# Patient Record
Sex: FEMALE | Race: WHITE | Employment: OTHER | URBAN - METROPOLITAN AREA
[De-identification: names, ages, dates, MRNs, and addresses within clinical notes are randomized per-mention and may not be internally consistent; named-entity substitution may affect disease eponyms.]

---

## 2017-01-16 ENCOUNTER — OFFICE VISIT (OUTPATIENT)
Dept: FAMILY MEDICINE CLINIC | Age: 62
End: 2017-01-16

## 2017-01-16 VITALS
BODY MASS INDEX: 34.2 KG/M2 | DIASTOLIC BLOOD PRESSURE: 76 MMHG | SYSTOLIC BLOOD PRESSURE: 110 MMHG | WEIGHT: 193 LBS | HEIGHT: 63 IN

## 2017-01-16 DIAGNOSIS — Z23 FLU VACCINE NEED: ICD-10-CM

## 2017-01-16 DIAGNOSIS — E78.00 PURE HYPERCHOLESTEROLEMIA: ICD-10-CM

## 2017-01-16 DIAGNOSIS — I10 ESSENTIAL HYPERTENSION: Primary | ICD-10-CM

## 2017-01-16 DIAGNOSIS — N28.9 RENAL INSUFFICIENCY: ICD-10-CM

## 2017-01-16 LAB
A/G RATIO: 2 (ref 1.1–2.2)
ALBUMIN SERPL-MCNC: 4.5 G/DL (ref 3.4–5)
ALP BLD-CCNC: 84 U/L (ref 40–129)
ALT SERPL-CCNC: 17 U/L (ref 10–40)
ANION GAP SERPL CALCULATED.3IONS-SCNC: 20 MMOL/L (ref 3–16)
AST SERPL-CCNC: 24 U/L (ref 15–37)
BILIRUB SERPL-MCNC: 0.4 MG/DL (ref 0–1)
BUN BLDV-MCNC: 17 MG/DL (ref 7–20)
CALCIUM SERPL-MCNC: 9.9 MG/DL (ref 8.3–10.6)
CHLORIDE BLD-SCNC: 101 MMOL/L (ref 99–110)
CHOLESTEROL, TOTAL: 180 MG/DL (ref 0–199)
CO2: 23 MMOL/L (ref 21–32)
CREAT SERPL-MCNC: 1.7 MG/DL (ref 0.6–1.2)
GFR AFRICAN AMERICAN: 37
GFR NON-AFRICAN AMERICAN: 31
GLOBULIN: 2.3 G/DL
GLUCOSE BLD-MCNC: 97 MG/DL (ref 70–99)
HDLC SERPL-MCNC: 58 MG/DL (ref 40–60)
LDL CHOLESTEROL CALCULATED: 87 MG/DL
POTASSIUM SERPL-SCNC: 4.9 MMOL/L (ref 3.5–5.1)
SODIUM BLD-SCNC: 144 MMOL/L (ref 136–145)
TOTAL PROTEIN: 6.8 G/DL (ref 6.4–8.2)
TRIGL SERPL-MCNC: 177 MG/DL (ref 0–150)
VLDLC SERPL CALC-MCNC: 35 MG/DL

## 2017-01-16 PROCEDURE — 90686 IIV4 VACC NO PRSV 0.5 ML IM: CPT | Performed by: FAMILY MEDICINE

## 2017-01-16 PROCEDURE — 36415 COLL VENOUS BLD VENIPUNCTURE: CPT | Performed by: FAMILY MEDICINE

## 2017-01-16 PROCEDURE — 99214 OFFICE O/P EST MOD 30 MIN: CPT | Performed by: FAMILY MEDICINE

## 2017-01-16 PROCEDURE — 90471 IMMUNIZATION ADMIN: CPT | Performed by: FAMILY MEDICINE

## 2017-01-16 RX ORDER — NEBIVOLOL 5 MG/1
TABLET ORAL
Qty: 90 TABLET | Refills: 1 | Status: SHIPPED | OUTPATIENT
Start: 2017-01-16 | End: 2017-07-17 | Stop reason: SDUPTHER

## 2017-01-16 RX ORDER — SIMVASTATIN 20 MG
TABLET ORAL
Qty: 90 TABLET | Refills: 1 | Status: SHIPPED | OUTPATIENT
Start: 2017-01-16 | End: 2017-07-17 | Stop reason: SDUPTHER

## 2017-01-16 RX ORDER — POTASSIUM CITRATE 5 MEQ/1
15 TABLET, EXTENDED RELEASE ORAL 2 TIMES DAILY
COMMUNITY
End: 2021-01-26 | Stop reason: SDUPTHER

## 2017-03-24 ENCOUNTER — OFFICE VISIT (OUTPATIENT)
Dept: FAMILY MEDICINE CLINIC | Age: 62
End: 2017-03-24

## 2017-03-24 VITALS
WEIGHT: 193.8 LBS | DIASTOLIC BLOOD PRESSURE: 76 MMHG | HEIGHT: 63 IN | OXYGEN SATURATION: 98 % | TEMPERATURE: 98.7 F | HEART RATE: 72 BPM | SYSTOLIC BLOOD PRESSURE: 124 MMHG | BODY MASS INDEX: 34.34 KG/M2

## 2017-03-24 DIAGNOSIS — B02.9 HERPES ZOSTER WITHOUT COMPLICATION: Primary | ICD-10-CM

## 2017-03-24 PROCEDURE — 99213 OFFICE O/P EST LOW 20 MIN: CPT | Performed by: PHYSICIAN ASSISTANT

## 2017-03-24 ASSESSMENT — ENCOUNTER SYMPTOMS: RESPIRATORY NEGATIVE: 1

## 2017-07-17 ENCOUNTER — OFFICE VISIT (OUTPATIENT)
Dept: FAMILY MEDICINE CLINIC | Age: 62
End: 2017-07-17

## 2017-07-17 VITALS
HEART RATE: 62 BPM | WEIGHT: 196 LBS | SYSTOLIC BLOOD PRESSURE: 126 MMHG | HEIGHT: 63 IN | BODY MASS INDEX: 34.73 KG/M2 | DIASTOLIC BLOOD PRESSURE: 80 MMHG

## 2017-07-17 DIAGNOSIS — I10 ESSENTIAL HYPERTENSION: Primary | ICD-10-CM

## 2017-07-17 DIAGNOSIS — E78.00 PURE HYPERCHOLESTEROLEMIA: ICD-10-CM

## 2017-07-17 DIAGNOSIS — N28.9 RENAL INSUFFICIENCY: ICD-10-CM

## 2017-07-17 LAB
A/G RATIO: 1.7 (ref 1.1–2.2)
ALBUMIN SERPL-MCNC: 4.5 G/DL (ref 3.4–5)
ALP BLD-CCNC: 79 U/L (ref 40–129)
ALT SERPL-CCNC: 19 U/L (ref 10–40)
ANION GAP SERPL CALCULATED.3IONS-SCNC: 13 MMOL/L (ref 3–16)
AST SERPL-CCNC: 22 U/L (ref 15–37)
BILIRUB SERPL-MCNC: 0.3 MG/DL (ref 0–1)
BUN BLDV-MCNC: 19 MG/DL (ref 7–20)
CALCIUM SERPL-MCNC: 9.8 MG/DL (ref 8.3–10.6)
CHLORIDE BLD-SCNC: 102 MMOL/L (ref 99–110)
CHOLESTEROL, TOTAL: 187 MG/DL (ref 0–199)
CO2: 28 MMOL/L (ref 21–32)
CREAT SERPL-MCNC: 1.5 MG/DL (ref 0.6–1.2)
GFR AFRICAN AMERICAN: 43
GFR NON-AFRICAN AMERICAN: 35
GLOBULIN: 2.6 G/DL
GLUCOSE BLD-MCNC: 85 MG/DL (ref 70–99)
HDLC SERPL-MCNC: 56 MG/DL (ref 40–60)
LDL CHOLESTEROL CALCULATED: 76 MG/DL
POTASSIUM SERPL-SCNC: 4.6 MMOL/L (ref 3.5–5.1)
SODIUM BLD-SCNC: 143 MMOL/L (ref 136–145)
TOTAL PROTEIN: 7.1 G/DL (ref 6.4–8.2)
TRIGL SERPL-MCNC: 277 MG/DL (ref 0–150)
VLDLC SERPL CALC-MCNC: 55 MG/DL

## 2017-07-17 PROCEDURE — 36415 COLL VENOUS BLD VENIPUNCTURE: CPT | Performed by: FAMILY MEDICINE

## 2017-07-17 PROCEDURE — 99214 OFFICE O/P EST MOD 30 MIN: CPT | Performed by: FAMILY MEDICINE

## 2017-07-17 RX ORDER — NEBIVOLOL 5 MG/1
TABLET ORAL
Qty: 90 TABLET | Refills: 1 | Status: SHIPPED | OUTPATIENT
Start: 2017-07-17 | End: 2018-01-25 | Stop reason: SDUPTHER

## 2017-07-17 RX ORDER — SIMVASTATIN 20 MG
TABLET ORAL
Qty: 90 TABLET | Refills: 1 | Status: SHIPPED | OUTPATIENT
Start: 2017-07-17 | End: 2018-01-25 | Stop reason: SDUPTHER

## 2017-07-17 ASSESSMENT — PATIENT HEALTH QUESTIONNAIRE - PHQ9
SUM OF ALL RESPONSES TO PHQ QUESTIONS 1-9: 0
2. FEELING DOWN, DEPRESSED OR HOPELESS: 0
SUM OF ALL RESPONSES TO PHQ9 QUESTIONS 1 & 2: 0
1. LITTLE INTEREST OR PLEASURE IN DOING THINGS: 0

## 2017-11-22 ENCOUNTER — NURSE ONLY (OUTPATIENT)
Dept: FAMILY MEDICINE CLINIC | Age: 62
End: 2017-11-22

## 2017-11-22 ENCOUNTER — HOSPITAL ENCOUNTER (OUTPATIENT)
Dept: MAMMOGRAPHY | Age: 62
Discharge: HOME | End: 2017-11-22
Attending: OBSTETRICS & GYNECOLOGY | Admitting: OBSTETRICS & GYNECOLOGY

## 2017-11-22 DIAGNOSIS — Z12.39 BREAST CANCER SCREENING: ICD-10-CM

## 2017-11-22 DIAGNOSIS — Z23 NEED FOR INFLUENZA VACCINATION: Primary | ICD-10-CM

## 2017-11-22 PROCEDURE — 90686 IIV4 VACC NO PRSV 0.5 ML IM: CPT | Performed by: FAMILY MEDICINE

## 2017-11-22 PROCEDURE — 90471 IMMUNIZATION ADMIN: CPT | Performed by: FAMILY MEDICINE

## 2017-11-22 NOTE — PROGRESS NOTES
Vaccine Information Sheet, \"Influenza - Inactivated\"  given to Eneida Lundbergornstzeb, or parent/legal guardian of  Balsamdmitry Finnegan and verbalized understanding. Patient responses:    Have you ever had a reaction to a flu vaccine? No  Are you able to eat eggs without adverse effects? Yes  Do you have any current illness? No  Have you ever had Guillian Mystic Syndrome? No    Flu vaccine given per order. Please see immunization tab.

## 2018-01-25 ENCOUNTER — OFFICE VISIT (OUTPATIENT)
Dept: FAMILY MEDICINE CLINIC | Age: 63
End: 2018-01-25

## 2018-01-25 VITALS
SYSTOLIC BLOOD PRESSURE: 140 MMHG | OXYGEN SATURATION: 98 % | WEIGHT: 204 LBS | BODY MASS INDEX: 36.14 KG/M2 | HEIGHT: 63 IN | HEART RATE: 60 BPM | DIASTOLIC BLOOD PRESSURE: 86 MMHG

## 2018-01-25 DIAGNOSIS — E78.00 PURE HYPERCHOLESTEROLEMIA: Primary | ICD-10-CM

## 2018-01-25 DIAGNOSIS — I10 ESSENTIAL HYPERTENSION: ICD-10-CM

## 2018-01-25 DIAGNOSIS — N28.9 RENAL INSUFFICIENCY: ICD-10-CM

## 2018-01-25 LAB
A/G RATIO: 1.7 (ref 1.1–2.2)
ALBUMIN SERPL-MCNC: 4.5 G/DL (ref 3.4–5)
ALP BLD-CCNC: 82 U/L (ref 40–129)
ALT SERPL-CCNC: 18 U/L (ref 10–40)
ANION GAP SERPL CALCULATED.3IONS-SCNC: 15 MMOL/L (ref 3–16)
AST SERPL-CCNC: 20 U/L (ref 15–37)
BILIRUB SERPL-MCNC: 0.5 MG/DL (ref 0–1)
BUN BLDV-MCNC: 19 MG/DL (ref 7–20)
CALCIUM SERPL-MCNC: 9.9 MG/DL (ref 8.3–10.6)
CHLORIDE BLD-SCNC: 102 MMOL/L (ref 99–110)
CHOLESTEROL, FASTING: 198 MG/DL (ref 0–199)
CO2: 27 MMOL/L (ref 21–32)
CREAT SERPL-MCNC: 1.4 MG/DL (ref 0.6–1.2)
GFR AFRICAN AMERICAN: 46
GFR NON-AFRICAN AMERICAN: 38
GLOBULIN: 2.6 G/DL
GLUCOSE BLD-MCNC: 99 MG/DL (ref 70–99)
HDLC SERPL-MCNC: 60 MG/DL (ref 40–60)
LDL CHOLESTEROL CALCULATED: 101 MG/DL
POTASSIUM SERPL-SCNC: 4.4 MMOL/L (ref 3.5–5.1)
SODIUM BLD-SCNC: 144 MMOL/L (ref 136–145)
TOTAL PROTEIN: 7.1 G/DL (ref 6.4–8.2)
TRIGLYCERIDE, FASTING: 185 MG/DL (ref 0–150)
VLDLC SERPL CALC-MCNC: 37 MG/DL

## 2018-01-25 PROCEDURE — 36415 COLL VENOUS BLD VENIPUNCTURE: CPT | Performed by: PHYSICIAN ASSISTANT

## 2018-01-25 PROCEDURE — 99213 OFFICE O/P EST LOW 20 MIN: CPT | Performed by: PHYSICIAN ASSISTANT

## 2018-01-25 RX ORDER — NEBIVOLOL 5 MG/1
TABLET ORAL
Qty: 90 TABLET | Refills: 1 | Status: SHIPPED | OUTPATIENT
Start: 2018-01-25 | End: 2018-07-30 | Stop reason: SDUPTHER

## 2018-01-25 RX ORDER — SIMVASTATIN 20 MG
TABLET ORAL
Qty: 90 TABLET | Refills: 1 | Status: SHIPPED | OUTPATIENT
Start: 2018-01-25 | End: 2018-07-30 | Stop reason: SDUPTHER

## 2018-03-19 ENCOUNTER — OFFICE VISIT (OUTPATIENT)
Dept: ORTHOPEDIC SURGERY | Age: 63
End: 2018-03-19

## 2018-03-19 VITALS
DIASTOLIC BLOOD PRESSURE: 63 MMHG | HEART RATE: 67 BPM | BODY MASS INDEX: 36.13 KG/M2 | HEIGHT: 63 IN | WEIGHT: 203.93 LBS | SYSTOLIC BLOOD PRESSURE: 122 MMHG

## 2018-03-19 DIAGNOSIS — M25.562 LEFT KNEE PAIN, UNSPECIFIED CHRONICITY: Primary | ICD-10-CM

## 2018-03-19 PROCEDURE — 99203 OFFICE O/P NEW LOW 30 MIN: CPT | Performed by: ORTHOPAEDIC SURGERY

## 2018-03-19 NOTE — PROGRESS NOTES
CHIEF COMPLAINT:  No chief complaint on file. HISTORY OF PRESENT ILLNESS:                The patient is a 65 y.o. female   Past Medical History:   Diagnosis Date    Hyperlipidemia     Hypertension     S/P colonoscopy 08/2009    Tendinitis of foot       This lady presents with ongoing LEFT leg pain. She describes this pain in the past.  This starts at the lateral aspect of the knee and it continues all the way up to the lateral aspect of the hip. It doesn't go past the leg generally but she has occasionally had some numbness and tingling. He responded nicely to Voltaren and well but in the past which she cannot take it anymore because of kidney issues. She is also received successful physical therapy.     The pain assessment was noted & is as follows:   ]Pain Assessment  Location of Pain: Knee  Location Modifiers: Left  Severity of Pain: 5  Quality of Pain: Sharp (tingling)  Duration of Pain: A few minutes  Frequency of Pain: Several times daily  Aggravating Factors: Stairs, Walking, Standing  Limiting Behavior: No  Relieving Factors: Rest, Nsaids  Result of Injury: No  Work-Related Injury: No  Are there other pain locations you wish to document?: No]      Work Status/Functionality:     Past Medical History: Medical history form was reviewed today & can be found in the media tab  Past Medical History:   Diagnosis Date    Hyperlipidemia     Hypertension     S/P colonoscopy 08/2009    Tendinitis of foot       Past Surgical History:     Past Surgical History:   Procedure Laterality Date    ABDOMEN SURGERY      stone removed from bile duct    ANGIOPLASTY      renal    BREAST SURGERY      duct removal    CHOLECYSTECTOMY  12/1992    COLONOSCOPY  8/1/2009    LAPAROSCOPY      OTHER SURGICAL HISTORY Left 01/11/2016    Cystoscopy, with left stent placement    TONSILLECTOMY AND ADENOIDECTOMY       Current Medications:     Current Outpatient Prescriptions:     nebivolol (BYSTOLIC) 5 MG tablet, TAKE roll examination. Negative straight leg raise examination. · Skin:  There are no rashes, ulcerations or lesions. · Gait & station:       · Additional Examinations:        Right Lower Extremity: Examination of the right lower extremity does not show any tenderness, deformity or injury. Range of motion is unremarkable. There is no gross instability. There are no rashes, ulcerations or lesions. Strength and tone are normal.      Diagnostic Testing:      3 x-ray views of the LEFT knee demonstrates basically normal findings for this 65year-old patient. Orders     Orders Placed This Encounter   Procedures    XR KNEE LEFT (3 VIEWS)     [id number]     Order Specific Question:   Reason for exam:     Answer:   Pain         Assessment / Treatment Plan:     1. LEFT greater trochanter bursitis with associated iliotibial band friction syndrome. We discussed treatment options and the patient elected to go ahead and pursue physical therapy and not have a shot for NSAIDs. 2.  Again, no NSAIDs because of kidney issues. 3.  Follow-up 3 weeks p.r.n. She'll consider a shot perhaps at that point if her symptoms were to persist.    I have personally performed and/or participated in the history, exam and medical decision making and agree with all pertinent clinical information. I have also reviewed and agree with the past medical, family and social history unless otherwise noted. This dictation was performed with a verbal recognition program (DRAGON) and it was checked for errors. It is possible that there are still dictated errors within this office note. If so, please bring any errors to my attention for an addendum. All efforts were made to ensure that this office note is accurate.           Sreekanth Alva MD

## 2018-03-30 ENCOUNTER — HOSPITAL ENCOUNTER (OUTPATIENT)
Dept: PHYSICAL THERAPY | Age: 63
Discharge: HOME | End: 2018-03-31
Admitting: ORTHOPAEDIC SURGERY

## 2018-04-01 ENCOUNTER — HOSPITAL ENCOUNTER (OUTPATIENT)
Dept: PHYSICAL THERAPY | Age: 63
Discharge: HOME | End: 2018-04-24
Attending: ORTHOPAEDIC SURGERY | Admitting: ORTHOPAEDIC SURGERY

## 2018-04-13 ENCOUNTER — HOSPITAL ENCOUNTER (OUTPATIENT)
Dept: PHYSICAL THERAPY | Age: 63
Discharge: HOME | End: 2018-04-14
Admitting: ORTHOPAEDIC SURGERY

## 2018-04-18 ENCOUNTER — HOSPITAL ENCOUNTER (OUTPATIENT)
Dept: PHYSICAL THERAPY | Age: 63
Discharge: HOME | End: 2018-04-19
Admitting: ORTHOPAEDIC SURGERY

## 2018-04-23 ENCOUNTER — HOSPITAL ENCOUNTER (OUTPATIENT)
Dept: PHYSICAL THERAPY | Age: 63
Discharge: HOME | End: 2018-04-24
Admitting: ORTHOPAEDIC SURGERY

## 2018-05-29 ENCOUNTER — OFFICE VISIT (OUTPATIENT)
Dept: FAMILY MEDICINE CLINIC | Age: 63
End: 2018-05-29

## 2018-05-29 VITALS
OXYGEN SATURATION: 95 % | TEMPERATURE: 99.2 F | HEART RATE: 72 BPM | HEIGHT: 63 IN | WEIGHT: 200 LBS | SYSTOLIC BLOOD PRESSURE: 130 MMHG | DIASTOLIC BLOOD PRESSURE: 84 MMHG | BODY MASS INDEX: 35.44 KG/M2

## 2018-05-29 DIAGNOSIS — L23.7 ALLERGIC CONTACT DERMATITIS DUE TO PLANTS, EXCEPT FOOD: ICD-10-CM

## 2018-05-29 DIAGNOSIS — J40 BRONCHITIS: Primary | ICD-10-CM

## 2018-05-29 PROCEDURE — 99213 OFFICE O/P EST LOW 20 MIN: CPT | Performed by: FAMILY MEDICINE

## 2018-05-29 RX ORDER — AZITHROMYCIN 250 MG/1
250 TABLET, FILM COATED ORAL DAILY
Qty: 1 PACKET | Refills: 0 | Status: SHIPPED | OUTPATIENT
Start: 2018-05-29 | End: 2018-06-26 | Stop reason: ALTCHOICE

## 2018-05-29 ASSESSMENT — ENCOUNTER SYMPTOMS
SPUTUM PRODUCTION: 1
WHEEZING: 1
COUGH: 1

## 2018-06-26 ENCOUNTER — OFFICE VISIT (OUTPATIENT)
Dept: FAMILY MEDICINE CLINIC | Age: 63
End: 2018-06-26

## 2018-06-26 VITALS
OXYGEN SATURATION: 95 % | BODY MASS INDEX: 35.97 KG/M2 | DIASTOLIC BLOOD PRESSURE: 74 MMHG | HEIGHT: 63 IN | HEART RATE: 63 BPM | SYSTOLIC BLOOD PRESSURE: 106 MMHG | WEIGHT: 203 LBS

## 2018-06-26 DIAGNOSIS — S60.561A INSECT BITE OF RIGHT HAND, INITIAL ENCOUNTER: Primary | ICD-10-CM
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
## 2018-06-26 PROCEDURE — 99213 OFFICE O/P EST LOW 20 MIN: CPT | Performed by: PHYSICIAN ASSISTANT

## 2018-06-26 RX ORDER — METHYLPREDNISOLONE 4 MG/1
TABLET ORAL
Qty: 1 KIT | Refills: 0 | Status: SHIPPED | OUTPATIENT
Start: 2018-06-26 | End: 2018-07-02

## 2018-06-26 ASSESSMENT — ENCOUNTER SYMPTOMS: RESPIRATORY NEGATIVE: 1

## 2018-07-30 ENCOUNTER — OFFICE VISIT (OUTPATIENT)
Dept: FAMILY MEDICINE CLINIC | Age: 63
End: 2018-07-30

## 2018-07-30 VITALS
SYSTOLIC BLOOD PRESSURE: 130 MMHG | HEIGHT: 63 IN | HEART RATE: 64 BPM | DIASTOLIC BLOOD PRESSURE: 74 MMHG | BODY MASS INDEX: 36.32 KG/M2 | WEIGHT: 205 LBS | OXYGEN SATURATION: 97 %

## 2018-07-30 DIAGNOSIS — I10 ESSENTIAL HYPERTENSION: Primary | ICD-10-CM

## 2018-07-30 DIAGNOSIS — E78.00 PURE HYPERCHOLESTEROLEMIA: ICD-10-CM

## 2018-07-30 DIAGNOSIS — N28.9 RENAL INSUFFICIENCY: ICD-10-CM

## 2018-07-30 LAB
A/G RATIO: 1.7 (ref 1.1–2.2)
ALBUMIN SERPL-MCNC: 4.7 G/DL (ref 3.4–5)
ALP BLD-CCNC: 85 U/L (ref 40–129)
ALT SERPL-CCNC: 25 U/L (ref 10–40)
ANION GAP SERPL CALCULATED.3IONS-SCNC: 15 MMOL/L (ref 3–16)
AST SERPL-CCNC: 25 U/L (ref 15–37)
BILIRUB SERPL-MCNC: 0.5 MG/DL (ref 0–1)
BUN BLDV-MCNC: 17 MG/DL (ref 7–20)
CALCIUM SERPL-MCNC: 10.3 MG/DL (ref 8.3–10.6)
CHLORIDE BLD-SCNC: 101 MMOL/L (ref 99–110)
CHOLESTEROL, TOTAL: 219 MG/DL (ref 0–199)
CO2: 27 MMOL/L (ref 21–32)
CREAT SERPL-MCNC: 1.5 MG/DL (ref 0.6–1.2)
GFR AFRICAN AMERICAN: 43
GFR NON-AFRICAN AMERICAN: 35
GLOBULIN: 2.7 G/DL
GLUCOSE BLD-MCNC: 102 MG/DL (ref 70–99)
HDLC SERPL-MCNC: 60 MG/DL (ref 40–60)
LDL CHOLESTEROL CALCULATED: ABNORMAL MG/DL
LDL CHOLESTEROL DIRECT: 125 MG/DL
POTASSIUM SERPL-SCNC: 4.5 MMOL/L (ref 3.5–5.1)
SODIUM BLD-SCNC: 143 MMOL/L (ref 136–145)
TOTAL PROTEIN: 7.4 G/DL (ref 6.4–8.2)
TRIGL SERPL-MCNC: 339 MG/DL (ref 0–150)
VLDLC SERPL CALC-MCNC: ABNORMAL MG/DL

## 2018-07-30 PROCEDURE — 36415 COLL VENOUS BLD VENIPUNCTURE: CPT | Performed by: FAMILY MEDICINE

## 2018-07-30 PROCEDURE — 99214 OFFICE O/P EST MOD 30 MIN: CPT | Performed by: FAMILY MEDICINE

## 2018-07-30 RX ORDER — SIMVASTATIN 20 MG
TABLET ORAL
Qty: 90 TABLET | Refills: 1 | Status: SHIPPED | OUTPATIENT
Start: 2018-07-30 | End: 2018-08-01 | Stop reason: SDUPTHER

## 2018-07-30 RX ORDER — NEBIVOLOL 5 MG/1
TABLET ORAL
Qty: 90 TABLET | Refills: 1 | Status: SHIPPED | OUTPATIENT
Start: 2018-07-30 | End: 2019-02-22 | Stop reason: SDUPTHER

## 2018-07-30 ASSESSMENT — PATIENT HEALTH QUESTIONNAIRE - PHQ9
SUM OF ALL RESPONSES TO PHQ QUESTIONS 1-9: 0
SUM OF ALL RESPONSES TO PHQ9 QUESTIONS 1 & 2: 0
1. LITTLE INTEREST OR PLEASURE IN DOING THINGS: 0
2. FEELING DOWN, DEPRESSED OR HOPELESS: 0

## 2018-08-01 ENCOUNTER — TELEPHONE (OUTPATIENT)
Dept: FAMILY MEDICINE CLINIC | Age: 63
End: 2018-08-01

## 2018-08-01 DIAGNOSIS — E78.00 PURE HYPERCHOLESTEROLEMIA: ICD-10-CM

## 2018-08-01 DIAGNOSIS — I10 ESSENTIAL HYPERTENSION: ICD-10-CM

## 2018-08-01 RX ORDER — SIMVASTATIN 40 MG
TABLET ORAL
Qty: 90 TABLET | Refills: 1 | Status: SHIPPED | OUTPATIENT
Start: 2018-08-01 | End: 2019-02-22 | Stop reason: SDUPTHER

## 2018-08-02 ENCOUNTER — TELEPHONE (OUTPATIENT)
Dept: PRIMARY CARE CLINIC | Age: 63
End: 2018-08-02

## 2018-08-02 NOTE — TELEPHONE ENCOUNTER
Submitted PA via CMM for Bystolic 5MG OR TABS. Silviano BARBOSA Case ID: [id number]  Rx #: [id number]. Medication has been APPROVED through 08/02/2019. Please advise patient.

## 2018-08-29 ENCOUNTER — OFFICE VISIT (OUTPATIENT)
Dept: FAMILY MEDICINE CLINIC | Age: 63
End: 2018-08-29

## 2018-08-29 VITALS
OXYGEN SATURATION: 97 % | SYSTOLIC BLOOD PRESSURE: 122 MMHG | WEIGHT: 208 LBS | DIASTOLIC BLOOD PRESSURE: 76 MMHG | HEART RATE: 60 BPM | HEIGHT: 63 IN | TEMPERATURE: 98 F | BODY MASS INDEX: 36.86 KG/M2

## 2018-08-29 DIAGNOSIS — H92.01 RIGHT EAR PAIN: Primary | ICD-10-CM

## 2018-08-29 PROCEDURE — 99213 OFFICE O/P EST LOW 20 MIN: CPT | Performed by: FAMILY MEDICINE

## 2018-08-29 ASSESSMENT — ENCOUNTER SYMPTOMS
SINUS PAIN: 0
COUGH: 0
SORE THROAT: 0

## 2018-08-29 NOTE — PROGRESS NOTES
Patient: Marina howell 59 y.o. femalewho presents today with the following Chief Complaint(s):  Chief Complaint   Patient presents with    Ear Fullness     c/o right ear fullness/ and pain x 3 weeks. Some jaw pain. Patient complains of a pain in her right ear only when moving her head to a certain direction. It feels full. It does not feel like it needs to pop. She denies sore throat and sinus congestion and head congestion cough. There are no symptoms on the left. Current Outpatient Prescriptions   Medication Sig Dispense Refill    simvastatin (ZOCOR) 40 MG tablet TAKE ONE TABLET BY MOUTH ONCE NIGHTLY 90 tablet 1    nebivolol (BYSTOLIC) 5 MG tablet TAKE ONE TABLET BY MOUTH DAILY 90 tablet 1    potassium citrate (UROCIT-K) 5 MEQ (540 MG) extended release tablet Take 15 mEq by mouth 2 times daily       No current facility-administered medications for this visit. Patients past medical history, surgical history, family history, medications and allergies were all reviewed and updated as appropriate today. Review of Systems   Constitutional: Negative for fever. HENT: Positive for ear pain. Negative for congestion, hearing loss, sinus pain and sore throat. Respiratory: Negative for cough. Physical Exam   Constitutional: No distress. HENT:   Right Ear: External ear normal.   Left Ear: External ear normal.   Mouth/Throat: Oropharynx is clear and moist.   Neck: Neck supple. Cardiovascular: Normal rate, regular rhythm and normal heart sounds. Pulmonary/Chest: Breath sounds normal.   Lymphadenopathy:     She has no cervical adenopathy. Vitals:    08/29/18 1447   BP: 122/76   Site: Left Arm   Position: Sitting   Cuff Size: Medium Adult   Pulse: 60   Temp: 98 °F (36.7 °C)   TempSrc: Temporal   SpO2: 97%   Weight: 208 lb (94.3 kg)   Height: 5' 3\" (1.6 m)       Assessment/Plan:   Osie Meigs was seen today for ear fullness.     Diagnoses and all orders for this

## 2018-10-17 ENCOUNTER — IMMUNIZATION (OUTPATIENT)
Dept: FAMILY MEDICINE CLINIC | Age: 63
End: 2018-10-17
Payer: COMMERCIAL

## 2018-10-17 DIAGNOSIS — Z23 FLU VACCINE NEED: Primary | ICD-10-CM

## 2018-10-17 PROCEDURE — 90471 IMMUNIZATION ADMIN: CPT | Performed by: FAMILY MEDICINE

## 2018-10-17 PROCEDURE — 90686 IIV4 VACC NO PRSV 0.5 ML IM: CPT | Performed by: FAMILY MEDICINE

## 2018-10-17 NOTE — PROGRESS NOTES
Vaccine Information Sheet, \"Influenza - Inactivated\"  given to [employer], or parent/legal guardian of  [employer] and verbalized understanding. Patient responses:    Have you ever had a reaction to a flu vaccine? No  Are you able to eat eggs without adverse effects? Yes  Do you have any current illness? No  Have you ever had Guillian [place] Syndrome? No    Flu vaccine given per order. Please see immunization tab.

## 2018-11-23 ENCOUNTER — HOSPITAL ENCOUNTER (OUTPATIENT)
Dept: WOMENS IMAGING | Age: 63
Discharge: HOME | End: 2018-11-23
Payer: COMMERCIAL

## 2018-11-23 DIAGNOSIS — Z12.31 ENCOUNTER FOR SCREENING MAMMOGRAM FOR BREAST CANCER: ICD-10-CM

## 2018-11-23 PROCEDURE — 77067 SCR MAMMO BI INCL CAD: CPT

## 2019-01-08 ENCOUNTER — HOSPITAL ENCOUNTER (OUTPATIENT)
Dept: PHYSICAL THERAPY | Age: 64
Setting detail: THERAPIES SERIES
Discharge: HOME | End: 2019-01-08
Payer: COMMERCIAL

## 2019-01-08 ENCOUNTER — OFFICE VISIT (OUTPATIENT)
Dept: ORTHOPEDIC SURGERY | Age: 64
End: 2019-01-08
Payer: COMMERCIAL

## 2019-01-08 ENCOUNTER — TELEPHONE (OUTPATIENT)
Dept: ORTHOPEDIC SURGERY | Age: 64
End: 2019-01-08

## 2019-01-08 VITALS
HEIGHT: 63 IN | WEIGHT: 207.89 LBS | SYSTOLIC BLOOD PRESSURE: 119 MMHG | BODY MASS INDEX: 36.84 KG/M2 | DIASTOLIC BLOOD PRESSURE: 80 MMHG | HEART RATE: 72 BPM

## 2019-01-08 DIAGNOSIS — M54.16 LUMBAR RADICULITIS: ICD-10-CM

## 2019-01-08 DIAGNOSIS — M51.36 DDD (DEGENERATIVE DISC DISEASE), LUMBAR: ICD-10-CM

## 2019-01-08 DIAGNOSIS — M54.5 LOW BACK PAIN, UNSPECIFIED BACK PAIN LATERALITY, UNSPECIFIED CHRONICITY, WITH SCIATICA PRESENCE UNSPECIFIED: Primary | ICD-10-CM

## 2019-01-08 PROCEDURE — G8982 BODY POS GOAL STATUS: HCPCS

## 2019-01-08 PROCEDURE — G8981 BODY POS CURRENT STATUS: HCPCS

## 2019-01-08 PROCEDURE — 97110 THERAPEUTIC EXERCISES: CPT

## 2019-01-08 PROCEDURE — L0642 LO SAG RI AN/POS PNL PRE OTS: HCPCS | Performed by: PHYSICIAN ASSISTANT

## 2019-01-08 PROCEDURE — 99203 OFFICE O/P NEW LOW 30 MIN: CPT | Performed by: PHYSICIAN ASSISTANT

## 2019-01-08 PROCEDURE — 97161 PT EVAL LOW COMPLEX 20 MIN: CPT

## 2019-01-08 RX ORDER — METHYLPREDNISOLONE 4 MG/1
TABLET ORAL
Qty: 1 KIT | Refills: 0 | Status: SHIPPED | OUTPATIENT
Start: 2019-01-08 | End: 2019-01-14

## 2019-01-15 ENCOUNTER — HOSPITAL ENCOUNTER (OUTPATIENT)
Dept: PHYSICAL THERAPY | Age: 64
Setting detail: THERAPIES SERIES
Discharge: HOME | End: 2019-01-15
Payer: COMMERCIAL

## 2019-01-15 PROCEDURE — 97110 THERAPEUTIC EXERCISES: CPT

## 2019-01-15 PROCEDURE — 97140 MANUAL THERAPY 1/> REGIONS: CPT

## 2019-01-22 ENCOUNTER — HOSPITAL ENCOUNTER (OUTPATIENT)
Dept: PHYSICAL THERAPY | Age: 64
Setting detail: THERAPIES SERIES
Discharge: HOME | End: 2019-01-22
Payer: COMMERCIAL

## 2019-01-22 PROCEDURE — 97110 THERAPEUTIC EXERCISES: CPT

## 2019-01-22 PROCEDURE — 97140 MANUAL THERAPY 1/> REGIONS: CPT

## 2019-01-29 ENCOUNTER — APPOINTMENT (OUTPATIENT)
Dept: PHYSICAL THERAPY | Age: 64
End: 2019-01-29
Payer: COMMERCIAL

## 2019-02-05 ENCOUNTER — HOSPITAL ENCOUNTER (OUTPATIENT)
Dept: PHYSICAL THERAPY | Age: 64
Setting detail: THERAPIES SERIES
Discharge: HOME | End: 2019-02-05
Payer: COMMERCIAL

## 2019-02-05 PROCEDURE — 97140 MANUAL THERAPY 1/> REGIONS: CPT

## 2019-02-05 PROCEDURE — G8981 BODY POS CURRENT STATUS: HCPCS

## 2019-02-05 PROCEDURE — 97110 THERAPEUTIC EXERCISES: CPT

## 2019-02-05 PROCEDURE — G8982 BODY POS GOAL STATUS: HCPCS

## 2019-02-05 PROCEDURE — G8983 BODY POS D/C STATUS: HCPCS

## 2019-02-06 ENCOUNTER — OFFICE VISIT (OUTPATIENT)
Dept: ORTHOPEDIC SURGERY | Age: 64
End: 2019-02-06
Payer: COMMERCIAL

## 2019-02-06 VITALS
SYSTOLIC BLOOD PRESSURE: 135 MMHG | DIASTOLIC BLOOD PRESSURE: 80 MMHG | HEIGHT: 63 IN | WEIGHT: 207.89 LBS | HEART RATE: 68 BPM | BODY MASS INDEX: 36.84 KG/M2

## 2019-02-06 DIAGNOSIS — M51.36 DDD (DEGENERATIVE DISC DISEASE), LUMBAR: ICD-10-CM

## 2019-02-06 DIAGNOSIS — M54.5 LOW BACK PAIN, UNSPECIFIED BACK PAIN LATERALITY, UNSPECIFIED CHRONICITY, WITH SCIATICA PRESENCE UNSPECIFIED: Primary | ICD-10-CM

## 2019-02-06 DIAGNOSIS — M54.16 LUMBAR RADICULITIS: ICD-10-CM

## 2019-02-06 PROCEDURE — 99213 OFFICE O/P EST LOW 20 MIN: CPT | Performed by: PHYSICIAN ASSISTANT

## 2019-02-22 ENCOUNTER — OFFICE VISIT (OUTPATIENT)
Dept: FAMILY MEDICINE CLINIC | Age: 64
End: 2019-02-22
Payer: COMMERCIAL

## 2019-02-22 VITALS
OXYGEN SATURATION: 96 % | BODY MASS INDEX: 37 KG/M2 | HEART RATE: 76 BPM | DIASTOLIC BLOOD PRESSURE: 70 MMHG | HEIGHT: 63 IN | WEIGHT: 208.8 LBS | SYSTOLIC BLOOD PRESSURE: 110 MMHG

## 2019-02-22 DIAGNOSIS — E78.00 PURE HYPERCHOLESTEROLEMIA: ICD-10-CM

## 2019-02-22 DIAGNOSIS — N28.9 RENAL INSUFFICIENCY: ICD-10-CM

## 2019-02-22 DIAGNOSIS — I10 ESSENTIAL HYPERTENSION: Primary | ICD-10-CM

## 2019-02-22 DIAGNOSIS — R73.9 ELEVATED BLOOD SUGAR: ICD-10-CM

## 2019-02-22 LAB
A/G RATIO: 1.8 (ref 1.1–2.2)
ALBUMIN SERPL-MCNC: 4.4 G/DL (ref 3.4–5)
ALP BLD-CCNC: 81 U/L (ref 40–129)
ALT SERPL-CCNC: 25 U/L (ref 10–40)
ANION GAP SERPL CALCULATED.3IONS-SCNC: 13 MMOL/L (ref 3–16)
AST SERPL-CCNC: 18 U/L (ref 15–37)
BILIRUB SERPL-MCNC: 0.4 MG/DL (ref 0–1)
BUN BLDV-MCNC: 22 MG/DL (ref 7–20)
CALCIUM SERPL-MCNC: 9.7 MG/DL (ref 8.3–10.6)
CHLORIDE BLD-SCNC: 101 MMOL/L (ref 99–110)
CHOLESTEROL, TOTAL: 168 MG/DL (ref 0–199)
CO2: 28 MMOL/L (ref 21–32)
CREAT SERPL-MCNC: 1.5 MG/DL (ref 0.6–1.2)
GFR AFRICAN AMERICAN: 42
GFR NON-AFRICAN AMERICAN: 35
GLOBULIN: 2.4 G/DL
GLUCOSE BLD-MCNC: 95 MG/DL (ref 70–99)
HDLC SERPL-MCNC: 55 MG/DL (ref 40–60)
LDL CHOLESTEROL CALCULATED: 71 MG/DL
POTASSIUM SERPL-SCNC: 4.2 MMOL/L (ref 3.5–5.1)
SODIUM BLD-SCNC: 142 MMOL/L (ref 136–145)
TOTAL PROTEIN: 6.8 G/DL (ref 6.4–8.2)
TRIGL SERPL-MCNC: 211 MG/DL (ref 0–150)
VLDLC SERPL CALC-MCNC: 42 MG/DL

## 2019-02-22 PROCEDURE — 36415 COLL VENOUS BLD VENIPUNCTURE: CPT | Performed by: FAMILY MEDICINE

## 2019-02-22 PROCEDURE — 99214 OFFICE O/P EST MOD 30 MIN: CPT | Performed by: FAMILY MEDICINE

## 2019-02-22 RX ORDER — NEBIVOLOL 5 MG/1
TABLET ORAL
Qty: 90 TABLET | Refills: 1 | Status: SHIPPED | OUTPATIENT
Start: 2019-02-22 | End: 2019-09-05 | Stop reason: SDUPTHER

## 2019-02-22 RX ORDER — SIMVASTATIN 40 MG
TABLET ORAL
Qty: 90 TABLET | Refills: 1 | Status: SHIPPED | OUTPATIENT
Start: 2019-02-22 | End: 2019-08-15 | Stop reason: SDUPTHER

## 2019-02-22 ASSESSMENT — PATIENT HEALTH QUESTIONNAIRE - PHQ9
2. FEELING DOWN, DEPRESSED OR HOPELESS: 0
SUM OF ALL RESPONSES TO PHQ9 QUESTIONS 1 & 2: 0
SUM OF ALL RESPONSES TO PHQ QUESTIONS 1-9: 0
1. LITTLE INTEREST OR PLEASURE IN DOING THINGS: 0
SUM OF ALL RESPONSES TO PHQ QUESTIONS 1-9: 0

## 2019-02-23 LAB
ESTIMATED AVERAGE GLUCOSE: 119.8 MG/DL
HBA1C MFR BLD: 5.8 %

## 2019-03-27 ENCOUNTER — HOSPITAL ENCOUNTER (OUTPATIENT)
Dept: GENERAL RADIOLOGY | Age: 64
Discharge: HOME | End: 2019-03-27
Payer: COMMERCIAL

## 2019-03-27 DIAGNOSIS — Z87.442 HISTORY OF KIDNEY STONES: ICD-10-CM

## 2019-03-27 PROCEDURE — 74018 RADEX ABDOMEN 1 VIEW: CPT

## 2019-04-03 ENCOUNTER — TELEPHONE (OUTPATIENT)
Dept: FAMILY MEDICINE CLINIC | Age: 64
End: 2019-04-03

## 2019-04-03 DIAGNOSIS — R73.03 PREDIABETES: Primary | ICD-10-CM

## 2019-04-03 NOTE — TELEPHONE ENCOUNTER
Patient would like a referral to attend the pre diabetes class - ([facility] if they have one in the next couple months) if [facility] doesn't have one then she found one at [facility] and they do one once a month. [employer] phone # is [phone]. Dr. Carolina Javier could you enter in the referral for the prediabetes class? Do you have any info on the [facility] classes? If you enter it in the chart I can fax it to [facility] or [place] whichever the patient prefers.      I can call the patient back when we get more information on the [facility] pre diabetes classes [phone]

## 2019-04-03 NOTE — TELEPHONE ENCOUNTER
Referral placed for [facility] Diabetic education class, patient was given phone number for scheduling.

## 2019-04-11 ENCOUNTER — TELEPHONE (OUTPATIENT)
Dept: FAMILY MEDICINE CLINIC | Age: 64
End: 2019-04-11

## 2019-04-11 NOTE — TELEPHONE ENCOUNTER
Pt called in requesting a referral for a pre diabetes class at [facility] because [facility] does not offer it. Fax number [phone].

## 2019-07-08 ENCOUNTER — OFFICE VISIT (OUTPATIENT)
Dept: ORTHOPEDIC SURGERY | Age: 64
End: 2019-07-08
Payer: COMMERCIAL

## 2019-07-08 ENCOUNTER — TELEPHONE (OUTPATIENT)
Dept: ORTHOPEDIC SURGERY | Age: 64
End: 2019-07-08

## 2019-07-08 VITALS
BODY MASS INDEX: 36.99 KG/M2 | DIASTOLIC BLOOD PRESSURE: 96 MMHG | HEIGHT: 63 IN | SYSTOLIC BLOOD PRESSURE: 156 MMHG | WEIGHT: 208.78 LBS | HEART RATE: 65 BPM

## 2019-07-08 DIAGNOSIS — M54.16 LUMBAR RADICULITIS: ICD-10-CM

## 2019-07-08 DIAGNOSIS — M51.36 DDD (DEGENERATIVE DISC DISEASE), LUMBAR: Primary | ICD-10-CM

## 2019-07-08 PROCEDURE — 99214 OFFICE O/P EST MOD 30 MIN: CPT | Performed by: PHYSICIAN ASSISTANT

## 2019-07-08 NOTE — PROGRESS NOTES
Follow up: [facility]INE    CHIEF COMPLAINT:    Chief Complaint   Patient presents with    Back Pain       HISTORY OF PRESENT ILLNESS:                The patient is a 62 y.o. female history of hypertension here to follow up subacute/chronic achy atraumatic low back/buttock pain at times extending into the groin bilaterally. Pain has been for several months but increased over the last month. She believes symptoms have been worsened since gardening. Denies any direct trauma. Pain is currently increased with any transition, some relief with stretching. Conservative care includes: PT (since Jan 2019 to present with HEP), brace PRN, Tylenol, ice, MDP. Currently denies any progressive numbness tingling weakness. No bowel or bladder changes. No recent trauma. Pain Assessment  Location of Pain: Back  Severity of Pain: 4  Quality of Pain: Sharp, Dull, Aching  Duration of Pain: Persistent  Frequency of Pain: Constant  Aggravating Factors: Stairs, Walking, Standing, Squatting, Kneeling, Exercise, Stretching, Bending, Straightening  Limiting Behavior: Yes  Relieving Factors: Rest  Result of Injury: No  Work-Related Injury: No  Are there other pain locations you wish to document?: No    The pain assessment was noted & reviewed in the medical record today.      Current/Past Treatment:   · Physical Therapy: yes since Jan 2019 with continued HEP  · Chiropractic:   no  · Injection:   no  Medications:            NSAIDS:             Muscle relaxer:              Steriods:  MDP            Neuropathic medications:              Opioids:            OTHER: Tylenol   · Surgery/Consult:    Work Status/Functionality: taking care of her ill      Past Medical History: Medical history form was reviewed today & scanned into the media tab  Past Medical History:   Diagnosis Date    Hyperlipidemia     Hypertension     S/P colonoscopy 08/2009    Tendinitis of foot       Past Surgical History:     Past Surgical History:   Procedure Laterality Date    ABDOMEN SURGERY      stone removed from bile duct    ANGIOPLASTY      renal    BREAST SURGERY      duct removal    CHOLECYSTECTOMY  1992    COLONOSCOPY  2009    LAPAROSCOPY      OTHER SURGICAL HISTORY Left 2016    Cystoscopy, with left stent placement    TONSILLECTOMY AND ADENOIDECTOMY       Current Medications:     Current Outpatient Medications:     simvastatin (ZOCOR) 40 MG tablet, TAKE ONE TABLET BY MOUTH ONCE NIGHTLY, Disp: 90 tablet, Rfl: 1    nebivolol (BYSTOLIC) 5 MG tablet, TAKE ONE TABLET BY MOUTH DAILY, Disp: 90 tablet, Rfl: 1    potassium citrate (UROCIT-K) 5 MEQ (540 MG) extended release tablet, Take 15 mEq by mouth 2 times daily, Disp: , Rfl:   Allergies:  Codeine; Lisinopril; Norvasc [amlodipine besylate]; and Triamterene-hctz  Social History:    reports that she has never smoked. She has never used smokeless tobacco. She reports that she does not drink alcohol or use drugs. Family History:   Family History   Problem Relation Age of Onset    Heart Disease Father         MI  at  age 48   Shahab Mi High Blood Pressure Sister     Diabetes Sister     Kidney Disease Sister     High Blood Pressure Mother     High Cholesterol Mother     Arthritis Mother         gout    Heart Disease Mother        REVIEW OF SYSTEMS: Full ROS noted & scanned   CONSTITUTIONAL: Denies unexplained weight loss, fevers, chills or fatigue  NEUROLOGICAL: Denies unsteady gait or progressive weakness      PHYSICAL EXAM:    Vitals: Blood pressure (!) 156/96, pulse 65, height 5' 2.99\" (1.6 m), weight 208 lb 12.4 oz (94.7 kg), last menstrual period 2005, not currently breastfeeding. GENERAL EXAM:  · General Apparence: Patient is adequately groomed with no evidence of malnutrition. · Orientation: The patient is oriented to time, place and person.    · Mood & Affect:The patient's mood and affect are appropriate   · Lymphatic: The lymphatic examination bilaterally reveals all areas

## 2019-07-09 ENCOUNTER — TELEPHONE (OUTPATIENT)
Dept: ORTHOPEDIC SURGERY | Age: 64
End: 2019-07-09

## 2019-07-10 ENCOUNTER — TELEPHONE (OUTPATIENT)
Dept: ORTHOPEDIC SURGERY | Age: 64
End: 2019-07-10

## 2019-07-10 NOTE — TELEPHONE ENCOUNTER
RETURNED PATIENTS CALL AND LET HER KNOW THAT WE ARE CALLING IN A SCRIPT FOR VALIUM TO TAKE PRIOR O HER MRI. SHE WAS INSTRUCTED TO HAVE SOMEONE DRIVE D/T SEDATING PROPERTIES.     VOICED UNDERSTANDING      SCRIPT CALLED TO Kevin Worthy ON [address]

## 2019-07-15 ENCOUNTER — HOSPITAL ENCOUNTER (OUTPATIENT)
Dept: MRI IMAGING | Age: 64
Discharge: HOME | End: 2019-07-15
Payer: COMMERCIAL

## 2019-07-15 DIAGNOSIS — M54.16 LUMBAR RADICULITIS: ICD-10-CM

## 2019-07-15 DIAGNOSIS — M51.36 DDD (DEGENERATIVE DISC DISEASE), LUMBAR: ICD-10-CM

## 2019-07-15 PROCEDURE — 72148 MRI LUMBAR SPINE W/O DYE: CPT

## 2019-07-17 ENCOUNTER — OFFICE VISIT (OUTPATIENT)
Dept: ORTHOPEDIC SURGERY | Age: 64
End: 2019-07-17
Payer: COMMERCIAL

## 2019-07-17 VITALS
SYSTOLIC BLOOD PRESSURE: 130 MMHG | BODY MASS INDEX: 36.99 KG/M2 | WEIGHT: 208.78 LBS | HEIGHT: 63 IN | DIASTOLIC BLOOD PRESSURE: 90 MMHG | HEART RATE: 72 BPM

## 2019-07-17 DIAGNOSIS — M51.36 DDD (DEGENERATIVE DISC DISEASE), LUMBAR: Primary | ICD-10-CM

## 2019-07-17 DIAGNOSIS — M48.062 LUMBAR STENOSIS WITH NEUROGENIC CLAUDICATION: ICD-10-CM

## 2019-07-17 PROCEDURE — 99214 OFFICE O/P EST MOD 30 MIN: CPT | Performed by: PHYSICIAN ASSISTANT

## 2019-07-17 RX ORDER — GABAPENTIN 100 MG/1
CAPSULE ORAL
Qty: 90 CAPSULE | Refills: 0 | Status: SHIPPED | OUTPATIENT
Start: 2019-07-17 | End: 2019-08-28

## 2019-07-17 NOTE — PROGRESS NOTES
Follow up: SPINE    CHIEF COMPLAINT:    Chief Complaint   Patient presents with    Back Pain     F/U MRI results       HISTORY OF PRESENT ILLNESS:                The patient is a 62 y.o. female history of hypertension here to review lumbar MRI for subacute/chronic achy atraumatic low back/buttock pain at times extending into the groin bilaterally. Earlier this year she had pain radiating into the bilateral lower extremities with numbness and tingling this has improved. The majority of her pain is in her low back and buttocks. Pain has been increased over the last 1 to 2 months. She believes symptoms have been worsened since gardening. Denies any direct trauma. Pain is currently increased with any transition, some relief with stretching. Conservative care includes: MDP, PT (since Jan 2019 to present with HEP), brace PRN, Tylenol, ice. She denies any significant improvement at this time. Currently denies any progressive numbness tingling weakness. No bowel or bladder changes. No recent trauma. Pain Assessment  Location of Pain: Back  Severity of Pain: 7  Quality of Pain: Sharp, Dull, Aching  Duration of Pain: Persistent  Frequency of Pain: Constant  Aggravating Factors: Stairs, Walking, Standing, Squatting, Kneeling, Exercise, Straightening, Stretching, Bending  Limiting Behavior: Yes  Relieving Factors: Rest  Result of Injury: No  Work-Related Injury: No  Are there other pain locations you wish to document?: No    The pain assessment was noted & reviewed in the medical record today.      Current/Past Treatment:   · Physical Therapy: yes since Jan 2019 with continued HEP  · Chiropractic:   no  · Injection:   no  Medications:            NSAIDS:             Muscle relaxer:              Steriods:  MDP            Neuropathic medications:              Opioids:            OTHER: Tylenol   · Surgery/Consult:    Work Status/Functionality: taking care of her ill      Past Medical History: Medical history

## 2019-07-18 ENCOUNTER — TELEPHONE (OUTPATIENT)
Dept: ORTHOPEDIC SURGERY | Age: 64
End: 2019-07-18

## 2019-07-22 ENCOUNTER — OFFICE VISIT (OUTPATIENT)
Dept: ORTHOPEDIC SURGERY | Age: 64
End: 2019-07-22
Payer: COMMERCIAL

## 2019-07-22 VITALS — HEIGHT: 63 IN | BODY MASS INDEX: 36.99 KG/M2 | WEIGHT: 208.78 LBS

## 2019-07-22 DIAGNOSIS — M25.562 LEFT KNEE PAIN, UNSPECIFIED CHRONICITY: Primary | ICD-10-CM

## 2019-07-22 PROCEDURE — 99214 OFFICE O/P EST MOD 30 MIN: CPT | Performed by: ORTHOPAEDIC SURGERY

## 2019-07-22 RX ORDER — TIZANIDINE 4 MG/1
4 TABLET ORAL EVERY 6 HOURS PRN
Qty: 40 TABLET | Refills: 0 | Status: SHIPPED | OUTPATIENT
Start: 2019-07-22 | End: 2019-08-28

## 2019-07-22 NOTE — PROGRESS NOTES
CHIEF COMPLAINT:    Chief Complaint   Patient presents with    Knee Pain     LEFT KNEE. PAIN STARTED YESTERDAY AFTER A WALK. FROM BUTTOCK ALL THE WAY DOWN THE LEG. SEE'S SANTI BRISCOE FOR LUMBAR ISSUES.[facility] FOR [facility]DAY       HISTORY OF PRESENT ILLNESS:                The patient is a 62 y.o. female presents to clinic for evaluation of LEFT knee pain. She states that yesterday she had a misstep when walking and felt a shooting pain down the entire LEFT leg. Since the injury, the pain has become more localized along the lateral aspect of the lower leg spanning from the knee to the ankle. She is an established patient of Dr. Thea Ha and is actually scheduled for an epidural injection 1 week from today. She denies any changes in bowel or bladder function.     Past Medical History:   Diagnosis Date    Hyperlipidemia     Hypertension     S/P colonoscopy 08/2009    Tendinitis of foot         Work Status: She works at [employer]    The pain assessment was noted & is as follows:  Pain Assessment  Location of Pain: Knee  Location Modifiers: Left  Severity of Pain: 6  Quality of Pain: Aching, Dull, Sharp, Throbbing  Duration of Pain: Persistent  Frequency of Pain: Constant  Aggravating Factors: Stretching, Bending, Walking, Stairs  Limiting Behavior: Some  Relieving Factors: Rest  Result of Injury: Yes  Work-Related Injury: No  Are there other pain locations you wish to document?: No]      Work Status/Functionality:     Past Medical History: Medical history form was reviewed today & can be found in the media tab  Past Medical History:   Diagnosis Date    Hyperlipidemia     Hypertension     S/P colonoscopy 08/2009    Tendinitis of foot       Past Surgical History:     Past Surgical History:   Procedure Laterality Date    ABDOMEN SURGERY      stone removed from bile duct    ANGIOPLASTY      renal    BREAST SURGERY      duct removal    CHOLECYSTECTOMY  12/1992    COLONOSCOPY  8/1/2009    personally performed and/or participated in the history, exam and medical decision making and agree with all pertinent clinical information. I have also reviewed and agree with the past medical, family and social history unless otherwise noted. This dictation was performed with a verbal recognition program (DRAGON) and it was checked for errors. It is possible that there are still dictated errors within this office note. If so, please bring any errors to my attention for an addendum. All efforts were made to ensure that this office note is accurate.           Darlin Shaw MD

## 2019-07-25 ENCOUNTER — HOSPITAL ENCOUNTER (OUTPATIENT)
Dept: PHYSICAL THERAPY | Age: 64
Setting detail: THERAPIES SERIES
Discharge: HOME | End: 2019-07-25
Payer: COMMERCIAL

## 2019-07-25 PROCEDURE — 97110 THERAPEUTIC EXERCISES: CPT | Performed by: PHYSICAL THERAPIST

## 2019-07-25 PROCEDURE — 97161 PT EVAL LOW COMPLEX 20 MIN: CPT | Performed by: PHYSICAL THERAPIST

## 2019-07-25 NOTE — H&P
HISTORY AND PHYSICAL/PRE-SEDATION ASSESSMENT    Patient:  Laura Viveros   :   1955  Medical Record No.:  [id number]   Date:   19  Physician:  Amish Williamson M.D. Facility: [facility]    Chief Complaint:  Low back pain    History of Present Illness: The patient is a 62 y.o. female who presents with subacute/chronic achy atraumatic low back/buttock pain at times extending into the groin bilaterally. Earlier this year she had pain radiating into the bilateral lower extremities with numbness and tingling this has improved. The majority of her pain is in her low back and buttocks. Pain has been increased over the last 1 to 2-3 months. She believes symptoms have been worsened since gardening. Denies any direct trauma. Pain is currently increased with any transition, some relief with stretching. Conservative care includes: MDP, PT (since 2019 to present with HEP), brace PRN, Tylenol, ice. She denies any significant improvement at this time. Currently denies any progressive numbness tingling weakness. No bowel or bladder changes. No recent trauma. Past Surgical History:    Past Surgical History:   Procedure Laterality Date    ABDOMEN SURGERY      stone removed from bile duct    ANGIOPLASTY      renal    BREAST SURGERY      duct removal    CHOLECYSTECTOMY  1992    COLONOSCOPY  2009    LAPAROSCOPY      OTHER SURGICAL HISTORY Left 2016    Cystoscopy, with left stent placement    TONSILLECTOMY AND ADENOIDECTOMY         Past Medical History:  Past Medical History:   Diagnosis Date    Hyperlipidemia     Hypertension     S/P colonoscopy 2009    Tendinitis of foot        Allergies:    Allergies   Allergen Reactions    Codeine Rash, Nausea And Vomiting and Shortness Of Breath    Lisinopril      cough    Norvasc [Amlodipine Besylate]      swelling    Triamterene-Hctz      Renal insufficiency       Home Medications:    Prior to Admission

## 2019-07-25 NOTE — FLOWSHEET NOTE
[facility],  [address] [place]  Phone: [phone]  Fax [phone]    Physical Therapy Daily Treatment Note  Date:  2019    Patient Name:  Osiel Gallardo  \"Marina\"  :  1955  MRN: [id number]  Restrictions/Precautions:    Medical/Treatment Diagnosis Information:  · Diagnosis: M51.36 (ICD-10-CM) - DDD (degenerative disc disease), lumbar, M48.062 (ICD-10-CM) - Lumbar stenosis with neurogenic claudication  · Treatment Diagnosis: LBP V60.1  Insurance/Certification information:  PT Insurance Information: 2019 [employer] - [id number]%-$[id number]/OT - 4 USED  Physician Information:  Referring Practitioner: FAMILIA Roman  Plan of care signed (Y/N):     Date of Patient follow up with Physician: 19    G-Code (if applicable):      Date G-Code Applied:     modODI 62% 19    Progress Note: []  Yes  []  No  Next due by: Visit #10      Latex Allergy:  [x]NO      []YES  Preferred Language for Healthcare:   [x]English       []other:     Visit # Insurance Allowable Requires auth   1 40( 4 used)    [x]no        []yes:     Pain level:  4-8/10     SUBJECTIVE:  See eval    OBJECTIVE: See eval  Observation:   Test measurements:    CORINA 19  RESTRICTIONS/PRECAUTIONS: lumbar DDD/stenosis    Exercises/Interventions:     Therapeutic Ex sets/reps comments   Fig 4 3x20\" HEP   DKTC 3x20\" HEP   Supine HS S 3x20\" HEP   Seated TR 2x10 HEP   Seated LAQ 2x10 HEP                                                     Pt ed: POC, HEP, activity mod, centralization, TA, lifting/house work considerations, ILU massage for bowel x15'    Manual Intervention                                              NMR re-education                                 Therapeutic Exercise and NMR EXR  [x] (43815) Provided verbal/tactile cueing for activities related to strengthening, flexibility, endurance, ROM  for improvements in proximal hip and core control

## 2019-07-25 NOTE — PLAN OF CARE
progression of HEP. HEP instruction: (see scanned forms)    GOALS:  Patient stated goal: able to resume walking with friends    Therapist goals for Patient:   Short Term Goals: To be achieved in: 2 weeks  1. Independent in HEP and progression per patient tolerance, in order to prevent re-injury. 2. Patient will have a decrease in pain to facilitate improvement in movement, function, and ADLs as indicated by Functional Deficits. Long Term Goals: To be achieved in: 6 weeks  1. Disability index score of 31% or less for the modODI  to assist with reaching prior level of function. 2. Patient will demonstrate increased AROM to WNL, good LS mobility, good hip ROM to allow for proper joint functioning as indicated by patients Functional Deficits. 3. Patient will demonstrate an increase in Strength to good proximal hip and core activation to allow for proper functional mobility as indicated by patients Functional Deficits. 4. Patient will return to house hold cleaning functional activities without increased symptoms or restriction.    5. Pt will demo good gait mechanics for >/=30' for community ambulation.  (patient specific functional goal)         Electronically signed by:  Sola Lacey PT

## 2019-07-29 ENCOUNTER — HOSPITAL ENCOUNTER (OUTPATIENT)
Age: 64
Setting detail: OUTPATIENT SURGERY
Discharge: HOME | End: 2019-07-29
Attending: PHYSICAL MEDICINE & REHABILITATION | Admitting: PHYSICAL MEDICINE & REHABILITATION
Payer: COMMERCIAL

## 2019-07-29 VITALS
DIASTOLIC BLOOD PRESSURE: 70 MMHG | BODY MASS INDEX: 33.29 KG/M2 | WEIGHT: 195 LBS | OXYGEN SATURATION: 98 % | HEIGHT: 64 IN | SYSTOLIC BLOOD PRESSURE: 140 MMHG | HEART RATE: 54 BPM | RESPIRATION RATE: 16 BRPM | TEMPERATURE: 97.9 F

## 2019-07-29 PROCEDURE — 2500000003 HC RX 250 WO HCPCS: Performed by: PHYSICAL MEDICINE & REHABILITATION

## 2019-07-29 PROCEDURE — 2580000003 HC RX 258: Performed by: PHYSICAL MEDICINE & REHABILITATION

## 2019-07-29 PROCEDURE — 7100000010 HC PHASE II RECOVERY - FIRST 15 MIN: Performed by: PHYSICAL MEDICINE & REHABILITATION

## 2019-07-29 PROCEDURE — 7100000011 HC PHASE II RECOVERY - ADDTL 15 MIN: Performed by: PHYSICAL MEDICINE & REHABILITATION

## 2019-07-29 PROCEDURE — 3600000002 HC SURGERY LEVEL 2 BASE: Performed by: PHYSICAL MEDICINE & REHABILITATION

## 2019-07-29 PROCEDURE — 99152 MOD SED SAME PHYS/QHP 5/>YRS: CPT | Performed by: PHYSICAL MEDICINE & REHABILITATION

## 2019-07-29 PROCEDURE — 2709999900 HC NON-CHARGEABLE SUPPLY: Performed by: PHYSICAL MEDICINE & REHABILITATION

## 2019-07-29 PROCEDURE — 6360000002 HC RX W HCPCS: Performed by: PHYSICAL MEDICINE & REHABILITATION

## 2019-07-29 RX ORDER — LIDOCAINE HYDROCHLORIDE 10 MG/ML
INJECTION, SOLUTION EPIDURAL; INFILTRATION; INTRACAUDAL; PERINEURAL PRN
Status: DISCONTINUED | OUTPATIENT
Start: 2019-07-29 | End: 2019-07-29 | Stop reason: ALTCHOICE

## 2019-07-29 RX ORDER — SODIUM CHLORIDE, SODIUM LACTATE, POTASSIUM CHLORIDE, CALCIUM CHLORIDE 600; 310; 30; 20 MG/100ML; MG/100ML; MG/100ML; MG/100ML
INJECTION, SOLUTION INTRAVENOUS
Status: DISCONTINUED
Start: 2019-07-29 | End: 2019-07-29 | Stop reason: HOSPADM

## 2019-07-29 RX ORDER — MIDAZOLAM HYDROCHLORIDE 5 MG/ML
INJECTION INTRAMUSCULAR; INTRAVENOUS PRN
Status: DISCONTINUED | OUTPATIENT
Start: 2019-07-29 | End: 2019-07-29 | Stop reason: ALTCHOICE

## 2019-07-29 RX ORDER — LIDOCAINE HYDROCHLORIDE 10 MG/ML
INJECTION, SOLUTION EPIDURAL; INFILTRATION; INTRACAUDAL; PERINEURAL
Status: DISCONTINUED
Start: 2019-07-29 | End: 2019-07-29 | Stop reason: HOSPADM

## 2019-07-29 RX ORDER — SODIUM CHLORIDE, SODIUM LACTATE, POTASSIUM CHLORIDE, CALCIUM CHLORIDE 600; 310; 30; 20 MG/100ML; MG/100ML; MG/100ML; MG/100ML
INJECTION, SOLUTION INTRAVENOUS CONTINUOUS
Status: DISCONTINUED | OUTPATIENT
Start: 2019-07-29 | End: 2019-07-29 | Stop reason: HOSPADM

## 2019-07-29 RX ORDER — METHYLPREDNISOLONE ACETATE 40 MG/ML
INJECTION, SUSPENSION INTRA-ARTICULAR; INTRALESIONAL; INTRAMUSCULAR; SOFT TISSUE PRN
Status: DISCONTINUED | OUTPATIENT
Start: 2019-07-29 | End: 2019-07-29 | Stop reason: ALTCHOICE

## 2019-07-29 RX ADMIN — LIDOCAINE HYDROCHLORIDE 0.1 ML: 10 INJECTION, SOLUTION EPIDURAL; INFILTRATION; INTRACAUDAL; PERINEURAL at 08:09

## 2019-07-29 RX ADMIN — SODIUM CHLORIDE, POTASSIUM CHLORIDE, SODIUM LACTATE AND CALCIUM CHLORIDE: 600; 310; 30; 20 INJECTION, SOLUTION INTRAVENOUS at 08:09

## 2019-07-29 ASSESSMENT — PAIN - FUNCTIONAL ASSESSMENT
PAIN_FUNCTIONAL_ASSESSMENT: PREVENTS OR INTERFERES WITH MANY ACTIVE NOT PASSIVE ACTIVITIES
PAIN_FUNCTIONAL_ASSESSMENT: 0-10

## 2019-07-29 NOTE — OP NOTE
POST SEDATION ASSESSMENT      Patient:  Ja Lal   :  1955  Medical Record No.:  [id number]   Date:  2019  Physician:  Margy Church M.D.       Patient location: Recovery  Level of consciousness: Awake, Alert, Oriented  Pain Control: Good  Respiration: Adequate  Post-op assessment: No sedation complications    Last Vitals:   Vitals:    19 0852   BP: (!) 140/70   Pulse: 54   Resp: 16   Temp:    SpO2: 98%     Post-op Vitals: Stable    F Katelynn Lerma  9:03 AM

## 2019-08-05 ENCOUNTER — HOSPITAL ENCOUNTER (OUTPATIENT)
Dept: PHYSICAL THERAPY | Age: 64
Setting detail: THERAPIES SERIES
Discharge: HOME | End: 2019-08-05
Payer: COMMERCIAL

## 2019-08-05 PROCEDURE — 97140 MANUAL THERAPY 1/> REGIONS: CPT | Performed by: PHYSICAL THERAPIST

## 2019-08-05 PROCEDURE — 97110 THERAPEUTIC EXERCISES: CPT | Performed by: PHYSICAL THERAPIST

## 2019-08-05 NOTE — FLOWSHEET NOTE
[facility], [address] [place]  Phone: [phone]  Fax [phone]    Physical Therapy Daily Treatment Note  Date:  2019    Patient Name:  Roger Wu  \"Marina\"  :  1955  MRN: [id number]  Restrictions/Precautions:    Medical/Treatment Diagnosis Information:  · Diagnosis: M51.36 (ICD-10-CM) - DDD (degenerative disc disease), lumbar, M48.062 (ICD-10-CM) - Lumbar stenosis with neurogenic claudication  · Treatment Diagnosis: LBP P22.1  Insurance/Certification information:  PT Insurance Information:  [employer] - [id number]-100%-$[id number]-40PT/OT - 4 USED  Physician Information:  Referring Practitioner: FAMILIA Omalley  Plan of care signed (Y/N):     Date of Patient follow up with Physician: 19    G-Code (if applicable):      Date G-Code Applied:     modODI 62% 19    Progress Note: []  Yes  []  No  Next due by: Visit #10      Latex Allergy:  [x]NO      []YES  Preferred Language for Healthcare:   [x]English       []other:     Visit # Insurance Allowable Requires auth   2 40( 4 used)    [x]no        []yes:     Pain level:  4/10     SUBJECTIVE:  Patient reports her HEP is going well, but is still challenging. She reports it is more difficult to perform them on the left. She reports she felt a little better after last session. She had her epidural last Monday and that helped. She reports she is still experiencing radicular symptoms tao to left ankle, but not as bad as before.     OBJECTIVE: See eval  Observation:   Test measurements:    CORINA 19  RESTRICTIONS/PRECAUTIONS: lumbar DDD/stenosis    Exercises/Interventions:     Therapeutic Ex sets/reps comments   Fig 4 3x20\" HEP   DKTC 3x20\" HEP   HEP   hooklying TA 10 sec 10x    Bent knee fallout with TA 2x10 each Manual and verbal cues -added to HEP 8/5   Seated TR 2x10 HEP   Seated LAQ 3x10 HEP                                                     Manual Intervention      Prone PAs and rotational mobs grade 2-3 8 min    Prone quad and supine ham stretches  3x30 sec each                                  NMR re-education                                 Therapeutic Exercise and NMR EXR  [x] (31010) Provided verbal/tactile cueing for activities related to strengthening, flexibility, endurance, ROM  for improvements in proximal hip and core control with self care, mobility, lifting and ambulation. [x] (70687) Provided verbal/tactile cueing for activities related to improving balance, coordination, kinesthetic sense, posture, motor skill, proprioception  to assist with core control in self care, mobility, lifting, and ambulation.      Therapeutic Activities:    [] (36300 or 49067) Provided verbal/tactile cueing for activities related to improving balance, coordination, kinesthetic sense, posture, motor skill, proprioception and motor activation to allow for proper function  with self care and ADLs  [] (60984) Provided training and instruction to the patient for proper core and proximal hip recruitment and positioning with ambulation re-education     Home Exercise Program:    [x] (84146) Reviewed/Progressed HEP activities related to strengthening, flexibility, endurance, ROM of core, proximal hip and LE for functional self-care, mobility, lifting and ambulation   [] (53773) Reviewed/Progressed HEP activities related to improving balance, coordination, kinesthetic sense, posture, motor skill, proprioception of core, proximal hip and LE for self care, mobility, lifting, and ambulation      Manual Treatments:  PROM / STM / Oscillations-Mobs:  G-I, II, III, IV (PA's, Inf., Post.)  [x] (79703) Provided manual therapy to mobilize proximal hip and LS spine soft tissue/joints for the purpose of modulating pain, promoting relaxation,  increasing ROM, reducing/eliminating soft tissue swelling/inflammation/restriction, improving soft tissue extensibility and allowing for proper ROM

## 2019-08-07 ENCOUNTER — OFFICE VISIT (OUTPATIENT)
Dept: ORTHOPEDIC SURGERY | Age: 64
End: 2019-08-07
Payer: COMMERCIAL

## 2019-08-07 ENCOUNTER — HOSPITAL ENCOUNTER (OUTPATIENT)
Dept: PHYSICAL THERAPY | Age: 64
Setting detail: THERAPIES SERIES
Discharge: HOME | End: 2019-08-07
Payer: COMMERCIAL

## 2019-08-07 VITALS
HEIGHT: 64 IN | SYSTOLIC BLOOD PRESSURE: 124 MMHG | DIASTOLIC BLOOD PRESSURE: 75 MMHG | WEIGHT: 195.11 LBS | HEART RATE: 70 BPM | BODY MASS INDEX: 33.31 KG/M2

## 2019-08-07 DIAGNOSIS — M51.36 DDD (DEGENERATIVE DISC DISEASE), LUMBAR: Primary | ICD-10-CM

## 2019-08-07 DIAGNOSIS — M54.16 LUMBAR RADICULITIS: ICD-10-CM

## 2019-08-07 DIAGNOSIS — M48.062 LUMBAR STENOSIS WITH NEUROGENIC CLAUDICATION: ICD-10-CM

## 2019-08-07 PROCEDURE — 97140 MANUAL THERAPY 1/> REGIONS: CPT | Performed by: PHYSICAL THERAPIST

## 2019-08-07 PROCEDURE — 99213 OFFICE O/P EST LOW 20 MIN: CPT | Performed by: PHYSICIAN ASSISTANT

## 2019-08-07 PROCEDURE — 97110 THERAPEUTIC EXERCISES: CPT | Performed by: PHYSICAL THERAPIST

## 2019-08-07 NOTE — PROGRESS NOTES
lower extremity does not show any tenderness, deformity or injury. Range of motion is full. There is no gross instability. There are no rashes, ulcerations or lesions.   Strength and tone are normal.    Diagnostic Testing:   Lumbar MRI scan report independently reviewed 7/15/2019 showing severe central stenosis L4-5, multilevel DDD with varying degrees of foraminal stenosis, facet arthropathy, (left renal atrophy--pt aware)     2 views lumbar spine 1/8/2019 show severe DDD L5-S1    Labs reviewed February 2019 BUN 22, creatinine 1.5        Impression:  1) Subacute/chronic LBP/buttock pain--50% improved     2) Severe L4-5 central stenosis, DDD        Plan:   1) Finish out PT    2) Discussed she can call for midline L5-S1 LESI #2 if needed    3) Activity as tolerated          [facility]

## 2019-08-12 ENCOUNTER — HOSPITAL ENCOUNTER (OUTPATIENT)
Dept: PHYSICAL THERAPY | Age: 64
Setting detail: THERAPIES SERIES
Discharge: HOME | End: 2019-08-12
Payer: COMMERCIAL

## 2019-08-12 PROCEDURE — 97110 THERAPEUTIC EXERCISES: CPT | Performed by: PHYSICAL THERAPIST

## 2019-08-12 PROCEDURE — 97140 MANUAL THERAPY 1/> REGIONS: CPT | Performed by: PHYSICAL THERAPIST

## 2019-08-12 NOTE — FLOWSHEET NOTE
[facility], [address] [facility] [place]  Phone: [phone]  Fax [phone]    Physical Therapy Daily Treatment Note  Date:  2019    Patient Name:  Danilo Mosqueda  \"Marina\"  :  1955  MRN: [id number]  Restrictions/Precautions:    Medical/Treatment Diagnosis Information:  · Diagnosis: M51.36 (ICD-10-CM) - DDD (degenerative disc disease), lumbar, M48.062 (ICD-10-CM) - Lumbar stenosis with neurogenic claudication  · Treatment Diagnosis: LBP P63.6  Insurance/Certification information:  PT Insurance Information:  [employer] - 6750D-100%-$0CP-40PT/OT - 4 USED  Physician Information:  Referring Practitioner: FAMILIA Julien  Plan of care signed (Y/N):     Date of Patient follow up with Physician: 19    G-Code (if applicable):      Date G-Code Applied:     modODI 62% 19    Progress Note: []  Yes  []  No  Next due by: Visit #10      Latex Allergy:  [x]NO      []YES  Preferred Language for Healthcare:   [x]English       []other:     Visit # Insurance Allowable Requires auth   4 40( 4 used)    [x]no        []yes:     Pain level:  6-7/10 Pre-PT, 4/10 post-PT    SUBJECTIVE:  Patient reports she woke up with increased R post hip pain at about 4 a.m. Today. She has been trying to wean off Gabapentin and wonders if that has anything to do with it.     OBJECTIVE: See eval  Observation: + B neural tension this visit  Test measurements:    CORINA 19  RESTRICTIONS/PRECAUTIONS: lumbar DDD/stenosis    Exercises/Interventions:     Therapeutic Ex sets/reps comments   Fig 4 HEP   DKTC HEP   HEP   hooklying TA    Bent knee fallout with TA Manual and verbal cues -added to HEP 8/5   Seated TR HEP   Seated LAQ  Seated nerve glide    HEP   Supine alt march to 90/     Supine SB TA iso 5\" x10              SB seated: march, LA  TB row, ext  Pelvic tilts AP, lat x20 R/L  Blue 2x15 ea  x20 ea HEP  HEP  Cues for AP   Standing soleus S soft tissue swelling/inflammation/restriction, improving soft tissue extensibility and allowing for proper ROM for normal function with self care, mobility, lifting and ambulation. Modalities:   declined    Charges:  Timed Code Treatment Minutes: 40   Total Treatment Minutes: 40     [] EVAL (LOW) 96154 (typically 20 minutes face-to-face)  [] EVAL (MOD) 68753 (typically 30 minutes face-to-face)  [] EVAL (HIGH) 74455 (typically 45 minutes face-to-face)  [] RE-EVAL     [x] RI(19037) x  2   [] IONTO  [] NMR (61733) x      [] VASO  [x] Manual (66574) x  1    [] Other:  [] TA x       [] Mech Traction (31975)  [] ES(attended) (74014)      [] ES (un) (84749):     Goals: Patient stated goal: able to resume walking with friends    Therapist goals for Patient:   Short Term Goals: To be achieved in: 2 weeks  1. Independent in HEP and progression per patient tolerance, in order to prevent re-injury. 2. Patient will have a decrease in pain to facilitate improvement in movement, function, and ADLs as indicated by Functional Deficits. Long Term Goals: To be achieved in: 6 weeks  1. Disability index score of 31% or less for the modODI  to assist with reaching prior level of function. 2. Patient will demonstrate increased AROM to WNL, good LS mobility, good hip ROM to allow for proper joint functioning as indicated by patients Functional Deficits. 3. Patient will demonstrate an increase in Strength to good proximal hip and core activation to allow for proper functional mobility as indicated by patients Functional Deficits. 4. Patient will return to house hold cleaning functional activities without increased symptoms or restriction. 5. Pt will demo good gait mechanics for >/=30' for community ambulation. Progression Towards Functional goals:  [x] Patient is progressing as expected towards functional goals listed. [] Progression is slowed due to complexities listed.   [] Progression has been slowed due to

## 2019-08-15 ENCOUNTER — HOSPITAL ENCOUNTER (OUTPATIENT)
Dept: PHYSICAL THERAPY | Age: 64
Setting detail: THERAPIES SERIES
Discharge: HOME | End: 2019-08-15
Payer: COMMERCIAL

## 2019-08-15 DIAGNOSIS — I10 ESSENTIAL HYPERTENSION: ICD-10-CM

## 2019-08-15 DIAGNOSIS — E78.00 PURE HYPERCHOLESTEROLEMIA: ICD-10-CM

## 2019-08-15 DIAGNOSIS — R73.9 ELEVATED BLOOD SUGAR: Primary | ICD-10-CM

## 2019-08-15 PROCEDURE — 97140 MANUAL THERAPY 1/> REGIONS: CPT | Performed by: PHYSICAL THERAPIST

## 2019-08-15 PROCEDURE — 97110 THERAPEUTIC EXERCISES: CPT | Performed by: PHYSICAL THERAPIST

## 2019-08-15 RX ORDER — SIMVASTATIN 40 MG
TABLET ORAL
Qty: 90 TABLET | Refills: 1 | Status: SHIPPED | OUTPATIENT
Start: 2019-08-15 | End: 2020-02-11

## 2019-08-15 NOTE — FLOWSHEET NOTE
[facility],  [address] [place]  Phone: [phone]  Fax [phone]    Physical Therapy Daily Treatment Note  Date:  8/15/2019    Patient Name:  Bernard Souza  \"Marina\"  :  1955  MRN: [id number]  Restrictions/Precautions:    Medical/Treatment Diagnosis Information:  · Diagnosis: M51.36 (ICD-10-CM) - DDD (degenerative disc disease), lumbar, M48.062 (ICD-10-CM) - Lumbar stenosis with neurogenic claudication  · Treatment Diagnosis: LBP R76.9  Insurance/Certification information:  PT Insurance Information:  [employer] - 6750D-100%-$0CP-40PT/OT - 4 USED  Physician Information:  Referring Practitioner: FAMILIA Ni  Plan of care signed (Y/N):     Date of Patient follow up with Physician: 19    G-Code (if applicable):      Date G-Code Applied:     modODI 62% 19    Progress Note: []  Yes  []  No  Next due by: Visit #10      Latex Allergy:  [x]NO      []YES  Preferred Language for Healthcare:   [x]English       []other:     Visit # Insurance Allowable Requires auth   5 40( 4 used)    [x]no        []yes:     Pain level:  4/10     SUBJECTIVE:  Patient reports her R post hip has been much better since LV and she has a little pain L lat knee, but not getting the pain running down the leg much anymore.      OBJECTIVE: See eval  Observation: minimal B neural tension this visit  Test measurements:    CORINA 19  RESTRICTIONS/PRECAUTIONS: lumbar DDD/stenosis    Exercises/Interventions:     Therapeutic Ex sets/reps comments   Fig 4 HEP   DKTC HEP   HEP   hooklying TA    Bent knee fallout with TA Manual and verbal cues -added to HEP 8/5   Seated TR HEP   Seated LAQ  Seated nerve glide    HEP   Supine alt march to   Supine march up/up/down/down   2x5 R/L    Supine SB TA iso 5\" x10              SB seated: march, LAQ  TB row, ext  Pelvic tilts AP, lat  HEP  HEP  Cues for AP   Standing soleus S w/ arch support  HEP

## 2019-08-19 ENCOUNTER — HOSPITAL ENCOUNTER (OUTPATIENT)
Dept: PHYSICAL THERAPY | Age: 64
Setting detail: THERAPIES SERIES
Discharge: HOME | End: 2019-08-19
Payer: COMMERCIAL

## 2019-08-19 PROCEDURE — 97140 MANUAL THERAPY 1/> REGIONS: CPT | Performed by: PHYSICAL THERAPIST

## 2019-08-19 PROCEDURE — 97110 THERAPEUTIC EXERCISES: CPT | Performed by: PHYSICAL THERAPIST

## 2019-08-19 NOTE — FLOWSHEET NOTE
goals:  [x] Patient is progressing as expected towards functional goals listed. [] Progression is slowed due to complexities listed. [] Progression has been slowed due to co-morbidities. [] Plan just implemented, too soon to assess goals progression  [] Other:     ASSESSMENT:  Decreased tightness and soreness following manuals and decreased soreness reported with lumbar PA's. Pt continues to have increased tightness L quad and ITB. LE fatigue with standing progressions.     Treatment/Activity Tolerance:  [x] Patient tolerated treatment well [] Patient limited by fatique  [] Patient limited by pain  [] Patient limited by other medical complications  [] Other:     Prognosis: [x] Good [] Fair  [] Poor    Patient Requires Follow-up: [x] Yes  [] No    PLAN: Cont to progress manuals and HEP as rudy  [x] Continue per plan of care [] Alter current plan (see comments)  [] Plan of care initiated [] Hold pending MD visit [] Discharge    Electronically signed by: Bhargavi Macias, PT

## 2019-08-22 ENCOUNTER — NURSE ONLY (OUTPATIENT)
Dept: FAMILY MEDICINE CLINIC | Age: 64
End: 2019-08-22
Payer: COMMERCIAL

## 2019-08-22 ENCOUNTER — HOSPITAL ENCOUNTER (OUTPATIENT)
Dept: PHYSICAL THERAPY | Age: 64
Setting detail: THERAPIES SERIES
Discharge: HOME | End: 2019-08-22
Payer: COMMERCIAL

## 2019-08-22 DIAGNOSIS — R73.9 ELEVATED BLOOD SUGAR: ICD-10-CM

## 2019-08-22 DIAGNOSIS — I10 ESSENTIAL HYPERTENSION: ICD-10-CM

## 2019-08-22 DIAGNOSIS — E78.00 PURE HYPERCHOLESTEROLEMIA: ICD-10-CM

## 2019-08-22 LAB
A/G RATIO: 2.1 (ref 1.1–2.2)
ALBUMIN SERPL-MCNC: 4.7 G/DL (ref 3.4–5)
ALP BLD-CCNC: 121 U/L (ref 40–129)
ALT SERPL-CCNC: 25 U/L (ref 10–40)
ANION GAP SERPL CALCULATED.3IONS-SCNC: 11 MMOL/L (ref 3–16)
AST SERPL-CCNC: 29 U/L (ref 15–37)
BILIRUB SERPL-MCNC: 0.6 MG/DL (ref 0–1)
BUN BLDV-MCNC: 13 MG/DL (ref 7–20)
CALCIUM SERPL-MCNC: 10.3 MG/DL (ref 8.3–10.6)
CHLORIDE BLD-SCNC: 103 MMOL/L (ref 99–110)
CHOLESTEROL, TOTAL: 165 MG/DL (ref 0–199)
CO2: 29 MMOL/L (ref 21–32)
CREAT SERPL-MCNC: 1.3 MG/DL (ref 0.6–1.2)
GFR AFRICAN AMERICAN: 50
GFR NON-AFRICAN AMERICAN: 41
GLOBULIN: 2.2 G/DL
GLUCOSE BLD-MCNC: 102 MG/DL (ref 70–99)
HDLC SERPL-MCNC: 53 MG/DL (ref 40–60)
LDL CHOLESTEROL CALCULATED: 58 MG/DL
POTASSIUM SERPL-SCNC: 4.7 MMOL/L (ref 3.5–5.1)
SODIUM BLD-SCNC: 143 MMOL/L (ref 136–145)
TOTAL PROTEIN: 6.9 G/DL (ref 6.4–8.2)
TRIGL SERPL-MCNC: 272 MG/DL (ref 0–150)
VLDLC SERPL CALC-MCNC: 54 MG/DL

## 2019-08-22 PROCEDURE — 97140 MANUAL THERAPY 1/> REGIONS: CPT | Performed by: PHYSICAL THERAPIST

## 2019-08-22 PROCEDURE — 97110 THERAPEUTIC EXERCISES: CPT | Performed by: PHYSICAL THERAPIST

## 2019-08-22 PROCEDURE — 36415 COLL VENOUS BLD VENIPUNCTURE: CPT | Performed by: FAMILY MEDICINE

## 2019-08-23 LAB
ESTIMATED AVERAGE GLUCOSE: 111.2 MG/DL
HBA1C MFR BLD: 5.5 %

## 2019-08-26 ENCOUNTER — HOSPITAL ENCOUNTER (OUTPATIENT)
Dept: PHYSICAL THERAPY | Age: 64
Setting detail: THERAPIES SERIES
Discharge: HOME | End: 2019-08-26
Payer: COMMERCIAL

## 2019-08-26 PROCEDURE — 97140 MANUAL THERAPY 1/> REGIONS: CPT | Performed by: PHYSICAL THERAPIST

## 2019-08-26 PROCEDURE — 97110 THERAPEUTIC EXERCISES: CPT | Performed by: PHYSICAL THERAPIST

## 2019-08-26 NOTE — FLOWSHEET NOTE
[facility], [address] [place]  Phone: [phone]  Fax [phone]    Physical Therapy Daily Treatment Note  Date:  2019    Patient Name:  Sanket Vora  \"Marina\"  :  1955  MRN: [id number]  Restrictions/Precautions:    Medical/Treatment Diagnosis Information:  · Diagnosis: M51.36 (ICD-10-CM) - DDD (degenerative disc disease), lumbar, M48.062 (ICD-10-CM) - Lumbar stenosis with neurogenic claudication  · Treatment Diagnosis: LBP P63.7  Insurance/Certification information:  PT Insurance Information:  [employer] - [id number]-100%-$0CP-40PT/OT - 4 USED  Physician Information:  Referring Practitioner: FAMILIA Bowles  Plan of care signed (Y/N):     Date of Patient follow up with Physician: prn    G-Code (if applicable):      Date G-Code Applied:     modODI 62% 19    Progress Note: []  Yes  []  No  Next due by: Visit #10      Latex Allergy:  [x]NO      []YES  Preferred Language for Healthcare:   [x]English       []other:     Visit # Insurance Allowable Requires auth   8 40( 4 used)    [x]no        []yes:     Pain level:  2/10     SUBJECTIVE:  Patient reports her R hip has been pretty much pain free the last couple of days. Still has discomfort L lat knee. Also reports some discomfort in LB with ascending stairs at home (no HR).     OBJECTIVE:   Observation:   Test measurements:    CORINA 19  RESTRICTIONS/PRECAUTIONS: lumbar DDD/stenosis    Exercises/Interventions:     Therapeutic Ex sets/reps comments   Fig 4 HEP   DKTC HEP   HEP   hooklying TA    Bent knee fallout with TA Manual and verbal cues -added to HEP 8/5   Seated TR HEP   Seated LAQ  Seated nerve glide    HEP   Supine alt march to   Supine march up/up/down/down       Supine SB TA iso     Prone plank from knees 3x10\"    Supine bridge +TA x10         SB seated: march, LAQ  TB row, ext  Pelvic tilts AP, lat  HEP  HEP  Cues for AP   Standing soleus IV (Isabella, Inf., Post.)  [x] (57689) Provided manual therapy to mobilize proximal hip and LS spine soft tissue/joints for the purpose of modulating pain, promoting relaxation,  increasing ROM, reducing/eliminating soft tissue swelling/inflammation/restriction, improving soft tissue extensibility and allowing for proper ROM for normal function with self care, mobility, lifting and ambulation. Modalities:   CPL knee x10'    Charges:  Timed Code Treatment Minutes: 42   Total Treatment Minutes: 52     [] EVAL (LOW) 56430 (typically 20 minutes face-to-face)  [] EVAL (MOD) 15212 (typically 30 minutes face-to-face)  [] EVAL (HIGH) 32659 (typically 45 minutes face-to-face)  [] RE-EVAL     [x] MP(36513) x  2   [] IONTO  [] NMR (49374) x      [] VASO  [x] Manual (93861) x  1    [] Other:  [] TA x       [] Mech Traction (93487)  [] ES(attended) (89607)      [] ES (un) (50676):     Goals: Patient stated goal: able to resume walking with friends    Therapist goals for Patient:   Short Term Goals: To be achieved in: 2 weeks  1. Independent in HEP and progression per patient tolerance, in order to prevent re-injury. 2. Patient will have a decrease in pain to facilitate improvement in movement, function, and ADLs as indicated by Functional Deficits. Long Term Goals: To be achieved in: 6 weeks  1. Disability index score of 31% or less for the modODI  to assist with reaching prior level of function. 2. Patient will demonstrate increased AROM to WNL, good LS mobility, good hip ROM to allow for proper joint functioning as indicated by patients Functional Deficits. 3. Patient will demonstrate an increase in Strength to good proximal hip and core activation to allow for proper functional mobility as indicated by patients Functional Deficits. 4. Patient will return to house hold cleaning functional activities without increased symptoms or restriction. 5. Pt will demo good gait mechanics for >/=30' for community ambulation.

## 2019-08-28 ENCOUNTER — OFFICE VISIT (OUTPATIENT)
Dept: FAMILY MEDICINE CLINIC | Age: 64
End: 2019-08-28
Payer: COMMERCIAL

## 2019-08-28 VITALS
DIASTOLIC BLOOD PRESSURE: 68 MMHG | OXYGEN SATURATION: 96 % | WEIGHT: 189 LBS | BODY MASS INDEX: 32.27 KG/M2 | HEART RATE: 70 BPM | SYSTOLIC BLOOD PRESSURE: 100 MMHG | HEIGHT: 64 IN

## 2019-08-28 DIAGNOSIS — E78.00 PURE HYPERCHOLESTEROLEMIA: ICD-10-CM

## 2019-08-28 DIAGNOSIS — R73.9 ELEVATED BLOOD SUGAR: ICD-10-CM

## 2019-08-28 DIAGNOSIS — I10 ESSENTIAL HYPERTENSION: Primary | ICD-10-CM

## 2019-08-28 DIAGNOSIS — N28.9 RENAL INSUFFICIENCY: ICD-10-CM

## 2019-08-28 PROCEDURE — 99214 OFFICE O/P EST MOD 30 MIN: CPT | Performed by: FAMILY MEDICINE

## 2019-08-29 ENCOUNTER — HOSPITAL ENCOUNTER (OUTPATIENT)
Dept: PHYSICAL THERAPY | Age: 64
Setting detail: THERAPIES SERIES
Discharge: HOME | End: 2019-08-29
Payer: COMMERCIAL

## 2019-08-29 PROCEDURE — 97140 MANUAL THERAPY 1/> REGIONS: CPT | Performed by: PHYSICAL THERAPIST

## 2019-08-29 PROCEDURE — 97110 THERAPEUTIC EXERCISES: CPT | Performed by: PHYSICAL THERAPIST

## 2019-08-29 NOTE — FLOWSHEET NOTE
[facility],  [address] [place]  Phone: [phone]  Fax [phone]      ATHLETIC TRAINING [address]  Date:  2019    Patient Name:  Jeremy Harvey    :  1955  MRN: [id number]  Restrictions/Precautions:    Medical/Treatment Diagnosis Information:  ·  LBP, lumbar DDD  ·    Physician Information:   Lm Romano Post-op  8 wks  12 wks 16 wks 20 wks   24 wks                            Activity Log                                                  DOS/DOI:                                                    Date:  19   ATC communication HEP given   Bike    Elliptical    Treadmill    Airdyne        Gastroc stretch    Soleus stretch    Hamstring stretch    ITB stretch    Hip Flexor stretch    Quad stretch    Adductor stretch        Weight Shifting sp                              fp                              tp    Lateral walking (with/w/o TB)        Balance: PEP/Meghan board                   SLS          Star excursion load/explode          Extremity reach UE/LE        Leg Press Chance. 80# 2x10                     Ecc.                      Inv. Calf Press Chance. 80# 2x10                      Ecc.                        Inv.        MATEO   Flex               ABd               ADd              TKE               Ext        Steps Up               Up and Over               Down               Lateral               Rotation        Squats  mini                  wall                 BOSU         Lunges:  Lunge to Balance                   Balance to Lunge                   Walking        Knee Extension Bilat. 20# 2x10                              Ecc.                               Inv. Hamstring Curls Bilat. 25# 2x10                              Ecc.                               Inv.        Soleus Press Bilat.                            Ecc.                           Inv.

## 2019-08-29 NOTE — FLOWSHEET NOTE
Oscillations-Mobs:  G-I, II, III, IV (PA's, Inf., Post.)  [x] (72517) Provided manual therapy to mobilize proximal hip and LS spine soft tissue/joints for the purpose of modulating pain, promoting relaxation,  increasing ROM, reducing/eliminating soft tissue swelling/inflammation/restriction, improving soft tissue extensibility and allowing for proper ROM for normal function with self care, mobility, lifting and ambulation. Modalities:   CPL knee x10'    Charges:  Timed Code Treatment Minutes: 38   Total Treatment Minutes: 48     [] EVAL (LOW) 08449 (typically 20 minutes face-to-face)  [] EVAL (MOD) 58331 (typically 30 minutes face-to-face)  [] EVAL (HIGH) 62395 (typically 45 minutes face-to-face)  [] RE-EVAL     [x] JI(56533) x  2   [] IONTO  [] NMR (90873) x      [] VASO  [x] Manual (08657) x  1    [] Other:  [] TA x       [] Mech Traction (79198)  [] ES(attended) (64300)      [] ES (un) (85080):     Goals: Patient stated goal: able to resume walking with friends    Therapist goals for Patient:   Short Term Goals: To be achieved in: 2 weeks  1. Independent in HEP and progression per patient tolerance, in order to prevent re-injury. 2. Patient will have a decrease in pain to facilitate improvement in movement, function, and ADLs as indicated by Functional Deficits. Long Term Goals: To be achieved in: 6 weeks  1. Disability index score of 31% or less for the modODI  to assist with reaching prior level of function. --met  2. Patient will demonstrate increased AROM to WNL, good LS mobility, good hip ROM to allow for proper joint functioning as indicated by patients Functional Deficits. --met  3. Patient will demonstrate an increase in Strength to good proximal hip and core activation to allow for proper functional mobility as indicated by patients Functional Deficits. --met except L hip which is progressing  4.  Patient will return to house hold cleaning functional activities without increased symptoms or restriction. --met  5. Pt will demo good gait mechanics for >/=30' for community ambulation. --met    Progression Towards Functional goals:  [x] Patient is progressing as expected towards functional goals listed. [] Progression is slowed due to complexities listed. [] Progression has been slowed due to co-morbidities. [] Plan just implemented, too soon to assess goals progression  [] Other:     ASSESSMENT:  Pt with good progress towards goals as stated.   HEP and gym progressions given w/ H.O.'s.    Treatment/Activity Tolerance:  [x] Patient tolerated treatment well [] Patient limited by fatique  [] Patient limited by pain  [] Patient limited by other medical complications  [] Other:     Prognosis: [x] Good [] Fair  [] Poor    Patient Requires Follow-up: [] Yes  [x] No    PLAN: D/C   [] Continue per plan of care [] Alter current plan (see comments)  [] Plan of care initiated [] Hold pending MD visit [x] Discharge    Electronically signed by: Tamara Palma PT

## 2019-09-05 DIAGNOSIS — I10 ESSENTIAL HYPERTENSION: ICD-10-CM

## 2019-09-05 RX ORDER — NEBIVOLOL 5 MG/1
TABLET ORAL
Qty: 90 TABLET | Refills: 1 | Status: SHIPPED | OUTPATIENT
Start: 2019-09-05 | End: 2020-02-28 | Stop reason: SDUPTHER

## 2019-09-09 ENCOUNTER — TELEPHONE (OUTPATIENT)
Dept: FAMILY MEDICINE CLINIC | Age: 64
End: 2019-09-09

## 2019-10-11 ENCOUNTER — IMMUNIZATION (OUTPATIENT)
Dept: FAMILY MEDICINE CLINIC | Age: 64
End: 2019-10-11
Payer: COMMERCIAL

## 2019-10-11 DIAGNOSIS — Z23 FLU VACCINE NEED: Primary | ICD-10-CM

## 2019-10-11 PROCEDURE — 90471 IMMUNIZATION ADMIN: CPT | Performed by: FAMILY MEDICINE

## 2019-10-11 PROCEDURE — 90686 IIV4 VACC NO PRSV 0.5 ML IM: CPT | Performed by: FAMILY MEDICINE

## 2019-10-23 ENCOUNTER — HOSPITAL ENCOUNTER (OUTPATIENT)
Age: 64
Setting detail: OUTPATIENT SURGERY
Discharge: HOME | End: 2019-10-23
Attending: INTERNAL MEDICINE | Admitting: INTERNAL MEDICINE
Payer: COMMERCIAL

## 2019-10-23 VITALS
BODY MASS INDEX: 30.82 KG/M2 | OXYGEN SATURATION: 97 % | HEIGHT: 65 IN | TEMPERATURE: 97.9 F | HEART RATE: 58 BPM | SYSTOLIC BLOOD PRESSURE: 132 MMHG | RESPIRATION RATE: 14 BRPM | WEIGHT: 185 LBS | DIASTOLIC BLOOD PRESSURE: 68 MMHG

## 2019-10-23 DIAGNOSIS — Z12.11 SCREENING FOR COLON CANCER: ICD-10-CM

## 2019-10-23 PROCEDURE — 6360000002 HC RX W HCPCS: Performed by: INTERNAL MEDICINE

## 2019-10-23 PROCEDURE — 88305 TISSUE EXAM BY PATHOLOGIST: CPT

## 2019-10-23 PROCEDURE — 7100000011 HC PHASE II RECOVERY - ADDTL 15 MIN: Performed by: INTERNAL MEDICINE

## 2019-10-23 PROCEDURE — 99153 MOD SED SAME PHYS/QHP EA: CPT | Performed by: INTERNAL MEDICINE

## 2019-10-23 PROCEDURE — 7100000010 HC PHASE II RECOVERY - FIRST 15 MIN: Performed by: INTERNAL MEDICINE

## 2019-10-23 PROCEDURE — 3609010600 HC COLONOSCOPY POLYPECTOMY SNARE/COLD BIOPSY: Performed by: INTERNAL MEDICINE

## 2019-10-23 PROCEDURE — 2709999900 HC NON-CHARGEABLE SUPPLY: Performed by: INTERNAL MEDICINE

## 2019-10-23 PROCEDURE — 99152 MOD SED SAME PHYS/QHP 5/>YRS: CPT | Performed by: INTERNAL MEDICINE

## 2019-10-23 PROCEDURE — 2580000003 HC RX 258: Performed by: INTERNAL MEDICINE

## 2019-10-23 RX ORDER — FENTANYL CITRATE 50 UG/ML
INJECTION, SOLUTION INTRAMUSCULAR; INTRAVENOUS PRN
Status: DISCONTINUED | OUTPATIENT
Start: 2019-10-23 | End: 2019-10-23 | Stop reason: ALTCHOICE

## 2019-10-23 RX ORDER — LIDOCAINE HYDROCHLORIDE 10 MG/ML
1 INJECTION, SOLUTION EPIDURAL; INFILTRATION; INTRACAUDAL; PERINEURAL ONCE
Status: DISCONTINUED | OUTPATIENT
Start: 2019-10-23 | End: 2019-10-23 | Stop reason: HOSPADM

## 2019-10-23 RX ORDER — SODIUM CHLORIDE, SODIUM LACTATE, POTASSIUM CHLORIDE, CALCIUM CHLORIDE 600; 310; 30; 20 MG/100ML; MG/100ML; MG/100ML; MG/100ML
1000 INJECTION, SOLUTION INTRAVENOUS ONCE
Status: DISCONTINUED | OUTPATIENT
Start: 2019-10-23 | End: 2019-10-23 | Stop reason: HOSPADM

## 2019-10-23 RX ORDER — MIDAZOLAM HYDROCHLORIDE 5 MG/ML
INJECTION INTRAMUSCULAR; INTRAVENOUS PRN
Status: DISCONTINUED | OUTPATIENT
Start: 2019-10-23 | End: 2019-10-23 | Stop reason: ALTCHOICE

## 2019-10-23 RX ORDER — SODIUM CHLORIDE, SODIUM LACTATE, POTASSIUM CHLORIDE, CALCIUM CHLORIDE 600; 310; 30; 20 MG/100ML; MG/100ML; MG/100ML; MG/100ML
1000 INJECTION, SOLUTION INTRAVENOUS ONCE
Status: COMPLETED | OUTPATIENT
Start: 2019-10-23 | End: 2019-10-23

## 2019-10-23 RX ADMIN — SODIUM CHLORIDE, POTASSIUM CHLORIDE, SODIUM LACTATE AND CALCIUM CHLORIDE 1000 ML: 600; 310; 30; 20 INJECTION, SOLUTION INTRAVENOUS at 13:04

## 2019-10-23 ASSESSMENT — PAIN SCALES - GENERAL
PAINLEVEL_OUTOF10: 0

## 2019-10-23 ASSESSMENT — PAIN - FUNCTIONAL ASSESSMENT: PAIN_FUNCTIONAL_ASSESSMENT: 0-10

## 2019-12-13 ENCOUNTER — HOSPITAL ENCOUNTER (OUTPATIENT)
Dept: WOMENS IMAGING | Age: 64
Discharge: HOME | End: 2019-12-13
Payer: COMMERCIAL

## 2019-12-13 DIAGNOSIS — Z12.31 VISIT FOR SCREENING MAMMOGRAM: ICD-10-CM

## 2019-12-13 PROCEDURE — 77067 SCR MAMMO BI INCL CAD: CPT

## 2020-01-27 ENCOUNTER — OFFICE VISIT (OUTPATIENT)
Dept: DERMATOLOGY | Age: 65
End: 2020-01-27
Payer: COMMERCIAL

## 2020-01-27 PROCEDURE — 99202 OFFICE O/P NEW SF 15 MIN: CPT | Performed by: DERMATOLOGY

## 2020-01-27 PROCEDURE — 17000 DESTRUCT PREMALG LESION: CPT | Performed by: DERMATOLOGY

## 2020-01-27 NOTE — PROGRESS NOTES
[facility]) Dermatology  [address] , [address]       Nick Mercado  1955    62 y.o. female     Date of Visit: 2020    Chief Complaint:   Chief Complaint   Patient presents with    Skin Lesion     bump on back, comes and goes, noticable 4 mos        I was asked to see this patient by Dr. Lopez ref. provider found. History of Present Illness: Nick Mercado is a 62 y.o. female who presents with the chief complaint of establish care and for the followin. Patient complains of a lump to her back that first appeared 4 months ago and has since enlarged. States her  attempted to drain the lesion and drain out a [employer]"  Does not think this helped to decrease the size or resolve the lesion. Denies pain, swelling, bleeding, tenderness. Interested in removal.     Pacemaker/ICD: No  Joint prosthesis in the past 2 years: No  Difficulty with numbing in the past: No  Local Anesthesia Reaction: No  Artificial Heart Valve: No  Organ Transplant: No  Immunosuppression: No  Bleeding/Clotting Disorders: No  Anticoagulant Therapy: No  Latex allergy: No    2. Complains of a few rough spots to her forehead that have been present for at least 6 months. History of actinic keratoses status post cryotherapy to forehead and seborrheic keratoses. Last seen in 2018 by [facility]. 3. Progressive freckling and lentigines located to sun exposed areas on head/neck, torso, extremities over several years, Admits to sun exposure in youth without wearing sunscreen, hats, or protective clothing. 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 Currently wears sunscreen when outdoors for long periods of time. Review of Systems:  Constitutional: Reports general sense of well-being   Skin: No new or changing moles, no tendency to develop thick scars  Heme: No abnormal bruising or bleeding.     Past Medical encounter (Office Visit) with Torito Adkins DO   Medication Sig Dispense Refill    nebivolol (BYSTOLIC) 5 MG tablet TAKE ONE TABLET BY MOUTH DAILY 90 tablet 1    simvastatin (ZOCOR) 40 MG tablet TAKE 1 TABLET NIGHTLY 90 tablet 1    potassium citrate (UROCIT-K) 5 MEQ (540 MG) extended release tablet Take 15 mEq by mouth 2 times daily         Social History:   Social History     Socioeconomic History    Marital status:      Spouse name: Not on file    Number of children: Not on file    Years of education: Not on file    Highest education level: Not on file   Occupational History    Not on file   Social Needs    Financial resource strain: Not on file    Food insecurity:     Worry: Not on file     Inability: Not on file    Transportation needs:     Medical: Not on file     Non-medical: Not on file   Tobacco Use    Smoking status: Never Smoker    Smokeless tobacco: Never Used   Substance and Sexual Activity    Alcohol use: No    Drug use: No    Sexual activity: Not Currently   Lifestyle    Physical activity:     Days per week: Not on file     Minutes per session: Not on file    Stress: Not on file   Relationships    Social connections:     Talks on phone: Not on file     Gets together: Not on file     Attends Taoist service: Not on file     Active member of club or organization: Not on file     Attends meetings of clubs or organizations: Not on file     Relationship status: Not on file    Intimate partner violence:     Fear of current or ex partner: Not on file     Emotionally abused: Not on file     Physically abused: Not on file     Forced sexual activity: Not on file   Other Topics Concern    Not on file   Social History Narrative    Not on file       Physical Examination     The following were examined and determined to be normal: Psych/Neuro, Conjunctivae/eyelids, Gums/teeth/lips and Neck. The following were examined and determined to be abnormal: Head/face and Back.

## 2020-02-06 ENCOUNTER — PROCEDURE VISIT (OUTPATIENT)
Dept: DERMATOLOGY | Age: 65
End: 2020-02-06
Payer: COMMERCIAL

## 2020-02-06 VITALS — DIASTOLIC BLOOD PRESSURE: 66 MMHG | BODY MASS INDEX: 31.12 KG/M2 | WEIGHT: 187 LBS | SYSTOLIC BLOOD PRESSURE: 120 MMHG

## 2020-02-06 PROCEDURE — 12032 INTMD RPR S/A/T/EXT 2.6-7.5: CPT | Performed by: DERMATOLOGY

## 2020-02-06 PROCEDURE — 11403 EXC TR-EXT B9+MARG 2.1-3CM: CPT | Performed by: DERMATOLOGY

## 2020-02-06 NOTE — PROGRESS NOTES
file     Attends meetings of clubs or organizations: Not on file     Relationship status: Not on file    Intimate partner violence:     Fear of current or ex partner: Not on file     Emotionally abused: Not on file     Physically abused: Not on file     Forced sexual activity: Not on file   Other Topics Concern    Not on file   Social History Narrative    Not on file       Physical Examination     Well appearing. A+Ox3. No acute distress. BP: 137/82  1. Left paraspinal mid-back- 2.1x2.0cm subcutaneous mobile cystic-like nodule, no erythema          Assessment and Plan     1. EIC (epidermal inclusion cyst)    2. Disturbance of skin sensation        1. EIC (epidermal inclusion cyst)  The site to be treated was confirmed with the patient and the previous note. The patient was educated regarding potential risks of bleeding, discomfort, scar, wound dehiscence, infection, pain, and recurrence. A consent form was signed by the patient. The area was prepped and draped in the normal sterile fashion. Local anesthesia was obtained wth 1% lidocaine with epinephrine. An incision was performed overlying the cyst, the cyst was visualized and dissected from the surrounding tissue. The specimen was submitted to pathology in an appropriately labeled specimen container. Pinpoint hemostasis with electrodessication was obtained. The wound edges were undermined and approximated in a layered fashion with buried sutures of 3-0 vicryl and running sutures of 5-0 ethilon. The final length of the wound measured 4.2 cm. Blood loss was minimal and there were no immediate complications. The wound was covered with petrolatum and a pressure bandage.      The patient is to return in 2 week for suture removal.        Return in about 2 weeks (around 2/20/2020) for suture removal.

## 2020-02-06 NOTE — PATIENT INSTRUCTIONS
Sun Protection Tips    Apply broad spectrum water resistant sunscreen with an SPF of at least 30 to exposed areas of the skin. Dont forget the ears and lips! Remember to reapply sunscreen about every 2 hours and after swimming or sweating. Wear sun protective clothing. Swim shirts (aka. rash guards) are a great idea and negates the need to reapply sunscreen in those areas. Seek the shade whenever possible especially between the hours of 10am and 4pm when the suns rays are the strongest.     Avoid tanning beds          Wear a wide brim hat while in the sun    Surgical Wound Care Instructions  Sutured wounds:  · After your surgery, go home and take it easy. Please refrain from any vigorous physical activity or heavy lifting. · Leave the pressure bandage in place for 48 hours. If it starts to detach, reinforce the bandage with another piece of tape. · Please keep the bandage dry for 48 hours  · After 48 hours, the wound can get wet. Clean the area daily using mild soapy water and a gauze pad or cotton tipped applicator, applying gentle pressure. · Apply a thin layer of Vaseline or petroleum jelly. · Cut a Telfa pad in the shape of the wound but slightly larger and secure with tape. Special sites:  Facial sites:  · Keep the wound elevated for the first 2 nights. · Do not sleep on the side of the body where the wound is located. · Do not bend your head lower than your heart or engage in heavy lifting. Leg:  · Keep the leg elevated when reclining, using a pillow to elevate it. Complications:  · If bleeding develops when you go home, apply pressure for 15 minutes continuously with no peeking. A small amount of ice in a bag covered with a towel may be used for another 10 minutes if necessary. If the bleeding does not stop, please call us [phone]. · Please call if you develop any fevers, chills, or pus drains from the wound.   · A small amount of redness at the site of the surgery is normal at

## 2020-02-10 ENCOUNTER — TELEPHONE (OUTPATIENT)
Dept: DERMATOLOGY | Age: 65
End: 2020-02-10

## 2020-02-11 RX ORDER — SIMVASTATIN 40 MG
TABLET ORAL
Qty: 90 TABLET | Refills: 1 | Status: SHIPPED | OUTPATIENT
Start: 2020-02-11 | End: 2020-03-18 | Stop reason: ALTCHOICE

## 2020-02-11 NOTE — TELEPHONE ENCOUNTER
Moise Rocio [phone] (home)    is requesting refill(s) of medication Simvastatin to preferred [facility] [address] 8/28/19 (pertaining to medication)   Last refill 8/15/19 (per medication requested)  Next office visit scheduled or attempted Yes  Date 2/28/20  If No, reason

## 2020-02-20 ENCOUNTER — NURSE ONLY (OUTPATIENT)
Dept: DERMATOLOGY | Age: 65
End: 2020-02-20

## 2020-02-20 PROCEDURE — 99024 POSTOP FOLLOW-UP VISIT: CPT | Performed by: DERMATOLOGY

## 2020-02-28 ENCOUNTER — OFFICE VISIT (OUTPATIENT)
Dept: FAMILY MEDICINE CLINIC | Age: 65
End: 2020-02-28
Payer: COMMERCIAL

## 2020-02-28 VITALS
BODY MASS INDEX: 30.82 KG/M2 | DIASTOLIC BLOOD PRESSURE: 78 MMHG | OXYGEN SATURATION: 98 % | SYSTOLIC BLOOD PRESSURE: 122 MMHG | HEART RATE: 73 BPM | HEIGHT: 65 IN | WEIGHT: 185 LBS

## 2020-02-28 LAB
A/G RATIO: 1.9 (ref 1.1–2.2)
ALBUMIN SERPL-MCNC: 4.5 G/DL (ref 3.4–5)
ALP BLD-CCNC: 95 U/L (ref 40–129)
ALT SERPL-CCNC: 16 U/L (ref 10–40)
ANION GAP SERPL CALCULATED.3IONS-SCNC: 14 MMOL/L (ref 3–16)
AST SERPL-CCNC: 23 U/L (ref 15–37)
BILIRUB SERPL-MCNC: 0.4 MG/DL (ref 0–1)
BUN BLDV-MCNC: 16 MG/DL (ref 7–20)
CALCIUM SERPL-MCNC: 9.9 MG/DL (ref 8.3–10.6)
CHLORIDE BLD-SCNC: 100 MMOL/L (ref 99–110)
CHOLESTEROL, TOTAL: 170 MG/DL (ref 0–199)
CO2: 28 MMOL/L (ref 21–32)
CREAT SERPL-MCNC: 1.4 MG/DL (ref 0.6–1.2)
GFR AFRICAN AMERICAN: 46
GFR NON-AFRICAN AMERICAN: 38
GLOBULIN: 2.4 G/DL
GLUCOSE BLD-MCNC: 98 MG/DL (ref 70–99)
HDLC SERPL-MCNC: 54 MG/DL (ref 40–60)
LDL CHOLESTEROL CALCULATED: 76 MG/DL
POTASSIUM SERPL-SCNC: 4.3 MMOL/L (ref 3.5–5.1)
SODIUM BLD-SCNC: 142 MMOL/L (ref 136–145)
TOTAL PROTEIN: 6.9 G/DL (ref 6.4–8.2)
TRIGL SERPL-MCNC: 198 MG/DL (ref 0–150)
VLDLC SERPL CALC-MCNC: 40 MG/DL

## 2020-02-28 PROCEDURE — 36415 COLL VENOUS BLD VENIPUNCTURE: CPT | Performed by: FAMILY MEDICINE

## 2020-02-28 PROCEDURE — 99214 OFFICE O/P EST MOD 30 MIN: CPT | Performed by: FAMILY MEDICINE

## 2020-02-28 RX ORDER — MELATONIN 5 MG
TABLET,CHEWABLE ORAL NIGHTLY
COMMUNITY

## 2020-02-28 RX ORDER — NEBIVOLOL 5 MG/1
TABLET ORAL
Qty: 90 TABLET | Refills: 1 | Status: SHIPPED | OUTPATIENT
Start: 2020-02-28 | End: 2020-08-28 | Stop reason: SDUPTHER

## 2020-02-28 ASSESSMENT — PATIENT HEALTH QUESTIONNAIRE - PHQ9
SUM OF ALL RESPONSES TO PHQ9 QUESTIONS 1 & 2: 0
1. LITTLE INTEREST OR PLEASURE IN DOING THINGS: 0
2. FEELING DOWN, DEPRESSED OR HOPELESS: 0
SUM OF ALL RESPONSES TO PHQ QUESTIONS 1-9: 0
SUM OF ALL RESPONSES TO PHQ QUESTIONS 1-9: 0

## 2020-02-28 NOTE — PROGRESS NOTES
SUBJECTIVE:  Dory Tompkins is a 62 y.o. female who presents for evaluation of renal insufficiency, elevated blood sugar, hypertension and hyperlipidemia. She indicates that she is feeling well and denies any symptoms referable to her elevated blood pressure. Specifically denies chest pain, palpitations, dyspnea, orthopnea, PND or peripheral edema or neuro sx. No anorexia, arthralgia, or leg cramps noted. Current medication regimen is as listed below. She denies any side effects of medication, and has been taking it regularly. Lab Results   Component Value Date    LDLCALC 58 08/22/2019    LDLDIRECT 125 (H) 07/30/2018       Patient has no acute issues going on. She has lost more weight from her last appointment. She has been exercising more consistently and is working harder on heart healthy choices in her diet. Her last A1c was 5.5, last BUN/creatinine was 13 and 1.3 with a GFR 41. Patient states that her she has a brother that had a heart attack since her last appointment. She was asking if there is anything further that she could do to prevent heart disease in herself    Current Outpatient Medications   Medication Sig Dispense Refill    Melatonin 5 MG CHEW Take by mouth      nebivolol (BYSTOLIC) 5 MG tablet TAKE ONE TABLET BY MOUTH DAILY 90 tablet 1    simvastatin (ZOCOR) 40 MG tablet TAKE 1 TABLET NIGHTLY 90 tablet 1    potassium citrate (UROCIT-K) 5 MEQ (540 MG) extended release tablet Take 15 mEq by mouth 2 times daily       No current facility-administered medications for this visit.         Allergies   Allergen Reactions    Codeine Rash, Nausea And Vomiting and Shortness Of Breath    Lisinopril      cough    Norvasc [Amlodipine Besylate]      swelling    Triamterene-Hctz      Renal insufficiency       Social History     Tobacco Use    Smoking status: Never Smoker    Smokeless tobacco: Never Used   Substance Use Topics    Alcohol use: No       OBJECTIVE:   /78   Pulse 73   Ht 5' 5\"

## 2020-02-29 LAB
ESTIMATED AVERAGE GLUCOSE: 102.5 MG/DL
HBA1C MFR BLD: 5.2 %

## 2020-03-12 ENCOUNTER — HOSPITAL ENCOUNTER (OUTPATIENT)
Dept: CT IMAGING | Age: 65
Discharge: HOME | End: 2020-03-12
Payer: COMMERCIAL

## 2020-03-12 PROCEDURE — 75571 CT HRT W/O DYE W/CA TEST: CPT

## 2020-03-17 ENCOUNTER — TELEPHONE (OUTPATIENT)
Dept: FAMILY MEDICINE CLINIC | Age: 65
End: 2020-03-17

## 2020-03-17 NOTE — TELEPHONE ENCOUNTER
Appreciate Dr. Ubaldo Halsted  response to my question. He said that it does need to be further pursued with a CT without contrast through the adrenals and then subsequent laboratory testing to evaluate the functioning of the adenoma. He says that given the small size of the adenoma this is not an urgent matter and can be postponed for several months. Given the current crisis with the pandemic, we can revisit this a few months down the road. I spoke with the patient and reviewed the above message. She does have an appointment with cardiology to assess her CAD. We will regroup in 3 months or so and address the adrenal incidentaloma and referral to endocrinology.   Patient is in agreement with plan

## 2020-03-17 NOTE — PROGRESS NOTES
[address]   Cardiac Consultation    Referring Provider:  Teresa Jacobo MD     Chief Complaint   Patient presents with    New Patient    Dizziness     while bent over and gets up    Coronary Artery Disease    Shortness of Breath    Dizziness        History of Present Illness: Ilana Mena is a NEW patient for interventional cardiology, referred by Teresa Jacobo MD for an abnormal CT calcium score of 469, hypertension, hyperlipidemia. Today she states she underwent a CT calcium score due to her family HX of heart disease. She has been having dizziness that started one month ago, no associated chest pain. She has some SOB when she walks up stairs that started 6 months ago with a back injury. SOB resolves when she stops and rest at the top of the stairs. She thinks SOB has improved over the last few months. No associated chest pain. She checks her blood pressure at home rarely. Runs 114-116. She has been walking and using a stationary bike. She has lost 25 lbs since last summer. Patient currently denies any weight gain, edema, palpitations, chest pain, and syncope. Past Medical History:   has a past medical history of Hyperlipidemia, Hypertension, S/P colonoscopy, and Tendinitis of foot. Surgical History:   has a past surgical history that includes Cholecystectomy (12/1992); laparoscopy; Tonsillectomy and adenoidectomy; Breast surgery; Abdomen surgery; Colonoscopy (8/1/2009); angioplasty; other surgical history (Left, 01/11/2016); epidural steroid injection (N/A, 7/29/2019); and Colonoscopy (N/A, 10/23/2019). Social History:   reports that she has never smoked. She has never used smokeless tobacco. She reports that she does not drink alcohol or use drugs. Family History:  family history includes Arthritis in her mother; Cancer in her mother; Diabetes in her sister;  Heart Disease in her brother, father, and mother; High Blood Pressure in her brother, mother, and sister; High

## 2020-03-18 ENCOUNTER — OFFICE VISIT (OUTPATIENT)
Dept: CARDIOLOGY CLINIC | Age: 65
End: 2020-03-18
Payer: COMMERCIAL

## 2020-03-18 VITALS
OXYGEN SATURATION: 94 % | HEIGHT: 65 IN | SYSTOLIC BLOOD PRESSURE: 148 MMHG | HEART RATE: 66 BPM | BODY MASS INDEX: 31.32 KG/M2 | DIASTOLIC BLOOD PRESSURE: 90 MMHG | WEIGHT: 188 LBS

## 2020-03-18 PROCEDURE — 99244 OFF/OP CNSLTJ NEW/EST MOD 40: CPT | Performed by: INTERNAL MEDICINE

## 2020-03-18 PROCEDURE — 93000 ELECTROCARDIOGRAM COMPLETE: CPT | Performed by: INTERNAL MEDICINE

## 2020-03-18 RX ORDER — ATORVASTATIN CALCIUM 40 MG/1
40 TABLET, FILM COATED ORAL DAILY
Qty: 90 TABLET | Refills: 3 | Status: SHIPPED
Start: 2020-03-18 | End: 2020-04-20 | Stop reason: SDUPTHER

## 2020-03-18 RX ORDER — ATORVASTATIN CALCIUM 40 MG/1
40 TABLET, FILM COATED ORAL DAILY
Qty: 30 TABLET | Refills: 5 | Status: SHIPPED | OUTPATIENT
Start: 2020-03-18 | End: 2021-03-19

## 2020-03-18 NOTE — LETTER
[address]   Cardiac Consultation    Referring Provider:  Alphonse Arthur MD     Chief Complaint   Patient presents with    New Patient    Dizziness     while bent over and gets up    Coronary Artery Disease    Shortness of Breath    Dizziness        History of Present Illness: Guille Sutton is a NEW patient for interventional cardiology, referred by Alphonse Arthur MD for an abnormal CT calcium score of 469, hypertension, hyperlipidemia. Today she states she underwent a CT calcium score due to her family HX of heart disease. She has been having dizziness that started one month ago, no associated chest pain. She has some SOB when she walks up stairs that started 6 months ago with a back injury. SOB resolves when she stops and rest at the top of the stairs. She thinks SOB has improved over the last few months. No associated chest pain. She checks her blood pressure at home rarely. Runs 114-116. She has been walking and using a stationary bike. She has lost 25 lbs since last summer. Patient currently denies any weight gain, edema, palpitations, chest pain, and syncope. Past Medical History:   has a past medical history of Hyperlipidemia, Hypertension, S/P colonoscopy, and Tendinitis of foot. Surgical History:   has a past surgical history that includes Cholecystectomy (12/1992); laparoscopy; Tonsillectomy and adenoidectomy; Breast surgery; Abdomen surgery; Colonoscopy (8/1/2009); angioplasty; other surgical history (Left, 01/11/2016); epidural steroid injection (N/A, 7/29/2019); and Colonoscopy (N/A, 10/23/2019). Social History:   reports that she has never smoked. She has never used smokeless tobacco. She reports that she does not drink alcohol or use drugs. Family History:  family history includes Arthritis in her mother; Cancer in her mother; Diabetes in her sister;  Heart Disease in her brother, father, and mother; SpO2: 94%        Constitutional and General Appearance: NAD   Respiratory:  · Normal excursion and expansion without use of accessory muscles  · Resp Auscultation: Normal breath sounds without dullness  Cardiovascular:  · The apical impulses not displaced  · Heart tones are crisp and normal  · Cervical veins are not engorged  · The carotid upstroke is normal in amplitude and contour without delay or bruit  · Normal S1S2, No S3, No Murmur  · Peripheral pulses are symmetrical and full  · There is no clubbing, cyanosis of the extremities. · No edema  · Femoral Arteries: 2+ and equal  · Pedal Pulses: 2+ and equal   Abdomen:  · No masses or tenderness  · Liver/Spleen: No Abnormalities Noted  Neurological/Psychiatric:  · Alert and oriented in all spheres  · Moves all extremities well  · Exhibits normal gait balance and coordination  · No abnormalities of mood, affect, memory, mentation, or behavior are noted      Stress echocardiogram 9/6/13  Summary  Normal echocardiographic response to stress, but abnormal EKG response. Renal angiogram 2014 [facility] [place]  No significant renal artery stenosis       CT Calcium score 3/12/2020  Total Agatston calcium score of 469. High likelihood of at least one significant coronary artery stenosis (>50% diameter). Assessment:   Coronary artery disease - based on CT calcium score   SOB - possible angina equivalent  Dizziness   Hypertension - elevated. Pt states always normal at home  Hyperlipidemia   Family history of heart disease     Plan:  Stress test- GXT Myoview   Discussed possible need for cath if stress test is abnormal   Echocardiogram   Change Zocor to Lipitor 40 mg daily for better LDL control   Start Aspirin 81 mg daily   Continue current medications   Check blood pressure at home twice daily- keep a log with the dates and times.  Call the office in a few weeks with readings   Regular exercise and following a healthy diet encouraged )low carb and fat diet)

## 2020-04-01 ENCOUNTER — TELEPHONE (OUTPATIENT)
Dept: CARDIOLOGY CLINIC | Age: 65
End: 2020-04-01

## 2020-04-02 ENCOUNTER — HOSPITAL ENCOUNTER (OUTPATIENT)
Dept: NUCLEAR MEDICINE | Age: 65
Discharge: HOME | End: 2020-04-02
Payer: COMMERCIAL

## 2020-04-02 ENCOUNTER — HOSPITAL ENCOUNTER (OUTPATIENT)
Dept: NON INVASIVE DIAGNOSTICS | Age: 65
Discharge: HOME | End: 2020-04-02
Payer: COMMERCIAL

## 2020-04-02 ENCOUNTER — TELEPHONE (OUTPATIENT)
Dept: CARDIOLOGY CLINIC | Age: 65
End: 2020-04-02

## 2020-04-02 LAB
LV EF: 55 %
LV EF: 65 %
LVEF MODALITY: NORMAL
LVEF MODALITY: NORMAL

## 2020-04-02 PROCEDURE — 78452 HT MUSCLE IMAGE SPECT MULT: CPT

## 2020-04-02 PROCEDURE — 93017 CV STRESS TEST TRACING ONLY: CPT

## 2020-04-02 PROCEDURE — A9502 TC99M TETROFOSMIN: HCPCS | Performed by: INTERNAL MEDICINE

## 2020-04-02 PROCEDURE — 3430000000 HC RX DIAGNOSTIC RADIOPHARMACEUTICAL: Performed by: INTERNAL MEDICINE

## 2020-04-02 PROCEDURE — 93306 TTE W/DOPPLER COMPLETE: CPT

## 2020-04-02 RX ADMIN — TETROFOSMIN 11.2 MILLICURIE: 1.38 INJECTION, POWDER, LYOPHILIZED, FOR SOLUTION INTRAVENOUS at 08:30

## 2020-04-02 RX ADMIN — TETROFOSMIN 32.8 MILLICURIE: 1.38 INJECTION, POWDER, LYOPHILIZED, FOR SOLUTION INTRAVENOUS at 10:02

## 2020-04-02 NOTE — TELEPHONE ENCOUNTER
Message left that BP log was reviewed by [facility] [place]. No changes at this time.

## 2020-04-02 NOTE — TELEPHONE ENCOUNTER
----- Message from Elisha Leahy MD sent at 4/2/2020 11:50 AM EDT -----  Call  Stress test and echo are both normal

## 2020-04-20 ENCOUNTER — VIRTUAL VISIT (OUTPATIENT)
Dept: ENDOCRINOLOGY | Age: 65
End: 2020-04-20
Payer: COMMERCIAL

## 2020-04-20 PROCEDURE — 99243 OFF/OP CNSLTJ NEW/EST LOW 30: CPT | Performed by: INTERNAL MEDICINE

## 2020-04-20 RX ORDER — DEXAMETHASONE 1 MG
TABLET ORAL
Qty: 1 TABLET | Refills: 0 | Status: SHIPPED
Start: 2020-04-20 | End: 2020-07-09 | Stop reason: ALTCHOICE

## 2020-04-20 ASSESSMENT — ENCOUNTER SYMPTOMS
COUGH: 0
SHORTNESS OF BREATH: 0
ABDOMINAL PAIN: 0
NAUSEA: 0
VOMITING: 0
SORE THROAT: 0
BACK PAIN: 0

## 2020-04-20 NOTE — PROGRESS NOTES
Respiratory: Negative for cough and shortness of breath. Cardiovascular: Negative for chest pain and palpitations. Gastrointestinal: Negative for abdominal pain, nausea and vomiting. Endocrine: Negative for polydipsia. Genitourinary: Negative for frequency and urgency. Musculoskeletal: Negative for back pain and myalgias. Skin: Negative for rash. Neurological: Negative for headaches. Psychiatric/Behavioral: The patient is not nervous/anxious. Assessment/Plan      1. Adrenal adenoma    This 62 yrs old female had CT for cardiac calcium scoring which showed 11 mm adenoma in left adrenal.     CT abdomen in 2016 reported normal adrenal glands      -Will order CT of  adrenals to be done in June , 2020 to get more details of the adenoma.     -Will order labs including aldosterone, renin, plasma metanephrines and 1 mg DST. Based on CT scan and labs , will make further recommendations      2. HTN.  Controlled   As per cardiology

## 2020-05-11 ENCOUNTER — TELEPHONE (OUTPATIENT)
Dept: ENDOCRINOLOGY | Age: 65
End: 2020-05-11

## 2020-05-11 ENCOUNTER — HOSPITAL ENCOUNTER (OUTPATIENT)
Age: 65
Discharge: HOME | End: 2020-05-11
Payer: COMMERCIAL

## 2020-05-11 LAB — CORTISOL - AM: 1.6 UG/DL (ref 4.3–22.4)

## 2020-05-11 PROCEDURE — 82088 ASSAY OF ALDOSTERONE: CPT

## 2020-05-11 PROCEDURE — 83835 ASSAY OF METANEPHRINES: CPT

## 2020-05-11 PROCEDURE — 82533 TOTAL CORTISOL: CPT

## 2020-05-11 PROCEDURE — 84244 ASSAY OF RENIN: CPT

## 2020-05-11 PROCEDURE — 36415 COLL VENOUS BLD VENIPUNCTURE: CPT

## 2020-05-12 ENCOUNTER — TELEPHONE (OUTPATIENT)
Dept: ENDOCRINOLOGY | Age: 65
End: 2020-05-12

## 2020-05-12 NOTE — TELEPHONE ENCOUNTER
[facility] called and stated because the pt is over the age of 62 the pt will need lab work for creatinine and BUN before her CT scan that is scheduled for tomorrow. They asked for orders to be placed.

## 2020-05-12 NOTE — TELEPHONE ENCOUNTER
Spoke with Rigo Driscoll at central scheduling and she was made aware of the change. Rigo Driscoll stated she updated everything on their end. Pt is set to have CT without contrast tomorrow at [facility].

## 2020-05-13 ENCOUNTER — HOSPITAL ENCOUNTER (OUTPATIENT)
Dept: CT IMAGING | Age: 65
Discharge: HOME | End: 2020-05-13
Payer: COMMERCIAL

## 2020-05-13 LAB — RENIN ACTIVITY: 0.3 NG/ML/HR

## 2020-05-13 PROCEDURE — 74150 CT ABDOMEN W/O CONTRAST: CPT

## 2020-05-14 LAB
METANEPH/PLASMA INTERP: NORMAL
METANEPHRINE FREE PLASMA: 0.16 NMOL/L (ref 0–0.49)
NORMETANEPHRINE FREE PLASMA: 0.38 NMOL/L (ref 0–0.89)

## 2020-05-20 ENCOUNTER — TELEPHONE (OUTPATIENT)
Dept: ENDOCRINOLOGY | Age: 65
End: 2020-05-20

## 2020-05-20 NOTE — TELEPHONE ENCOUNTER
Patient called and would like the results of her labs and scans.      Patient said she got a letter stating they are denying her CT scan and she said it states it needs more information on why this test is nesessary   Will fax a copy of the letter

## 2020-06-03 ENCOUNTER — HOSPITAL ENCOUNTER (OUTPATIENT)
Age: 65
Discharge: HOME | End: 2020-06-03
Payer: COMMERCIAL

## 2020-06-03 LAB
A/G RATIO: 1.7 (ref 1.1–2.2)
ALBUMIN SERPL-MCNC: 4.3 G/DL (ref 3.4–5)
ALP BLD-CCNC: 103 U/L (ref 40–129)
ALT SERPL-CCNC: 27 U/L (ref 10–40)
ANION GAP SERPL CALCULATED.3IONS-SCNC: 15 MMOL/L (ref 3–16)
AST SERPL-CCNC: 32 U/L (ref 15–37)
BILIRUB SERPL-MCNC: 0.4 MG/DL (ref 0–1)
BUN BLDV-MCNC: 23 MG/DL (ref 7–20)
CALCIUM SERPL-MCNC: 9.5 MG/DL (ref 8.3–10.6)
CHLORIDE BLD-SCNC: 101 MMOL/L (ref 99–110)
CO2: 24 MMOL/L (ref 21–32)
CREAT SERPL-MCNC: 1.4 MG/DL (ref 0.6–1.2)
GFR AFRICAN AMERICAN: 46
GFR NON-AFRICAN AMERICAN: 38
GLOBULIN: 2.5 G/DL
GLUCOSE BLD-MCNC: 100 MG/DL (ref 70–99)
PHOSPHORUS: 3.6 MG/DL (ref 2.5–4.9)
POTASSIUM SERPL-SCNC: 4.1 MMOL/L (ref 3.5–5.1)
SODIUM BLD-SCNC: 140 MMOL/L (ref 136–145)
TOTAL PROTEIN: 6.8 G/DL (ref 6.4–8.2)
VITAMIN D 25-HYDROXY: 35.4 NG/ML

## 2020-06-03 PROCEDURE — 84100 ASSAY OF PHOSPHORUS: CPT

## 2020-06-03 PROCEDURE — 82088 ASSAY OF ALDOSTERONE: CPT

## 2020-06-03 PROCEDURE — 36415 COLL VENOUS BLD VENIPUNCTURE: CPT

## 2020-06-03 PROCEDURE — 82306 VITAMIN D 25 HYDROXY: CPT

## 2020-06-03 PROCEDURE — 80053 COMPREHEN METABOLIC PANEL: CPT

## 2020-06-05 LAB — ALDOSTERONE: 20.9 NG/DL

## 2020-06-09 ENCOUNTER — HOSPITAL ENCOUNTER (OUTPATIENT)
Dept: WOMENS IMAGING | Age: 65
Discharge: HOME | End: 2020-06-09
Payer: COMMERCIAL

## 2020-06-09 PROCEDURE — 77080 DXA BONE DENSITY AXIAL: CPT

## 2020-06-10 ENCOUNTER — OFFICE VISIT (OUTPATIENT)
Dept: ORTHOPEDIC SURGERY | Age: 65
End: 2020-06-10
Payer: COMMERCIAL

## 2020-06-10 VITALS — WEIGHT: 188.05 LBS | BODY MASS INDEX: 31.33 KG/M2 | HEIGHT: 65 IN

## 2020-06-10 PROCEDURE — 99213 OFFICE O/P EST LOW 20 MIN: CPT | Performed by: PHYSICAL MEDICINE & REHABILITATION

## 2020-06-10 NOTE — PROGRESS NOTES
Follow up: SPINE    CHIEF COMPLAINT:    Chief Complaint   Patient presents with    Back Pain       HISTORY OF PRESENT ILLNESS:                The patient is a 60 y.o. female history of hypertension who I saw last year for lumbar stenosis and epidural injection. She has been doing well. She recently had work-up for stable adrenal mass followed on CT. This showed questionable \"L1\" new fracture. She reports no trauma. Her back actually feels well. She noticed notes no lower extremity weakness. She follows up. These findings    Pain Assessment  Location of Pain: Back  Severity of Pain: 0  Quality of Pain: Sharp, Dull, Aching  Duration of Pain: Persistent  Frequency of Pain: Constant  Aggravating Factors: Stairs, Walking, Standing, Squatting, Kneeling, Exercise, Straightening, Stretching, Bending  Limiting Behavior: Yes  Relieving Factors: Rest  Result of Injury: No  Work-Related Injury: No  Are there other pain locations you wish to document?: No    The pain assessment was noted & reviewed in the medical record today.      Current/Past Treatment:   · Physical Therapy: yes since Jan 2019 with continued HEP  · Chiropractic:   no  · Injection:  7/29/19 Midline L5/S1 interlaminar epidural injection #1--50%  Medications:            NSAIDS:             Muscle relaxer:              Steriods:  MDP            Neuropathic medications:              Opioids:            OTHER: Tylenol   · Surgery/Consult:    Work Status/Functionality: taking care of her ill      Past Medical History: Medical history form was reviewed today & scanned into the media tab  Past Medical History:   Diagnosis Date    Hyperlipidemia     Hypertension     S/P colonoscopy 08/2009    Tendinitis of foot       Past Surgical History:     Past Surgical History:   Procedure Laterality Date    ABDOMEN SURGERY      stone removed from bile duct    ANGIOPLASTY      renal    BREAST SURGERY      duct removal    CHOLECYSTECTOMY  12/1992    showing osteopenia    I reviewed her CT report and films which Jessica Patel note \"L1\" superior endplate fracture and Schmorl's node. However, this finding is at L4. Repeat x-rays today show no evidence of fracture    Lumbar MRI scan report independently reviewed 7/15/2019 showing severe central stenosis L4-5, multilevel DDD with varying degrees of foraminal stenosis, facet arthropathy, (left renal atrophy--pt aware)     2 views lumbar spine 1/8/2019 show severe DDD L5-S1    Labs reviewed February 2019 BUN 22, creatinine 1.5        Impression:    L4 Schmorl's node, no acute fracture  Lumbar stenosis-doing well after epidural last year  Osteopenia    Plan:     Reassurance, activity to tolerance.   Follow-up for any worsening back pain      MATY Kaur MD

## 2020-06-11 ENCOUNTER — TELEPHONE (OUTPATIENT)
Dept: ENDOCRINOLOGY | Age: 65
End: 2020-06-11

## 2020-07-01 ENCOUNTER — HOSPITAL ENCOUNTER (OUTPATIENT)
Age: 65
Discharge: HOME | End: 2020-07-01
Payer: COMMERCIAL

## 2020-07-01 PROCEDURE — 84244 ASSAY OF RENIN: CPT

## 2020-07-01 PROCEDURE — 82088 ASSAY OF ALDOSTERONE: CPT

## 2020-07-03 LAB — ALDOSTERONE: 25.5 NG/DL

## 2020-07-04 LAB — RENIN ACTIVITY: <0.1 NG/ML/HR

## 2020-07-09 ENCOUNTER — OFFICE VISIT (OUTPATIENT)
Dept: ENDOCRINOLOGY | Age: 65
End: 2020-07-09
Payer: COMMERCIAL

## 2020-07-09 VITALS
SYSTOLIC BLOOD PRESSURE: 131 MMHG | OXYGEN SATURATION: 97 % | BODY MASS INDEX: 31.69 KG/M2 | HEIGHT: 65 IN | DIASTOLIC BLOOD PRESSURE: 86 MMHG | WEIGHT: 190.2 LBS | HEART RATE: 60 BPM

## 2020-07-09 PROCEDURE — 99214 OFFICE O/P EST MOD 30 MIN: CPT | Performed by: INTERNAL MEDICINE

## 2020-07-09 RX ORDER — DENOSUMAB 60 MG/ML
60 INJECTION SUBCUTANEOUS ONCE
Qty: 1 ML | Refills: 0 | Status: SHIPPED | OUTPATIENT
Start: 2020-07-09 | End: 2021-04-02

## 2020-07-09 ASSESSMENT — ENCOUNTER SYMPTOMS
VOMITING: 0
SHORTNESS OF BREATH: 0
COUGH: 0
NAUSEA: 0
SORE THROAT: 0
ABDOMINAL PAIN: 0
BACK PAIN: 0

## 2020-07-09 NOTE — PROGRESS NOTES
Endocrinology  Anjum Ramos M.D. Phone: [phone]   FAX: [phone]       Bonifacio Mac   YOB: 1955    Date of Visit:  7/9/2020    Allergies   Allergen Reactions    Codeine Rash, Nausea And Vomiting and Shortness Of Breath    Lisinopril      cough    Norvasc [Amlodipine Besylate]      swelling    Triamterene-Hctz      Renal insufficiency     Outpatient Medications Marked as Taking for the 7/9/20 encounter (Office Visit) with Laura Sebastian MD   Medication Sig Dispense Refill    aspirin 81 MG tablet Take 81 mg by mouth daily      atorvastatin (LIPITOR) 40 MG tablet Take 1 tablet by mouth daily 30 tablet 5    Melatonin 5 MG CHEW Take by mouth      nebivolol (BYSTOLIC) 5 MG tablet TAKE ONE TABLET BY MOUTH DAILY 90 tablet 1    potassium citrate (UROCIT-K) 5 MEQ (540 MG) extended release tablet Take 15 mEq by mouth 2 times daily           Vitals:    07/09/20 0917   BP: 131/86   Site: Left Upper Arm   Position: Sitting   Cuff Size: Medium Adult   Pulse: 60   SpO2: 97%   Weight: 190 lb 3.2 oz (86.3 kg)   Height: 5' 5\" (1.651 m)     Body mass index is 31.65 kg/m².      Wt Readings from Last 3 Encounters:   07/09/20 190 lb 3.2 oz (86.3 kg)   06/10/20 188 lb 0.8 oz (85.3 kg)   03/18/20 188 lb (85.3 kg)     BP Readings from Last 3 Encounters:   07/09/20 131/86   03/18/20 (!) 148/90   02/28/20 122/78        Past Medical History:   Diagnosis Date    Hyperlipidemia     Hypertension     S/P colonoscopy 08/2009    Tendinitis of foot      Past Surgical History:   Procedure Laterality Date    ABDOMEN SURGERY      stone removed from bile duct    ANGIOPLASTY      renal    BREAST SURGERY      duct removal    CHOLECYSTECTOMY  12/1992    COLONOSCOPY  8/1/2009    COLONOSCOPY N/A 10/23/2019    COLONOSCOPY POLYPECTOMY SNARE/COLD BIOPSY performed by Haydee Benitez MD at [address] [place] [address] 7/29/2019    MIDLINE LUMBAR FIVE SACRAL ONE EPIDURAL STEROID INJECTION SITE CONFIRMED BY FLUOROSCOPY performed by Maryalice Simmonds, MD at [address] Left 2016    Cystoscopy, with left stent placement    TONSILLECTOMY AND ADENOIDECTOMY       Family History   Problem Relation Age of Onset    Heart Disease Father         MI  at  age 48   [facility] High Blood Pressure Sister     Diabetes Sister     Kidney Disease Sister     High Blood Pressure Mother     High Cholesterol Mother     Arthritis Mother         gout    Heart Disease Mother     Cancer Mother     Heart Disease Brother     High Cholesterol Brother     High Blood Pressure Brother      Social History     Tobacco Use   Smoking Status Never Smoker   Smokeless Tobacco Never Used      Social History     Substance and Sexual Activity   Alcohol Use No       HPI    Marina Demar Rider is a 60 y.o. female who was seen for management of adrenal nodule,  osteoporosis. PCP :  Jojo Singleton MD  .      Patient has a PMH of HTN, hyperlipidemia, kidney stones  , spinal stenosis. Patient had a CT for calcium cardiac scoring in   which showed 11 mm adenoma in left adrenal gland. No h/o malignancy. No H/o weight gain.    had renal stones in 2016. Calcium   No easy bruisability. She has lost 25 lbs since last year with life style changes. No recent Steroid use. H/o Hypertension. On bystolic 5 mg daily. Had cough with lisinopril . Had swelling with norvasc. No Spells of headache, sweating and palpitations. Osteoporosis    Was found to L1 compression fracture of spine on CT abdomen in     No h/o fragility fracture in adult life. DEXA scan in  showed lowest T score of -2.3 in Left femoral neck. No calcium , vitamin D. Eats 1 cup of yogurt. Walks regularly. Father had a hip fracture . Menopause at 40          Review of Systems   HENT: Negative for sore throat.     Respiratory: Negative for cough and shortness of breath. Cardiovascular: Negative for chest pain and palpitations. Gastrointestinal: Negative for abdominal pain, nausea and vomiting. Endocrine: Negative for polydipsia. Genitourinary: Negative for frequency and urgency. Musculoskeletal: Negative for back pain and myalgias. Skin: Negative for rash. Neurological: Negative for headaches. Psychiatric/Behavioral: The patient is not nervous/anxious. EXAMINATION:   CT ABDOMEN WITHOUT CONTRAST 5/13/2020 7:53 am       TECHNIQUE:   CT of the abdomen was performed without the administration of intravenous   contrast. Multiplanar reformatted images are provided for review. Dose   modulation, iterative reconstruction, and/or weight based adjustment of the   mA/kV was utilized to reduce the radiation dose to as low as reasonably   achievable.       COMPARISON:   03/12/2020 and 01/11/2016       HISTORY:   ORDERING SYSTEM PROVIDED HISTORY: Adenoma of left adrenal gland   TECHNOLOGIST PROVIDED HISTORY:   Reason for exam:->Please mention [place]en[place] units. Please do CT without   contrast given CKD. Reason for Exam: f/u from other CT, no symptoms   Relevant Medical/Surgical History: GB,       FINDINGS:   Lower Chest: There are new in unchanged 3-4 mm solid bilateral pulmonary   nodules, no follow-up imaging is recommended.  There is bibasilar   atelectasis.  Small hiatal hernia is noted.  Coronary artery calcifications   are a marker of atherosclerosis.       Organs: The unenhanced liver, spleen, pancreas, right adrenal gland and   kidneys are unremarkable.  Status post cholecystectomy. Levada Jiménez is left renal   atrophy with bilateral cortical renal scarring and calcifications.       No change in the 1.0 x 1.1 cm left adrenal adenoma.       GI/Bowel:  There is no evidence of bowel obstruction.  The appendix is within   normal limits.       Peritoneum/Retroperitoneum: There is no free fluid or adenopathy.  A small   hiatal hernia is noted. Sachin Duncan atherosclerosis involves the abdominal aorta   and bilateral common iliac arteries.       Bones/Soft Tissues: Degenerative changes involve the thoracolumbar spine. There is an age-indeterminate compression fracture involving the superior   endplate of L1 with a new lucent lesion which may be due to a new Schmorl's   node.           Impression   1. Unchanged 1.1 cm left adrenal adenoma. 2. New age-indeterminate L1 compression fracture.  If there is point   tenderness, recommend nonemergent MRI of the lumbar spine       EXAMINATION:   BONE DENSITOMETRY       6/9/2020 10:46 am       TECHNIQUE:   A bone density DEXA scan was performed of the lumbar spine and left hip on a   "[employer] system.       COMPARISON:   None.       HISTORY:   ORDERING SYSTEM PROVIDED HISTORY: Postmenopausal disorder   TECHNOLOGIST PROVIDED HISTORY:   Reason for exam:->Postmenoapusal. L1 compresison fracture.       FINDINGS:   LUMBAR SPINE:       The bone mineral density in the lumbar spine including the L1-L3 levels is   measured at 0.94 g/cm2, which corresponds to a T-score of -0.7 and a Z-score   of 0.9.  This is within the normal range by WHO criteria.       LEFT HIP:       The bone mineral density in the total hip is measured at 0.75 g/cm2   corresponding to a T-score of -1.6 and a Z-score of -0.4.  This is within the   osteopenia range by WHO criteria.       The bone mineral density of the femoral neck is measured at 0.60 g/cm2   corresponding to a T-score of -2.3 and a Z-score of -0.8.  This is within the   osteopenia range by WHO criteria.       10 year risk for a major osteoporotic fracture is 20% and the risk of a hip   fracture is 1.9%.           Impression   Osteopenia by WHO criteria.             Assessment/Plan      1.  Adrenal adenoma    This 60 yrs old female had CT for cardiac calcium scoring which showed 11 mm adenoma in left adrenal.     Repeat  CT of  adrenals  in May 2020 again showed 1.1 cm left adrenal adenoma with benign features. Spoke to radiologist. Given benign appearance, no further radiological follow-up is recommended. Normal plasma metanephrines,  Cortisol  After 1 mg DST was 1.6 ( normal)     Aldosterone was high x 2   Renin is suppressed     Reports BP of 110-120/60-70    BP is well controlled on one medication. If her BP is uncontrolled in the future, will do further confirmatory testing. 2. Osteoporosis. Was found to have L1 compression fracture of spine on CT abdomen in 05/20    DEXA scan in 06/20 showed lowest T score of -2.3 in Left femoral neck. FRAX score  :    10 year risk for a major osteoporotic fracture is 20% and the risk of a hip   fracture is 1.9%. Vit D 35.4     Her GFR is 38. Will avoid bisphosphonate    Will recommend denosumab ( prolia ) 60 mg SQ every 6 months. Reviewed side effects     Will apply for insurance approval.       3. HTN. As per cardiology      4. CKD.  As per nephrology

## 2020-07-13 ENCOUNTER — TELEPHONE (OUTPATIENT)
Dept: ENDOCRINOLOGY | Age: 65
End: 2020-07-13

## 2020-07-13 NOTE — TELEPHONE ENCOUNTER
Called pt at both numbers and l/m to call back to schedule her first Prolia injection.   7/13/2020 @ 1133am.

## 2020-07-14 ENCOUNTER — OFFICE VISIT (OUTPATIENT)
Dept: DERMATOLOGY | Age: 65
End: 2020-07-14
Payer: COMMERCIAL

## 2020-07-14 VITALS — TEMPERATURE: 98.2 F

## 2020-07-14 PROCEDURE — 99213 OFFICE O/P EST LOW 20 MIN: CPT | Performed by: DERMATOLOGY

## 2020-07-14 NOTE — PROGRESS NOTES
[facility]) Dermatology  Gonsalo LoboDO lena, [address]       Maximino Robles  1955    60 y.o. female     Date of Visit: 2020    Chief Complaint:   Chief Complaint   Patient presents with    Skin Exam     moles, tbse        I was asked to see this patient by Dr. Lopez ref. provider found. History of Present Illness: Maximino Robles is a 60 y.o. female who presents with the chief complaint of the followin. Total body skin cancer screening exam. Many year history of multiple nevi on the head/neck, trunk and extremities, all present for many years. Denies new moles. Denies moles changing in size, shape, color. None associated w/ pain, bleeding, pruritus. 2.  History of actinic keratosis to right medial superior forehead status post cryotherapy 6 months ago. Denies recurrence. 3.  Complains of a few rough feeling brown spots scattered to her superior chest, lower legs, including 1 that has turned pink in color to her left medial thigh. All present for at least 3 months. Denies any pain, bleeding, pruritus to the above lesions. Patient has a list in hand of spots to be checked, used a magnifying mirror for spots on her chest but states at least 1 of the dark spots has disappeared. 4.  History of excision to epidermal inclusion cyst located to left paraspinal mid back inferior , denies recurrence. Admits to sun exposure in youth without wearing sunscreen, hats, or protective clothing. Attempts to wear sunscreen and sun protective clothing when mowing grass once per week. Otherwise takes walks in early morning or late evening. Review of Systems:  Constitutional: Reports general sense of well-being   Skin: No new or changing moles, no tendency to develop thick scars, no interval of severe sunburns  Heme: No abnormal bruising or bleeding. Past Medical History, Family History, Surgical History, Medications and Allergies reviewed.     Past Skin Hx:  -2020-hx of EIC s/p surgical excision located to left paraspinal mid-back   -hx of AKs s/p cryotherapy  -History of lentigines  Patient denies past history of melanoma, NMSC, dysplastic nevi, or chronic skin rashes.     PFHx: Denies hx of MM or NMSC    Family History   Problem Relation Age of Onset    Heart Disease Father         MI  at  age 48   [place] High Blood Pressure Sister     Diabetes Sister     Kidney Disease Sister     High Blood Pressure Mother     High Cholesterol Mother     Arthritis Mother         gout    Heart Disease Mother     Cancer Mother     Heart Disease Brother     High Cholesterol Brother     High Blood Pressure Brother      Past Medical History:   Diagnosis Date    Actinic keratosis     Hyperlipidemia     Hypertension     S/P colonoscopy 2009    Tendinitis of foot      Past Surgical History:   Procedure Laterality Date    ABDOMEN SURGERY      stone removed from bile duct    ANGIOPLASTY      renal    BREAST SURGERY      duct removal    CHOLECYSTECTOMY  1992    COLONOSCOPY  2009    COLONOSCOPY N/A 10/23/2019    COLONOSCOPY POLYPECTOMY SNARE/COLD BIOPSY performed by True Guzman MD at [facility] N/A 2019    MIDLINE LUMBAR FIVE SACRAL ONE EPIDURAL STEROID INJECTION SITE CONFIRMED BY FLUOROSCOPY performed by Sinan Richardson MD at [address] Left 2016    Cystoscopy, with left stent placement    TONSILLECTOMY AND ADENOIDECTOMY         Allergies   Allergen Reactions    Codeine Rash, Nausea And Vomiting and Shortness Of Breath    Lisinopril      cough    Norvasc [Amlodipine Besylate]      swelling    Triamterene-Hctz      Renal insufficiency     Outpatient Medications Marked as Taking for the 20 encounter (Office Visit) with David Lebron DO   Medication Sig Dispense Refill    aspirin 81 MG tablet Take 81 mg by mouth daily      atorvastatin (LIPITOR) 40 MG tablet Take 1 tablet by mouth daily 30 tablet 5    Melatonin 5 MG CHEW Take by mouth      nebivolol (BYSTOLIC) 5 MG tablet TAKE ONE TABLET BY MOUTH DAILY 90 tablet 1    potassium citrate (UROCIT-K) 5 MEQ (540 MG) extended release tablet Take 15 mEq by mouth 2 times daily         Social History:   Social History     Socioeconomic History    Marital status:      Spouse name: Not on file    Number of children: Not on file    Years of education: Not on file    Highest education level: Not on file   Occupational History    Not on file   Social Needs    Financial resource strain: Not on file    Food insecurity     Worry: Not on file     Inability: Not on file    Transportation needs     Medical: Not on file     Non-medical: Not on file   Tobacco Use    Smoking status: Never Smoker    Smokeless tobacco: Never Used   Substance and Sexual Activity    Alcohol use: No    Drug use: No    Sexual activity: Not Currently   Lifestyle    Physical activity     Days per week: Not on file     Minutes per session: Not on file    Stress: Not on file   Relationships    Social connections     Talks on phone: Not on file     Gets together: Not on file     Attends Jain service: Not on file     Active member of club or organization: Not on file     Attends meetings of clubs or organizations: Not on file     Relationship status: Not on file    Intimate partner violence     Fear of current or ex partner: Not on file     Emotionally abused: Not on file     Physically abused: Not on file     Forced sexual activity: Not on file   Other Topics Concern    Not on file   Social History Narrative    Not on file       Physical Examination     The following were examined and determined to be normal: Psych/Neuro, Scalp/hair, Head/face, Conjunctivae/eyelids, Gums/teeth/lips, Neck, Breast/axilla/chest, Abdomen, Back, RUE, LUE, RLE, LLE and Nails/digits. buttocks. Areas covered by underwear garment(s) not examined.     The following were examined and determined to be abnormal:none    Hever Magallanes phototype: 2    -Constitutional: Well appearing, no acute distress  -Neurological: Alert and oriented X 3  -Mood and Affect: Pleasant  Total body skin exam performed, areas examined listed above:   1. Scattered on the head,neck, trunk and extremities are multiple well-defined round and oval symmetric smoothly-bordered uniformly brown macules and papules. no change in size/shape/color of any lesions; no bleeding lesions. 2. Left medial thigh- well-defined \"stuck-on\" verrucous tan-brown papule(s) w/ surrounding bright erythema  3. stuck-on appearing tan-brown verrucous papules scattered to decolletage and lower legs  4. left paraspinal mid-back - well healed scar, clear  5. right medial superior forehead-clear  Assessment and Plan     1. Multiple benign melanocytic nevi    2. Inflamed seborrheic keratosis    3. Seborrheic keratoses    4. History of epidermal inclusion cyst excision    5. History of actinic keratosis        1. Multiple benign melanocytic nevi  Benign acquired melanocytic nevi  -Recommend monthly self skin exams   -Educated regarding the ABCDEs of melanoma detection   -Call for any new/changing moles or concerning lesions  -Reviewed sun protective behavior -- sun avoidance during the peak hours of the day, sun-protective clothing (including hat and sunglasses), sunscreen use (water resistant, broad spectrum, SPF at least 30, need for reapplication every 1.5 to 2 hours), avoidance of tanning beds   -Plan: Observation with annual skin checks (earlier if indicated) performed in office to monitor current nevi and to assess for new lesions. 2. Inflamed seborrheic keratosis  Patient educated that seborrheic keratoses have no malignant potential.    -Due to inflammation, offered cryotherapy but patient declined, asymptomatic  Observation, pt to call if changes in size, shape, color or experiences bleeding/pain/itching      3.  Seborrheic keratoses  Patient educated that seborrheic keratoses have no malignant potential.    -Reassurance provided to the patient regarding their chronic and benign nature. No treatment performed    4. History of epidermal inclusion cyst excision  No evidence of recurrence    5. History of actinic keratosis  Clear  Call office if observes any new scaly irritated lesions or worried for recurrence        Return to Clinic:  1 year skin exam  Discussed plan with patient and/or primary caretaker. Patient to call clinic with any questions / concerns. Reviewed proper use and side effects of treatment(s) and/or medication(s) with patient and/or primary caretaker. AVS provided or is available on [employer]     Note is transcribed using voice recognition software. Inadvertent computerized transcription errors may be present.

## 2020-07-24 ENCOUNTER — NURSE ONLY (OUTPATIENT)
Dept: ENDOCRINOLOGY | Age: 65
End: 2020-07-24
Payer: COMMERCIAL

## 2020-07-24 PROCEDURE — 96372 THER/PROPH/DIAG INJ SC/IM: CPT | Performed by: INTERNAL MEDICINE

## 2020-08-28 ENCOUNTER — OFFICE VISIT (OUTPATIENT)
Dept: FAMILY MEDICINE CLINIC | Age: 65
End: 2020-08-28
Payer: COMMERCIAL

## 2020-08-28 VITALS
HEIGHT: 65 IN | HEART RATE: 60 BPM | DIASTOLIC BLOOD PRESSURE: 80 MMHG | WEIGHT: 190.2 LBS | SYSTOLIC BLOOD PRESSURE: 148 MMHG | BODY MASS INDEX: 31.69 KG/M2 | TEMPERATURE: 97.8 F | OXYGEN SATURATION: 97 %

## 2020-08-28 LAB
A/G RATIO: 2.2 (ref 1.1–2.2)
ALBUMIN SERPL-MCNC: 4.6 G/DL (ref 3.4–5)
ALP BLD-CCNC: 99 U/L (ref 40–129)
ALT SERPL-CCNC: 27 U/L (ref 10–40)
ANION GAP SERPL CALCULATED.3IONS-SCNC: 13 MMOL/L (ref 3–16)
AST SERPL-CCNC: 27 U/L (ref 15–37)
BILIRUB SERPL-MCNC: 0.6 MG/DL (ref 0–1)
BUN BLDV-MCNC: 16 MG/DL (ref 7–20)
CALCIUM SERPL-MCNC: 9.5 MG/DL (ref 8.3–10.6)
CHLORIDE BLD-SCNC: 102 MMOL/L (ref 99–110)
CHOLESTEROL, TOTAL: 148 MG/DL (ref 0–199)
CO2: 27 MMOL/L (ref 21–32)
CREAT SERPL-MCNC: 1.5 MG/DL (ref 0.6–1.2)
GFR AFRICAN AMERICAN: 42
GFR NON-AFRICAN AMERICAN: 35
GLOBULIN: 2.1 G/DL
GLUCOSE BLD-MCNC: 96 MG/DL (ref 70–99)
HDLC SERPL-MCNC: 50 MG/DL (ref 40–60)
LDL CHOLESTEROL CALCULATED: 56 MG/DL
POTASSIUM SERPL-SCNC: 4.9 MMOL/L (ref 3.5–5.1)
SODIUM BLD-SCNC: 142 MMOL/L (ref 136–145)
TOTAL PROTEIN: 6.7 G/DL (ref 6.4–8.2)
TRIGL SERPL-MCNC: 210 MG/DL (ref 0–150)
VLDLC SERPL CALC-MCNC: 42 MG/DL

## 2020-08-28 PROCEDURE — 90750 HZV VACC RECOMBINANT IM: CPT | Performed by: FAMILY MEDICINE

## 2020-08-28 PROCEDURE — 99215 OFFICE O/P EST HI 40 MIN: CPT | Performed by: FAMILY MEDICINE

## 2020-08-28 PROCEDURE — 90471 IMMUNIZATION ADMIN: CPT | Performed by: FAMILY MEDICINE

## 2020-08-28 PROCEDURE — 36415 COLL VENOUS BLD VENIPUNCTURE: CPT | Performed by: FAMILY MEDICINE

## 2020-08-28 RX ORDER — NEBIVOLOL 5 MG/1
TABLET ORAL
Qty: 90 TABLET | Refills: 1 | Status: SHIPPED | OUTPATIENT
Start: 2020-08-28 | End: 2021-03-02 | Stop reason: SDUPTHER

## 2020-08-28 ASSESSMENT — PATIENT HEALTH QUESTIONNAIRE - PHQ9
SUM OF ALL RESPONSES TO PHQ9 QUESTIONS 1 & 2: 0
SUM OF ALL RESPONSES TO PHQ QUESTIONS 1-9: 0
2. FEELING DOWN, DEPRESSED OR HOPELESS: 0
SUM OF ALL RESPONSES TO PHQ QUESTIONS 1-9: 0
1. LITTLE INTEREST OR PLEASURE IN DOING THINGS: 0

## 2020-08-28 NOTE — PROGRESS NOTES
SUBJECTIVE:  Theron Renee is a 60 y.o. female who presents for evaluation of renal insufficiency, elevated blood sugar, abnormal coronary calcium score, left adrenal adenoma, osteopenia, hypertension and hyperlipidemia. She indicates that she is feeling well and denies any symptoms referable to her elevated blood pressure. Specifically denies chest pain, palpitations, dyspnea, orthopnea, PND or peripheral edema or neuro sx. No anorexia, arthralgia, or leg cramps noted. Current medication regimen is as listed below. She denies any side effects of medication, and has been taking it regularly. Lab Results   Component Value Date    LDLCALC 76 02/28/2020    LDLDIRECT 125 (H) 07/30/2018       Patient was last seen 6 months ago. At that time she had a coronary calcium score which was elevated at 400. She was then seen by cardiology and had a stress test which was negative. She was started on a statin and a baby aspirin. Also a left adrenal mass was noted. She was evaluated by endocrinology for the adrenal adenoma. Lab work was done this was reviewed. A L1 compression fracture was detected. Patient had a bone density test which showed osteopenia, she is being treated with Prolia for compression fracture and osteopenia. She continues to follow-up with nephrology for her chronic renal insufficiency. Patient brought in her blood pressure cuff from home. Her blood pressure log at home reveals blood pressures ranging /60-70. Her blood pressure cuff here reads 156/91, 150/91, for me 148/80. So patient's blood pressures are generally higher here in the office but her cuff is accurate. Patient would like to receive her first shingles vaccine today. In the past her blood sugar was slightly elevated at 102 and A1c was 5.2 we will recheck this today. Last BUN and creatinine was 23 and 1.4 with a GFR 38.   She has appointment in the next few weeks with nephrology    Current Outpatient Medications   Medication Sig Dispense Refill    nebivolol (BYSTOLIC) 5 MG tablet TAKE ONE TABLET BY MOUTH DAILY 90 tablet 1    aspirin 81 MG tablet Take 81 mg by mouth daily      atorvastatin (LIPITOR) 40 MG tablet Take 1 tablet by mouth daily 30 tablet 5    Melatonin 5 MG CHEW Take by mouth      potassium citrate (UROCIT-K) 5 MEQ (540 MG) extended release tablet Take 15 mEq by mouth 2 times daily      denosumab (PROLIA) 60 MG/ML SOSY SC injection Inject 1 mL into the skin once for 1 dose 1 mL 0     No current facility-administered medications for this visit. Allergies   Allergen Reactions    Codeine Rash, Nausea And Vomiting and Shortness Of Breath    Lisinopril      cough    Norvasc [Amlodipine Besylate]      swelling    Triamterene-Hctz      Renal insufficiency       Social History     Tobacco Use    Smoking status: Never Smoker    Smokeless tobacco: Never Used   Substance Use Topics    Alcohol use: No       OBJECTIVE:   BP (!) 148/80   Pulse 60   Temp 97.8 °F (36.6 °C)   Ht 5' 5\" (1.651 m)   Wt 190 lb 3.2 oz (86.3 kg)   LMP 07/11/2005   SpO2 97%   BMI 31.65 kg/m²   NAD  Neck no bruit or JVD  S1 and S2 normal, no murmurs, clicks, gallops or rubs. Regular rate and rhythm. Chest is clear; no wheezes or rales. No edema or JVD. Neuro alert, no CN or motor deficits  Psych nl mood, thought and judgement    ASSESSMENT:   Diagnosis Orders   1. Essential hypertension, falsely elevated here in the office she will continue to monitor her blood pressure at home she will bring 2 weeks worth of blood pressure numbers prior to her next appointment nebivolol (BYSTOLIC) 5 MG tablet    Comprehensive Metabolic Panel   2. Pure hypercholesterolemia, labs pending on a statin Lipid Panel   3. Elevated blood sugar, labs pending Hemoglobin A1C   4. Renal insufficiency, labs pending continue follow-up with nephrology    5. Osteopenia, unspecified location, continue Prolia per endocrinology    6.  Adenoma of left adrenal gland, follow-up with endocrinology    7. Need for shingles vaccine discussed potential side effects she will return in 2 months for shot #2 Zoster recombinant [facility])        Plan:  1)  Medication: continue current medication regimen unchanged  2)  Recheck in 6 months, sooner should new symptoms or problems arise. 3) LLR  Marina received counseling on the following healthy behaviors: exercise and medication adherence    Patient given educational materials on     I have instructed Marina to complete a self tracking handout on Blood Pressures  and instructed them to bring it with them to her next appointment. Discussed use, benefit, and side effects of prescribed medications. Barriers to medication compliance addressed. All patient questions answered. Pt voiced understanding. I spent a total of 40* minutes face to face with this patient, over 50% of that time was spent on counseling and care coordination. Please see assessment and plan for counseling and care coordination details. 

## 2020-08-29 LAB
ESTIMATED AVERAGE GLUCOSE: 114 MG/DL
HBA1C MFR BLD: 5.6 %

## 2020-08-31 ENCOUNTER — TELEPHONE (OUTPATIENT)
Dept: OBGYN CLINIC | Age: 65
End: 2020-08-31

## 2020-09-01 ENCOUNTER — OFFICE VISIT (OUTPATIENT)
Dept: OBGYN CLINIC | Age: 65
End: 2020-09-01
Payer: COMMERCIAL

## 2020-09-01 VITALS
BODY MASS INDEX: 32.2 KG/M2 | SYSTOLIC BLOOD PRESSURE: 122 MMHG | TEMPERATURE: 97.5 F | DIASTOLIC BLOOD PRESSURE: 84 MMHG | WEIGHT: 188.6 LBS | HEART RATE: 62 BPM | HEIGHT: 64 IN

## 2020-09-01 PROCEDURE — 99386 PREV VISIT NEW AGE 40-64: CPT | Performed by: OBSTETRICS & GYNECOLOGY

## 2020-09-01 ASSESSMENT — ENCOUNTER SYMPTOMS
ABDOMINAL PAIN: 0
SHORTNESS OF BREATH: 0
DIARRHEA: 0
VOMITING: 0
CONSTIPATION: 0
BACK PAIN: 1
NAUSEA: 0

## 2020-09-01 NOTE — PROGRESS NOTES
Last PAP was normal; 2018. Abnormal pap history? no  Last HPV screen:  unknown  Mammogram was normal, December/2019.   Last Dexa Scan: June / 2020 osteopenia and osteoperosis  Contraceptive method: Post menopausal status  Last colonoscopy was 10/2019
Date    ABDOMEN SURGERY      stone removed from bile duct    ANGIOPLASTY      renal    BREAST SURGERY      duct removal    CHOLECYSTECTOMY  12/1992    COLONOSCOPY  8/1/2009    COLONOSCOPY N/A 10/23/2019    COLONOSCOPY POLYPECTOMY SNARE/COLD BIOPSY performed by Tina Wilkinson MD at [facility] 7/29/2019    MIDLINE LUMBAR FIVE SACRAL ONE EPIDURAL STEROID INJECTION SITE CONFIRMED BY FLUOROSCOPY performed by Ulises Mccarty MD at [address] Left 01/11/2016    Cystoscopy, with left stent placement    TONSILLECTOMY AND ADENOIDECTOMY         Medications:  Current Outpatient Medications   Medication Sig Dispense Refill    nebivolol (BYSTOLIC) 5 MG tablet TAKE ONE TABLET BY MOUTH DAILY 90 tablet 1    aspirin 81 MG tablet Take 81 mg by mouth daily      atorvastatin (LIPITOR) 40 MG tablet Take 1 tablet by mouth daily 30 tablet 5    Melatonin 5 MG CHEW Take by mouth      potassium citrate (UROCIT-K) 5 MEQ (540 MG) extended release tablet Take 15 mEq by mouth 2 times daily      denosumab (PROLIA) 60 MG/ML SOSY SC injection Inject 1 mL into the skin once for 1 dose 1 mL 0     No current facility-administered medications for this visit. Allergies: Allergies   Allergen Reactions    Codeine Rash, Nausea And Vomiting and Shortness Of Breath    Lisinopril      cough    Norvasc [Amlodipine Besylate]      swelling    Triamterene-Hctz      Renal insufficiency       Social History:  Social History     Socioeconomic History    Marital status:       Spouse name: None    Number of children: None    Years of education: None    Highest education level: None   Occupational History    None   Social Needs    Financial resource strain: None    Food insecurity     Worry: None     Inability: None    Transportation needs     Medical: None     Non-medical: None   Tobacco Use    Smoking status: Never Smoker   

## 2020-09-08 ENCOUNTER — HOSPITAL ENCOUNTER (OUTPATIENT)
Age: 65
Discharge: HOME | End: 2020-09-08
Payer: COMMERCIAL

## 2020-09-08 LAB
ALBUMIN SERPL-MCNC: 4.3 G/DL (ref 3.4–5)
ANION GAP SERPL CALCULATED.3IONS-SCNC: 13 MMOL/L (ref 3–16)
BACTERIA: ABNORMAL /HPF
BILIRUBIN URINE: NEGATIVE
BLOOD, URINE: NEGATIVE
BUN BLDV-MCNC: 17 MG/DL (ref 7–20)
CALCIUM SERPL-MCNC: 9.2 MG/DL (ref 8.3–10.6)
CHLORIDE BLD-SCNC: 102 MMOL/L (ref 99–110)
CLARITY: ABNORMAL
CO2: 25 MMOL/L (ref 21–32)
COLOR: YELLOW
CREAT SERPL-MCNC: 1.4 MG/DL (ref 0.6–1.2)
CREATININE URINE: 111.7 MG/DL (ref 28–259)
EPITHELIAL CELLS, UA: ABNORMAL /HPF (ref 0–5)
GFR AFRICAN AMERICAN: 46
GFR NON-AFRICAN AMERICAN: 38
GLUCOSE BLD-MCNC: 92 MG/DL (ref 70–99)
GLUCOSE URINE: NEGATIVE MG/DL
KETONES, URINE: NEGATIVE MG/DL
LEUKOCYTE ESTERASE, URINE: ABNORMAL
MAGNESIUM: 2.2 MG/DL (ref 1.8–2.4)
MICROALBUMIN UR-MCNC: <1.2 MG/DL
MICROALBUMIN/CREAT UR-RTO: NORMAL MG/G (ref 0–30)
MICROSCOPIC EXAMINATION: YES
NITRITE, URINE: POSITIVE
PARATHYROID HORMONE INTACT: 171.9 PG/ML (ref 14–72)
PH UA: 8.5 (ref 5–8)
PHOSPHORUS: 2.1 MG/DL (ref 2.5–4.9)
POTASSIUM SERPL-SCNC: 4.5 MMOL/L (ref 3.5–5.1)
PROTEIN UA: NEGATIVE MG/DL
RBC UA: ABNORMAL /HPF (ref 0–4)
SODIUM BLD-SCNC: 140 MMOL/L (ref 136–145)
SPECIFIC GRAVITY UA: 1.01 (ref 1–1.03)
URIC ACID, SERUM: 6.9 MG/DL (ref 2.6–6)
URINE TYPE: ABNORMAL
UROBILINOGEN, URINE: 0.2 E.U./DL
VITAMIN D 25-HYDROXY: 36.6 NG/ML
WBC UA: ABNORMAL /HPF (ref 0–5)

## 2020-09-08 PROCEDURE — 36415 COLL VENOUS BLD VENIPUNCTURE: CPT

## 2020-09-08 PROCEDURE — 82306 VITAMIN D 25 HYDROXY: CPT

## 2020-09-08 PROCEDURE — 83735 ASSAY OF MAGNESIUM: CPT

## 2020-09-08 PROCEDURE — 81001 URINALYSIS AUTO W/SCOPE: CPT

## 2020-09-08 PROCEDURE — 84550 ASSAY OF BLOOD/URIC ACID: CPT

## 2020-09-08 PROCEDURE — 80069 RENAL FUNCTION PANEL: CPT

## 2020-09-08 PROCEDURE — 83970 ASSAY OF PARATHORMONE: CPT

## 2020-09-08 PROCEDURE — 82570 ASSAY OF URINE CREATININE: CPT

## 2020-09-08 PROCEDURE — 82043 UR ALBUMIN QUANTITATIVE: CPT

## 2020-09-11 ENCOUNTER — OFFICE VISIT (OUTPATIENT)
Dept: ORTHOPEDIC SURGERY | Age: 65
End: 2020-09-11
Payer: COMMERCIAL

## 2020-09-11 VITALS — HEIGHT: 64 IN | WEIGHT: 188 LBS | BODY MASS INDEX: 32.1 KG/M2

## 2020-09-11 PROCEDURE — 99214 OFFICE O/P EST MOD 30 MIN: CPT | Performed by: PHYSICAL MEDICINE & REHABILITATION

## 2020-09-11 RX ORDER — CYCLOBENZAPRINE HCL 10 MG
10 TABLET ORAL 2 TIMES DAILY PRN
Qty: 60 TABLET | Refills: 0 | Status: SHIPPED | OUTPATIENT
Start: 2020-09-11 | End: 2020-10-11

## 2020-09-11 NOTE — PROGRESS NOTES
Follow up: SPINE    CHIEF COMPLAINT:    Chief Complaint   Patient presents with    Back Pain     mid back pain x2-3 weeks comes & goes       HISTORY OF PRESENT ILLNESS:                The patient is a 60 y.o. female history of hypertension who I saw last year for lumbar stenosis and epidural injection. She has underlying mild renal insufficiency    She reports a history of osteopenia followed by Dr. Padmini Evans. She has atraumatic thoracic pain worse with motion and neck range of motion the last several weeks. No trauma. She reports no weakness in lower extremities no fevers or chills she tried some Tylenol. Pain Assessment  Location of Pain: Back  Severity of Pain: 3  Quality of Pain: Aching  Duration of Pain: Persistent  Frequency of Pain: Constant  Aggravating Factors: Standing, Walking, Other (Comment)  Limiting Behavior: Yes  Relieving Factors: Rest  Result of Injury: No  Work-Related Injury: No  Are there other pain locations you wish to document?: No    The pain assessment was noted & reviewed in the medical record today.      Current/Past Treatment:   · Physical Therapy: yes since Jan 2019 with continued HEP  · Chiropractic:   no  · Injection:  7/29/19 Midline L5/S1 interlaminar epidural injection #1--50%  Medications:            NSAIDS:             Muscle relaxer:              Steriods:  MDP            Neuropathic medications:              Opioids:            OTHER: Tylenol   · Surgery/Consult:    Work Status/Functionality: taking care of her ill      Past Medical History: Medical history form was reviewed today & scanned into the media tab  Past Medical History:   Diagnosis Date    Actinic keratosis     Breast disorder     CAD (coronary artery disease)     Chronic kidney disease     Hyperlipidemia     Hypertension     Infertility, female     S/P colonoscopy 08/2009    Tendinitis of foot       Past Surgical History:     Past Surgical History:   Procedure Laterality Date    ABDOMEN SURGERY      stone removed from bile duct    ANGIOPLASTY      renal    BREAST SURGERY      duct removal    CHOLECYSTECTOMY  1992    COLONOSCOPY  2009    COLONOSCOPY N/A 10/23/2019    COLONOSCOPY POLYPECTOMY SNARE/COLD BIOPSY performed by Obed Dumont MD at [address] [facility] [place] N/A 2019    MIDLINE LUMBAR FIVE SACRAL ONE EPIDURAL STEROID INJECTION SITE CONFIRMED BY FLUOROSCOPY performed by Vane Soni MD at [address] Left 2016    Cystoscopy, with left stent placement    TONSILLECTOMY AND ADENOIDECTOMY       Current Medications:     Current Outpatient Medications:     nebivolol (BYSTOLIC) 5 MG tablet, TAKE ONE TABLET BY MOUTH DAILY, Disp: 90 tablet, Rfl: 1    denosumab (PROLIA) 60 MG/ML SOSY SC injection, Inject 1 mL into the skin once for 1 dose, Disp: 1 mL, Rfl: 0    aspirin 81 MG tablet, Take 81 mg by mouth daily, Disp: , Rfl:     atorvastatin (LIPITOR) 40 MG tablet, Take 1 tablet by mouth daily, Disp: 30 tablet, Rfl: 5    Melatonin 5 MG CHEW, Take by mouth, Disp: , Rfl:     potassium citrate (UROCIT-K) 5 MEQ (540 MG) extended release tablet, Take 15 mEq by mouth 2 times daily, Disp: , Rfl:   Allergies:  Codeine; Lisinopril; Norvasc [amlodipine besylate]; and Triamterene-hctz  Social History:    reports that she has never smoked. She has never used smokeless tobacco. She reports that she does not drink alcohol or use drugs.   Family History:   Family History   Problem Relation Age of Onset    Heart Disease Father         MI  at  age 48   Maude Craze High Blood Pressure Sister     Diabetes Sister     Kidney Disease Sister         transplant     High Blood Pressure Mother     High Cholesterol Mother     Arthritis Mother         gout    Heart Disease Mother     Cancer Mother     Heart Disease Brother     High Cholesterol Brother     High Blood Pressure Brother        REVIEW OF There is no gross instability. There are no rashes, ulcerations or lesions. Strength and tone are normal.    Diagnostic Testin creatinine shows borderline elevation    2020 thoracic x-rays show spondylosis but no acute fracture    Recent DEXA scan reviewed showing osteopenia    I reviewed her CT report and films which Kyle Hoffman note \"L1\" superior endplate fracture and Schmorl's node. However, this finding is at L4.     Repeat x-rays today show no evidence of fracture    Lumbar MRI scan report independently reviewed 7/15/2019 showing severe central stenosis L4-5, multilevel DDD with varying degrees of foraminal stenosis, facet arthropathy, (left renal atrophy--pt aware)     2 views lumbar spine 2019 show severe DDD L5-S1    Labs reviewed 2019 BUN 22, creatinine 1.5        Impression:    Acute thoracic strain x2 weeks    L4 Schmorl's node, no acute fracture  Lumbar stenosis-doing well after epidural last year  Osteopenia    Plan:     Tylenol extra strength    Avoid NSAIDs    Flexeril at night and twice daily if can tolerate    3-week follow-up MRI if not improved to rule out subtle compression fracture not seen on x-ray  MATY Mccord MD

## 2020-09-22 ENCOUNTER — TELEPHONE (OUTPATIENT)
Dept: ENDOCRINOLOGY | Age: 65
End: 2020-09-22

## 2020-09-22 NOTE — TELEPHONE ENCOUNTER
I think she had labs done for her kidney doctor , Dr. Beatriz Summers and should call her office to get results. I have not ordered any labs for her recently.

## 2020-09-23 NOTE — TELEPHONE ENCOUNTER
Spoke with pt and advised per Dr. Bree Sesay: I think she had labs done for her kidney doctor , Dr. Samia House and should call her office to get results. I have not ordered any labs for her recently. FM 9/23/2020.

## 2020-10-06 ENCOUNTER — VIRTUAL VISIT (OUTPATIENT)
Dept: ORTHOPEDIC SURGERY | Age: 65
End: 2020-10-06
Payer: COMMERCIAL

## 2020-10-06 PROCEDURE — 99213 OFFICE O/P EST LOW 20 MIN: CPT | Performed by: PHYSICIAN ASSISTANT

## 2020-10-06 NOTE — PROGRESS NOTES
Virtual Visit: [facility] SPINE    CHIEF COMPLAINT:    Chief Complaint   Patient presents with    Back Pain       The patient is being evaluated by a Virtual Visit (video visit) encounter due to the restrictions of the COVID-19 pandemic. A caregiver was present when appropriate. All issues as below were discussed and addressed, but no physical exam was performed unless allowed by visual confirmation. If it was felt that patient should be evaluated in clinic then they were directed there. Due to this being a TeleHealth encounter (During CBTQX-02 public health emergency), evaluation of the following organ systems was limited to: spine. Pursuant to the emergency declaration under the [facility] waiver authority and the [employer] Act, this Virtual Visit was conducted with patient's verbal consent, to reduce the risk of exposure to COVID-19 and provide necessary medical care. The patient (and/or legal guardian) has also been advised to contact this office for worsening conditions or problems, and seek emergency medical treatment and/or call 911 if deemed necessary. Services were provided through a video synchronous discussion virtually to substitute for in-person encounter. Individuals present during telemedicine consultation-MEGAN Toure    HISTORY OF PRESENT ILLNESS:                The patient is a 60 y.o. female history of osteopenia and renal insufficiency, virtual visit follow-up for a 2-month history of atraumatic aching mid thoracic back pain. Symptoms initially constant but increased with walking, standing and range of motion (cervical flexion). Some relief with resting. Conservative care includes Tylenol, Flexeril. Overall reports 80% improvement at this time. Denies any distal or chest wall radiating pain, no progressive numbness tingling weakness. Overall much improved.    Current/Past Treatment: · Physical Therapy: yes since Jan 2019 with continued HEP  · Chiropractic:   no  · Injection:  7/29/19 Midline L5/S1 interlaminar epidural injection #1--50%  Medications:            NSAIDS:             Muscle relaxer: Flexeril nightly PRN            Steriods:  MDP            Neuropathic medications:              Opioids:            OTHER: Tylenol   · Surgery/Consult:     Work Status/Functionality: taking care of her ill      Past Medical History: Medical history form was reviewed today & scanned into the media tab  Past Medical History:   Diagnosis Date    Actinic keratosis     Breast disorder     CAD (coronary artery disease)     Chronic kidney disease     Hyperlipidemia     Hypertension     Infertility, female     S/P colonoscopy 08/2009    Tendinitis of foot       Past Surgical History:     Past Surgical History:   Procedure Laterality Date    ABDOMEN SURGERY      stone removed from bile duct    ANGIOPLASTY      renal    BREAST SURGERY      duct removal    CHOLECYSTECTOMY  12/1992    COLONOSCOPY  8/1/2009    COLONOSCOPY N/A 10/23/2019    COLONOSCOPY POLYPECTOMY SNARE/COLD BIOPSY performed by Elisabet Garg MD at [facility] N/A 7/29/2019    [address] BY FLUOROSCOPY performed by Fannie Ruiz MD at [address] Left 01/11/2016    Cystoscopy, with left stent placement    TONSILLECTOMY AND ADENOIDECTOMY       Current Medications:     Current Outpatient Medications:     cyclobenzaprine (FLEXERIL) 10 MG tablet, Take 1 tablet by mouth 2 times daily as needed for Muscle spasms, Disp: 60 tablet, Rfl: 0    nebivolol (BYSTOLIC) 5 MG tablet, TAKE ONE TABLET BY MOUTH DAILY, Disp: 90 tablet, Rfl: 1    aspirin 81 MG tablet, Take 81 mg by mouth daily, Disp: , Rfl:     atorvastatin (LIPITOR) 40 MG tablet, Take 1 tablet by mouth daily, Disp: muscle groups visualized is at least anti gravity   · Sensation: intact to light touch of extremities per patient   · RIGHT UPPER EXTREMITY:  Inspection/examination of the right upper extremity does not show any deformity or injury. Range of motion seen is full. There is no gross instability. · LEFT UPPER EXTREMITY: Inspection/examination of the left upper extremity does not show any deformity or injury. Range of motion seen is full. There is no gross instability. ·      Diagnostic Testing:    Recent DEXA scan reviewed showing osteopenia     I reviewed her CT report and films which Loc Show note \"L1\" superior endplate fracture and Schmorl's node.   However, this finding is at L4.     Repeat thoracic x-rays today show no evidence of fracture     Lumbar MRI scan report independently reviewed 7/15/2019 showing severe central stenosis L4-5, multilevel DDD with varying degrees of foraminal stenosis, facet arthropathy, (left renal atrophy--pt aware)     2 views lumbar spine 1/8/2019 show severe DDD L5-S1     Labs reviewed February 2019 BUN 22, creatinine 1.5           Impression:  1) 2mo thoracic pain--improved  2) L4 Schmorl's node, no acute fracture  3) Lumbar stenosis-doing well after epidural last year  4) Osteopenia, renal insuff        Plan:   1) She will call if symptoms return or worsen for thoracic MRI without  2) Cont HEP, activity as tolerated           Time spent during this visit - 15min        [facility]

## 2020-10-08 ENCOUNTER — IMMUNIZATION (OUTPATIENT)
Dept: FAMILY MEDICINE CLINIC | Age: 65
End: 2020-10-08
Payer: COMMERCIAL

## 2020-10-08 VITALS — TEMPERATURE: 97.8 F

## 2020-10-08 PROCEDURE — 90471 IMMUNIZATION ADMIN: CPT | Performed by: FAMILY MEDICINE

## 2020-10-08 PROCEDURE — 90686 IIV4 VACC NO PRSV 0.5 ML IM: CPT | Performed by: FAMILY MEDICINE

## 2020-10-08 NOTE — PROGRESS NOTES
Vaccine Information Sheet, \"Influenza - Inactivated\"  given to [employer], or parent/legal guardian of  [employer] and verbalized understanding. Patient responses:    Have you ever had a reaction to a flu vaccine? No  Do you have any current illness? No  Have you ever had [place]illian [place] Syndrome? No  Do you have a serious allergy to any of the follow: Neomycin, Polymyxin, Thimerosal, eggs or egg products? No    Flu vaccine given per order. Please see immunization tab. Risks and benefits explained. Current VIS given.       Immunizations Administered     Name Date Dose Route    Influenza, Quadv, IM, PF (6 mo and older Fluzone, Flulaval, Fluarix, and 3 yrs and older Afluria) 10/8/2020 0.5 mL Intramuscular    Site: Deltoid- Left    Lot: [id number]    NDC: 79348-019-52

## 2020-10-28 ENCOUNTER — NURSE ONLY (OUTPATIENT)
Dept: FAMILY MEDICINE CLINIC | Age: 65
End: 2020-10-28
Payer: COMMERCIAL

## 2020-10-28 PROCEDURE — 90750 HZV VACC RECOMBINANT IM: CPT | Performed by: FAMILY MEDICINE

## 2020-10-28 PROCEDURE — 90471 IMMUNIZATION ADMIN: CPT | Performed by: FAMILY MEDICINE

## 2020-11-30 ENCOUNTER — OFFICE VISIT (OUTPATIENT)
Dept: FAMILY MEDICINE CLINIC | Age: 65
End: 2020-11-30
Payer: COMMERCIAL

## 2020-11-30 ENCOUNTER — TELEPHONE (OUTPATIENT)
Dept: CARDIOLOGY CLINIC | Age: 65
End: 2020-11-30

## 2020-11-30 VITALS
BODY MASS INDEX: 32.95 KG/M2 | DIASTOLIC BLOOD PRESSURE: 70 MMHG | HEART RATE: 59 BPM | HEIGHT: 64 IN | TEMPERATURE: 97.6 F | SYSTOLIC BLOOD PRESSURE: 110 MMHG | WEIGHT: 193 LBS | OXYGEN SATURATION: 98 %

## 2020-11-30 LAB
ANION GAP SERPL CALCULATED.3IONS-SCNC: 13 MMOL/L (ref 3–16)
BUN BLDV-MCNC: 17 MG/DL (ref 7–20)
CALCIUM SERPL-MCNC: 9.6 MG/DL (ref 8.3–10.6)
CHLORIDE BLD-SCNC: 102 MMOL/L (ref 99–110)
CO2: 26 MMOL/L (ref 21–32)
CREAT SERPL-MCNC: 1.2 MG/DL (ref 0.6–1.2)
GFR AFRICAN AMERICAN: 55
GFR NON-AFRICAN AMERICAN: 45
GLUCOSE BLD-MCNC: 97 MG/DL (ref 70–99)
POTASSIUM SERPL-SCNC: 4.7 MMOL/L (ref 3.5–5.1)
SODIUM BLD-SCNC: 141 MMOL/L (ref 136–145)

## 2020-11-30 PROCEDURE — 99242 OFF/OP CONSLTJ NEW/EST SF 20: CPT | Performed by: FAMILY MEDICINE

## 2020-11-30 PROCEDURE — 93000 ELECTROCARDIOGRAM COMPLETE: CPT | Performed by: FAMILY MEDICINE

## 2020-11-30 PROCEDURE — 36415 COLL VENOUS BLD VENIPUNCTURE: CPT | Performed by: FAMILY MEDICINE

## 2020-11-30 NOTE — PROGRESS NOTES
[facility]   Pre-operative History and Physical      DIAGNOSIS:  L bunion and hammer toes,2,3,4    PROCEDURE:  Bunionectomy, hammer toe repair      History Obtained From:  patient    HISTORY OF PRESENT ILLNESS:    The patient is a 60 y.o. female who presents for pre-operative evaluation. Dr. Jahaira Clark sends patient for preop clearance with her history of CAD diagnosed by coronary calcium score, CKD, hypertension, hyperlipidemia, osteopenia.   Nuclear medicine stress test showed no evidence of myocardial ischemia, echocardiogram was normal.  Patient's last BUN and creatinine were 17 and 1.4 with a GFR 38    Past Medical History:    Past Medical History:   Diagnosis Date    Actinic keratosis     Breast disorder     CAD (coronary artery disease)     Chronic kidney disease     Hyperlipidemia     Hypertension     Infertility, female     S/P colonoscopy 08/2009    Tendinitis of foot      Past Surgical History:    Past Surgical History:   Procedure Laterality Date    ABDOMEN SURGERY      stone removed from bile duct    ANGIOPLASTY      renal    BREAST SURGERY      duct removal    CHOLECYSTECTOMY  12/1992    COLONOSCOPY  8/1/2009    COLONOSCOPY N/A 10/23/2019    COLONOSCOPY POLYPECTOMY SNARE/COLD BIOPSY performed by Carloyn Romberg, MD at [facility] N/A 7/29/2019    MIDLINE LUMBAR FIVE SACRAL ONE EPIDURAL STEROID INJECTION SITE CONFIRMED BY FLUOROSCOPY performed by Tyronne Snellen, MD at [address] Left 01/11/2016    Cystoscopy, with left stent placement    TONSILLECTOMY AND ADENOIDECTOMY       Current Medication  Current Outpatient Medications   Medication Sig Dispense Refill    nebivolol (BYSTOLIC) 5 MG tablet TAKE ONE TABLET BY MOUTH DAILY 90 tablet 1    aspirin 81 MG tablet Take 81 mg by mouth daily      atorvastatin (LIPITOR) 40 MG tablet Take 1 tablet by mouth daily 30 tablet 5    rate and rhythm, normal S1 and S2, no S3 or S4, and no murmur noted    Lungs:  No increased work of breathing, good air exchange, clear to auscultation bilaterally, no crackles or wheezing    Abdomen:   normal bowel sounds, soft, non-distended, non-tender, no masses palpated, no hepatosplenomegally    Extremities:  No clubbing, cyanosis, or edema        DATA:  EKG:  Date:  11/30/20  I have reviewed EKG with the following interpretation:  Impression: Sinus bradycardia with a heart rate of 57, unchanged from previous  BMP:    Lab Results   Component Value Date     09/08/2020    K 4.5 09/08/2020     09/08/2020    CO2 25 09/08/2020    BUN 17 09/08/2020    LABALBU 4.3 09/08/2020    CREATININE 1.4 09/08/2020    CALCIUM 9.2 09/08/2020    GFRAA 46 09/08/2020    GFRAA >60 10/08/2012    LABGLOM 38 09/08/2020    GLUCOSE 92 09/08/2020       ASSESSMENT AND PLAN:   Diagnosis Orders   1. Preop examination     2. Bunion, left foot     3. Essential hypertension  Basic Metabolic Panel    EKG 12 Lead   4. Pure hypercholesterolemia     5. Renal insufficiency     6. Osteopenia, unspecified location     7. Coronary artery disease involving native heart without angina pectoris, unspecified vessel or lesion type         1. Patient is a 60 y.o. female with above specified procedure planned on 12/15/20 with Dr. Jd Campbell at [facility]. They will not require cardiology evaluation prior to procedure. 2. Stop asa, NSAIDS medications 7-10 days prior to procedure.   3. Cleared for surgery

## 2020-11-30 NOTE — TELEPHONE ENCOUNTER
Pt having foot surgery to repair bunion and hammer toes on 12/15 with Dr. Russell Koenig at [facility] under general anesthesia. Pt needs cardiac clearance. Please advise pt.

## 2020-12-03 ENCOUNTER — HOSPITAL ENCOUNTER (OUTPATIENT)
Age: 65
Discharge: HOME | End: 2020-12-03
Payer: COMMERCIAL

## 2020-12-03 LAB
ALBUMIN SERPL-MCNC: 4.3 G/DL (ref 3.4–5)
ANION GAP SERPL CALCULATED.3IONS-SCNC: 11 MMOL/L (ref 3–16)
BUN BLDV-MCNC: 21 MG/DL (ref 7–20)
CALCIUM SERPL-MCNC: 10.3 MG/DL (ref 8.3–10.6)
CHLORIDE BLD-SCNC: 101 MMOL/L (ref 99–110)
CO2: 28 MMOL/L (ref 21–32)
CREAT SERPL-MCNC: 1.4 MG/DL (ref 0.6–1.2)
GFR AFRICAN AMERICAN: 46
GFR NON-AFRICAN AMERICAN: 38
GLUCOSE BLD-MCNC: 104 MG/DL (ref 70–99)
PHOSPHORUS: 3.6 MG/DL (ref 2.5–4.9)
POTASSIUM SERPL-SCNC: 4.6 MMOL/L (ref 3.5–5.1)
SODIUM BLD-SCNC: 140 MMOL/L (ref 136–145)

## 2020-12-03 PROCEDURE — 83970 ASSAY OF PARATHORMONE: CPT

## 2020-12-03 PROCEDURE — 36415 COLL VENOUS BLD VENIPUNCTURE: CPT

## 2020-12-03 PROCEDURE — 80069 RENAL FUNCTION PANEL: CPT

## 2020-12-04 LAB — PARATHYROID HORMONE INTACT: 41.5 PG/ML (ref 14–72)

## 2020-12-04 RX ORDER — CALCITRIOL 0.25 UG/1
0.25 CAPSULE, LIQUID FILLED ORAL EVERY OTHER DAY
COMMUNITY

## 2020-12-10 ENCOUNTER — OFFICE VISIT (OUTPATIENT)
Dept: PRIMARY CARE CLINIC | Age: 65
End: 2020-12-10
Payer: COMMERCIAL

## 2020-12-10 PROCEDURE — 99211 OFF/OP EST MAY X REQ PHY/QHP: CPT | Performed by: NURSE PRACTITIONER

## 2020-12-10 NOTE — PATIENT INSTRUCTIONS

## 2020-12-11 ENCOUNTER — ANESTHESIA EVENT (OUTPATIENT)
Dept: OPERATING ROOM | Age: 65
End: 2020-12-11
Payer: COMMERCIAL

## 2020-12-11 LAB — SARS-COV-2, NAA: NOT DETECTED

## 2020-12-15 ENCOUNTER — HOSPITAL ENCOUNTER (OUTPATIENT)
Age: 65
Setting detail: OUTPATIENT SURGERY
Discharge: HOME | End: 2020-12-15
Attending: PODIATRIST | Admitting: PODIATRIST
Payer: COMMERCIAL

## 2020-12-15 ENCOUNTER — ANESTHESIA (OUTPATIENT)
Dept: OPERATING ROOM | Age: 65
End: 2020-12-15
Payer: COMMERCIAL

## 2020-12-15 VITALS
SYSTOLIC BLOOD PRESSURE: 107 MMHG | DIASTOLIC BLOOD PRESSURE: 56 MMHG | TEMPERATURE: 95.5 F | OXYGEN SATURATION: 96 % | RESPIRATION RATE: 4 BRPM

## 2020-12-15 VITALS
BODY MASS INDEX: 31.65 KG/M2 | WEIGHT: 190 LBS | RESPIRATION RATE: 16 BRPM | HEART RATE: 76 BPM | DIASTOLIC BLOOD PRESSURE: 80 MMHG | OXYGEN SATURATION: 97 % | TEMPERATURE: 97.6 F | HEIGHT: 65 IN | SYSTOLIC BLOOD PRESSURE: 130 MMHG

## 2020-12-15 PROCEDURE — 6360000002 HC RX W HCPCS: Performed by: PODIATRIST

## 2020-12-15 PROCEDURE — 2709999900 HC NON-CHARGEABLE SUPPLY: Performed by: PODIATRIST

## 2020-12-15 PROCEDURE — 3600000004 HC SURGERY LEVEL 4 BASE: Performed by: PODIATRIST

## 2020-12-15 PROCEDURE — 3600000014 HC SURGERY LEVEL 4 ADDTL 15MIN: Performed by: PODIATRIST

## 2020-12-15 PROCEDURE — C1713 ANCHOR/SCREW BN/BN,TIS/BN: HCPCS | Performed by: PODIATRIST

## 2020-12-15 PROCEDURE — 2580000003 HC RX 258: Performed by: ANESTHESIOLOGY

## 2020-12-15 PROCEDURE — 3700000000 HC ANESTHESIA ATTENDED CARE: Performed by: PODIATRIST

## 2020-12-15 PROCEDURE — 3700000001 HC ADD 15 MINUTES (ANESTHESIA): Performed by: PODIATRIST

## 2020-12-15 PROCEDURE — 2500000003 HC RX 250 WO HCPCS: Performed by: NURSE ANESTHETIST, CERTIFIED REGISTERED

## 2020-12-15 PROCEDURE — 2500000003 HC RX 250 WO HCPCS: Performed by: PODIATRIST

## 2020-12-15 PROCEDURE — 7100000000 HC PACU RECOVERY - FIRST 15 MIN: Performed by: PODIATRIST

## 2020-12-15 PROCEDURE — 2720000010 HC SURG SUPPLY STERILE: Performed by: PODIATRIST

## 2020-12-15 PROCEDURE — 2500000003 HC RX 250 WO HCPCS: Performed by: ANESTHESIOLOGY

## 2020-12-15 PROCEDURE — 7100000010 HC PHASE II RECOVERY - FIRST 15 MIN: Performed by: PODIATRIST

## 2020-12-15 PROCEDURE — 7100000011 HC PHASE II RECOVERY - ADDTL 15 MIN: Performed by: PODIATRIST

## 2020-12-15 PROCEDURE — 7100000001 HC PACU RECOVERY - ADDTL 15 MIN: Performed by: PODIATRIST

## 2020-12-15 PROCEDURE — 6360000002 HC RX W HCPCS: Performed by: NURSE ANESTHETIST, CERTIFIED REGISTERED

## 2020-12-15 PROCEDURE — 6370000000 HC RX 637 (ALT 250 FOR IP): Performed by: ANESTHESIOLOGY

## 2020-12-15 DEVICE — OSTEOSYNTHESIS COMPRESSION STAPLE
Type: IMPLANTABLE DEVICE | Site: FOOT | Status: FUNCTIONAL
Brand: EASY CLIP

## 2020-12-15 DEVICE — CANNULATED SCREW
Type: IMPLANTABLE DEVICE | Site: FOOT | Status: FUNCTIONAL
Brand: ASNIS

## 2020-12-15 DEVICE — IMPLANTABLE DEVICE: Type: IMPLANTABLE DEVICE | Site: FOOT | Status: FUNCTIONAL

## 2020-12-15 DEVICE — TWIST-OFF SCREW
Type: IMPLANTABLE DEVICE | Site: FOOT | Status: FUNCTIONAL
Brand: FIXOS

## 2020-12-15 DEVICE — IMPLANT TOE L16MM 10DEG NIT IM STR 1 PC COMPRESSIVE FOR: Type: IMPLANTABLE DEVICE | Status: FUNCTIONAL

## 2020-12-15 DEVICE — IMPLANT TOE L19MM 10DEG ANG IM PNK NIT 1 PC SMRT TOE II: Type: IMPLANTABLE DEVICE | Site: FOOT | Status: FUNCTIONAL

## 2020-12-15 RX ORDER — EPHEDRINE SULFATE 50 MG/ML
INJECTION INTRAVENOUS PRN
Status: DISCONTINUED | OUTPATIENT
Start: 2020-12-15 | End: 2020-12-15 | Stop reason: SDUPTHER

## 2020-12-15 RX ORDER — PROPOFOL 10 MG/ML
INJECTION, EMULSION INTRAVENOUS PRN
Status: DISCONTINUED | OUTPATIENT
Start: 2020-12-15 | End: 2020-12-15 | Stop reason: SDUPTHER

## 2020-12-15 RX ORDER — ONDANSETRON 2 MG/ML
INJECTION INTRAMUSCULAR; INTRAVENOUS PRN
Status: DISCONTINUED | OUTPATIENT
Start: 2020-12-15 | End: 2020-12-15 | Stop reason: SDUPTHER

## 2020-12-15 RX ORDER — FENTANYL CITRATE 50 UG/ML
INJECTION, SOLUTION INTRAMUSCULAR; INTRAVENOUS PRN
Status: DISCONTINUED | OUTPATIENT
Start: 2020-12-15 | End: 2020-12-15 | Stop reason: SDUPTHER

## 2020-12-15 RX ORDER — OXYCODONE HYDROCHLORIDE AND ACETAMINOPHEN 5; 325 MG/1; MG/1
1 TABLET ORAL PRN
Status: COMPLETED | OUTPATIENT
Start: 2020-12-15 | End: 2020-12-15

## 2020-12-15 RX ORDER — SODIUM CHLORIDE 0.9 % (FLUSH) 0.9 %
10 SYRINGE (ML) INJECTION EVERY 12 HOURS SCHEDULED
Status: DISCONTINUED | OUTPATIENT
Start: 2020-12-15 | End: 2020-12-15 | Stop reason: HOSPADM

## 2020-12-15 RX ORDER — SODIUM CHLORIDE 0.9 % (FLUSH) 0.9 %
10 SYRINGE (ML) INJECTION PRN
Status: DISCONTINUED | OUTPATIENT
Start: 2020-12-15 | End: 2020-12-15 | Stop reason: HOSPADM

## 2020-12-15 RX ORDER — BUPIVACAINE HYDROCHLORIDE 5 MG/ML
INJECTION, SOLUTION EPIDURAL; INTRACAUDAL
Status: DISCONTINUED
Start: 2020-12-15 | End: 2020-12-15 | Stop reason: HOSPADM

## 2020-12-15 RX ORDER — LABETALOL HYDROCHLORIDE 5 MG/ML
5 INJECTION, SOLUTION INTRAVENOUS EVERY 10 MIN PRN
Status: DISCONTINUED | OUTPATIENT
Start: 2020-12-15 | End: 2020-12-15 | Stop reason: HOSPADM

## 2020-12-15 RX ORDER — ONDANSETRON 2 MG/ML
4 INJECTION INTRAMUSCULAR; INTRAVENOUS EVERY 10 MIN PRN
Status: DISCONTINUED | OUTPATIENT
Start: 2020-12-15 | End: 2020-12-15 | Stop reason: HOSPADM

## 2020-12-15 RX ORDER — DEXAMETHASONE SODIUM PHOSPHATE 4 MG/ML
INJECTION, SOLUTION INTRA-ARTICULAR; INTRALESIONAL; INTRAMUSCULAR; INTRAVENOUS; SOFT TISSUE PRN
Status: DISCONTINUED | OUTPATIENT
Start: 2020-12-15 | End: 2020-12-15 | Stop reason: SDUPTHER

## 2020-12-15 RX ORDER — BUPIVACAINE HYDROCHLORIDE 5 MG/ML
INJECTION, SOLUTION EPIDURAL; INTRACAUDAL PRN
Status: DISCONTINUED | OUTPATIENT
Start: 2020-12-15 | End: 2020-12-15 | Stop reason: ALTCHOICE

## 2020-12-15 RX ORDER — HYDROCODONE BITARTRATE AND ACETAMINOPHEN 5; 325 MG/1; MG/1
1 TABLET ORAL EVERY 6 HOURS PRN
Qty: 28 TABLET | Refills: 0 | Status: SHIPPED | OUTPATIENT
Start: 2020-12-15 | End: 2020-12-22

## 2020-12-15 RX ORDER — OXYCODONE HYDROCHLORIDE AND ACETAMINOPHEN 5; 325 MG/1; MG/1
2 TABLET ORAL PRN
Status: COMPLETED | OUTPATIENT
Start: 2020-12-15 | End: 2020-12-15

## 2020-12-15 RX ORDER — HYDRALAZINE HYDROCHLORIDE 20 MG/ML
5 INJECTION INTRAMUSCULAR; INTRAVENOUS EVERY 10 MIN PRN
Status: DISCONTINUED | OUTPATIENT
Start: 2020-12-15 | End: 2020-12-15 | Stop reason: HOSPADM

## 2020-12-15 RX ORDER — SODIUM CHLORIDE, SODIUM LACTATE, POTASSIUM CHLORIDE, CALCIUM CHLORIDE 600; 310; 30; 20 MG/100ML; MG/100ML; MG/100ML; MG/100ML
INJECTION, SOLUTION INTRAVENOUS CONTINUOUS
Status: DISCONTINUED | OUTPATIENT
Start: 2020-12-15 | End: 2020-12-15 | Stop reason: HOSPADM

## 2020-12-15 RX ADMIN — ONDANSETRON 4 MG: 2 INJECTION, SOLUTION INTRAMUSCULAR; INTRAVENOUS at 08:34

## 2020-12-15 RX ADMIN — OXYCODONE HYDROCHLORIDE AND ACETAMINOPHEN 1 TABLET: 5; 325 TABLET ORAL at 11:02

## 2020-12-15 RX ADMIN — SODIUM CHLORIDE, POTASSIUM CHLORIDE, SODIUM LACTATE AND CALCIUM CHLORIDE: 600; 310; 30; 20 INJECTION, SOLUTION INTRAVENOUS at 07:15

## 2020-12-15 RX ADMIN — FENTANYL CITRATE 50 MCG: 50 INJECTION INTRAMUSCULAR; INTRAVENOUS at 08:34

## 2020-12-15 RX ADMIN — PROPOFOL 200 MG: 10 INJECTION, EMULSION INTRAVENOUS at 08:34

## 2020-12-15 RX ADMIN — FENTANYL CITRATE 25 MCG: 50 INJECTION INTRAMUSCULAR; INTRAVENOUS at 10:24

## 2020-12-15 RX ADMIN — EPHEDRINE SULFATE 10 MG: 50 INJECTION INTRAVENOUS at 09:39

## 2020-12-15 RX ADMIN — FENTANYL CITRATE 25 MCG: 50 INJECTION INTRAMUSCULAR; INTRAVENOUS at 10:18

## 2020-12-15 RX ADMIN — DEXAMETHASONE SODIUM PHOSPHATE 10 MG: 4 INJECTION, SOLUTION INTRAMUSCULAR; INTRAVENOUS at 08:34

## 2020-12-15 RX ADMIN — EPHEDRINE SULFATE 10 MG: 50 INJECTION INTRAVENOUS at 09:07

## 2020-12-15 RX ADMIN — EPHEDRINE SULFATE 10 MG: 50 INJECTION INTRAVENOUS at 09:51

## 2020-12-15 RX ADMIN — LIDOCAINE HYDROCHLORIDE 0.1 ML: 10 INJECTION, SOLUTION EPIDURAL; INFILTRATION; INTRACAUDAL; PERINEURAL at 07:15

## 2020-12-15 RX ADMIN — EPHEDRINE SULFATE 10 MG: 50 INJECTION INTRAVENOUS at 08:57

## 2020-12-15 RX ADMIN — EPHEDRINE SULFATE 10 MG: 50 INJECTION INTRAVENOUS at 08:49

## 2020-12-15 RX ADMIN — Medication 2 G: at 08:37

## 2020-12-15 RX ADMIN — SODIUM CHLORIDE, POTASSIUM CHLORIDE, SODIUM LACTATE AND CALCIUM CHLORIDE: 600; 310; 30; 20 INJECTION, SOLUTION INTRAVENOUS at 10:12

## 2020-12-15 ASSESSMENT — PULMONARY FUNCTION TESTS
PIF_VALUE: 3
PIF_VALUE: 2
PIF_VALUE: 3
PIF_VALUE: 3
PIF_VALUE: 1
PIF_VALUE: 3
PIF_VALUE: 2
PIF_VALUE: 1
PIF_VALUE: 3
PIF_VALUE: 0
PIF_VALUE: 3
PIF_VALUE: 2
PIF_VALUE: 3
PIF_VALUE: 3
PIF_VALUE: 23
PIF_VALUE: 3
PIF_VALUE: 2
PIF_VALUE: 27
PIF_VALUE: 3
PIF_VALUE: 2
PIF_VALUE: 3
PIF_VALUE: 3
PIF_VALUE: 2
PIF_VALUE: 3
PIF_VALUE: 4
PIF_VALUE: 3
PIF_VALUE: 2
PIF_VALUE: 3
PIF_VALUE: 2
PIF_VALUE: 3
PIF_VALUE: 11
PIF_VALUE: 3
PIF_VALUE: 2
PIF_VALUE: 2
PIF_VALUE: 0
PIF_VALUE: 3
PIF_VALUE: 2
PIF_VALUE: 3
PIF_VALUE: 2
PIF_VALUE: 3
PIF_VALUE: 2
PIF_VALUE: 3
PIF_VALUE: 3
PIF_VALUE: 2
PIF_VALUE: 3
PIF_VALUE: 0
PIF_VALUE: 3
PIF_VALUE: 1
PIF_VALUE: 3
PIF_VALUE: 2
PIF_VALUE: 3
PIF_VALUE: 1
PIF_VALUE: 3
PIF_VALUE: 20
PIF_VALUE: 3
PIF_VALUE: 2
PIF_VALUE: 3
PIF_VALUE: 3

## 2020-12-15 ASSESSMENT — PAIN SCALES - GENERAL: PAINLEVEL_OUTOF10: 4

## 2020-12-15 ASSESSMENT — PAIN DESCRIPTION - DESCRIPTORS: DESCRIPTORS: SHARP;ACHING

## 2020-12-15 ASSESSMENT — PAIN - FUNCTIONAL ASSESSMENT
PAIN_FUNCTIONAL_ASSESSMENT: 0-10
PAIN_FUNCTIONAL_ASSESSMENT: PREVENTS OR INTERFERES SOME ACTIVE ACTIVITIES AND ADLS

## 2020-12-15 ASSESSMENT — PAIN DESCRIPTION - ORIENTATION: ORIENTATION: LEFT

## 2020-12-15 ASSESSMENT — PAIN DESCRIPTION - PAIN TYPE: TYPE: SURGICAL PAIN

## 2020-12-15 ASSESSMENT — PAIN DESCRIPTION - LOCATION: LOCATION: FOOT

## 2020-12-15 NOTE — BRIEF OP NOTE
Brief Postoperative Note      Patient: Deann Ha  YOB: 1955  MRN: [id number]    Date of Procedure: 12/15/2020    Pre-Op Diagnosis: HALLUX VALGUS , MET PRIMUS VALGUS, HAMMERTOE 2, 3, 4 LEFT FOOT    Post-Op Diagnosis: Same        Procedure(s):  MODIFIED YAZMIN BUNIONECTOMY, MODIFIED AKIN OSTEOTOMY, MODIFIED WEIL SECOND METATARSAL, PROXIMAL INTERPHALANGEAL JOINT ARTHRODESIS DIGITS TWO THREE FOUR LEFT FOOT with flexor tenotomy & capsulotomy 3d & 4th digits all left foot     Surgeon(s):  Rajendra Streeter DPM    Assistant:  DAVID Ken DPM   Anesthesia: General    Estimated Blood Loss (mL): 1cc    Complications: None    Specimens:   None  Implants:  Implant Name Type Inv. Item Serial No.  Lot No. LRB No. Used Action   SCREW BNE L17MM DIA3MM THRD L4MM VIOLETA ANK FT TI KRISH  SCREW BNE L17MM DIA3MM THRD L4MM VIOLETA ANK FT TI KRISH  [employer] [facility]  Left 1 Implanted   SCREW BNE L18MM DIA3MM THRD L5MM NONSTERILE TI KRISH  SCREW BNE L18MM DIA3MM THRD L5MM NONSTERILE TI KRISH  [employer] [facility]  Left 1 Implanted   STAPLE INT FIX [id number] L1.2MM THK1. 5MM FT ANK NIT SUP E  STAPLE INT FIX [id number] L1.2MM THK1. 5MM FT ANK NIT SUP E  [employer] [facility] [id number] Left 1 Implanted   SCREW BNE SNAP OFF 2X12 MM  SCREW BNE SNAP OFF 2X12 MM  [employer] [facility]  Left 1 Implanted   IMPLANT TOE L16MM 10DEG NIT IM STR 1 PC COMPRESSIVE FOR  IMPLANT TOE L16MM 10DEG NIT IM STR 1 PC COMPRESSIVE FOR  [employer]- [id number] Left 1 Implanted   IMPLANT TOE L19MM 10DEG ANG IM PNK NIT 1 PC SMRT TOE II  IMPLANT TOE L19MM 10DEG ANG IM PNK NIT 1 PC SMRT TOE II  [employer]- [id number] Left 1 Implanted   IMPLANT TOE L15MM 10DEG ANG IM YEL NIT 1 PC SMRT TOE II  IMPLANT TOE L15MM 10DEG ANG IM YEL NIT 1 [facility]  [employer] [facility]  Left 1 Implanted         Drains: None    Findings: Significant arthrosis at the 2nd MTPJ secondary to dorsal dislocation of the 2nd digit

## 2020-12-15 NOTE — ANESTHESIA POSTPROCEDURE EVALUATION
Department of Anesthesiology  Postprocedure Note    Patient: Branden Duckworth  MRN: [id number]  YOB: 1955  Date of evaluation: 12/15/2020  Time:  12:06 PM     Procedure Summary     Date: 12/15/20 Room / Location: [facility] EG OR 01 / [facility]    Anesthesia Start: 8717 Anesthesia Stop: 2204    Procedure: MODIFIED YAZMIN BUNIONECTOMY, MODIFIED AKIN OSTEOTOMY, MODIFIED WEIL SECOND METATARSAL, PROXIMAL INTERPHALANGEAL JOINT ARTHRODESIS DIGITS TWO THREE FOUR LEFT FOOT (Left Foot) Diagnosis: (HALLUX VALGUS , MET PRIMUS VALGUS, HAMMERTOE 2, 3, 4 LEFT FOOT)    Surgeons: Ajay Aviles DPM Responsible Provider: Corin Velásquez MD    Anesthesia Type: general ASA Status: 3          Anesthesia Type: general    Jocelynn Phase I: Jocelynn Score: 10    Jocelynn Phase II: Jocelynn Score: 10    Last vitals: Reviewed and per EMR flowsheets.        Anesthesia Post Evaluation    Patient location during evaluation: PACU  Level of consciousness: awake  Airway patency: patent  Nausea & Vomiting: no nausea  Complications: no  Cardiovascular status: blood pressure returned to baseline  Respiratory status: acceptable  Hydration status: euvolemic

## 2020-12-15 NOTE — ANESTHESIA PRE PROCEDURE
Department of Anesthesiology  Preprocedure Note       Name:  Luke Mosqueda   Age:  60 y.o.  :  1955                                          MRN:  [id number]         Date:  12/15/2020      Surgeon: Bing Cantrell):  Conchita Lala DPM    Procedure: Procedure(s):  MODIFIED YAZMIN BUNIONECTOMY, MODIFIED AKIN OSTEOTOMY, MODIFIED WEIL SECOND METATARSAL, PROXIMAL INTERPHALANGEAL JOINT ARTHRODESIS DIGITS TWO THREE FOUR LEFT FOOT    Medications prior to admission:   Prior to Admission medications    Medication Sig Start Date End Date Taking?  Authorizing Provider   calcitRIOL (ROCALTROL) 0.25 MCG capsule Take 0.25 mcg by mouth every other day   Yes Historical Provider, MD   nebivolol (BYSTOLIC) 5 MG tablet TAKE ONE TABLET BY MOUTH DAILY 20   Bette Alcala MD   denosumab (PROLIA) 60 MG/ML SOSY SC injection Inject 1 mL into the skin once for 1 dose 20  Luis Lemos MD   aspirin 81 MG tablet Take 81 mg by mouth daily    Historical Provider, MD   atorvastatin (LIPITOR) 40 MG tablet Take 1 tablet by mouth daily 3/18/20   Mansi Felix MD   Melatonin 5 MG CHEW Take by mouth    Historical Provider, MD   potassium citrate (UROCIT-K) 5 MEQ (540 MG) extended release tablet Take 15 mEq by mouth 2 times daily    Historical Provider, MD       Current medications:    Current Facility-Administered Medications   Medication Dose Route Frequency Provider Last Rate Last Admin    ceFAZolin (ANCEF) 2 g in sterile water 20 mL IV syringe  2 g Intravenous Once Conchita Lala DPM        lactated ringers infusion   Intravenous Continuous Omega Covert, MD        sodium chloride flush 0.9 % injection 10 mL  10 mL Intravenous 2 times per day Matias Uriast, MD        sodium chloride flush 0.9 % injection 10 mL  10 mL Intravenous PRN Matias Machuca MD        famotidine (PEPCID) injection 20 mg  20 mg Intravenous Once Matias Machuca MD        lidocaine 1 % (PF) injection 0.1 mL  0.1 mL Intradermal Once Stevie Cobos MD           Allergies: Allergies   Allergen Reactions    Codeine Rash, Nausea And Vomiting and Shortness Of Breath    Lisinopril      cough    Norvasc [Amlodipine Besylate]      swelling    Triamterene-Hctz      Renal insufficiency       Problem List:    Patient Active Problem List   Diagnosis Code    Hyperlipidemia E78.5    HTN (hypertension) I10    Abnormal laboratory test R89.9    Renal artery stenosis (HCC) I70.1    Renal insufficiency N28.9    Renal stones N20.0       Past Medical History:        Diagnosis Date    Actinic keratosis     Breast disorder     CAD (coronary artery disease)     Chronic kidney disease     Hyperlipidemia     Hypertension     Infertility, female     S/P colonoscopy 08/2009    Tendinitis of foot        Past Surgical History:        Procedure Laterality Date    ABDOMEN SURGERY      stone removed from bile duct    ANGIOPLASTY      renal    BREAST SURGERY      duct removal    CHOLECYSTECTOMY  12/1992    COLONOSCOPY  8/1/2009    COLONOSCOPY N/A 10/23/2019    COLONOSCOPY POLYPECTOMY SNARE/COLD BIOPSY performed by Jann Baca MD at [facility] N/A 7/29/2019    MIDLINE LUMBAR FIVE SACRAL ONE EPIDURAL STEROID INJECTION SITE CONFIRMED BY FLUOROSCOPY performed by Ilene Saldana MD [facility] Left 01/11/2016    Cystoscopy, with left stent placement    TONSILLECTOMY AND ADENOIDECTOMY         Social History:    Social History     Tobacco Use    Smoking status: Never Smoker    Smokeless tobacco: Never Used   Substance Use Topics    Alcohol use:  No                                Counseling given: Not Answered      Vital Signs (Current):   Vitals:    12/04/20 1730   Weight: 193 lb (87.5 kg)   Height: 5' 4\" (1.626 m)                                              BP Readings from Last 3 Encounters:   11/30/20 110/70   09/01/20 Cardiovascular:    (+) hypertension:,     (-) CAD                Neuro/Psych:   Negative Neuro/Psych ROS              GI/Hepatic/Renal:   (+) renal disease: CRI and kidney stones,      (-) GERD and liver disease       Endo/Other: Negative Endo/Other ROS       (-) diabetes mellitus               Abdominal:           Vascular: negative vascular ROS. Anesthesia Plan      general     ASA 3     (I discussed with the patient the risks and benefits of PIV, general anesthesia, IV Narcotics, PACU. All questions were answered the patient agrees with the plan)  Induction: intravenous. MIPS: Prophylactic antiemetics administered. Anesthetic plan and risks discussed with patient. Plan discussed with CRNA.                   Bronwyn Tucker MD   12/15/2020

## 2020-12-15 NOTE — H&P
The H&P was reviewed, the patient was examined, and no change has occurred in the patient's condition since the H&P was completed.     Abeba Wood DPM, Sarah Si

## 2020-12-15 NOTE — PROGRESS NOTES
Pt resting in bed, drinking soda, PO pain pill to be given per request, vitals stable, will move into phase 2

## 2020-12-15 NOTE — OP NOTE
Operative Note      Patient: Bull Strong  YOB: 1955  MRN: [id number]    Date of Procedure: 12/15/2020    Pre-Op Diagnosis: HALLUX VALGUS , MET PRIMUS VALGUS, HAMMERTOE 2, 3, 4 LEFT FOOT  Post-Op Diagnosis: Same    Procedure(s):  1. MODIFIED YAZMIN BUNIONECTOMY, LEFT FOOT  2. MODIFIED JUNAID OSTEOTOMY, LEFT FOOT  3. MODIFIED WEIL SECOND METATARSAL, LEFT FOOT  4. PROXIMAL INTERPHALANGEAL JOINT ARTHRODESIS DIGITS TWO THREE FOUR, LEFT FOOT  5. FLEXOR TENOTOMY'S DIGITS 2 AND 3, LEFT FOOT   Surgeon(s):  Maggie Rios DPM  Assistant: Rivera Magana PGY3  Anesthesia: General  Hemostasis: ankle tourniquet at 250 mmHg for 80 minutes  Estimated Blood Loss (mL): Minimal  Materials: 3-0 vicryl, 4-0 vicryl, 4-0 nylon   Injectables: 30 cc of 0.5% marcaine total   Complications: None  Implants:  Implant Name Type Inv. Item Serial No.  Lot No. LRB No. Used Action   SCREW BNE L17MM DIA3MM THRD L4MM VIOLETA ANK FT TI KRISH  SCREW BNE L17MM DIA3MM THRD L4MM VIOLETA ANK FT TI KRISH  [employer] [facility]  Left 1 Implanted   SCREW BNE L18MM DIA3MM THRD L5MM NONSTERILE TI KRISH  SCREW BNE L18MM DIA3MM THRD L5MM NONSTERILE TI KRISH  [employer] [facility]  Left 1 Implanted   STAPLE INT FIX [id number] L1.2MM THK1. 5MM FT ANK NIT SUP E  STAPLE INT FIX [id number] L1.2MM THK1. 5MM FT ANK NIT SUP E  [employer] [facility] [id number] Left 1 Implanted   SCREW BNE SNAP OFF 2X12 MM  SCREW BNE SNAP OFF 2X12 MM  [employer] [facility]  Left 1 Implanted   IMPLANT TOE L16MM 10DEG NIT IM STR 1 PC COMPRESSIVE FOR  IMPLANT TOE L16MM 10DEG NIT IM STR 1 PC COMPRESSIVE FOR  MICHAEL QUIN-WD [id number] Left 1 Implanted   IMPLANT TOE L19MM 10DEG ANG IM PNK NIT 1 PC SMRT TOE II  IMPLANT TOE L19MM 10DEG ANG IM PNK NIT 1 PC SMRT TOE II  MICHAEL [employer] [id number] Left 1 Implanted   IMPLANT TOE L15MM 10DEG ANG IM YEL NIT 1 PC SMRT TOE II  IMPLANT TOE L15MM 10DEG ANG IM YEL NIT 1 PC SMRT TOE II  MICHAEL [facility]-  Left 1 Implanted Findings: as dictated     Detailed Description of Procedure:     INDICATIONS FOR PROCEDURE: This patient has signs and symptoms clinically  consistent with the above mentioned preoperative diagnosis. Having failed conservative treatment, it was determined that the patient would benefit from surgical intervention. All potential risks, benefits, and complications were discussed with the patient prior to the scheduling of surgery. All the patient's questions were answered and no guarantees were given. The patient wished to proceed with surgery, and informed written consent was obtained. DETAILS OF PROCEDURE: The patient was brought from the pre-operative area and placed on the operating table in the supine position. A pneumatic ankle tourniquet was placed around the patient's well-padded left lower extremity. Following IV sedation, a local anesthetic block was then injected proximal to the incision site consisting of 20cc of 0.5% marcaine plain. The left  lower extremity was then scrubbed, prepped, and draped in the usual sterile fashion. A time-out was performed. The patient, procedure, and operative site were confirmed. An Esmarch bandage was then utilized to exsanguinate the patient's left lower extremity. The tourniquet was then inflated to 250 mmHg and the following procedure was performed. PROCEDURE #1: MODIFIED YAZMIN BUNIONECTOMY, LEFT FOOT: Attention was directed to the dorsal aspect of the patients left foot. Using a #15 blade, a 6 cm curvilinear incision was made over the dorsal aspect of the first metatarsal, centering over the first metatarsophalangeal joint. Using sharp and blunt dissection the incision was deepened through the subcutaneous layer with care being taken to identify and retract all vital neurovascular structures. All venous tributaries were electrocoagulated as encountered. Sharp and blunt dissection was continued to the level of the joint capsule. Attention was then re-directed to the medial aspect of the first metatarsal head where a 0.045 inch k-wire was driven from medial to lateral into the metatarsal head to serve as an axis guide for the subsequent osteotomy. A through-and-through V-type osteotomy was then created in the metaphyseal region of the bone utilizing a sagittal bone saw with a #138 blade. The apex of the osteotomy pointed distally and the arms pointed proximoplantar and proximodorsal.  Upon completion of the osteotomy, the capital fragment was distracted and shifted laterally to correct the previously abnormal intermetatarsal angle and then impacted onto the first metatarsal shaft. A K-wire from the [place] set was then driven from dorsal to plantar across the osteotomy site to serve as a guidewire. Next, an additional K-wire from the Donte set was driven from dorsal to plantar across the osteotomy site to serve as a guidewire for an additional screw just proximal lateral to the first one. Following standard AO technique, a Donte 3.0x17 mm was then inserted and placed across the osteotomy site with excellent compression noted. At this time, the temporary K-wire was removed. Next, following standard AO technique, a Donte 3.0x18 mm was then inserted and placed across the osteotomy site with excellent compression noted. At this time, the temporary K-wire was removed. Correct positioning and placement of hardware was confirmed using mini C-arm. Attention was then directed to the medial bone shelf remaining after lateral translation of the metatarsal head on the shaft. The bone shelf was resected utilizing the sagittal bone saw and passed from the operative site. A power rasp was utilized to smooth all bony prominences. Correction of the deformity was assessed at this time. The position of the hardware and the correction of the deformity were noted to be excellent. The wound was then flushed with copious amounts of sterile normal saline. PROCEDURE #2: MODIFIED AKIN OSTEOTOMY, LEFT FOOT: Attention was directed to the left hallux were noted hallux abductus deformity is noted. A 3cm dorsomedial linear incision was then made, starting from the existing incision, using a #15 blade over the Hallux. The skin was deepened to the subcutaneous layers where all neurovascular structures were appropriately retracted and electrocoagulated as necessary. The incision was then deepened and a capsular incision was made over the proximal phalanx. The capsule was then reflected from the base of the proximal phalanx using a freer and adequate exposure of the phalanx was noted to perform the osteotomy. Using a Sagittal saw and a #38 blade, a wedge of the proximal phalanx was removed with the base of the wedge facing medially and the apex lateral to the lateral cortex with the lateral cortex kept intact. This wedge was in the base of the proximal phalanx of the hallux. Using manual compression, the wedged gap of bone was reduced. While holding the osteotomy reduction manually , using the drill guide and drill from the Donte instrument tray, the proximal phalanx was drilled both proximal and distal to the wedge osteotomy with a pull pin being inserted into each hole afterwards to maintain visualization of the hole. The drill guide was then removed and the 10 mm x 10 mm Donte EasyClip staple Implant was inserted into the drill holes after removing the pull pins. A tamp, from the [place] instrument kit, was used to fully seat the implant against the bony surface of the proximal phalanx using a surgical mallet. Great compression was noted with the placement of the staple across the osteotomy site. Correction of the deformity and position of the hardware were assessed at this time and noted to be excellent. The capsule and periosteal structures were reapproximated utilizing 3-0 Vicryl. The subcutaneous layer was then reapproximated using 4-0 Vicryl and the skin was closed in a horizontal mattress fashion using 4-0 nylon suture. PROCEDURE #3: MODIFIED WEIL SECOND METATARSAL, LEFT FOOT: Attention was directed to the dorsal aspect of the 2nd digit of the left foot. Using a #15 blade, an approximately 4.5 cm curvi-linear incision was made over the dorsal aspect of the proximal interphalangeal joint and metatarsophalangeal joint. Using sharp and blunt dissection techniques, the incision was deepened through the subcutaneous tissue. Care was taken to identify and retract all vital neurovascular structures. All venous tributaries were electrocoagulated as encountered. Dissection was then continued to the level of the joint capsule. At this time, a transverse tenotomy and capsulotomy were performed at the level of the proximal interphalangeal joint. All ligamentous and capsular structures, as well as the long extensor tendon, were reflected from the underlying bone, thereby allowing exposure and visualization of the head of the proximal phalanx. The long extensor tendon was reflected from its osseous attachments back to the level of the MPJ. The proximal phalanx was noted to do dorsally dislocated on the metatarsal.     Using a #15 blade, the dorsal, medial and lateral aspects of the metatarsophalangeal joint capsule were transected, thereby releasing the capsule. A weightbearing attitude of the joint was then simulated utilizing the Kelikian push-up test, performed by placing a dorsiflexory force on the plantar aspect of the metatarsal head. The previously noted dorsal contract was further reduced. Attention was then directed to the notably elongated metatarsal, and attention was directed to the osteotomy. Using a #15 blade, a capsular and periosteal incision was then made linearly over the 2nd metatarsal head. The capsule and periosteal structures were meticulously reflected both dorsally, medially and laterally until excellent soft tissue exposure had been achieved. Attention was then directed towards the creation of the osteotomy. Beginning approximately 2 mm into the articular cartilage from dorsodistal to proximoplantar, a through and through osteotomy of the second metatarsal was created, parallel to the weight-bearing surface. Upon completion of the osteotomy, the head of the metatarsal was transposed approximately 3 mm proximally. The remaining articular cartilage overhanging transversely following the osteotomy was resected utilizing a bone rongeur. The osteotomy was temporarily fixated utilizing a 0.045in K wire. C-arm fluoroscopy was utilized to evaluate the alignment of the osteotomy. Excellent restoration of the metatarsal parabola was observed. Attention was directed toward fixation of the osteotomy. Utilizing AO fixation principle with lag technique, a [employer] twist-off 12 mm screw was inserted from dorsal to plantar across the osteotomy site in a perpendicular orientation. The temporary fixation was removed, and excellent bone to bone apposition was noted at the osteotomy. The wound was then copiously lavaged with sterile normal saline. Attention was then directed toward fixing the 2nd digit hammer toe deformity. PROCEDURE #4: PROXIMAL INTERPHALANGEAL JOINT ARTHRODESIS DIGITS TWO THREE FOUR, LEFT FOOT: At this time, attention was directed towards the dorsal aspect of the patient's 2nd proximal interphalangeal joint. At this time, a sagittal saw was utilized to transect the the head of the proximal phalanx just proximal to the epicondyles from dorsal to plantar, medial to lateral, and through and through. The transected bone was extubated in total from the surgical site after being freed from all soft tissue attachments. Attention was then directed towards the base of the middle phalanx of the 2nd digit where the cartilage was removed via a backbrushing technique until subchondral plate was seen. Upon completion, the two bones were coapted. Utilizing the [employer] drill bit, the holes were made for the middle phalanx and the proximal phalanx. Next the form fitting key was inserted into each drill hole to broach and prepare the holes for implantation. The [place] Smart Toe implant was then implanted in the head of the proximal and the base of the middle phalanx. Intraoperative C-arm fluoroscopy was utilized to show good bone coaptation and without any osseous bridging or gapping at the arthrodesis site. This was confirmed with direct visualization, palpation and intraoperative C-arm fluoroscopy. Upon completion, weightbearing was stimulated and it was noted that there was adequate correction of the above-mentioned deformity. The surgical sites was irrigated with copious amounts of sterile saline. The periosteal tissue was then re-approximated using 3-0 Vicryl suture. The subcutaneous layer was then re-approximated using 4-0 Vicryl suture. The skin was then re-approximated using 4-0 nylon in a horizontal mattress suture in a fashion. At this time, attention was directed towards the dorsal aspect of the patient's 3rd digit where a dorsal incision was created over the dorsal proximal interphalangeal joint with the a #15 blade. Sharp and blunt dissection continued through the subcutaneous tissue with scissors and hemostats. All venous tributaries were isolated, clamped, electrocoagulated and encountered. All vital neurovascular structures were gently retracted. Dissection continued down to the level of the extensor digitorum longus tendon and capsule of the proximal interphalangeal joint of the 3rd digit. A transverse tenotomy and capsulotomy was created and the medial and lateral collateral ligaments were freed over the head of the proximal phalanx of the 3rd digit. Attention was directed towards the dorsal, medial, and lateral aspects of the patient's middle phalangeal base of the the 3rd digit and the collateral ligaments and extensor tendon was freed from the base and reflected. At this time, a sagittal saw was utilized to transect the the head of the proximal phalanx just proximal to the epicondyles from dorsal to plantar, medial to lateral, and through and through. The transected bone was extubated in total from the surgical site after being freed from all soft tissue attachments. Attention was then directed towards the base of the middle phalanx of the 3rd digit where the cartilage was removed via a backbrushing technique until subchondral plate was seen. At this time, attention was directed towards the dorsal aspect of the patient's 4th digit where a dorsal incision was created over the dorsal proximal interphalangeal joint with the a #15 blade. Sharp and blunt dissection continued through the subcutaneous tissue with scissors and hemostats. All venous tributaries were isolated, clamped, electrocoagulated and encountered. All vital neurovascular structures were gently retracted. Dissection continued down to the level of the extensor digitorum longus tendon and capsule of the proximal interphalangeal joint of the 4th digit. A transverse tenotomy and capsulotomy was created and the medial and lateral collateral ligaments were freed over the head of the proximal phalanx of the 4th digit. Attention was directed towards the dorsal, medial, and lateral aspects of the patient's middle phalangeal base of the the 4th digit and the collateral ligaments and extensor tendon was freed from the base and reflected. At this time, 4th saw was utilized to transect the the head of the proximal phalanx just proximal to the epicondyles from dorsal to plantar, medial to lateral, and through and through. The transected bone was extubated in total from the surgical site after being freed from all soft tissue attachments. Attention was then directed towards the base of the middle phalanx of the 4th digit where the cartilage was removed via a backbrushing technique until subchondral plate was seen. Upon completion, the two bones were coapted. Utilizing the [employer] drill bit, the holes were made for the middle phalanx and the proximal phalanx. Next the form fitting key was inserted into each drill hole to broach and prepare the holes for implantation. The [place] Smart Toe implant was then implanted in the head of the proximal and the base of the middle phalanx. Intraoperative C-arm fluoroscopy was utilized to show good bone coaptation and without any osseous bridging or gapping at the arthrodesis site. This was confirmed with direct visualization, palpation and intraoperative C-arm fluoroscopy. Upon completion, weightbearing was stimulated and it was noted that there was adequate correction of the above-mentioned deformity. The surgical sites was irrigated with copious amounts of sterile saline. The subcutaneous tissue was reapproximated with 4-0 vicryl. The skin incision was closed using 4-0 nylon suture in a horizontal mattress suture technique. PROCEDURE #5: FLEXOR TENOTOMY'S DIGITS 2 AND 3, LEFT FOOT: Next, attention was directed towards the plantar aspect of the 2nd digit on the left foot. While applying a plantarflexory force to the head of the middle phalanx and a dorsiflexory force to the distal aspect of the digit, a stab incision was made plantarly at the level of the distal interphalangeal joint. The flexor tendon was then severed at this level both medially and laterally. A gentle extension force was applied to the digit at the level of the distal interphalangeal joint. Excellent correction of the digital contracture was noted. The stab incision was then closed using 4-0 nylon in a simple interrupted suture technique.

## 2021-01-26 ENCOUNTER — OFFICE VISIT (OUTPATIENT)
Dept: ENDOCRINOLOGY | Age: 66
End: 2021-01-26
Payer: COMMERCIAL

## 2021-01-26 VITALS
OXYGEN SATURATION: 95 % | SYSTOLIC BLOOD PRESSURE: 120 MMHG | WEIGHT: 187 LBS | HEIGHT: 65 IN | BODY MASS INDEX: 31.16 KG/M2 | HEART RATE: 73 BPM | DIASTOLIC BLOOD PRESSURE: 82 MMHG

## 2021-01-26 DIAGNOSIS — M81.8 OTHER OSTEOPOROSIS WITHOUT CURRENT PATHOLOGICAL FRACTURE: ICD-10-CM

## 2021-01-26 DIAGNOSIS — M81.0 AGE-RELATED OSTEOPOROSIS WITHOUT CURRENT PATHOLOGICAL FRACTURE: Primary | ICD-10-CM

## 2021-01-26 DIAGNOSIS — E27.8 ADRENAL NODULE (HCC): ICD-10-CM

## 2021-01-26 PROCEDURE — 99213 OFFICE O/P EST LOW 20 MIN: CPT | Performed by: NURSE PRACTITIONER

## 2021-01-26 PROCEDURE — 96372 THER/PROPH/DIAG INJ SC/IM: CPT | Performed by: NURSE PRACTITIONER

## 2021-01-26 RX ORDER — POTASSIUM CITRATE 15 MEQ/1
TABLET, EXTENDED RELEASE ORAL 2 TIMES DAILY
COMMUNITY
Start: 2021-01-25

## 2021-01-26 ASSESSMENT — ENCOUNTER SYMPTOMS
COUGH: 0
WHEEZING: 0
BACK PAIN: 0
CHEST TIGHTNESS: 0
SHORTNESS OF BREATH: 0

## 2021-01-26 NOTE — PROGRESS NOTES
Informed patient if any signs of redness,rash,swelling or unusual symptoms occur, please contact the office. Prolia given per physician order.

## 2021-01-26 NOTE — ASSESSMENT & PLAN NOTE
Reviewed existing plan of care per Dr Efrem Lee  Had second injection of prolia at visit  Tolerates well  Next DEXA in 06/2020

## 2021-02-02 ENCOUNTER — HOSPITAL ENCOUNTER (OUTPATIENT)
Dept: WOMENS IMAGING | Age: 66
Discharge: HOME | End: 2021-02-02
Payer: COMMERCIAL

## 2021-02-02 DIAGNOSIS — Z12.31 ENCOUNTER FOR SCREENING MAMMOGRAM FOR BREAST CANCER: ICD-10-CM

## 2021-02-02 PROCEDURE — 77067 SCR MAMMO BI INCL CAD: CPT

## 2021-03-02 ENCOUNTER — OFFICE VISIT (OUTPATIENT)
Dept: FAMILY MEDICINE CLINIC | Age: 66
End: 2021-03-02
Payer: COMMERCIAL

## 2021-03-02 VITALS
BODY MASS INDEX: 32.26 KG/M2 | HEIGHT: 65 IN | DIASTOLIC BLOOD PRESSURE: 70 MMHG | HEART RATE: 62 BPM | TEMPERATURE: 97.1 F | OXYGEN SATURATION: 99 % | SYSTOLIC BLOOD PRESSURE: 112 MMHG | WEIGHT: 193.6 LBS

## 2021-03-02 DIAGNOSIS — I10 ESSENTIAL HYPERTENSION: ICD-10-CM

## 2021-03-02 DIAGNOSIS — I25.10 CORONARY ARTERY DISEASE INVOLVING NATIVE HEART WITHOUT ANGINA PECTORIS, UNSPECIFIED VESSEL OR LESION TYPE: ICD-10-CM

## 2021-03-02 DIAGNOSIS — E78.00 PURE HYPERCHOLESTEROLEMIA: Primary | ICD-10-CM

## 2021-03-02 DIAGNOSIS — N18.30 STAGE 3 CHRONIC KIDNEY DISEASE, UNSPECIFIED WHETHER STAGE 3A OR 3B CKD (HCC): ICD-10-CM

## 2021-03-02 LAB
A/G RATIO: 1.6 (ref 1.1–2.2)
ALBUMIN SERPL-MCNC: 4.5 G/DL (ref 3.4–5)
ALP BLD-CCNC: 341 U/L (ref 40–129)
ALT SERPL-CCNC: 116 U/L (ref 10–40)
ANION GAP SERPL CALCULATED.3IONS-SCNC: 11 MMOL/L (ref 3–16)
AST SERPL-CCNC: 50 U/L (ref 15–37)
BILIRUB SERPL-MCNC: 0.7 MG/DL (ref 0–1)
BUN BLDV-MCNC: 18 MG/DL (ref 7–20)
CALCIUM SERPL-MCNC: 10.2 MG/DL (ref 8.3–10.6)
CHLORIDE BLD-SCNC: 99 MMOL/L (ref 99–110)
CHOLESTEROL, TOTAL: 161 MG/DL (ref 0–199)
CO2: 29 MMOL/L (ref 21–32)
CREAT SERPL-MCNC: 1.4 MG/DL (ref 0.6–1.2)
GFR AFRICAN AMERICAN: 46
GFR NON-AFRICAN AMERICAN: 38
GLOBULIN: 2.9 G/DL
GLUCOSE BLD-MCNC: 103 MG/DL (ref 70–99)
HDLC SERPL-MCNC: 42 MG/DL (ref 40–60)
LDL CHOLESTEROL CALCULATED: 72 MG/DL
POTASSIUM SERPL-SCNC: 4.8 MMOL/L (ref 3.5–5.1)
SODIUM BLD-SCNC: 139 MMOL/L (ref 136–145)
TOTAL PROTEIN: 7.4 G/DL (ref 6.4–8.2)
TRIGL SERPL-MCNC: 237 MG/DL (ref 0–150)
VLDLC SERPL CALC-MCNC: 47 MG/DL

## 2021-03-02 PROCEDURE — 99214 OFFICE O/P EST MOD 30 MIN: CPT | Performed by: FAMILY MEDICINE

## 2021-03-02 PROCEDURE — 36415 COLL VENOUS BLD VENIPUNCTURE: CPT | Performed by: FAMILY MEDICINE

## 2021-03-02 RX ORDER — NEBIVOLOL 5 MG/1
TABLET ORAL
Qty: 90 TABLET | Refills: 1 | Status: SHIPPED | OUTPATIENT
Start: 2021-03-02 | End: 2021-09-03 | Stop reason: SDUPTHER

## 2021-03-02 ASSESSMENT — PATIENT HEALTH QUESTIONNAIRE - PHQ9
2. FEELING DOWN, DEPRESSED OR HOPELESS: 0
1. LITTLE INTEREST OR PLEASURE IN DOING THINGS: 0
SUM OF ALL RESPONSES TO PHQ QUESTIONS 1-9: 0

## 2021-03-02 NOTE — PROGRESS NOTES
 denosumab (PROLIA) 60 MG/ML SOSY SC injection Inject 1 mL into the skin once for 1 dose 1 mL 0     No current facility-administered medications for this visit. Allergies   Allergen Reactions    Codeine Rash, Nausea And Vomiting and Shortness Of Breath    Lisinopril      cough    Norvasc [Amlodipine Besylate]      swelling    Triamterene-Hctz      Renal insufficiency       Social History     Tobacco Use    Smoking status: Never Smoker    Smokeless tobacco: Never Used   Substance Use Topics    Alcohol use: No       OBJECTIVE:   /70   Pulse 62   Temp 97.1 °F (36.2 °C) (Temporal)   Ht 5' 5\" (1.651 m)   Wt 193 lb 9.6 oz (87.8 kg)   LMP 07/11/2005   SpO2 99%   BMI 32.22 kg/m²   NAD  Neck no bruit or JVD  S1 and S2 normal, no murmurs, clicks, gallops or rubs. Regular rate and rhythm. Chest is clear; no wheezes or rales. No edema or JVD. Neuro alert, no CN or motor deficits  Psych nl mood, thought and judgement    ASSESSMENT:   Diagnosis Orders   1. Pure hypercholesterolemia, continue statin labs are pending    2. Essential hypertension, extremely well controlled nebivolol (BYSTOLIC) 5 MG tablet    Lipid Panel    Comprehensive Metabolic Panel   3. Coronary artery disease involving native heart without angina pectoris, unspecified vessel or lesion type   continue follow-up with cardiology   4. Stage 3 chronic kidney disease, unspecified whether stage 3a or 3b CKD   labs are pending, continue follow-up with nephrology        Plan:  1)  Medication: continue current medication regimen unchanged  2)  Recheck in 6 months, sooner should new symptoms or problems arise.   3) LLR

## 2021-03-08 ENCOUNTER — HOSPITAL ENCOUNTER (OUTPATIENT)
Dept: ULTRASOUND IMAGING | Age: 66
Discharge: HOME | End: 2021-03-08
Payer: COMMERCIAL

## 2021-03-08 DIAGNOSIS — R10.13 ABDOMINAL PAIN, EPIGASTRIC: ICD-10-CM

## 2021-03-08 DIAGNOSIS — I10 ESSENTIAL HYPERTENSION: ICD-10-CM

## 2021-03-08 PROCEDURE — 76705 ECHO EXAM OF ABDOMEN: CPT

## 2021-03-08 RX ORDER — NEBIVOLOL 5 MG/1
TABLET ORAL
Qty: 30 TABLET | Refills: 0 | OUTPATIENT
Start: 2021-03-08

## 2021-03-09 ENCOUNTER — HOSPITAL ENCOUNTER (OUTPATIENT)
Dept: PHYSICAL THERAPY | Age: 66
Setting detail: THERAPIES SERIES
Discharge: HOME | End: 2021-03-09
Payer: COMMERCIAL

## 2021-03-09 PROCEDURE — 97110 THERAPEUTIC EXERCISES: CPT | Performed by: PHYSICAL THERAPIST

## 2021-03-09 PROCEDURE — 97161 PT EVAL LOW COMPLEX 20 MIN: CPT | Performed by: PHYSICAL THERAPIST

## 2021-03-09 NOTE — PLAN OF CARE
[facility], [address]  Phone: [phone]  Fax [phone]     Physical Therapy Certification    Dear Referring Practitioner: Dr Camryn Salgado,    We had the pleasure of evaluating the following patient for physical therapy services at [address] [place] [address]. A summary of our findings can be found in the initial assessment below. This includes our plan of care. If you have any questions or concerns regarding these findings, please do not hesitate to contact me at the office phone number checked above. Thank you for the referral.       Physician Signature:_______________________________Date:__________________  By signing above (or electronic signature), therapists plan is approved by physician    Patient: Senthil Monterroso   : 1955   MRN: [id number]  Referring Physician: Referring Practitioner: Dr Camryn Salgado      Evaluation Date: 3/9/2021      Medical Diagnosis Information:  Diagnosis: M79.673 (ICD-10-CM) - Foot pain s/p bunionectomy and 3 hammer to [id number]/15/20   Treatment Diagnosis: M79.673 (ICD-10-CM) - Foot pain, difficulty walking                                         Insurance information: PT Insurance Information: [facility] [place] 40 visits, no auth [id number] ded       Precautions/ Contra-indications: osteoporosis    C-SSRS Triggered by Intake questionnaire (Past 2 wk assessment):   [x] No, Questionnaire did not trigger screening.   [] Yes, Patient intake triggered further evaluation      [] C-SSRS Screening completed  [] PCP notified via Plan of Care  [] Emergency services notified     Latex Allergy:  [x]NO      []YES  Preferred Language for Healthcare:   [x]English       []other:    SUBJECTIVE: Patient stated complaint:Pt reports she underwent surgery for bunionectomy and 3 hammer toe correction on her L foot 12/15/20.   She has done well with initial healing, but feels that she continues to struggle with ROM, balance and walking and so DPM referred her to PT. Relevant Medical History:osteoporosis, OA, htn, hyperlipidemia, kidney disease  Functional Disability Index: LEFS 53%     Height: 5'5\" Weight: 190  Pain Scale: 3-5/10  Easing factors: rest, ice  Provocative factors: standing, walking, stairs, house work     Type: []Constant   [x]Intermittent  []Radiating []Localized []other:     Numbness/Tingling: denies    Occupation/School:retried    Living Status/Prior Level of Function: Independent with ADLs and IADLs, pt enjoys walking and stationary bike, yoga    OBJECTIVE:     ROM LEFT RIGHT   HIP Flex     HIP Abd     HIP Ext     HIP IR     HIP ER     Knee ext     Knee Flex     Ankle PF 48 45   Ankle DF 2 8   Gr toe ext 45 90   Gr toe flex 15 35   Strength  LEFT RIGHT   HIP Flexors     HIP Abductors NV NV   HIP Ext     Hip ER     Knee EXT (quad)     Knee Flex (HS)     Ankle DF 4+ 5   Ankle PF 5 seated 5 seated   Ankle Inv 4 5   Ankle EV 4 5        Circumference  Mid apex  7 cm prox             Reflexes/Sensation:NT   []Dermatomes/Myotomes intact    []Reflexes equal and normal bilaterally   []Other:    Joint mobility:    []Normal    [x]Hypo toes   []Hyper    Palpation: tenderness L medial sesamoid and dorsal toes along scars with distal adhesions noted all scars    Functional Mobility/Transfers: indep    Posture: grossly WNL    Bandages/Dressings/Incisions: see above    Gait: (include devices/WB status) decreased gr toe extension and early lift off of L foot, increased L supination    Orthopedic Special Tests: SLB x15\" with mild instability R/moderate L                       [x] Patient history, allergies, meds reviewed. Medical chart reviewed. See intake form. Review Of Systems (ROS):  [x]Performed Review of systems (Integumentary, CardioPulmonary, Neurological) by intake and observation. Intake form has been scanned into medical record.  Patient has been instructed to contact their primary care physician regarding ROS issues if not already being addressed at this time. Co-morbidities/Complexities (which will affect course of rehabilitation):   []None           Arthritic conditions   []Rheumatoid arthritis (M05.9)  [x]Osteoarthritis (M19.91)   Cardiovascular conditions   [x]Hypertension (I10)  [x]Hyperlipidemia (E78.5)  []Angina pectoris (I20)  []Atherosclerosis (I70)   Musculoskeletal conditions   []Disc pathology   []Congenital spine pathologies   []Prior surgical intervention  [x]Osteoporosis (M81.8)  []Osteopenia (M85.8)   Endocrine conditions   []Hypothyroid (E03.9)  []Hyperthyroid Gastrointestinal conditions   []Constipation (T94.12)   Metabolic conditions   []Morbid obesity (E66.01)  []Diabetes type 1(E10.65) or 2 (E11.65)   []Neuropathy (G60.9)     Pulmonary conditions   []Asthma (J45)  []Coughing   []COPD (J44.9)   Psychological Disorders  []Anxiety (F41.9)  []Depression (F32.9)   []Other:   []Other:          Barriers to/and or personal factors that will affect rehab potential:              []Age  []Sex              []Motivation/Lack of Motivation                        []Co-Morbidities              []Cognitive Function, education/learning barriers              []Environmental, home barriers              []profession/work barriers  []past PT/medical experience  []other:  Justification:     Falls Risk Assessment (30 days):   [x] Falls Risk assessed and no intervention required.   [] Falls Risk assessed and Patient requires intervention due to being higher risk   TUG score (>12s at risk):     [] Falls education provided, including           ASSESSMENT:   Functional Impairments:     []Noted lumbar/proximal hip/LE joint hypomobility   [x]Decreased LE functional ROM   [x]Decreased core/proximal hip strength and neuromuscular control   [x]Decreased LE functional strength   [x]Reduced balance/proprioceptive control   []other:      Functional Activity Limitations (from functional questionnaire and intake)   [x]Reduced ability to tolerate prolonged functional positions   []Reduced ability or difficulty with changes of positions or transfers between positions   []Reduced ability to maintain good posture and demonstrate good body mechanics with sitting, bending, and lifting   []Reduced ability to sleep   [] Reduced ability or tolerance with driving and/or computer work   []Reduced ability to perform lifting, carrying tasks   [x]Reduced ability to squat   []Reduced ability to forward bend   [x]Reduced ability to ambulate prolonged functional periods/distances/surfaces   [x]Reduced ability to ascend/descend stairs   [x]Reduced ability to run, hop, cut or jump   []other:    Participation Restrictions   []Reduced participation in self care activities   [x]Reduced participation in home management activities   []Reduced participation in work activities   [x]Reduced participation in social activities. [x]Reduced participation in sport/recreation activities. Classification :    [x]Signs/symptoms consistent with post-surgical status including decreased ROM, strength and function.    []Signs/symptoms consistent with joint sprain/strain   []Signs/symptoms consistent with patella-femoral syndrome   []Signs/symptoms consistent with knee OA/hip OA   []Signs/symptoms consistent with internal derangement of knee/Hip   []Signs/symptoms consistent with functional hip weakness/NMR control      []Signs/symptoms consistent with tendinitis/tendinosis    []signs/symptoms consistent with pathology which may benefit from Dry needling      []other:      Prognosis/Rehab Potential:      []Excellent   [x]Good    []Fair   []Poor    Tolerance of evaluation/treatment:    []Excellent   [x]Good    []Fair   []Poor  Physical Therapy Evaluation Complexity Justification  [x] A history of present problem with:  [] no personal factors and/or comorbidities that impact the plan of care;  [x]1-2 personal factors and/or comorbidities that impact the plan of care  []3 personal factors and/or comorbidities that impact the plan of care  [x] An examination of body systems using standardized tests and measures addressing any of the following: body structures and functions (impairments), activity limitations, and/or participation restrictions;:  [x] a total of 1-2 or more elements   [] a total of 3 or more elements   [] a total of 4 or more elements   [x] A clinical presentation with:  [x] stable and/or uncomplicated characteristics   [] evolving clinical presentation with changing characteristics  [] unstable and unpredictable characteristics;   [x] Clinical decision making of [x] low, [] moderate, [] high complexity using standardized patient assessment instrument and/or measurable assessment of functional outcome. [x] EVAL (LOW) 79275 (typically 20 minutes face-to-face)  [] EVAL (MOD) 39343 (typically 30 minutes face-to-face)  [] EVAL (HIGH) 53148 (typically 45 minutes face-to-face)  [] RE-EVAL       PLAN:   Frequency/Duration:  1-2 days per week for 6 Weeks:  Interventions:  [x]  Therapeutic exercise including: strength training, ROM, for Lower extremity and core   [x]  NMR activation and proprioception for LE, Glutes and Core   [x]  Manual therapy as indicated for LE, Hip and spine to include: Dry Needling/IASTM, STM, PROM, Gr I-IV mobilizations, manipulation. [x] Modalities as needed that may include: thermal agents, E-stim, Biofeedback, US, iontophoresis as indicated  [x] Patient education on joint protection, postural re-education, activity modification, progression of HEP. HEP instruction: (see flowsheet)    GOALS:  Patient stated goal: better balance with walking  [] Progressing: [] Met: [] Not Met: [] Adjusted    Therapist goals for Patient:   Short Term Goals: To be achieved in: 2 weeks  1. Independent in HEP and progression per patient tolerance, in order to prevent re-injury. [] Progressing: [] Met: [] Not Met: [] Adjusted  2.  Patient will Legs Crossed - 10-15 reps - 2-3 sets - 1x daily - 7x weekly

## 2021-03-09 NOTE — FLOWSHEET NOTE
[facility],  [address]  Phone: [phone]  Fax [phone]    Physical Therapy Treatment Note/ Progress Report:           Date:  3/9/2021    Patient Name:  Jean Pierre Donovan    :  1955  MRN: [id number]  Restrictions/Precautions:    Medical/Treatment Diagnosis Information:  · Diagnosis: M79.673 (ICD-10-CM) - Foot pain s/p bunionectomy and 3 hammer to [id number]/15/20  · Treatment Diagnosis: M79.673 (ICD-10-CM) - Foot pain, difficulty walking  Insurance/Certification information:  PT Insurance Information: [facility] [place] 40 visits, no auth [id number] ded  Physician Information:  Referring Practitioner: Dr Linda Lau  Has the plan of care been signed (Y/N):        []  Yes  [x]  No     Date of Patient follow up with Physician: early April      Is this a Progress Report:     []  Yes  [x]  No        If Yes:  Date Range for reporting period:  Beginning: 3/9/21  Ending:    Progress report will be due (10 Rx or 30 days whichever is less): 62       Recertification will be due (POC Duration  / 90 days whichever is less): 21      Visit # Insurance Allowable Auth Required   In-person 1 40 []  Yes [x]  No    Telehealth   []  Yes []  No    Total          Functional Scale: LEFS 53%     Date assessed:  3/9/21      Therapy Diagnosis/Practice Pattern:I, surgical      Number of Comorbidities:  []0     [x]1-2    []3+    Latex Allergy:  [x]NO      []YES  Preferred Language for Healthcare:   [x]English       []other:      Pain level:  3-5/10     SUBJECTIVE:  See eval    OBJECTIVE: See eval   Observation:    Test measurements:      RESTRICTIONS/PRECAUTIONS: osteoporosis, kidney disease, OA    Exercises/Interventions:     Therapeutic Ex (66438) Sets/sec/reps Notes/CUES   Seated HR x15 HEP   Towel scrunches x15 HEP   Tennis ball arch massage x2' R/L HEP   TB inv/ev Green x15 ea HEP   Seated gr toe ext S 3x15\" HEP             Standing gastroc S 3x30\" R/L HEP                  Pt ed: eval findings, POC, HEP, ice, return to walking w/ alt stationary bike on in between days, mary taping 3rd/4th toes x8'    Manual Intervention (09972)     Scar massage, PROM toes x8'                             NMR re-education (09017)  CUES NEEDED   SLB X15\" R/L HEP                                           Therapeutic Activity (34723)                                         Therapeutic Exercise and NMR EXR  [x] (82324) Provided verbal/tactile cueing for activities related to strengthening, flexibility, endurance, ROM for improvements in LE, proximal hip, and core control with self care, mobility, lifting, ambulation. [x] (40225) Provided verbal/tactile cueing for activities related to improving balance, coordination, kinesthetic sense, posture, motor skill, proprioception  to assist with LE, proximal hip, and core control in self care, mobility, lifting, ambulation and eccentric single leg control.      NMR and Therapeutic Activities:    [] (13168 or 69258) Provided verbal/tactile cueing for activities related to improving balance, coordination, kinesthetic sense, posture, motor skill, proprioception and motor activation to allow for proper function of core, proximal hip and LE with self care and ADLs  [] (40448) Gait Re-education- Provided training and instruction to the patient for proper LE, core and proximal hip recruitment and positioning and eccentric body weight control with ambulation re-education including up and down stairs     Home Exercise Program:    [x] (04180) Reviewed/Progressed HEP activities related to strengthening, flexibility, endurance, ROM of core, proximal hip and LE for functional self-care, mobility, lifting and ambulation/stair navigation   [] (52347)Reviewed/Progressed HEP activities related to improving balance, coordination, kinesthetic sense, posture, motor skill, proprioception of core, proximal hip and LE for self care, mobility, lifting, and ambulation/stair navigation      Manual Treatments:  PROM / STM / Oscillations-Mobs:  G-I, II, III, IV (PA's, Inf., Post.)  [x] (63426) Provided manual therapy to mobilize LE, proximal hip and/or LS spine soft tissue/joints for the purpose of modulating pain, promoting relaxation,  increasing ROM, reducing/eliminating soft tissue swelling/inflammation/restriction, improving soft tissue extensibility and allowing for proper ROM for normal function with self care, mobility, lifting and ambulation. Modalities:     [] GAME READY (VASO)- for significant edema, swelling, pain control. Charges:  Timed Code Treatment Minutes: 35   Total Treatment Minutes: 50     BWC time in/time out:   (and requires time in and out for each CPT code)    [x] EVAL (LOW) 28594   [] EVAL (MOD) 84150  [] EVAL (HIGH) 80043   [] RE-EVAL     [x] EJ(59303) x 2    [] IONTO  [] NMR (43032) x     [] VASO  [x] Manual (92143) x      [] Other:  [] TA x      [] Mech Traction (22605)  [] ES(attended) (07046)      [] ES (un) (67910):       GOALS:   Patient stated goal: better balance with walking  [] Progressing: [] Met: [] Not Met: [] Adjusted    Therapist goals for Patient:   Short Term Goals: To be achieved in: 2 weeks  1. Independent in HEP and progression per patient tolerance, in order to prevent re-injury. [] Progressing: [] Met: [] Not Met: [] Adjusted  2. Patient will have a decrease in pain to facilitate improvement in movement, function, and ADLs as indicated by Functional Deficits. [] Progressing: [] Met: [] Not Met: [] Adjusted    Long Term Goals: To be achieved in: 6 weeks  1. Disability index score of 40% or less for the LEFS to assist with reaching prior level of function. [] Progressing: [] Met: [] Not Met: [] Adjusted  2. Patient will demonstrate increased ROM to 60* gr toe ext and 10* DF to allow for proper joint functioning as indicated by patients Functional Deficits.    [] Progressing: [] Met: [] Not Met: []

## 2021-03-15 ENCOUNTER — IMMUNIZATION (OUTPATIENT)
Dept: PRIMARY CARE CLINIC | Age: 66
End: 2021-03-15
Payer: COMMERCIAL

## 2021-03-15 PROCEDURE — 0011A COVID-19, MODERNA VACCINE 100MCG/0.5ML DOSE: CPT

## 2021-03-15 PROCEDURE — 91301 COVID-19, MODERNA VACCINE 100MCG/0.5ML DOSE: CPT

## 2021-03-16 ENCOUNTER — HOSPITAL ENCOUNTER (OUTPATIENT)
Dept: PHYSICAL THERAPY | Age: 66
Setting detail: THERAPIES SERIES
Discharge: HOME | End: 2021-03-16
Payer: COMMERCIAL

## 2021-03-16 PROCEDURE — 97140 MANUAL THERAPY 1/> REGIONS: CPT | Performed by: PHYSICAL THERAPIST

## 2021-03-16 PROCEDURE — 97110 THERAPEUTIC EXERCISES: CPT | Performed by: PHYSICAL THERAPIST

## 2021-03-16 NOTE — FLOWSHEET NOTE
[facility],  [address] [place]  Phone: [phone]  Fax [phone]    Physical Therapy Treatment Note/ Progress Report:           Date:  3/16/2021    Patient Name:  Raegan Bustamante    :  1955  MRN: [id number]  Restrictions/Precautions:    Medical/Treatment Diagnosis Information:  · Diagnosis: M79.673 (ICD-10-CM) - Foot pain s/p bunionectomy and 3 hammer to jwybylrgeq62/15/20  · Treatment Diagnosis: M79.673 (ICD-10-CM) - Foot pain, difficulty walking  Insurance/Certification information:  PT Insurance Information: [facility] [place] 40 visits, no auth [id number] ded  Physician Information:  Referring Practitioner: Dr Krevin Dubois  Has the plan of care been signed (Y/N):        [x]  Yes  []  No     Date of Patient follow up with Physician: early April      Is this a Progress Report:     []  Yes  [x]  No        If Yes:  Date Range for reporting period:  Beginning: 3/9/21  Ending:    Progress report will be due (10 Rx or 30 days whichever is less): 79       Recertification will be due (POC Duration  / 90 days whichever is less): 21      Visit # Insurance Allowable Auth Required   In-person 2 40 []  Yes [x]  No    Telehealth   []  Yes []  No    Total          Functional Scale: LEFS 53%     Date assessed:  3/9/21      Therapy Diagnosis/Practice Pattern:I, surgical      Number of Comorbidities:  []0     [x]1-2    []3+    Latex Allergy:  [x]NO      []YES  Preferred Language for Healthcare:   [x]English       []other:      Pain level: eval 3-5/10 Current NT/10    SUBJECTIVE:  Pt reports HEP is going well, with only mild short term soreness in the toes following.     OBJECTIVE:    Observation:    Test measurements:  Hip abd MMT 3+/5 B    RESTRICTIONS/PRECAUTIONS: osteoporosis, kidney disease, OA    Exercises/Interventions:     Therapeutic Ex (07199) Sets/sec/reps Notes/CUES   Seated HR HEP   Towel scrunches HEP   Tennis ball arch massage HEP   TB inv/ev HEP   Seated gr toe ext S HEP   Seated [place] board 4 ways x15 ea    Seated gr toe flexion iso w/ alternating all toe ext  5\" x12              Standing gastroc S 3x30\" R/L HEP   Standing TB hip abd, post-lat GVL @ shins x5 R/L, x3 R/L UE support and VTC's posture   Reformer: squats  Walking  DHR 2R x20  2R x15 R/L  2R x20         Pt ed: , HEP,  trying to restart yoga and/or mamie chi this spring, Pilates program x8'    Manual Intervention (99693)     Scar massage, PROM toes flex/ext, STM to plantar foot x15'                             NMR re-education (82326)  CUES NEEDED   SLB  HEP   Tandem balance on air-ex 2x30\" R/L Finger support on 1/2 wall                                      Therapeutic Activity (92537)                                         Therapeutic Exercise and NMR EXR  [x] (82759) Provided verbal/tactile cueing for activities related to strengthening, flexibility, endurance, ROM for improvements in LE, proximal hip, and core control with self care, mobility, lifting, ambulation. [x] (75361) Provided verbal/tactile cueing for activities related to improving balance, coordination, kinesthetic sense, posture, motor skill, proprioception  to assist with LE, proximal hip, and core control in self care, mobility, lifting, ambulation and eccentric single leg control.      NMR and Therapeutic Activities:    [] (17191 or 67722) Provided verbal/tactile cueing for activities related to improving balance, coordination, kinesthetic sense, posture, motor skill, proprioception and motor activation to allow for proper function of core, proximal hip and LE with self care and ADLs  [] (77357) Gait Re-education- Provided training and instruction to the patient for proper LE, core and proximal hip recruitment and positioning and eccentric body weight control with ambulation re-education including up and down stairs     Home Exercise Program:    [x] (20855) Reviewed/Progressed HEP activities related to strengthening, flexibility, endurance, ROM of core, proximal hip and LE for functional self-care, mobility, lifting and ambulation/stair navigation   [] (28740)Reviewed/Progressed HEP activities related to improving balance, coordination, kinesthetic sense, posture, motor skill, proprioception of core, proximal hip and LE for self care, mobility, lifting, and ambulation/stair navigation      Manual Treatments:  PROM / STM / Oscillations-Mobs:  G-I, II, III, IV (PA's, Inf., Post.)  [x] (38037) Provided manual therapy to mobilize LE, proximal hip and/or LS spine soft tissue/joints for the purpose of modulating pain, promoting relaxation,  increasing ROM, reducing/eliminating soft tissue swelling/inflammation/restriction, improving soft tissue extensibility and allowing for proper ROM for normal function with self care, mobility, lifting and ambulation. Modalities:     [] GAME READY (VASO)- for significant edema, swelling, pain control. Charges:  Timed Code Treatment Minutes: 50   Total Treatment Minutes: 50     BWC time in/time out:   (and requires time in and out for each CPT code)    [] EVAL (LOW) 48690   [] EVAL (MOD) 27705  [] EVAL (HIGH) 30504   [] RE-EVAL     [x] FU(15121) x 2    [] IONTO  [x] NMR (22153) x     [] VASO  [x] Manual (39443) x1      [] Other:  [] TA x      [] Mech Traction (49835)  [] ES(attended) (54564)      [] ES (un) (54778):       GOALS:   Patient stated goal: better balance with walking  [] Progressing: [] Met: [] Not Met: [] Adjusted    Therapist goals for Patient:   Short Term Goals: To be achieved in: 2 weeks  1. Independent in HEP and progression per patient tolerance, in order to prevent re-injury. [] Progressing: [] Met: [] Not Met: [] Adjusted  2. Patient will have a decrease in pain to facilitate improvement in movement, function, and ADLs as indicated by Functional Deficits. [] Progressing: [] Met: [] Not Met: [] Adjusted    Long Term Goals:  To be achieved in: 6 weeks  1. Disability index score of 40% or less for the LEFS to assist with reaching prior level of function. [] Progressing: [] Met: [] Not Met: [] Adjusted  2. Patient will demonstrate increased ROM to 60* gr toe ext and 10* DF to allow for proper joint functioning as indicated by patients Functional Deficits. [] Progressing: [] Met: [] Not Met: [] Adjusted  3. Patient will demonstrate an increase in Strength to 5/5 L ankle and grossly 4+/5 B hips to allow for proper functional mobility as indicated by patients Functional Deficits. [] Progressing: [] Met: [] Not Met: [] Adjusted  4. Patient will return to squatting functional activities without increased symptoms or restriction. [] Progressing: [] Met: [] Not Met: [] Adjusted  5. Pt will demo good control with SLB on compliant surface >/= 15\" in order to be safe for walking in her yard. [] Progressing: [] Met: [] Not Met: [] Adjusted    Progression Towards Functional goals:  [] Patient is progressing as expected towards functional goals listed. [] Progression is slowed due to complexities listed. [] Progression has been slowed due to co-morbidities. [x] Plan just implemented, too soon to assess goals progression  [] Other:     Overall Progression Towards Functional goals/ Treatment Progress Update:  [] Patient is progressing as expected towards functional goals listed. [] Progression is slowed due to complexities/Impairments listed. [] Progression has been slowed due to co-morbidities.   [x] Plan just implemented, too soon to assess goals progression <30days   [] Goals require adjustment due to lack of progress  [] Patient is not progressing as expected and requires additional follow up with physician  [] Other    Prognosis for POC: [x] Good [] Fair  [] Poor      Patient requires continued skilled intervention: [x] Yes  [] No    Treatment/Activity Tolerance:  [x] Patient able to complete treatment  [] Patient limited by fatigue  [] Patient limited

## 2021-03-19 RX ORDER — ATORVASTATIN CALCIUM 40 MG/1
TABLET, FILM COATED ORAL
Qty: 90 TABLET | Refills: 0 | Status: SHIPPED | OUTPATIENT
Start: 2021-03-19 | End: 2021-03-24 | Stop reason: SDUPTHER

## 2021-03-19 NOTE — TELEPHONE ENCOUNTER
3/18/21 [facility] [place]    3/24/2021 [facility] appt

## 2021-03-23 ENCOUNTER — HOSPITAL ENCOUNTER (OUTPATIENT)
Dept: PHYSICAL THERAPY | Age: 66
Setting detail: THERAPIES SERIES
Discharge: HOME | End: 2021-03-23
Payer: COMMERCIAL

## 2021-03-23 PROCEDURE — 97140 MANUAL THERAPY 1/> REGIONS: CPT | Performed by: PHYSICAL THERAPIST

## 2021-03-23 PROCEDURE — 97110 THERAPEUTIC EXERCISES: CPT | Performed by: PHYSICAL THERAPIST

## 2021-03-23 NOTE — PROGRESS NOTES
[facility]   Cardiac Consultation    Referring Provider:  Linnea Infante MD     Chief Complaint   Patient presents with    1 Year Follow Up     discuss atorvastatin    Hypertension    Hyperlipidemia        History of Present Illness: Ina Suarez is a 60 y.o. female who is here today for follow up for a history of coronary artery disease- based on CT calcium score- (469), family history of heart diease, hypertension, SOB, and hyperlipidemia. She had a normal stress test on 4/2/2020. An echocardiogram from 4/2/2020 showed an EF of 55%. Today she states she has been feeling well. She is tolerating her medications and taking them as prescribed. Her blood pressure at home is well controlled. She had a foot injury that has limited her activity some but she is back to walking. Patient currently denies any weight gain, edema, palpitations, chest pain, shortness of breath, dizziness, and syncope. BP normal at home. Inquiring about ok for EGD. Past Medical History:   has a past medical history of Actinic keratosis, Breast disorder, CAD (coronary artery disease), Chronic kidney disease, Hyperlipidemia, Hypertension, Infertility, female, S/P colonoscopy, and Tendinitis of foot. Surgical History:   has a past surgical history that includes Cholecystectomy (12/1992); laparoscopy; Tonsillectomy and adenoidectomy; Breast surgery; Abdomen surgery; Colonoscopy (8/1/2009); angioplasty; other surgical history (Left, 01/11/2016); epidural steroid injection (N/A, 7/29/2019); Colonoscopy (N/A, 10/23/2019); and Bunionectomy (Left, 12/15/2020). Social History:   reports that she has never smoked. She has never used smokeless tobacco. She reports that she does not drink alcohol or use drugs. Family History:  family history includes Arthritis in her mother; Cancer in her mother; Diabetes in her sister;  Heart Disease in her brother, father, and mother; High Blood Pressure in her brother, mother, and sister; High Cholesterol in her brother and mother; Kidney Disease in her sister. brother has heart disease     Home Medications:  Prior to Admission medications    Medication Sig Start Date End Date Taking? Authorizing Provider   atorvastatin (LIPITOR) 40 MG tablet TAKE ONE TABLET BY MOUTH DAILY 3/19/21  Yes Danny Carrillo MD   nebivolol (BYSTOLIC) 5 MG tablet TAKE ONE TABLET BY MOUTH DAILY 3/2/21  Yes Romulo Puentes MD   Potassium Citrate ER 15 MEQ (1620 MG) TBCR  1/25/21  Yes Historical Provider, MD   calcitRIOL (ROCALTROL) 0.25 MCG capsule Take 0.25 mcg by mouth every other day   Yes Historical Provider, MD   aspirin 81 MG tablet Take 81 mg by mouth daily   Yes Historical Provider, MD   Melatonin 5 MG CHEW Take by mouth   Yes Historical Provider, MD   denosumab (PROLIA) 60 MG/ML SOSY SC injection Inject 1 mL into the skin once for 1 dose 7/9/20 7/9/20  Kylee Cunningham MD        Allergies:  Codeine, Lisinopril, Norvasc [amlodipine besylate], and Triamterene-hctz     Review of Systems:   · Constitutional: there has been no unanticipated weight loss. There's been no change in energy level, sleep pattern, or activity level. · Eyes: No visual changes or diplopia. No scleral icterus. · ENT: No Headaches, hearing loss or vertigo. No mouth sores or sore throat. · Cardiovascular: Reviewed in HPI  · Respiratory: No cough or wheezing, no sputum production. No hematemesis. · Gastrointestinal: No abdominal pain, appetite loss, blood in stools. No change in bowel or bladder habits. · Genitourinary: No dysuria, trouble voiding, or hematuria. · Musculoskeletal:  No gait disturbance, weakness or joint complaints. · Integumentary: No rash or pruritis. · Neurological: No headache, diplopia, change in muscle strength, numbness or tingling. No change in gait, balance, coordination, mood, affect, memory, mentation, behavior. · Psychiatric: No anxiety, no depression.   · Endocrine: No malaise, fatigue or temperature intolerance. No excessive thirst, fluid intake, or urination. No tremor. · Hematologic/Lymphatic: No abnormal bruising or bleeding, blood clots or swollen lymph nodes. · Allergic/Immunologic: No nasal congestion or hives. Physical Examination:    Vitals:    03/24/21 1227   BP: 124/78   Pulse: 61   SpO2: 96%        Constitutional and General Appearance: NAD   Respiratory:  · Normal excursion and expansion without use of accessory muscles  · Resp Auscultation: Normal breath sounds without dullness  Cardiovascular:  · The apical impulses not displaced  · Heart tones are crisp and normal  · Cervical veins are not engorged  · The carotid upstroke is normal in amplitude and contour without delay or bruit  · Normal S1S2, No S3, No Murmur  · Peripheral pulses are symmetrical and full  · There is no clubbing, cyanosis of the extremities. · No edema  · Femoral Arteries: 2+ and equal  · Pedal Pulses: 2+ and equal   Abdomen:  · No masses or tenderness  · Liver/Spleen: No Abnormalities Noted  Neurological/Psychiatric:  · Alert and oriented in all spheres  · Moves all extremities well  · Exhibits normal gait balance and coordination  · No abnormalities of mood, affect, memory, mentation, or behavior are noted      Stress echocardiogram 9/6/13  Summary  Normal echocardiographic response to stress, but abnormal EKG response. Renal angiogram 2014 [facility] [place]  No significant renal artery stenosis       CT Calcium score 3/12/2020  Total Agatston calcium score of 469. High likelihood of at least one significant coronary artery stenosis (>50% diameter). Echocardiogram 4/2/2020  Summary   Normal left ventricle systolic function with an estimated ejection fraction   of 55%. No regional wall motion abnormalities are seen. Normal left ventricular diastolic filling pressure. Trivial mitral regurgitation. Systolic pulmonary artery pressure (SPAP) is normal and estimated at 25 mmHg   (right atrial pressure 3 mmHg).     Stress test 4/2/2020  Conclusions    Summary  There is normal isotope uptake at stress and rest. There is no evidence of  myocardial ischemia or scar. Hyperdynamic LV systolic function with RM>09%  with uniform wall motion. Low risk study. Assessment:   Coronary artery disease - based on CT calcium score   Hypertension - well controlled  Hyperlipidemia - improved on lipitor   Family history of heart disease   Pre-op for EGD    Plan:  [id number] Citlali Barnes for EGD- can hold Aspirin if needed   Fasting lipids next year through PCP   Lipitor refilled   Check blood pressure at home weekly  Regular exercise and following a healthy diet encouraged   Follow up with me in 1 year         Scribe's attestation: This note was scribed in the presence of Dr. Lily Ramos M.D. By Maddy Garcia RN    The scribes documentation has been prepared under my direction and personally reviewed by me in its entirety. I confirm that the note above accurately reflects all work, treatment, procedures, and medical decision making performed by me. Dr. Lily Ramos MD    Thank you for allowing me to participate in the care of this individual.      Jose Wooten.  Pat Nunez M.D., [facility]

## 2021-03-24 ENCOUNTER — OFFICE VISIT (OUTPATIENT)
Dept: CARDIOLOGY CLINIC | Age: 66
End: 2021-03-24
Payer: COMMERCIAL

## 2021-03-24 VITALS
HEART RATE: 61 BPM | WEIGHT: 194 LBS | SYSTOLIC BLOOD PRESSURE: 124 MMHG | OXYGEN SATURATION: 96 % | HEIGHT: 65 IN | DIASTOLIC BLOOD PRESSURE: 78 MMHG | BODY MASS INDEX: 32.32 KG/M2

## 2021-03-24 DIAGNOSIS — E78.00 PURE HYPERCHOLESTEROLEMIA: ICD-10-CM

## 2021-03-24 DIAGNOSIS — I10 ESSENTIAL HYPERTENSION: Primary | ICD-10-CM

## 2021-03-24 PROCEDURE — 99214 OFFICE O/P EST MOD 30 MIN: CPT | Performed by: INTERNAL MEDICINE

## 2021-03-24 RX ORDER — ATORVASTATIN CALCIUM 40 MG/1
TABLET, FILM COATED ORAL
Qty: 30 TABLET | Refills: 11 | Status: SHIPPED | OUTPATIENT
Start: 2021-03-24 | End: 2021-09-03 | Stop reason: SDUPTHER

## 2021-03-24 NOTE — PATIENT INSTRUCTIONS
Plan:  Elaine Letters for EGD  Fasting lipids next year through PCP   Continue current medications   Check blood pressure at home weekly  Regular exercise and following a healthy diet encouraged   Follow up with me in 1 year

## 2021-03-24 NOTE — LETTER
[facility]   Cardiac Consultation    Referring Provider:  Azar Pemberton MD     Chief Complaint   Patient presents with    1 Year Follow Up     discuss atorvastatin    Hypertension    Hyperlipidemia        History of Present Illness: Reba Baer is a 60 y.o. female who is here today for follow up for a history of coronary artery disease- based on CT calcium score- (469), family history of heart diease, hypertension, SOB, and hyperlipidemia. She had a normal stress test on 4/2/2020. An echocardiogram from 4/2/2020 showed an EF of 55%. Today she states she has been feeling well. She is tolerating her medications and taking them as prescribed. Her blood pressure at home is well controlled. She had a foot injury that has limited her activity some but she is back to walking. Patient currently denies any weight gain, edema, palpitations, chest pain, shortness of breath, dizziness, and syncope. BP normal at home. Inquiring about ok for EGD. Past Medical History:   has a past medical history of Actinic keratosis, Breast disorder, CAD (coronary artery disease), Chronic kidney disease, Hyperlipidemia, Hypertension, Infertility, female, S/P colonoscopy, and Tendinitis of foot. Surgical History:   has a past surgical history that includes Cholecystectomy (12/1992); laparoscopy; Tonsillectomy and adenoidectomy; Breast surgery; Abdomen surgery; Colonoscopy (8/1/2009); angioplasty; other surgical history (Left, 01/11/2016); epidural steroid injection (N/A, 7/29/2019); Colonoscopy (N/A, 10/23/2019); and Bunionectomy (Left, 12/15/2020). Social History:   reports that she has never smoked. She has never used smokeless tobacco. She reports that she does not drink alcohol or use drugs. Family History:  family history includes Arthritis in her mother; Cancer in her mother; Diabetes in her sister;  Heart Disease in her brother, father, and mother; High Blood Pressure in her brother, mother, and sister; High Cholesterol in her brother and mother; Kidney Disease in her sister. brother has heart disease     Home Medications:  Prior to Admission medications    Medication Sig Start Date End Date Taking? Authorizing Provider   atorvastatin (LIPITOR) 40 MG tablet TAKE ONE TABLET BY MOUTH DAILY 3/19/21  Yes Zulay Peters MD   nebivolol (BYSTOLIC) 5 MG tablet TAKE ONE TABLET BY MOUTH DAILY 3/2/21  Yes Gilda Montgomery MD   Potassium Citrate ER 15 MEQ (1620 MG) TBCR  1/25/21  Yes Historical Provider, MD   calcitRIOL (ROCALTROL) 0.25 MCG capsule Take 0.25 mcg by mouth every other day   Yes Historical Provider, MD   aspirin 81 MG tablet Take 81 mg by mouth daily   Yes Historical Provider, MD   Melatonin 5 MG CHEW Take by mouth   Yes Historical Provider, MD   denosumab (PROLIA) 60 MG/ML SOSY SC injection Inject 1 mL into the skin once for 1 dose 7/9/20 7/9/20  Ashley Dove MD        Allergies:  Codeine, Lisinopril, Norvasc [amlodipine besylate], and Triamterene-hctz     Review of Systems:   · Constitutional: there has been no unanticipated weight loss. There's been no change in energy level, sleep pattern, or activity level. · Eyes: No visual changes or diplopia. No scleral icterus. · ENT: No Headaches, hearing loss or vertigo. No mouth sores or sore throat. · Cardiovascular: Reviewed in HPI  · Respiratory: No cough or wheezing, no sputum production. No hematemesis. · Gastrointestinal: No abdominal pain, appetite loss, blood in stools. No change in bowel or bladder habits. · Genitourinary: No dysuria, trouble voiding, or hematuria. · Musculoskeletal:  No gait disturbance, weakness or joint complaints. · Integumentary: No rash or pruritis. · Neurological: No headache, diplopia, change in muscle strength, numbness or tingling. No change in gait, balance, coordination, mood, affect, memory, mentation, behavior. · Psychiatric: No anxiety, no depression.   · Endocrine: No malaise, fatigue or temperature intolerance. No excessive thirst, fluid intake, or urination. No tremor. · Hematologic/Lymphatic: No abnormal bruising or bleeding, blood clots or swollen lymph nodes. · Allergic/Immunologic: No nasal congestion or hives. Physical Examination:    Vitals:    03/24/21 1227   BP: 124/78   Pulse: 61   SpO2: 96%        Constitutional and General Appearance: NAD   Respiratory:  · Normal excursion and expansion without use of accessory muscles  · Resp Auscultation: Normal breath sounds without dullness  Cardiovascular:  · The apical impulses not displaced  · Heart tones are crisp and normal  · Cervical veins are not engorged  · The carotid upstroke is normal in amplitude and contour without delay or bruit  · Normal S1S2, No S3, No Murmur  · Peripheral pulses are symmetrical and full  · There is no clubbing, cyanosis of the extremities. · No edema  · Femoral Arteries: 2+ and equal  · Pedal Pulses: 2+ and equal   Abdomen:  · No masses or tenderness  · Liver/Spleen: No Abnormalities Noted  Neurological/Psychiatric:  · Alert and oriented in all spheres  · Moves all extremities well  · Exhibits normal gait balance and coordination  · No abnormalities of mood, affect, memory, mentation, or behavior are noted      Stress echocardiogram 9/6/13  Summary  Normal echocardiographic response to stress, but abnormal EKG response. Renal angiogram 2014 [facility] [place]  No significant renal artery stenosis       CT Calcium score 3/12/2020  Total Agatston calcium score of 469. High likelihood of at least one significant coronary artery stenosis (>50% diameter). Echocardiogram 4/2/2020  Summary   Normal left ventricle systolic function with an estimated ejection fraction   of 55%. No regional wall motion abnormalities are seen. Normal left ventricular diastolic filling pressure. Trivial mitral regurgitation. Systolic pulmonary artery pressure (SPAP) is normal and estimated at 25 mmHg   (right atrial pressure 3 mmHg). Stress test 4/2/2020  Conclusions    Summary  There is normal isotope uptake at stress and rest. There is no evidence of  myocardial ischemia or scar. Hyperdynamic LV systolic function with AV>19%  with uniform wall motion. Low risk study. Assessment:   Coronary artery disease - based on CT calcium score   Hypertension - well controlled  Hyperlipidemia - improved on lipitor   Family history of heart disease   Pre-op for EGD    Plan:  [id number] Cit[address]ali Barnes for EGD- can hold Aspirin if needed   Fasting lipids next year through PCP   Lipitor refilled   Check blood pressure at home weekly  Regular exercise and following a healthy diet encouraged   Follow up with me in 1 year         Scribe's attestation: This note was scribed in the presence of Dr. Allean Schirmer M.D. By Odin Solomon RN    The scribes documentation has been prepared under my direction and personally reviewed by me in its entirety. I confirm that the note above accurately reflects all work, treatment, procedures, and medical decision making performed by me. Dr. Allean Schirmer, MD    Thank you for allowing me to participate in the care of this individual.      Trinity Rai.  Jean-Pierre Archibald M.D., [facility]

## 2021-03-30 ENCOUNTER — HOSPITAL ENCOUNTER (OUTPATIENT)
Dept: PHYSICAL THERAPY | Age: 66
Setting detail: THERAPIES SERIES
Discharge: HOME | End: 2021-03-30
Payer: COMMERCIAL

## 2021-03-30 ENCOUNTER — OFFICE VISIT (OUTPATIENT)
Dept: FAMILY MEDICINE CLINIC | Age: 66
End: 2021-03-30
Payer: COMMERCIAL

## 2021-03-30 VITALS
HEIGHT: 65 IN | DIASTOLIC BLOOD PRESSURE: 68 MMHG | WEIGHT: 195 LBS | SYSTOLIC BLOOD PRESSURE: 118 MMHG | TEMPERATURE: 97.4 F | BODY MASS INDEX: 32.49 KG/M2 | HEART RATE: 69 BPM | OXYGEN SATURATION: 94 %

## 2021-03-30 DIAGNOSIS — R30.0 DYSURIA: Primary | ICD-10-CM

## 2021-03-30 DIAGNOSIS — N30.01 ACUTE CYSTITIS WITH HEMATURIA: ICD-10-CM

## 2021-03-30 LAB
BILIRUBIN, POC: NORMAL
BLOOD URINE, POC: NORMAL
CLARITY, POC: NORMAL
COLOR, POC: NORMAL
GLUCOSE URINE, POC: NEGATIVE
KETONES, POC: NEGATIVE
LEUKOCYTE EST, POC: NORMAL
NITRITE, POC: POSITIVE
PH, POC: 9
PROTEIN, POC: NORMAL
SPECIFIC GRAVITY, POC: 1.02
UROBILINOGEN, POC: 1

## 2021-03-30 PROCEDURE — 97110 THERAPEUTIC EXERCISES: CPT | Performed by: PHYSICAL THERAPIST

## 2021-03-30 PROCEDURE — 97140 MANUAL THERAPY 1/> REGIONS: CPT | Performed by: PHYSICAL THERAPIST

## 2021-03-30 PROCEDURE — 97112 NEUROMUSCULAR REEDUCATION: CPT | Performed by: PHYSICAL THERAPIST

## 2021-03-30 PROCEDURE — 81002 URINALYSIS NONAUTO W/O SCOPE: CPT | Performed by: PHYSICIAN ASSISTANT

## 2021-03-30 PROCEDURE — 99213 OFFICE O/P EST LOW 20 MIN: CPT | Performed by: PHYSICIAN ASSISTANT

## 2021-03-30 RX ORDER — SULFAMETHOXAZOLE AND TRIMETHOPRIM 800; 160 MG/1; MG/1
1 TABLET ORAL 2 TIMES DAILY
Qty: 10 TABLET | Refills: 0 | Status: SHIPPED | OUTPATIENT
Start: 2021-03-30 | End: 2021-04-04

## 2021-03-30 NOTE — PROGRESS NOTES
SUBJECTIVE: Sindy Shipman is a 60 y.o. female who complains of urinary frequency, urgency and dysuria x 3 days, without flank pain, fever, chills, or abnormal vaginal discharge or bleeding. OBJECTIVE: Appears well, in no apparent distress. Vital signs are normal. The abdomen is soft without tenderness, guarding, mass, rebound or organomegaly. No CVA tenderness or inguinal adenopathy noted. Urine dipstick shows positive for RBC's, positive for nitrates and positive for leukocytes. ASSESSMENT     Diagnosis Orders   1. Dysuria  POCT Urinalysis no Micro   2. Acute cystitis with hematuria           PLAN: Treatment per orders - also push fluids, may use Pyridium OTC prn. Call or return to clinic prn if these symptoms worsen or fail to improve as anticipated.

## 2021-03-30 NOTE — FLOWSHEET NOTE
[facility], [address]  Phone: [phone]  Fax [phone]    Physical Therapy Treatment Note/ Progress Report:           Date:  3/30/2021    Patient Name:  Padmini Renee    :  1955  MRN: [id number]  Restrictions/Precautions:    Medical/Treatment Diagnosis Information:  · Diagnosis: M79.673 (ICD-10-CM) - Foot pain s/p bunionectomy and 3 hammer to [id number]/15/20  · Treatment Diagnosis: M79.673 (ICD-10-CM) - Foot pain, difficulty walking  Insurance/Certification information:  PT Insurance Information: [employer] [place] 40 visits, no auth [id number] ded  Physician Information:  Referring Practitioner: Dr Patrizia Prabhakar  Has the plan of care been signed (Y/N):        [x]  Yes  []  No     Date of Patient follow up with Physician: ~21      Is this a Progress Report:     []  Yes  [x]  No        If Yes:  Date Range for reporting period:  Beginning: 3/9/21  Ending:    Progress report will be due (10 Rx or 30 days whichever is less): 59       Recertification will be due (POC Duration  / 90 days whichever is less): 21      Visit # Insurance Allowable Auth Required   In-person 4 40 []  Yes [x]  No    Telehealth   []  Yes []  No    Total          Functional Scale: LEFS 53%     Date assessed:  3/9/21      Therapy Diagnosis/Practice Pattern:I, surgical      Number of Comorbidities:  []0     [x]1-2    []3+    Latex Allergy:  [x]NO      []YES  Preferred Language for Healthcare:   [x]English       []other:      Pain level: eval 3-5/10 Current NT/10    SUBJECTIVE:  Pt reports she has been able to gradually increase her walking distance with less soreness and swelling this past week. She also feels more stable and confident walking in her yard.     OBJECTIVE: PN NV   Observation:    Test measurements:   DHR 1/2 ht due to plantar surface Gr toe pain, gr toe PROM ext 60*    RESTRICTIONS/PRECAUTIONS: osteoporosis, kidney disease, Re-education- Provided training and instruction to the patient for proper LE, core and proximal hip recruitment and positioning and eccentric body weight control with ambulation re-education including up and down stairs     Home Exercise Program:    [x] (08276) Reviewed/Progressed HEP activities related to strengthening, flexibility, endurance, ROM of core, proximal hip and LE for functional self-care, mobility, lifting and ambulation/stair navigation   [] (91607)Reviewed/Progressed HEP activities related to improving balance, coordination, kinesthetic sense, posture, motor skill, proprioception of core, proximal hip and LE for self care, mobility, lifting, and ambulation/stair navigation      Manual Treatments:  PROM / STM / Oscillations-Mobs:  G-I, II, III, IV (PA's, Inf., Post.)  [x] (05723) Provided manual therapy to mobilize LE, proximal hip and/or LS spine soft tissue/joints for the purpose of modulating pain, promoting relaxation,  increasing ROM, reducing/eliminating soft tissue swelling/inflammation/restriction, improving soft tissue extensibility and allowing for proper ROM for normal function with self care, mobility, lifting and ambulation. Modalities:     [] GAME READY (VASO)- for significant edema, swelling, pain control. Charges:  Timed Code Treatment Minutes: 45   Total Treatment Minutes: 45     [facility] time in/time out:   (and requires time in and out for each CPT code)    [] EVAL (LOW) 00785   [] EVAL (MOD) 12324  [] EVAL (HIGH) 67675   [] RE-EVAL     [x] IS(15310) x1    [] IONTO  [x] NMR (97574) x 1    [] VASO  [x] Manual (17405) x1      [] Other:  [] TA x      [] Mech Traction (46096)  [] ES(attended) (56855)      [] ES (un) (15054):       GOALS:   Patient stated goal: better balance with walking  [] Progressing: [] Met: [] Not Met: [] Adjusted    Therapist goals for Patient:   Short Term Goals: To be achieved in: 2 weeks  1.  Independent in HEP and progression per patient tolerance, in order to prevent re-injury. [] Progressing: [x] Met: [] Not Met: [] Adjusted  2. Patient will have a decrease in pain to facilitate improvement in movement, function, and ADLs as indicated by Functional Deficits. [] Progressing: [x] Met: [] Not Met: [] Adjusted    Long Term Goals: To be achieved in: 6 weeks  1. Disability index score of 40% or less for the LEFS to assist with reaching prior level of function. [] Progressing: [] Met: [] Not Met: [] Adjusted  2. Patient will demonstrate increased ROM to 60* gr toe ext and 10* DF to allow for proper joint functioning as indicated by patients Functional Deficits. [] Progressing: [x] Met:gr toe [] Not Met: [] Adjusted  3. Patient will demonstrate an increase in Strength to 5/5 L ankle and grossly 4+/5 B hips to allow for proper functional mobility as indicated by patients Functional Deficits. [] Progressing: [] Met: [] Not Met: [] Adjusted  4. Patient will return to squatting functional activities without increased symptoms or restriction. [x] Progressing: [] Met: [] Not Met: [] Adjusted  5. Pt will demo good control with SLB on compliant surface >/= 15\" in order to be safe for walking in her yard. [x] Progressing: [] Met: [] Not Met: [] Adjusted    Progression Towards Functional goals:  [] Patient is progressing as expected towards functional goals listed. [] Progression is slowed due to complexities listed. [] Progression has been slowed due to co-morbidities. [x] Plan just implemented, too soon to assess goals progression  [] Other:     Overall Progression Towards Functional goals/ Treatment Progress Update:  [] Patient is progressing as expected towards functional goals listed. [] Progression is slowed due to complexities/Impairments listed. [] Progression has been slowed due to co-morbidities.   [x] Plan just implemented, too soon to assess goals progression <30days   [] Goals require adjustment due to lack of progress  [] Patient is not progressing as Eversion with Resistance - 1 x daily - 7 x weekly - 10-15 reps - 2-3 sets   Seated Ankle Inversion with Resistance and Legs Crossed - 1 x daily - 7 x weekly - 10-15 reps - 2-3 sets   Hip Abduction with Resistance Loop - 1 x daily - 7 x weekly - 2 sets - 5 reps

## 2021-04-01 ENCOUNTER — OFFICE VISIT (OUTPATIENT)
Dept: PRIMARY CARE CLINIC | Age: 66
End: 2021-04-01
Payer: COMMERCIAL

## 2021-04-01 DIAGNOSIS — Z01.818 PREOP TESTING: Primary | ICD-10-CM

## 2021-04-01 LAB — SARS-COV-2: NOT DETECTED

## 2021-04-01 PROCEDURE — 99211 OFF/OP EST MAY X REQ PHY/QHP: CPT | Performed by: NURSE PRACTITIONER

## 2021-04-01 NOTE — PROGRESS NOTES
Marina A Caridad received a viral test for COVID-19. They were educated on isolation and quarantine as appropriate. For any symptoms, they were directed to seek care from their PCP, given contact information to establish with a doctor, directed to an urgent care or the emergency room.

## 2021-04-01 NOTE — PATIENT INSTRUCTIONS
Advance Care Planning  People with COVID-19 may have no symptoms, mild symptoms, such as fever, cough, and shortness of breath or they may have more severe illness, developing severe and fatal pneumonia. As a result, Advance Care Planning with attention to naming a health care decision maker (someone you trust to make healthcare decisions for you if you could not speak for yourself) and sharing other health care preferences is important BEFORE a possible health crisis. Please contact your Primary Care Provider to discuss Advance Care Planning. Preventing the Spread of Coronavirus Disease 2019 in Homes and Residential Communities  For the most recent information go to [email or web address]    Prevention steps for People with confirmed or suspected COVID-19 (including persons under investigation) who do not need to be hospitalized  and   People with confirmed COVID-19 who were hospitalized and determined to be medically stable to go home    Your healthcare provider and public health staff will evaluate whether you can be cared for at home. If it is determined that you do not need to be hospitalized and can be isolated at home, you will be monitored by staff from your local or state health department. You should follow the prevention steps below until a healthcare provider or local or state health department says you can return to your normal activities. Stay home except to get medical care  People who are mildly ill with COVID-19 are able to isolate at home during their illness. You should restrict activities outside your home, except for getting medical care. Do not go to work, school, or public areas. Avoid using public transportation, ride-sharing, or taxis. Separate yourself from other people and animals in your home  People: As much as possible, you should stay in a specific room and away from other people in your home.  Also, you should use a separate before eating or preparing food. If soap and water are not readily available, use an alcohol-based hand  with at least 60% alcohol, covering all surfaces of your hands and rubbing them together until they feel dry. Soap and water are the best option if hands are visibly dirty. Avoid touching your eyes, nose, and mouth with unwashed hands. Avoid sharing personal household items  You should not share dishes, drinking glasses, cups, eating utensils, towels, or bedding with other people or pets in your home. After using these items, they should be washed thoroughly with soap and water. Clean all high-touch surfaces everyday  High touch surfaces include counters, tabletops, doorknobs, bathroom fixtures, toilets, phones, keyboards, tablets, and bedside tables. Also, clean any surfaces that may have blood, stool, or body fluids on them. Use a household cleaning spray or wipe, according to the label instructions. Labels contain instructions for safe and effective use of the cleaning product including precautions you should take when applying the product, such as wearing gloves and making sure you have good ventilation during use of the product. Monitor your symptoms  Seek prompt medical attention if your illness is worsening (e.g., difficulty breathing). Before seeking care, call your healthcare provider and tell them that you have, or are being evaluated for, COVID-19. Put on a facemask before you enter the facility. These steps will help the healthcare providers office to keep other people in the office or waiting room from getting infected or exposed. Ask your healthcare provider to call the local or state health department. Persons who are placed under active monitoring or facilitated self-monitoring should follow instructions provided by their local health department or occupational health professionals, as appropriate. When working with your local health department check their available hours.   If you

## 2021-04-02 RX ORDER — DENOSUMAB 60 MG/ML
60 INJECTION SUBCUTANEOUS
COMMUNITY
End: 2021-07-30 | Stop reason: SDUPTHER

## 2021-04-02 NOTE — PROGRESS NOTES
Preoperative Screening for Elective Surgery/Invasive Procedures While COVID-19 present in the community     Have you had any of the following symptoms? No  o Fever, chills  o Cough  o Shortness of breath  o Muscle aches/pain  o Diarrhea  o Abdominal pain, nausea, vomiting  o Loss or decrease in taste and / or smell   Risk of Exposure  o Have you recently been hospitalized for COVID-19 or flu-like illness, if so when? No  o Recently diagnosed with COVID-19, if so when? No  o Recently tested for COVID-19, if so when?4/1/2021  o Have you been in close contact with a person or family member who currently has or recently had COVID-23? If yes, when and in what context? No  o Do you live with anybody who in the last 14 days has had fever, chills, shortness of breath, muscle aches, flu-like illness? No  o Do you have any close contacts or family members who are currently in the hospital for COVID-19 or flu-like illness? No  If yes, assess recent close contact with this person. Indicate if the patient has a positive screen by answering yes to one or more of the above questions. Patients who test positive or screen positive prior to surgery or on the day of surgery should be evaluated in conjunction with the surgeon/proceduralist/anesthesiologist to determine the urgency of the procedure.

## 2021-04-07 ENCOUNTER — ANESTHESIA EVENT (OUTPATIENT)
Dept: ENDOSCOPY | Age: 66
End: 2021-04-07
Payer: COMMERCIAL

## 2021-04-07 ENCOUNTER — ANESTHESIA (OUTPATIENT)
Dept: ENDOSCOPY | Age: 66
End: 2021-04-07
Payer: COMMERCIAL

## 2021-04-07 ENCOUNTER — HOSPITAL ENCOUNTER (OUTPATIENT)
Age: 66
Setting detail: OUTPATIENT SURGERY
Discharge: HOME | End: 2021-04-07
Attending: INTERNAL MEDICINE | Admitting: INTERNAL MEDICINE
Payer: COMMERCIAL

## 2021-04-07 VITALS — DIASTOLIC BLOOD PRESSURE: 105 MMHG | OXYGEN SATURATION: 97 % | SYSTOLIC BLOOD PRESSURE: 140 MMHG

## 2021-04-07 VITALS
SYSTOLIC BLOOD PRESSURE: 118 MMHG | TEMPERATURE: 98.8 F | HEART RATE: 57 BPM | OXYGEN SATURATION: 97 % | WEIGHT: 195 LBS | BODY MASS INDEX: 32.49 KG/M2 | RESPIRATION RATE: 14 BRPM | DIASTOLIC BLOOD PRESSURE: 56 MMHG | HEIGHT: 65 IN

## 2021-04-07 DIAGNOSIS — R10.13 EPIGASTRIC ABDOMINAL PAIN: ICD-10-CM

## 2021-04-07 PROCEDURE — 2580000003 HC RX 258: Performed by: NURSE ANESTHETIST, CERTIFIED REGISTERED

## 2021-04-07 PROCEDURE — 2500000003 HC RX 250 WO HCPCS: Performed by: NURSE ANESTHETIST, CERTIFIED REGISTERED

## 2021-04-07 PROCEDURE — 7100000010 HC PHASE II RECOVERY - FIRST 15 MIN: Performed by: INTERNAL MEDICINE

## 2021-04-07 PROCEDURE — 6360000002 HC RX W HCPCS: Performed by: NURSE ANESTHETIST, CERTIFIED REGISTERED

## 2021-04-07 PROCEDURE — 3609012400 HC EGD TRANSORAL BIOPSY SINGLE/MULTIPLE: Performed by: INTERNAL MEDICINE

## 2021-04-07 PROCEDURE — 7100000011 HC PHASE II RECOVERY - ADDTL 15 MIN: Performed by: INTERNAL MEDICINE

## 2021-04-07 PROCEDURE — 2580000003 HC RX 258: Performed by: INTERNAL MEDICINE

## 2021-04-07 PROCEDURE — 88305 TISSUE EXAM BY PATHOLOGIST: CPT

## 2021-04-07 PROCEDURE — 2709999900 HC NON-CHARGEABLE SUPPLY: Performed by: INTERNAL MEDICINE

## 2021-04-07 PROCEDURE — 3700000000 HC ANESTHESIA ATTENDED CARE: Performed by: INTERNAL MEDICINE

## 2021-04-07 RX ORDER — MEPERIDINE HYDROCHLORIDE 50 MG/ML
12.5 INJECTION INTRAMUSCULAR; INTRAVENOUS; SUBCUTANEOUS EVERY 5 MIN PRN
Status: DISCONTINUED | OUTPATIENT
Start: 2021-04-07 | End: 2021-04-07 | Stop reason: HOSPADM

## 2021-04-07 RX ORDER — LABETALOL HYDROCHLORIDE 5 MG/ML
5 INJECTION, SOLUTION INTRAVENOUS EVERY 10 MIN PRN
Status: DISCONTINUED | OUTPATIENT
Start: 2021-04-07 | End: 2021-04-07 | Stop reason: HOSPADM

## 2021-04-07 RX ORDER — SODIUM CHLORIDE, SODIUM LACTATE, POTASSIUM CHLORIDE, CALCIUM CHLORIDE 600; 310; 30; 20 MG/100ML; MG/100ML; MG/100ML; MG/100ML
INJECTION, SOLUTION INTRAVENOUS ONCE
Status: COMPLETED | OUTPATIENT
Start: 2021-04-07 | End: 2021-04-07

## 2021-04-07 RX ORDER — PROPOFOL 10 MG/ML
INJECTION, EMULSION INTRAVENOUS PRN
Status: DISCONTINUED | OUTPATIENT
Start: 2021-04-07 | End: 2021-04-07 | Stop reason: SDUPTHER

## 2021-04-07 RX ORDER — PROMETHAZINE HYDROCHLORIDE 25 MG/ML
6.25 INJECTION, SOLUTION INTRAMUSCULAR; INTRAVENOUS
Status: DISCONTINUED | OUTPATIENT
Start: 2021-04-07 | End: 2021-04-07 | Stop reason: HOSPADM

## 2021-04-07 RX ORDER — LIDOCAINE HYDROCHLORIDE 20 MG/ML
INJECTION, SOLUTION INFILTRATION; PERINEURAL PRN
Status: DISCONTINUED | OUTPATIENT
Start: 2021-04-07 | End: 2021-04-07 | Stop reason: SDUPTHER

## 2021-04-07 RX ORDER — HYDRALAZINE HYDROCHLORIDE 20 MG/ML
5 INJECTION INTRAMUSCULAR; INTRAVENOUS EVERY 10 MIN PRN
Status: DISCONTINUED | OUTPATIENT
Start: 2021-04-07 | End: 2021-04-07 | Stop reason: HOSPADM

## 2021-04-07 RX ORDER — SODIUM CHLORIDE, SODIUM LACTATE, POTASSIUM CHLORIDE, CALCIUM CHLORIDE 600; 310; 30; 20 MG/100ML; MG/100ML; MG/100ML; MG/100ML
INJECTION, SOLUTION INTRAVENOUS CONTINUOUS PRN
Status: DISCONTINUED | OUTPATIENT
Start: 2021-04-07 | End: 2021-04-07 | Stop reason: SDUPTHER

## 2021-04-07 RX ORDER — LIDOCAINE HYDROCHLORIDE 10 MG/ML
0.1 INJECTION, SOLUTION EPIDURAL; INFILTRATION; INTRACAUDAL; PERINEURAL
Status: DISCONTINUED | OUTPATIENT
Start: 2021-04-07 | End: 2021-04-07 | Stop reason: HOSPADM

## 2021-04-07 RX ORDER — OXYCODONE HYDROCHLORIDE AND ACETAMINOPHEN 5; 325 MG/1; MG/1
1 TABLET ORAL PRN
Status: DISCONTINUED | OUTPATIENT
Start: 2021-04-07 | End: 2021-04-07 | Stop reason: HOSPADM

## 2021-04-07 RX ORDER — DIPHENHYDRAMINE HYDROCHLORIDE 50 MG/ML
12.5 INJECTION INTRAMUSCULAR; INTRAVENOUS
Status: DISCONTINUED | OUTPATIENT
Start: 2021-04-07 | End: 2021-04-07 | Stop reason: HOSPADM

## 2021-04-07 RX ORDER — ONDANSETRON 2 MG/ML
4 INJECTION INTRAMUSCULAR; INTRAVENOUS PRN
Status: DISCONTINUED | OUTPATIENT
Start: 2021-04-07 | End: 2021-04-07 | Stop reason: HOSPADM

## 2021-04-07 RX ORDER — OXYCODONE HYDROCHLORIDE AND ACETAMINOPHEN 5; 325 MG/1; MG/1
2 TABLET ORAL PRN
Status: DISCONTINUED | OUTPATIENT
Start: 2021-04-07 | End: 2021-04-07 | Stop reason: HOSPADM

## 2021-04-07 RX ADMIN — PROPOFOL 30 MG: 10 INJECTION, EMULSION INTRAVENOUS at 14:03

## 2021-04-07 RX ADMIN — PROPOFOL 40 MG: 10 INJECTION, EMULSION INTRAVENOUS at 14:01

## 2021-04-07 RX ADMIN — SODIUM CHLORIDE, SODIUM LACTATE, POTASSIUM CHLORIDE, AND CALCIUM CHLORIDE: .6; .31; .03; .02 INJECTION, SOLUTION INTRAVENOUS at 13:53

## 2021-04-07 RX ADMIN — PROPOFOL 60 MG: 10 INJECTION, EMULSION INTRAVENOUS at 13:58

## 2021-04-07 RX ADMIN — SODIUM CHLORIDE, POTASSIUM CHLORIDE, SODIUM LACTATE AND CALCIUM CHLORIDE: 600; 310; 30; 20 INJECTION, SOLUTION INTRAVENOUS at 13:47

## 2021-04-07 RX ADMIN — LIDOCAINE HYDROCHLORIDE 100 MG: 20 INJECTION, SOLUTION INFILTRATION; PERINEURAL at 13:58

## 2021-04-07 ASSESSMENT — PAIN - FUNCTIONAL ASSESSMENT: PAIN_FUNCTIONAL_ASSESSMENT: 0-10

## 2021-04-07 NOTE — ANESTHESIA PRE PROCEDURE
Department of Anesthesiology  Preprocedure Note       Name:  Khurram Dykes   Age:  60 y.o.  :  1955                                          MRN:  [id number]         Date:  2021      Surgeon: Roc Keene):  Moise Hamilton MD    Procedure: Procedure(s):  EGD WITH ANESTHESIA    Medications prior to admission:   Prior to Admission medications    Medication Sig Start Date End Date Taking? Authorizing Provider   denosumab (PROLIA) 60 MG/ML SOSY SC injection Inject 60 mg into the skin Twice yearly   Yes Historical Provider, MD   atorvastatin (LIPITOR) 40 MG tablet TAKE ONE TABLET BY MOUTH DAILY 3/24/21   Remigio Gonzalez MD   nebivolol (BYSTOLIC) 5 MG tablet TAKE ONE TABLET BY MOUTH DAILY 3/2/21   Do Wang MD   Potassium Citrate ER 15 MEQ (1620 MG) TBCR 2 times daily  21   Historical Provider, MD   calcitRIOL (ROCALTROL) 0.25 MCG capsule Take 0.25 mcg by mouth every other day    Historical Provider, MD   aspirin 81 MG tablet Take 81 mg by mouth daily    Historical Provider, MD   Melatonin 5 MG CHEW Take by mouth nightly     Historical Provider, MD       Current medications:    Current Facility-Administered Medications   Medication Dose Route Frequency Provider Last Rate Last Admin    lactated ringers infusion   Intravenous Once Moise Hamilton MD        lidocaine PF 1 % injection 0.1 mL  0.1 mL Intradermal Once PRN Moise Hamilton MD           Allergies:     Allergies   Allergen Reactions    Codeine Rash, Nausea And Vomiting and Shortness Of Breath    Lisinopril      cough    Norvasc [Amlodipine Besylate]      swelling    Triamterene-Hctz      Renal insufficiency       Problem List:    Patient Active Problem List   Diagnosis Code    Hyperlipidemia E78.5    HTN (hypertension) I10    Abnormal laboratory test R89.9    Renal artery stenosis (HCC) I70.1    Renal stones N20.0    Other osteoporosis without current pathological fracture M81.8    Adrenal nodule (HCC) E27.8       Past Medical History:        Diagnosis Date    Actinic keratosis     Breast disorder     CAD (coronary artery disease)     Chronic kidney disease     Hyperlipidemia     Hypertension     Infertility, female     S/P colonoscopy 08/2009    Tendinitis of foot        Past Surgical History:        Procedure Laterality Date    ABDOMEN SURGERY      stone removed from bile duct    ANGIOPLASTY      renal    BREAST SURGERY      duct removal    BUNIONECTOMY Left 12/15/2020    MODIFIED YAZMIN BUNIONECTOMY, MODIFIED AKIN OSTEOTOMY, MODIFIED WEIL SECOND METATARSAL, PROXIMAL INTERPHALANGEAL JOINT ARTHRODESIS DIGITS TWO THREE FOUR LEFT FOOT performed by Laura Cunningham DPM at [place] [address]  12/1992    COLONOSCOPY  8/1/2009    COLONOSCOPY N/A 10/23/2019    COLONOSCOPY POLYPECTOMY SNARE/COLD BIOPSY performed by Karolyn Lewis MD at [facility] N/A 7/29/2019    MIDLINE LUMBAR FIVE SACRAL ONE EPIDURAL STEROID INJECTION SITE CONFIRMED BY FLUOROSCOPY performed by Khang Michael MD at [address] Left 01/11/2016    Cystoscopy, with left stent placement    TONSILLECTOMY AND ADENOIDECTOMY         Social History:    Social History     Tobacco Use    Smoking status: Never Smoker    Smokeless tobacco: Never Used   Substance Use Topics    Alcohol use: No                                Counseling given: Not Answered      Vital Signs (Current):   Vitals:    04/02/21 1407   Weight: 195 lb (88.5 kg)   Height: 5' 5\" (1.651 m)                                              BP Readings from Last 3 Encounters:   03/30/21 118/68   03/24/21 124/78   03/02/21 112/70       NPO Status:                                                                                 BMI:   Wt Readings from Last 3 Encounters:   04/02/21 195 lb (88.5 kg)   03/30/21 195 lb (88.5 kg)   03/24/21 194 lb (88 kg)     Body mass index is 32.45 kg/m².     CBC: Lab Results   Component Value Date    WBC 10.4 01/13/2016    RBC 4.01 01/13/2016    HGB 11.9 01/13/2016    HCT 36.3 01/13/2016    MCV 90.5 01/13/2016    RDW 14.9 01/13/2016     01/13/2016       CMP:   Lab Results   Component Value Date     03/02/2021    K 4.8 03/02/2021    CL 99 03/02/2021    CO2 29 03/02/2021    BUN 18 03/02/2021    CREATININE 1.4 03/02/2021    GFRAA 46 03/02/2021    GFRAA >60 10/08/2012    AGRATIO 1.6 03/02/2021    LABGLOM 38 03/02/2021    GLUCOSE 103 03/02/2021    PROT 7.4 03/02/2021    PROT 7.3 10/08/2012    CALCIUM 10.2 03/02/2021    BILITOT 0.7 03/02/2021    ALKPHOS 341 03/02/2021    AST 50 03/02/2021     03/02/2021       POC Tests: No results for input(s): POCGLU, POCNA, POCK, POCCL, POCBUN, POCHEMO, POCHCT in the last 72 hours.     Coags:   Lab Results   Component Value Date    PROTIME 10.5 06/19/2014    INR 0.94 06/19/2014    APTT 31.9 11/11/2011       HCG (If Applicable): No results found for: PREGTESTUR, PREGSERUM, HCG, HCGQUANT     ABGs: No results found for: PHART, PO2ART, OMY5LGS, AHN6QVI, BEART, L9OPEUMW     Type & Screen (If Applicable):  No results found for: LABABO, LABRH    Drug/Infectious Status (If Applicable):  No results found for: HIV, HEPCAB    COVID-19 Screening (If Applicable):   Lab Results   Component Value Date    COVID19 Not Detected 04/01/2021    COVID19 NOT DETECTED 12/10/2020           Anesthesia Evaluation  Patient summary reviewed no history of anesthetic complications:   Airway: Mallampati: II  TM distance: >3 FB   Neck ROM: full  Mouth opening: > = 3 FB Dental: normal exam         Pulmonary:Negative Pulmonary ROS and normal exam  breath sounds clear to auscultation                             Cardiovascular:Negative CV ROS  Exercise tolerance: good (>4 METS),   (+) hypertension:,         Rhythm: regular  Rate: normal                    Neuro/Psych:   Negative Neuro/Psych ROS              GI/Hepatic/Renal: Neg GI/Hepatic/Renal ROS  (+) GERD: well controlled, renal disease: CRI,           Endo/Other: Negative Endo/Other ROS                    Abdominal:           Vascular: negative vascular ROS. 11/30/20 EKG  Sinus  Bradycardia   -  Negative precordial T-waves  -Probably normal -consider anteroseptal ischemia. PROBABLY NORMAL FOR AGE    4/2/20 stress test  Summary   Marilyn Navas is normal isotope uptake at stress and rest. There is no evidence of    myocardial ischemia or scar. Hyperdynamic LV systolic function with LF>47%    with uniform wall motion. Low risk study     4/2/20 echo   Summary   Normal left ventricle systolic function with an estimated ejection fraction   of 55%. No regional wall motion abnormalities are seen. Normal left ventricular diastolic filling pressure. Trivial mitral regurgitation. Systolic pulmonary artery pressure (SPAP) is normal and estimated at 25 mmHg   (right atrial pressure 3 mmHg). Anesthesia Plan      general     ASA 2     (I discussed with the patient the risks and benefits of PIV, anesthesia, IV Narcotics, PACU. All questions were answered the patient agrees with the plan and wishes to proceed)  Induction: intravenous.                           Aj Justice MD   4/7/2021

## 2021-04-07 NOTE — H&P
Pre-sedation Assessment    History and Physical / Pre-Sedation Assessment  Patient: Marina Mclean   :   1955     Intended Procedure: EGD      HPI: 57yo F presents for evaluation of epigastric pain, nausea, emesis in the setting of ASA use    Past Medical History:   Diagnosis Date    Actinic keratosis     Breast disorder     CAD (coronary artery disease)     Chronic kidney disease     Hyperlipidemia     Hypertension     Infertility, female     S/P colonoscopy 2009    Tendinitis of foot     UTI (urinary tract infection)      No current facility-administered medications on file prior to encounter. Current Outpatient Medications on File Prior to Encounter   Medication Sig Dispense Refill    denosumab (PROLIA) 60 MG/ML SOSY SC injection Inject 60 mg into the skin Twice yearly      nebivolol (BYSTOLIC) 5 MG tablet TAKE ONE TABLET BY MOUTH DAILY 90 tablet 1    Potassium Citrate ER 15 MEQ (1620 MG) TBCR 2 times daily       calcitRIOL (ROCALTROL) 0.25 MCG capsule Take 0.25 mcg by mouth every other day      aspirin 81 MG tablet Take 81 mg by mouth daily      Melatonin 5 MG CHEW Take by mouth nightly          Nurses notes reviewed and agreed. Medications reviewed  Allergies: Allergies   Allergen Reactions    Codeine Rash, Nausea And Vomiting and Shortness Of Breath    Lisinopril      cough    Norvasc [Amlodipine Besylate]      swelling    Triamterene-Hctz      Renal insufficiency           Physical Exam:  Vital Signs: /83   Pulse 68   Temp 98.8 °F (37.1 °C) (Temporal)   Resp 20   Ht 5' 5\" (1.651 m)   Wt 195 lb (88.5 kg)   LMP 2005   SpO2 95%   BMI 32.45 kg/m²  Body mass index is 32.45 kg/m².   Airway:Normal  Cardiac:Normal  Pulmonary:Normal  Abdomen:Normal  Specific to procedure: Mallampati 3      Pre-Procedure Assessment/Plan:  ASA 2 - Patient with mild systemic disease with no functional limitations    Level of Sedation Plan:Deep sedation    Post Procedure plan: Return to same level of care    I assessed the patient and find that the patient is in satisfactory condition to proceed with the planned procedure and sedation plan. I have explained the risk, benefits, and alternatives to the procedure. The patient understands and agrees to proceed.   Yes    Paddy Ramirez  1:51 PM 4/7/2021

## 2021-04-07 NOTE — ANESTHESIA POSTPROCEDURE EVALUATION
Department of Anesthesiology  Postprocedure Note    Patient: Chastity Justice  MRN: [id number]  YOB: 1955  Date of evaluation: 4/7/2021  Time:  3:22 PM     Procedure Summary     Date: 04/07/21 Room / Location: [address] [place] [address] [place] [address] [facility]    Anesthesia Start: 3190 Anesthesia Stop: 4176    Procedure: EGD BIOPSY (N/A ) Diagnosis:       Epigastric abdominal pain      (Epigastric abdominal pain [R10.13])    Surgeons: Sandra Torres MD Responsible Provider: Gela Toussaint MD    Anesthesia Type: general ASA Status: 2          Anesthesia Type: general    Jocelynn Phase I: Jocelynn Score: 10    Jocelynn Phase II: Jocelynn Score: 9    Last vitals: Reviewed and per EMR flowsheets. Anesthesia Post Evaluation    Patient location during evaluation: PACU  Patient participation: complete - patient participated  Level of consciousness: awake and alert  Airway patency: patent  Nausea & Vomiting: no nausea and no vomiting  Complications: no  Cardiovascular status: blood pressure returned to baseline  Respiratory status: acceptable  Hydration status: euvolemic  Comments: VSS on transfer to phase 2 recovery. No anesthetic complications.

## 2021-04-12 ENCOUNTER — IMMUNIZATION (OUTPATIENT)
Dept: PRIMARY CARE CLINIC | Age: 66
End: 2021-04-12
Payer: COMMERCIAL

## 2021-04-12 PROCEDURE — 0012A COVID-19, MODERNA VACCINE 100MCG/0.5ML DOSE: CPT | Performed by: FAMILY MEDICINE

## 2021-04-12 PROCEDURE — 91301 COVID-19, MODERNA VACCINE 100MCG/0.5ML DOSE: CPT | Performed by: FAMILY MEDICINE

## 2021-04-13 ENCOUNTER — HOSPITAL ENCOUNTER (OUTPATIENT)
Dept: PHYSICAL THERAPY | Age: 66
Setting detail: THERAPIES SERIES
Discharge: HOME | End: 2021-04-13
Payer: COMMERCIAL

## 2021-04-13 PROCEDURE — 97140 MANUAL THERAPY 1/> REGIONS: CPT | Performed by: PHYSICAL THERAPIST

## 2021-04-13 PROCEDURE — 97110 THERAPEUTIC EXERCISES: CPT | Performed by: PHYSICAL THERAPIST

## 2021-04-13 PROCEDURE — 97112 NEUROMUSCULAR REEDUCATION: CPT | Performed by: PHYSICAL THERAPIST

## 2021-04-13 NOTE — DISCHARGE SUMMARY
[facility], [address]  Phone: [phone]  Fax [phone]    Physical Therapy Treatment Note/ Progress Report:           Date:  2021    Patient Name:  Chastity Justice    :  1955  MRN: [id number]  Restrictions/Precautions:    Medical/Treatment Diagnosis Information:  · Diagnosis: M79.673 (ICD-10-CM) - Foot pain s/p bunionectomy and 3 hammer to ehdcfyhnzp99/15/20  · Treatment Diagnosis: M79.673 (ICD-10-CM) - Foot pain, difficulty walking  Insurance/Certification information:  PT Insurance Information: [employer] 40 visits, no auth [id number] ded  Physician Information:  Referring Practitioner: Dr Ronell Krabbe  Has the plan of care been signed (Y/N):        [x]  Yes  []  No     Date of Patient follow up with Physician: ~21      Is this a Progress Report:     [x]  Yes  []  No        If Yes:  Date Range for reporting period:  Beginning: 3/9/21  Endin21    Progress report will be due (10 Rx or 30 days whichever is less): 12       Recertification will be due (POC Duration  / 90 days whichever is less): 21      Visit # Insurance Allowable Auth Required   In-person 5 40 []  Yes [x]  No    Telehealth   []  Yes []  No    Total          Functional Scale: LEFS 53%     Date assessed:  3/9/21    LEFS 36%               21    Therapy Diagnosis/Practice Pattern:I, surgical      Number of Comorbidities:  []0     [x]1-2    []3+    Latex Allergy:  [x]NO      []YES  Preferred Language for Healthcare:   [x]English       []other:      Pain level: eval 3-5/10 Current 1/10    SUBJECTIVE:  Pt reports she has continued to gradually increase her walking distance on even surfaces with less soreness and swelling over the last 2 weeks. She also feels more stable and confident walking in her yard. She has been able to get up/down from the ground for gardening.   She has not yet tried any hiking and continues to note some mild decreased balance, but significantly improved since starting PT.    OBJECTIVE: D/C   Observation: continued tightness foot intrinsics as noted w/ PROM toe abd, indep up/down from ground w/ UE assist   Test measurements:  Hip abd MMT 4/5 B, single HR 1/2 ht due to plantar surface Gr toe pain but 10 reps L vs 20 reps R, MMT inf 5 R/5 L, ev 5 R/4+ L , gr toe PROM ext 60*, AROM DF 9, PF 56    RESTRICTIONS/PRECAUTIONS: osteoporosis, kidney disease, OA    Exercises/Interventions:     Therapeutic Ex (23088) Sets/sec/reps Notes/CUES   Seated HR HEP   Towel scrunches HEP   Tennis ball arch massage HEP   TB inv/ev HEP   Seated gr toe ext S HEP   Seated [place] board 4 ways     Seated gr toe flexion iso w/ alternating all toe ext           Soleus pump from floor     Standing gastroc S L2 3x30\" R/L HEP   Standing TB hip abd, post-lat  UE support and VTC's posture, HEP   LBW  HEP   Reformer: squats  Walking  DHR  In use   LSD 4\" x15 R/L    DHR off 2\" step to 1/2 ht (pain free toe) 2x10 w/ 75% wt shift to L UE support , calf fatigue, HEP w/ rolled towel        Pt ed: POC, HEP, walking and return to hiking progression expectations,  x5'    Manual Intervention (73071)     Scar massage, PROM toes flex/ext, STM to plantar foot x15' Try toe separator to stretch feet at home                            NMR re-education (42503)  CUES NEEDED   SLB    HEP   Tandem balance on air-ex  Alternating march on air-ex  Intermittent Finger support on 1/2 wall   meenakshi disc mini squats  meenakshi disc LSU to hip abd  meenakshi disc step up and over R/L 2x10  2x10 R/L  x10 ea Light 1/2 wall support  Light 1/2 wall support  Light wall support                                 Therapeutic Activity (44297)                                         Therapeutic Exercise and NMR EXR  [x] (65981) Provided verbal/tactile cueing for activities related to strengthening, flexibility, endurance, ROM for improvements in LE, proximal hip, and core control with self care, mobility, lifting, ambulation. [x] (23100) Provided verbal/tactile cueing for activities related to improving balance, coordination, kinesthetic sense, posture, motor skill, proprioception  to assist with LE, proximal hip, and core control in self care, mobility, lifting, ambulation and eccentric single leg control. NMR and Therapeutic Activities:    [] (97184 or 31860) Provided verbal/tactile cueing for activities related to improving balance, coordination, kinesthetic sense, posture, motor skill, proprioception and motor activation to allow for proper function of core, proximal hip and LE with self care and ADLs  [] (91513) Gait Re-education- Provided training and instruction to the patient for proper LE, core and proximal hip recruitment and positioning and eccentric body weight control with ambulation re-education including up and down stairs     Home Exercise Program:    [x] (76255) Reviewed/Progressed HEP activities related to strengthening, flexibility, endurance, ROM of core, proximal hip and LE for functional self-care, mobility, lifting and ambulation/stair navigation   [] (56190)Reviewed/Progressed HEP activities related to improving balance, coordination, kinesthetic sense, posture, motor skill, proprioception of core, proximal hip and LE for self care, mobility, lifting, and ambulation/stair navigation      Manual Treatments:  PROM / STM / Oscillations-Mobs:  G-I, II, III, IV (PA's, Inf., Post.)  [x] (41987) Provided manual therapy to mobilize LE, proximal hip and/or LS spine soft tissue/joints for the purpose of modulating pain, promoting relaxation,  increasing ROM, reducing/eliminating soft tissue swelling/inflammation/restriction, improving soft tissue extensibility and allowing for proper ROM for normal function with self care, mobility, lifting and ambulation. Modalities:     [] GAME READY (VASO)- for significant edema, swelling, pain control.      Charges:  Timed Code Treatment Minutes: 45   Total Treatment Minutes: 45     BWC time in/time out:   (and requires time in and out for each CPT code)    [] EVAL (LOW) 455 [id number]   [] EVAL (MOD) 83645  [] EVAL (HIGH) 42737   [] RE-EVAL     [x] IE(89805) x1    [] IONTO  [x] NMR (27211) x 1    [] VASO  [x] Manual (96949) x1      [] Other:  [] TA x      [] Mech Traction (78645)  [] ES(attended) (29778)      [] ES (un) (43120):       GOALS:   Patient stated goal: better balance with walking  [] Progressing: [x] Met: [] Not Met: [] Adjusted    Therapist goals for Patient:   Short Term Goals: To be achieved in: 2 weeks  1. Independent in HEP and progression per patient tolerance, in order to prevent re-injury. [] Progressing: [x] Met: [] Not Met: [] Adjusted  2. Patient will have a decrease in pain to facilitate improvement in movement, function, and ADLs as indicated by Functional Deficits. [] Progressing: [x] Met: [] Not Met: [] Adjusted    Long Term Goals: To be achieved in: 6 weeks  1. Disability index score of 40% or less for the LEFS to assist with reaching prior level of function. [] Progressing: [x] Met: [] Not Met: [] Adjusted  2. Patient will demonstrate increased ROM to 60* gr toe ext and 10* DF to allow for proper joint functioning as indicated by patients Functional Deficits. [] Progressing: [x] Met:gr toe and nearly for DF [] Not Met: [] Adjusted  3. Patient will demonstrate an increase in Strength to 5/5 L ankle and grossly 4+/5 B hips to allow for proper functional mobility as indicated by patients Functional Deficits. [x] Progressing:hips [x] Met:ankle [] Not Met: [] Adjusted  4. Patient will return to squatting functional activities without increased symptoms or restriction. [] Progressing: [x] Met: [] Not Met: [] Adjusted  5. Pt will demo good control with SLB on compliant surface >/= 15\" in order to be safe for walking in her yard.     [x] Progressing: [] Met: [] Not Met: [] Adjusted 12\" R/4\" L    Progression Towards Functional goals:  [] Patient is progressing as expected towards functional goals listed. [] Progression is slowed due to complexities listed. [] Progression has been slowed due to co-morbidities. [x] Plan just implemented, too soon to assess goals progression  [] Other:     Overall Progression Towards Functional goals/ Treatment Progress Update:  [] Patient is progressing as expected towards functional goals listed. [] Progression is slowed due to complexities/Impairments listed. [] Progression has been slowed due to co-morbidities. [x] Plan just implemented, too soon to assess goals progression <30days   [] Goals require adjustment due to lack of progress  [] Patient is not progressing as expected and requires additional follow up with physician  [] Other    Prognosis for POC: [x] Good [] Fair  [] Poor      Patient requires continued skilled intervention: [] Yes  [x] No    Treatment/Activity Tolerance:  [x] Patient able to complete treatment  [] Patient limited by fatigue  [] Patient limited by pain    [] Patient limited by other medical complications  [] Other:     ASSESSMENT: progression of dynamic balance met with improved limb confidence. Pt has made good progress towards goals as noted and verbalizes good understanding of progressive HEP and progression for return to desired recreational activities. indep for up/down from floor without cuing with use of UE support.     Return to Play: (if applicable)   []  Stage 1: Intro to Strength   []  Stage 2: Return to Run and Strength   []  Stage 3: Return to Jump and Strength   []  Stage 4: Dynamic Strength and Agility   []  Stage 5: Sport Specific Training     []  Ready to Return to Play, Meets All Above Stages   []  Not Ready for Return to Sports   Comments:                         PLAN:   [] Continue per plan of care [] Alter current plan (see comments above)  [] Plan of care initiated [] Hold pending MD visit [x] Discharge--pt to contact PT in 2-3 weeks if having issues or concerns    Electronically signed by:  Jenita Gowers, PT    Note: If patient does not return for scheduled/ recommended follow up visits, this note will serve as a discharge from care along with most recent update on progress. Access Code: [employer]: [employer]/Date: 03/16/2021Prepared by: Ly CalhounExercalexei   Seated Heel Raise - 1 x daily - 7 x weekly - 10-15 reps - 2-3 sets   Seated Toe Towel Scrunches - 1 x daily - 7 x weekly - 10-15 reps - 2-3 sets   Seated Plantar Fascia Mobilization with Small Ball - 1-2 x daily - 7 x weekly - 10-15 reps - 2-3 sets   Single Leg Balance with Knee Flexion - 1 x daily - 7 x weekly - 3 sets - 15-20 seconds hold   Standing Toe Dorsiflexion Stretch - 1-2 x daily - 7 x weekly - 3-5 sets - 10-15 seconds hold   Standing Gastroc Stretch - 1-2 x daily - 7 x weekly - 3 sets - 20-30 seconds hold   Long Sitting Ankle Eversion with Resistance - 1 x daily - 7 x weekly - 10-15 reps - 2-3 sets   Seated Ankle Inversion with Resistance and Legs Crossed - 1 x daily - 7 x weekly - 10-15 reps - 2-3 sets   Hip Abduction with Resistance Loop - 1 x daily - 7 x weekly - 2 sets - 5 reps

## 2021-07-02 ENCOUNTER — HOSPITAL ENCOUNTER (OUTPATIENT)
Dept: CT IMAGING | Age: 66
Discharge: HOME | End: 2021-07-02
Payer: MEDICARE

## 2021-07-02 DIAGNOSIS — R10.9 ABDOMINAL PAIN, UNSPECIFIED ABDOMINAL LOCATION: ICD-10-CM

## 2021-07-02 DIAGNOSIS — Z87.442 PERSONAL HISTORY OF URINARY CALCULI: ICD-10-CM

## 2021-07-02 PROCEDURE — 74176 CT ABD & PELVIS W/O CONTRAST: CPT

## 2021-07-30 ENCOUNTER — NURSE ONLY (OUTPATIENT)
Dept: ENDOCRINOLOGY | Age: 66
End: 2021-07-30
Payer: MEDICARE

## 2021-07-30 DIAGNOSIS — M81.0 AGE-RELATED OSTEOPOROSIS WITHOUT CURRENT PATHOLOGICAL FRACTURE: Primary | ICD-10-CM

## 2021-07-30 PROCEDURE — 96372 THER/PROPH/DIAG INJ SC/IM: CPT | Performed by: NURSE PRACTITIONER

## 2021-08-04 ENCOUNTER — HOSPITAL ENCOUNTER (OUTPATIENT)
Age: 66
Discharge: HOME | End: 2021-08-04
Payer: MEDICARE

## 2021-08-04 LAB
ALBUMIN SERPL-MCNC: 4.4 G/DL (ref 3.4–5)
ANION GAP SERPL CALCULATED.3IONS-SCNC: 14 MMOL/L (ref 3–16)
BACTERIA: ABNORMAL /HPF
BASOPHILS ABSOLUTE: 0.1 K/UL (ref 0–0.2)
BASOPHILS RELATIVE PERCENT: 0.9 %
BILIRUBIN URINE: NEGATIVE
BLOOD, URINE: NEGATIVE
BUN BLDV-MCNC: 20 MG/DL (ref 7–20)
CALCIUM SERPL-MCNC: 9.6 MG/DL (ref 8.3–10.6)
CHLORIDE BLD-SCNC: 101 MMOL/L (ref 99–110)
CLARITY: CLEAR
CO2: 25 MMOL/L (ref 21–32)
COLOR: YELLOW
CREAT SERPL-MCNC: 1.6 MG/DL (ref 0.6–1.2)
EOSINOPHILS ABSOLUTE: 0.2 K/UL (ref 0–0.6)
EOSINOPHILS RELATIVE PERCENT: 2.8 %
EPITHELIAL CELLS, UA: ABNORMAL /HPF (ref 0–5)
GFR AFRICAN AMERICAN: 39
GFR NON-AFRICAN AMERICAN: 32
GLUCOSE BLD-MCNC: 89 MG/DL (ref 70–99)
GLUCOSE URINE: NEGATIVE MG/DL
HCT VFR BLD CALC: 43.4 % (ref 36–48)
HEMOGLOBIN: 14.9 G/DL (ref 12–16)
KETONES, URINE: NEGATIVE MG/DL
LEUKOCYTE ESTERASE, URINE: ABNORMAL
LYMPHOCYTES ABSOLUTE: 1.7 K/UL (ref 1–5.1)
LYMPHOCYTES RELATIVE PERCENT: 26.1 %
MAGNESIUM: 1.9 MG/DL (ref 1.8–2.4)
MCH RBC QN AUTO: 30.5 PG (ref 26–34)
MCHC RBC AUTO-ENTMCNC: 34.2 G/DL (ref 31–36)
MCV RBC AUTO: 89 FL (ref 80–100)
MICROSCOPIC EXAMINATION: YES
MONOCYTES ABSOLUTE: 0.5 K/UL (ref 0–1.3)
MONOCYTES RELATIVE PERCENT: 7.6 %
NEUTROPHILS ABSOLUTE: 4.1 K/UL (ref 1.7–7.7)
NEUTROPHILS RELATIVE PERCENT: 62.6 %
NITRITE, URINE: NEGATIVE
PARATHYROID HORMONE INTACT: 71.8 PG/ML (ref 14–72)
PDW BLD-RTO: 14 % (ref 12.4–15.4)
PH UA: 7 (ref 5–8)
PHOSPHORUS: 3.2 MG/DL (ref 2.5–4.9)
PLATELET # BLD: 191 K/UL (ref 135–450)
PMV BLD AUTO: 9 FL (ref 5–10.5)
POTASSIUM SERPL-SCNC: 4.5 MMOL/L (ref 3.5–5.1)
PROTEIN UA: NEGATIVE MG/DL
RBC # BLD: 4.87 M/UL (ref 4–5.2)
RBC UA: ABNORMAL /HPF (ref 0–4)
SODIUM BLD-SCNC: 140 MMOL/L (ref 136–145)
SPECIFIC GRAVITY UA: 1.01 (ref 1–1.03)
URIC ACID, SERUM: 8.2 MG/DL (ref 2.6–6)
URINE TYPE: ABNORMAL
UROBILINOGEN, URINE: 0.2 E.U./DL
WBC # BLD: 6.5 K/UL (ref 4–11)
WBC UA: ABNORMAL /HPF (ref 0–5)

## 2021-08-04 PROCEDURE — 81001 URINALYSIS AUTO W/SCOPE: CPT

## 2021-08-04 PROCEDURE — 83735 ASSAY OF MAGNESIUM: CPT

## 2021-08-04 PROCEDURE — 36415 COLL VENOUS BLD VENIPUNCTURE: CPT

## 2021-08-04 PROCEDURE — 82306 VITAMIN D 25 HYDROXY: CPT

## 2021-08-04 PROCEDURE — 80069 RENAL FUNCTION PANEL: CPT

## 2021-08-04 PROCEDURE — 85025 COMPLETE CBC W/AUTO DIFF WBC: CPT

## 2021-08-04 PROCEDURE — 84550 ASSAY OF BLOOD/URIC ACID: CPT

## 2021-08-04 PROCEDURE — 83970 ASSAY OF PARATHORMONE: CPT

## 2021-08-05 LAB — VITAMIN D 25-HYDROXY: 43.2 NG/ML

## 2021-08-20 NOTE — PATIENT INSTRUCTIONS
Sun Protection Tips    Apply broad spectrum water resistant sunscreen with an SPF of at least 30 to exposed areas of the skin. Dont forget the ears and lips! Remember to reapply sunscreen about every 2 hours and after swimming or sweating. Wear sun protective clothing. Swim shirts (aka. rash guards) are a great idea and negates the need to reapply sunscreen in those areas. Seek the shade whenever possible especially between the hours of 10am and 4pm when the suns rays are the strongest.     Avoid tanning beds          Wear a wide brim hat while in the sun    Biopsy Wound Care Instructions   Cleanse the wound twice a day with mild soapy water.  Gently dry the area.  Apply Vaseline/Aquaphor to the wound using a cotton tipped applicator or Qtip.  Cover with a clean bandage.  Repeat this process until the biopsy site is healed. You will know when it's healed when it's pink and shiny.  You may shower and bathe as usual.      If bleeding should occur, apply firm pressure to bleeding area for 15 minutes and repeat if needed. If bleeding continues after 30 minutes, please call office and ask to speak to Bella.        ** Biopsy results generally take around 7-10  business days to come back. A member of Dr. Yanique Barker staff will call you when the results are have been reviewed and a personalized treatment plan has been established. If you don't hear from our staff after the 10 business days, please call our office for your results. Thanks for your visit! Feel free to call/MyChart message  Dr. Arlin Magallanes assistantBella if you have questions, concerns or to schedule your cosmetic procedures.

## 2021-08-24 ENCOUNTER — OFFICE VISIT (OUTPATIENT)
Dept: DERMATOLOGY | Age: 66
End: 2021-08-24
Payer: MEDICARE

## 2021-08-24 VITALS — TEMPERATURE: 98.8 F

## 2021-08-24 DIAGNOSIS — L82.1 SEBORRHEIC KERATOSES: ICD-10-CM

## 2021-08-24 DIAGNOSIS — D48.9 NEOPLASM OF UNCERTAIN BEHAVIOR: Primary | ICD-10-CM

## 2021-08-24 DIAGNOSIS — D22.9 MULTIPLE BENIGN NEVI: ICD-10-CM

## 2021-08-24 DIAGNOSIS — L81.4 SOLAR LENTIGINOSIS: ICD-10-CM

## 2021-08-24 DIAGNOSIS — H02.9 EYELID LESION: ICD-10-CM

## 2021-08-24 PROCEDURE — 99213 OFFICE O/P EST LOW 20 MIN: CPT | Performed by: DERMATOLOGY

## 2021-08-24 PROCEDURE — 11102 TANGNTL BX SKIN SINGLE LES: CPT | Performed by: DERMATOLOGY

## 2021-08-24 PROCEDURE — 1123F ACP DISCUSS/DSCN MKR DOCD: CPT | Performed by: DERMATOLOGY

## 2021-08-24 PROCEDURE — 1090F PRES/ABSN URINE INCON ASSESS: CPT | Performed by: DERMATOLOGY

## 2021-08-24 PROCEDURE — 3017F COLORECTAL CA SCREEN DOC REV: CPT | Performed by: DERMATOLOGY

## 2021-08-24 PROCEDURE — 1036F TOBACCO NON-USER: CPT | Performed by: DERMATOLOGY

## 2021-08-24 PROCEDURE — 4040F PNEUMOC VAC/ADMIN/RCVD: CPT | Performed by: DERMATOLOGY

## 2021-08-24 PROCEDURE — G8399 PT W/DXA RESULTS DOCUMENT: HCPCS | Performed by: DERMATOLOGY

## 2021-08-24 PROCEDURE — 11103 TANGNTL BX SKIN EA SEP/ADDL: CPT | Performed by: DERMATOLOGY

## 2021-08-24 PROCEDURE — G8417 CALC BMI ABV UP PARAM F/U: HCPCS | Performed by: DERMATOLOGY

## 2021-08-24 PROCEDURE — G8427 DOCREV CUR MEDS BY ELIG CLIN: HCPCS | Performed by: DERMATOLOGY

## 2021-08-24 RX ORDER — PANTOPRAZOLE SODIUM 40 MG/1
TABLET, DELAYED RELEASE ORAL
COMMUNITY
Start: 2021-08-04

## 2021-08-24 NOTE — PROGRESS NOTES
[facility]) Dermatology  Mei DO Lionel, [address]       Bull Strong  1955    73 y.o. female     Date of Visit: 2021    Chief Complaint:   Chief Complaint   Patient presents with    Skin Lesion     lt corner eye, left cheek, chest, right wrist, right upper back        I was asked to see this patient by Dr. Lopez ref. provider found. History of Present Illness: Bull Strong is a 73 y.o. female who presents with the chief complaint of the followin. Total-body skin exam for spots. Many year history of multiple nevi on the head/neck, trunk and extremities, all present for many years. Denies new moles. Denies moles changing in size, shape, color. None associated w/ pain, bleeding, pruritus. 2.  Patient states she has felt a raised irritated bump to her right superior back for months. 3.  Patient states that she has a raised rough feeling growth to her left cheek, 2 raised growth to right wrist and 2 raised rough feeling growths to medial chest,she denies associated burning, bleeding, pain, or itch. 4.  She noticed a small pink spot when using a magnified mirror to her left lateral lower eyelid 1 week ago after using a facemask. Thinks it may be irritation due to removal of the facemask. she denies associated burning, bleeding, pain, or itch. 5. Chronic History of photoaging of skin/lentigines to sun exposed areas on head/neck/torso/extremities over several years. Denies changes in size, shape, or color. Admits to sun exposure in youth without wearing sunscreen, hats, or protective clothing. Attempts to wear sunscreen and sun protective clothing when mowing grass once per week. Otherwise takes walks in early morning or late evening.         Review of Systems:  Constitutional: Reports general sense of well-being   Skin: No new or changing moles, no tendency to develop thick scars, no interval of severe sunburns  Heme: No abnormal bruising or bleeding.         Past Skin Hx: -2020-hx of EIC s/p surgical excision located to left paraspinal mid-back   -hx of AKs s/p cryotherapy  -History of solar lentigines  Patient denies past history of melanoma, NMSC, dysplastic nevi     PFHx: Denies hx of MM or NMSC    ADDITIONAL HISTORY:    I have reviewed past medical and surgical histories, current medications, allergies, social and family histories as documented in the patient's electronic medical record.     Family History   Problem Relation Age of Onset    Heart Disease Father         MI  at  age 48   [facility] High Blood Pressure Sister     Diabetes Sister     Kidney Disease Sister         transplant     High Blood Pressure Mother     High Cholesterol Mother     Arthritis Mother         gout    Heart Disease Mother     Cancer Mother     Heart Disease Brother     High Cholesterol Brother     High Blood Pressure Brother      Past Medical History:   Diagnosis Date    Actinic keratosis     Breast disorder     CAD (coronary artery disease)     Chronic kidney disease     Hyperlipidemia     Hypertension     Infertility, female     S/P colonoscopy 2009    Tendinitis of foot     UTI (urinary tract infection)      Past Surgical History:   Procedure Laterality Date    ABDOMEN SURGERY      stone removed from bile duct    ANGIOPLASTY      renal    BREAST SURGERY      duct removal    BUNIONECTOMY Left 12/15/2020    MODIFIED YAZMIN BUNIONECTOMY, MODIFIED AKIN OSTEOTOMY, MODIFIED WEIL SECOND METATARSAL, PROXIMAL INTERPHALANGEAL JOINT ARTHRODESIS DIGITS TWO THREE FOUR LEFT FOOT performed by Pat Schmidt DPM at [place] [address]  1992    COLONOSCOPY  2009    COLONOSCOPY N/A 10/23/2019    COLONOSCOPY POLYPECTOMY SNARE/COLD BIOPSY performed by Allie Alberts MD at [address] [facility] N/A 2019    MIDLINE LUMBAR FIVE SACRAL ONE EPIDURAL STEROID INJECTION SITE CONFIRMED BY FLUOROSCOPY performed by Ching Ring Donis Colmenares MD at [address] Left 01/11/2016    Cystoscopy, with left stent placement    TONSILLECTOMY AND ADENOIDECTOMY      UPPER GASTROINTESTINAL ENDOSCOPY N/A 4/7/2021    EGD BIOPSY performed by Arianna Mora MD at [facility]   Allergen Reactions    Codeine Rash, Nausea And Vomiting and Shortness Of Breath    Lisinopril      cough    Norvasc [Amlodipine Besylate]      swelling    Triamterene-Hctz      Renal insufficiency     Outpatient Medications Marked as Taking for the 8/24/21 encounter (Office Visit) with Mei Flowers,    Medication Sig Dispense Refill    pantoprazole (PROTONIX) 40 MG tablet       atorvastatin (LIPITOR) 40 MG tablet TAKE ONE TABLET BY MOUTH DAILY 30 tablet 11    nebivolol (BYSTOLIC) 5 MG tablet TAKE ONE TABLET BY MOUTH DAILY 90 tablet 1    Potassium Citrate ER 15 MEQ (1620 MG) TBCR 2 times daily       calcitRIOL (ROCALTROL) 0.25 MCG capsule Take 0.25 mcg by mouth every other day      aspirin 81 MG tablet Take 81 mg by mouth daily      Melatonin 5 MG CHEW Take by mouth nightly          Social History:   Social History     Socioeconomic History    Marital status:       Spouse name: Not on file    Number of children: Not on file    Years of education: Not on file    Highest education level: Not on file   Occupational History    Not on file   Tobacco Use    Smoking status: Never Smoker    Smokeless tobacco: Never Used   Vaping Use    Vaping Use: Never used   Substance and Sexual Activity    Alcohol use: No    Drug use: No    Sexual activity: Not Currently   Other Topics Concern    Not on file   Social History Narrative    Not on file     Social Determinants of Health     Financial Resource Strain:     Difficulty of Paying Living Expenses:    Food Insecurity:     Worried About Running Out of Food in the Last Year:     [address] in the Last Year:    Transportation Needs:  Lack of Transportation (Medical):  Lack of Transportation (Non-Medical):    Physical Activity:     Days of Exercise per Week:     Minutes of Exercise per Session:    Stress:     Feeling of Stress :    Social Connections:     Frequency of Communication with Friends and Family:     Frequency of Social Gatherings with Friends and Family:     Attends Hoahaoism Services:     Active Member of Clubs or Organizations:     Attends Club or Organization Meetings:     Marital Status:    Intimate Partner Violence:     Fear of Current or Ex-Partner:     Emotionally Abused:     Physically Abused:     Sexually Abused:        Physical Examination     The following were examined and determined to be normal: Psych/Neuro, Scalp/hair, Head/face, Gums/teeth/lips, Neck, Abdomen, Back, RUE, LUE, RLE, LLE and Nails/digits. buttocks. Areas covered by underwear garment(s) not examined. The following were examined and determined to be abnormal: Conjunctivae/eyelids and Breast/axilla/chest.     Jenetta Jayden phototype: 2    -Constitutional: Well appearing, no acute distress  -Neurological: Alert and oriented X 3  -Mood and Affect: Pleasant  Total body skin exam performed, areas examined listed above:   1a.  Right superior back-1.0 x 0.5 cm erythematous pink scaly papule      1b. L superior chest- 1.5x1.0cm erythematous scaly papule with slight peripheral ridging      1c. Central chest- 0.4x0.3cm pink-yellowish pearly telangiectatic papule  2. Left lateral lower eyelid- 1mm faint pink macule  3. Scattered on the head,neck, trunk and extremities are multiple well-defined round and oval symmetric smoothly-bordered uniformly brown macules and papules; no bleeding nevi.   4. stuck-on appearing tan-brown verrucous papules located to left cheek, right wrist (2), right medial chest (2)  5. several discrete and coalescing few 2-4 mm round uniformly brown macules scattered to face, neck, and sun exposed areas on torso and extremities    Assessment and Plan     1. Neoplasm of uncertain behavior    2. Eyelid lesion    3. Multiple benign nevi    4. Seborrheic keratoses    5. Solar lentiginosis        1. Neoplasm of uncertain behavior  DDx:   A. ISK v. Lichenoid keratosis rule out NMSC  B. Lichenoid keratosis v. Porokeratosis rule out NMSC   C.  Sebaceous hyperplasia v. Irritated nevus rule out BCC  -Discussed possible diagnosis. Patient agreeable to biopsy. Verbal consent obtained after risks (infection, bleeding, scar, dyspigmentation), benefits and alternatives explained. -Area(s) to be biopsied were marked with a surgical pen. Site(s) were cleansed with alcohol. Local anesthesia achieved with 1% lidocaine with epinephrine/sodium bicarbonate. Shave biopsy performed with a razor blade. Hemostasis was achieved with aluminum chloride. The wound(s) were dressed with petrolatum and covered with a bandage. Specimen(s) sent to pathology. Pt educated re: risk of bleeding, infection, scar, discoloration, and wound care instructions. 2. Eyelid lesion  -Possible external causes/irritation from recent chemical mask and patient admitting to picking the area versus postinflammatory hypererythema?  -Given acute onset and very mild severity, advised patient to monitor the area for resolution/improvement of the next 2 to 3 weeks. Patient is established with an ophthalmologist and given location, recommend she follows up with ophthalmologist as needed if it worsens or does not improve for further evaluation and possible biopsy as needed. Patient verbalized agreement.     3. Multiple benign nevi  Benign acquired melanocytic nevi  -Recommend monthly self skin exams   -Educated regarding the ABCDEs of melanoma detection   -Call for any new/changing moles or concerning lesions  -Reviewed sun protective behavior -- sun avoidance during the peak hours of the day, sun-protective clothing (including hat and sunglasses), sunscreen use (water resistant, broad spectrum, SPF at least 30, need for reapplication every 1.5 to 2 hours), avoidance of tanning beds   -Plan: Observation with annual skin checks (earlier if indicated) performed in office to monitor current nevi and to assess for new lesions. 4. Seborrheic keratoses  -Reassurance provided to the patient regarding their chronic and benign nature. No treatment performed    5. Solar lentiginosis  Solar lentigines  -Edu re: benignity, relationship w/ chronic cumulative sun exposure, darkening w/ unprotected sun exposure  -Reviewed sun protective behavior -- sun avoidance during the peak hours of the day, sun-protective clothing (including hat and sunglasses), sunscreen use (water resistant, broad spectrum, SPF at least 30, need for reapplication every 2 to 3 hours), avoidance of tanning beds   Observation, pt to call if changes in size, shape, color or experiences bleeding/pain/itching        Return to Clinic:PRN; 1 year skin exam  Discussed plan with patient and/or primary caretaker. Patient to call clinic with any questions / concerns. Reviewed proper use and side effects of treatment(s) and/or medication(s) with patient and/or primary caretaker. AVS provided or is available on [email or web address]     Note is transcribed using voice recognition software. Inadvertent computerized transcription errors may be present.

## 2021-08-30 LAB — DERMATOLOGY PATHOLOGY REPORT: NORMAL

## 2021-09-03 ENCOUNTER — OFFICE VISIT (OUTPATIENT)
Dept: FAMILY MEDICINE CLINIC | Age: 66
End: 2021-09-03
Payer: MEDICARE

## 2021-09-03 VITALS
BODY MASS INDEX: 29.99 KG/M2 | DIASTOLIC BLOOD PRESSURE: 68 MMHG | HEIGHT: 65 IN | OXYGEN SATURATION: 97 % | SYSTOLIC BLOOD PRESSURE: 108 MMHG | TEMPERATURE: 95.6 F | WEIGHT: 180 LBS | HEART RATE: 61 BPM

## 2021-09-03 DIAGNOSIS — I10 ESSENTIAL HYPERTENSION: Primary | ICD-10-CM

## 2021-09-03 DIAGNOSIS — I70.1 RENAL ARTERY STENOSIS (HCC): ICD-10-CM

## 2021-09-03 DIAGNOSIS — N18.30 STAGE 3 CHRONIC KIDNEY DISEASE, UNSPECIFIED WHETHER STAGE 3A OR 3B CKD (HCC): ICD-10-CM

## 2021-09-03 DIAGNOSIS — Z23 NEED FOR 23-POLYVALENT PNEUMOCOCCAL POLYSACCHARIDE VACCINE: ICD-10-CM

## 2021-09-03 DIAGNOSIS — I25.10 CORONARY ARTERY DISEASE INVOLVING NATIVE HEART WITHOUT ANGINA PECTORIS, UNSPECIFIED VESSEL OR LESION TYPE: ICD-10-CM

## 2021-09-03 DIAGNOSIS — D35.02 ADENOMA OF LEFT ADRENAL GLAND: ICD-10-CM

## 2021-09-03 DIAGNOSIS — N20.0 RENAL STONES: ICD-10-CM

## 2021-09-03 DIAGNOSIS — E78.00 PURE HYPERCHOLESTEROLEMIA: ICD-10-CM

## 2021-09-03 LAB
CHOLESTEROL, TOTAL: 142 MG/DL (ref 0–199)
HDLC SERPL-MCNC: 46 MG/DL (ref 40–60)
LDL CHOLESTEROL CALCULATED: 65 MG/DL
TRIGL SERPL-MCNC: 157 MG/DL (ref 0–150)
VLDLC SERPL CALC-MCNC: 31 MG/DL

## 2021-09-03 PROCEDURE — 1123F ACP DISCUSS/DSCN MKR DOCD: CPT | Performed by: FAMILY MEDICINE

## 2021-09-03 PROCEDURE — 4040F PNEUMOC VAC/ADMIN/RCVD: CPT | Performed by: FAMILY MEDICINE

## 2021-09-03 PROCEDURE — G8399 PT W/DXA RESULTS DOCUMENT: HCPCS | Performed by: FAMILY MEDICINE

## 2021-09-03 PROCEDURE — 1036F TOBACCO NON-USER: CPT | Performed by: FAMILY MEDICINE

## 2021-09-03 PROCEDURE — G0009 ADMIN PNEUMOCOCCAL VACCINE: HCPCS | Performed by: FAMILY MEDICINE

## 2021-09-03 PROCEDURE — G8417 CALC BMI ABV UP PARAM F/U: HCPCS | Performed by: FAMILY MEDICINE

## 2021-09-03 PROCEDURE — 99214 OFFICE O/P EST MOD 30 MIN: CPT | Performed by: FAMILY MEDICINE

## 2021-09-03 PROCEDURE — 1090F PRES/ABSN URINE INCON ASSESS: CPT | Performed by: FAMILY MEDICINE

## 2021-09-03 PROCEDURE — 3017F COLORECTAL CA SCREEN DOC REV: CPT | Performed by: FAMILY MEDICINE

## 2021-09-03 PROCEDURE — 90732 PPSV23 VACC 2 YRS+ SUBQ/IM: CPT | Performed by: FAMILY MEDICINE

## 2021-09-03 PROCEDURE — 36415 COLL VENOUS BLD VENIPUNCTURE: CPT | Performed by: FAMILY MEDICINE

## 2021-09-03 PROCEDURE — G8427 DOCREV CUR MEDS BY ELIG CLIN: HCPCS | Performed by: FAMILY MEDICINE

## 2021-09-03 RX ORDER — ATORVASTATIN CALCIUM 40 MG/1
TABLET, FILM COATED ORAL
Qty: 90 TABLET | Refills: 1 | Status: SHIPPED | OUTPATIENT
Start: 2021-09-03 | End: 2022-06-30 | Stop reason: SDUPTHER

## 2021-09-03 RX ORDER — NEBIVOLOL 5 MG/1
TABLET ORAL
Qty: 90 TABLET | Refills: 1 | Status: SHIPPED | OUTPATIENT
Start: 2021-09-03 | End: 2022-03-01 | Stop reason: SDUPTHER

## 2021-09-03 NOTE — PROGRESS NOTES
Kisha Gordon presents for   Chief Complaint   Patient presents with    6 Month Follow-Up        ASSESSMENT:   Diagnosis Orders   1. Essential hypertension, well controlled continue current regimen nebivolol (BYSTOLIC) 5 MG tablet   2. Renal artery stenosis ([address] [address] [address] stable    3. Pure hypercholesterolemia, labs are pending on statin atorvastatin (LIPITOR) 40 MG tablet    Lipid Panel   4. Coronary artery disease involving native heart without angina pectoris, unspecified vessel or lesion type, cardiology note reviewed continue current regimen continue follow-up in 1 year with them    5. Stage 3 chronic kidney disease, unspecified whether stage 3a or 3b CKD ([facility] [address] [address] patient saw nephrology 1 month ago those labs were reviewed and are stable    6. Renal stones, plan per urology and nephrology    7. Adenoma of left adrenal gland, continue monitoring by Endo    8. Need for 23-polyvalent pneumococcal polysaccharide vaccine  Pneumococcal polysaccharide vaccine 23-valent greater than or equal to 1yo subcutaneous/IM        Plan:  1)  Medication: continue current medication regimen unchanged, medications are working and tolerated, continue as listed  2)  Recheck in 6 months, sooner should new symptoms or problems arise. 3) LLR          SUBJECTIVE:  Kisha Gordon is a 73 y.o. female who presents for evaluation of kidney stone, CKD, adrenal adenoma, CAD, renal artery stenosis, hypertension and hyperlipidemia. She indicates that she is feeling well and denies any symptoms referable to her elevated blood pressure. Specifically denies chest pain, palpitations, dyspnea, orthopnea, PND or peripheral edema or neuro sx. No anorexia, arthralgia, or leg cramps noted. Current medication regimen is as listed below. She denies any side effects of medication, and has been taking it regularly. Lab Results   Component Value Date    LDLCALC 72 03/02/2021    LDLDIRECT 125 (H) 07/30/2018       Overall, patient has been doing well.   She recently did have another kidney stone. She was seen by urology and nephrology. Nephrology note was reviewed and recent labs were reviewed. Latest BUN/creatinine were 20 and 1.6 with a GFR of 32. She follows up with endocrinology for management of her hyperparathyroidism and history of adrenal adenoma. Her blood pressure has been well controlled. She is on Protonix due to a gastric ulcer due to her chronic aspirin therapy. She recently saw GI that note was reviewed    Current Outpatient Medications   Medication Sig Dispense Refill    atorvastatin (LIPITOR) 40 MG tablet TAKE ONE TABLET BY MOUTH DAILY 90 tablet 1    nebivolol (BYSTOLIC) 5 MG tablet TAKE ONE TABLET BY MOUTH DAILY 90 tablet 1    pantoprazole (PROTONIX) 40 MG tablet       Potassium Citrate ER 15 MEQ (1620 MG) TBCR 2 times daily       calcitRIOL (ROCALTROL) 0.25 MCG capsule Take 0.25 mcg by mouth every other day      aspirin 81 MG tablet Take 81 mg by mouth daily      Melatonin 5 MG CHEW Take by mouth nightly        No current facility-administered medications for this visit. Allergies   Allergen Reactions    Codeine Rash, Nausea And Vomiting and Shortness Of Breath    Lisinopril      cough    Norvasc [Amlodipine Besylate]      swelling    Triamterene-Hctz      Renal insufficiency       Social History     Tobacco Use    Smoking status: Never Smoker    Smokeless tobacco: Never Used   Substance Use Topics    Alcohol use: No       OBJECTIVE:   /68 (Site: Left Upper Arm, Position: Sitting, Cuff Size: Medium Adult)   Pulse 61   Temp 95.6 °F (35.3 °C)   Ht 5' 5\" (1.651 m)   Wt 180 lb (81.6 kg)   LMP 07/11/2005   SpO2 97%   BMI 29.95 kg/m²   NAD  Neck no bruit or JVD  S1 and S2 normal, no murmurs, clicks, gallops or rubs. Regular rate and rhythm. Chest is clear; no wheezes or rales. No edema or JVD.   Neuro alert, no CN or motor deficits  Psych nl mood, thought and judgement                EMR Dragon/transcription disclaimer:  Much of this encounter note is electronic transcription/translation of spoken language to printed texts. The electronic translation of spoken language may be erroneous, or at times, nonsensical words or phrases may be inadvertently transcribed.   Although I have reviewed the note for such errors, some may still exist.

## 2021-09-07 ENCOUNTER — OFFICE VISIT (OUTPATIENT)
Dept: OBGYN CLINIC | Age: 66
End: 2021-09-07
Payer: MEDICARE

## 2021-09-07 VITALS
TEMPERATURE: 97.3 F | SYSTOLIC BLOOD PRESSURE: 104 MMHG | DIASTOLIC BLOOD PRESSURE: 62 MMHG | HEART RATE: 70 BPM | WEIGHT: 178.8 LBS | BODY MASS INDEX: 29.75 KG/M2

## 2021-09-07 DIAGNOSIS — Z01.419 ENCOUNTER FOR ANNUAL ROUTINE GYNECOLOGICAL EXAMINATION: Primary | ICD-10-CM

## 2021-09-07 DIAGNOSIS — Z12.4 PAP SMEAR FOR CERVICAL CANCER SCREENING: ICD-10-CM

## 2021-09-07 PROCEDURE — G0101 CA SCREEN;PELVIC/BREAST EXAM: HCPCS | Performed by: OBSTETRICS & GYNECOLOGY

## 2021-09-07 PROCEDURE — G8417 CALC BMI ABV UP PARAM F/U: HCPCS | Performed by: OBSTETRICS & GYNECOLOGY

## 2021-09-07 PROCEDURE — G8427 DOCREV CUR MEDS BY ELIG CLIN: HCPCS | Performed by: OBSTETRICS & GYNECOLOGY

## 2021-09-07 NOTE — PROGRESS NOTES
Annual Exam      CC:   Chief Complaint   Patient presents with    Gynecologic Exam     annual       HPI:  73 y.o. Jason Srinivasan presents for her gynecologic annual exam.    No issues today, has been doing weight watchers and has lost 20lbs since starting it earlier this summer. Patient seen and examined. Screening:  Last pap smear: 2018 NILM  Mammogram: 2021 category 1  Colonoscopy: 10/2019  DEXA scan: 2020    Review of Systems:   Review of Systems   Constitutional: Negative for chills and fever. Respiratory: Negative for shortness of breath. Cardiovascular: Negative for chest pain. Gastrointestinal: Negative for abdominal pain, constipation, diarrhea, nausea and vomiting. Genitourinary: Negative for difficulty urinating, dysuria, menstrual problem, vaginal bleeding and vaginal discharge.        Primary Care Physician: Ramona Lau MD    Obstetric History  OB History    Para Term  AB Living   1 1   1   1   SAB TAB Ectopic Molar Multiple Live Births             1      # Outcome Date GA Lbr Salvador/2nd Weight Sex Delivery Anes PTL Lv   1  85 36w0d  6 lb 12 oz (3.062 kg) F Vag-Forceps EPI Y RON       Gynecologic History  Menstrual History:   LMP: s/p menopause    Menstrual pattern: s/p menopause, no PMB  Sexual History:   Contraception: n/a   Currently is not sexually active   Denies history of STIs   No sexual problems  Pap History:   Last pap smear: 2018 NILM   History of abnormal pap smears: denies    Medical History:  Past Medical History:   Diagnosis Date    Actinic keratosis     Breast disorder     CAD (coronary artery disease)     Chronic kidney disease     Hyperlipidemia     Hypertension     Infertility, female     S/P colonoscopy 2009    Tendinitis of foot     UTI (urinary tract infection)        Surgical History:  Past Surgical History:   Procedure Laterality Date    ABDOMEN SURGERY      stone removed from bile duct    ANGIOPLASTY renal    BREAST SURGERY      duct removal    BUNIONECTOMY Left 12/15/2020    MODIFIED YAZMIN BUNIONECTOMY, MODIFIED AKIN OSTEOTOMY, MODIFIED WEIL SECOND METATARSAL, PROXIMAL INTERPHALANGEAL JOINT ARTHRODESIS DIGITS TWO THREE FOUR LEFT FOOT performed by Page Wagner DPM at [place] [address]  12/1992    COLONOSCOPY  8/1/2009    COLONOSCOPY N/A 10/23/2019    COLONOSCOPY POLYPECTOMY SNARE/COLD BIOPSY performed by Frances Steen MD at [address] [facility] [address] N/A 7/29/2019    MIDLINE LUMBAR FIVE SACRAL ONE EPIDURAL STEROID INJECTION SITE CONFIRMED BY FLUOROSCOPY performed by Mikey Davis MD at [address] Left 01/11/2016    Cystoscopy, with left stent placement    TONSILLECTOMY AND ADENOIDECTOMY      UPPER GASTROINTESTINAL ENDOSCOPY N/A 4/7/2021    EGD BIOPSY performed by Frances Steen MD at [facility] [facility] ENDOSCOPY       Medications:  Current Outpatient Medications   Medication Sig Dispense Refill    atorvastatin (LIPITOR) 40 MG tablet TAKE ONE TABLET BY MOUTH DAILY 90 tablet 1    nebivolol (BYSTOLIC) 5 MG tablet TAKE ONE TABLET BY MOUTH DAILY 90 tablet 1    pantoprazole (PROTONIX) 40 MG tablet       Potassium Citrate ER 15 MEQ (1620 MG) TBCR 2 times daily       calcitRIOL (ROCALTROL) 0.25 MCG capsule Take 0.25 mcg by mouth every other day      aspirin 81 MG tablet Take 81 mg by mouth daily      Melatonin 5 MG CHEW Take by mouth nightly        No current facility-administered medications for this visit. Allergies: Allergies   Allergen Reactions    Codeine Rash, Nausea And Vomiting and Shortness Of Breath    Lisinopril      cough    Norvasc [Amlodipine Besylate]      swelling    Triamterene-Hctz      Renal insufficiency       Social History:  Social History     Socioeconomic History    Marital status:       Spouse name: None    Number of children: None    Years of education: None    Highest education level: None   Occupational History    None   Tobacco Use    Smoking status: Never Smoker    Smokeless tobacco: Never Used   Vaping Use    Vaping Use: Never used   Substance and Sexual Activity    Alcohol use: No    Drug use: No    Sexual activity: Not Currently   Other Topics Concern    None   Social History Narrative    None     Social Determinants of Health     Financial Resource Strain:     Difficulty of Paying Living Expenses:    Food Insecurity:     Worried About Running Out of Food in the Last Year:     Ran Out of Food in the Last Year:    Transportation Needs:     Lack of Transportation (Medical):      Lack of Transportation (Non-Medical):    Physical Activity:     Days of Exercise per Week:     Minutes of Exercise per Session:    Stress:     Feeling of Stress :    Social Connections:     Frequency of Communication with Friends and Family:     Frequency of Social Gatherings with Friends and Family:     Attends Voodoo Services:     Active Member of Clubs or Organizations:     Attends Club or Organization Meetings:     Marital Status:    Intimate Partner Violence:     Fear of Current or Ex-Partner:     Emotionally Abused:     Physically Abused:     Sexually Abused:        Family History:  Family History   Problem Relation Age of Onset    Heart Disease Father         MI  at  age 48   [facility] High Blood Pressure Sister     Diabetes Sister     Kidney Disease Sister         transplant 2019    High Blood Pressure Mother     High Cholesterol Mother     Arthritis Mother         gout    Heart Disease Mother     Cancer Mother     Heart Disease Brother     High Cholesterol Brother     High Blood Pressure Brother        Objective:  /62 (Site: Right Upper Arm, Position: Sitting, Cuff Size: Medium Adult)   Pulse 70   Temp 97.3 °F (36.3 °C) (Infrared)   Wt 178 lb 12.8 oz (81.1 kg)   LMP 2005   BMI 29.75 kg/m²     Exam:   Physical Exam  Exam

## 2021-09-08 LAB
HPV COMMENT: NORMAL
HPV TYPE 16: NOT DETECTED
HPV TYPE 18: NOT DETECTED
HPVOH (OTHER TYPES): NOT DETECTED

## 2021-09-08 ASSESSMENT — ENCOUNTER SYMPTOMS
SHORTNESS OF BREATH: 0
NAUSEA: 0
DIARRHEA: 0
CONSTIPATION: 0
VOMITING: 0
ABDOMINAL PAIN: 0

## 2021-09-24 ENCOUNTER — TELEPHONE (OUTPATIENT)
Dept: DERMATOLOGY | Age: 66
End: 2021-09-24

## 2021-09-24 NOTE — TELEPHONE ENCOUNTER
Called pt and cancelled appt on 9/28/21 for cryo on AK on left superior chest/central chest. Informed pt that Dr. Rhea Weaver has departed from [facility] and Dr. Fanta Daniel in our practice is willing to treat these Ak spots. Pt stated she is wanting to follow Dr. Rhea Weaver to her new practice. I reassured her that seeing Dr. Fanta Daniel would not affect that process.

## 2021-09-30 ENCOUNTER — OFFICE VISIT (OUTPATIENT)
Dept: DERMATOLOGY | Age: 66
End: 2021-09-30
Payer: MEDICARE

## 2021-09-30 VITALS — TEMPERATURE: 97.5 F

## 2021-09-30 DIAGNOSIS — L57.0 KERATOSIS, ACTINIC: Primary | ICD-10-CM

## 2021-09-30 PROCEDURE — 17003 DESTRUCT PREMALG LES 2-14: CPT | Performed by: DERMATOLOGY

## 2021-09-30 PROCEDURE — 17000 DESTRUCT PREMALG LESION: CPT | Performed by: DERMATOLOGY

## 2021-09-30 NOTE — PROGRESS NOTES
[facility]) Dermatology  Rachelle Melendez M.D.  [facility]  1955    73 y.o. female     Date of Visit: 9/30/2021    Chief Complaint:   Chief Complaint   Patient presents with    Follow-up     cryo on chest         I was asked to see this patient by Dr. Jessica us. provider found. History of Present Illness:  1. Presents today for follow-up of 2 actinic keratoses biopsied on her chest 8/24/2021-left superior chest and central chest.  Patient states she is healing well. Prominent postinflammatory erythema but biopsy sites have healed. Past Skin Hx:  -2/2020-hx of epi cyst s/p surgical excision located to left paraspinal mid-back   -hx of AKs s/p cryotherapy  -History of solar lentigines  Patient denies past history of melanoma, NMSC, dysplastic nevi     PFHx: Denies hx of MM or NMSC    Review of Systems:  Constitutional: Reports general sense of well-being       Past Medical History, Surgical History, Family History, Medications and Allergies reviewed. Social History:   Social History     Socioeconomic History    Marital status:      Spouse name: Not on file    Number of children: Not on file    Years of education: Not on file    Highest education level: Not on file   Occupational History    Not on file   Tobacco Use    Smoking status: Never Smoker    Smokeless tobacco: Never Used   Vaping Use    Vaping Use: Never used   Substance and Sexual Activity    Alcohol use: No    Drug use: No    Sexual activity: Not Currently   Other Topics Concern    Not on file   Social History Narrative    Not on file     Social Determinants of Health     Financial Resource Strain:     Difficulty of Paying Living Expenses:    Food Insecurity:     Worried About Running Out of Food in the Last Year:     [address] in the Last Year:    Transportation Needs:     Lack of Transportation (Medical):      Lack of Transportation (Non-Medical):    Physical Activity:     Days of Exercise per Week:     Minutes of Exercise per Session:    Stress:     Feeling of Stress :    Social Connections:     Frequency of Communication with Friends and Family:     Frequency of Social Gatherings with Friends and Family:     Attends Restorationist Services:     Active Member of Clubs or Organizations:     Attends Club or Organization Meetings:     Marital Status:    Intimate Partner Violence:     Fear of Current or Ex-Partner:     Emotionally Abused:     Physically Abused:     Sexually Abused:        Physical Examination       -General: Well-appearing, NAD  1. Well-developed well-nourished  Postinflammatory erythema at both biopsy sites. There is residual actinic keratosis left superior chest adjacent to biopsy site      Assessment and Plan     1. Keratosis, actinic -central chest, left superior chest-2- lesion(s) treated w/ liquid nitrogen. Edu re: risk of blister formation, discomfort, scar, hypopigmentation. Discussed wound care.    Follow-up with Dr. Lindsay

## 2021-10-08 ENCOUNTER — IMMUNIZATION (OUTPATIENT)
Dept: FAMILY MEDICINE CLINIC | Age: 66
End: 2021-10-08
Payer: MEDICARE

## 2021-10-08 DIAGNOSIS — Z23 NEED FOR IMMUNIZATION AGAINST INFLUENZA: Primary | ICD-10-CM

## 2021-10-08 PROCEDURE — G0008 ADMIN INFLUENZA VIRUS VAC: HCPCS | Performed by: FAMILY MEDICINE

## 2021-10-08 PROCEDURE — 90694 VACC AIIV4 NO PRSRV 0.5ML IM: CPT | Performed by: FAMILY MEDICINE

## 2021-10-08 NOTE — PROGRESS NOTES
Vaccine Information Sheet, \"Influenza - Inactivated\"  given to [employer], or parent/legal guardian of  [employer] and verbalized understanding. Patient responses:    Have you ever had a reaction to a flu vaccine? No  Do you have any current illness? No  Have you ever had Guillian Denair Syndrome? No  Do you have a serious allergy to any of the follow: Neomycin, Polymyxin, Thimerosal, eggs or egg products? No    Flu vaccine given per order. Please see immunization tab. Risks and benefits explained. Current VIS given.

## 2021-11-19 ENCOUNTER — HOSPITAL ENCOUNTER (OUTPATIENT)
Age: 66
Discharge: HOME | End: 2021-11-19
Payer: MEDICARE

## 2021-11-19 LAB
ALBUMIN SERPL-MCNC: 4.1 G/DL (ref 3.4–5)
ANION GAP SERPL CALCULATED.3IONS-SCNC: 11 MMOL/L (ref 3–16)
BASOPHILS ABSOLUTE: 0.1 K/UL (ref 0–0.2)
BASOPHILS RELATIVE PERCENT: 0.9 %
BILIRUBIN URINE: NEGATIVE
BLOOD, URINE: NEGATIVE
BUN BLDV-MCNC: 19 MG/DL (ref 7–20)
CALCIUM SERPL-MCNC: 9.7 MG/DL (ref 8.3–10.6)
CHLORIDE BLD-SCNC: 99 MMOL/L (ref 99–110)
CLARITY: CLEAR
CO2: 28 MMOL/L (ref 21–32)
COLOR: YELLOW
CREAT SERPL-MCNC: 1.5 MG/DL (ref 0.6–1.2)
CREATININE URINE: 143 MG/DL (ref 28–259)
EOSINOPHILS ABSOLUTE: 0.2 K/UL (ref 0–0.6)
EOSINOPHILS RELATIVE PERCENT: 3 %
EPITHELIAL CELLS, UA: NORMAL /HPF (ref 0–5)
GFR AFRICAN AMERICAN: 42
GFR NON-AFRICAN AMERICAN: 35
GLUCOSE BLD-MCNC: 97 MG/DL (ref 70–99)
GLUCOSE URINE: NEGATIVE MG/DL
HCT VFR BLD CALC: 44.1 % (ref 36–48)
HEMOGLOBIN: 14.7 G/DL (ref 12–16)
KETONES, URINE: NEGATIVE MG/DL
LEUKOCYTE ESTERASE, URINE: ABNORMAL
LYMPHOCYTES ABSOLUTE: 1.8 K/UL (ref 1–5.1)
LYMPHOCYTES RELATIVE PERCENT: 31.5 %
MCH RBC QN AUTO: 29.7 PG (ref 26–34)
MCHC RBC AUTO-ENTMCNC: 33.2 G/DL (ref 31–36)
MCV RBC AUTO: 89.3 FL (ref 80–100)
MICROALBUMIN UR-MCNC: <1.2 MG/DL
MICROALBUMIN/CREAT UR-RTO: NORMAL MG/G (ref 0–30)
MICROSCOPIC EXAMINATION: YES
MONOCYTES ABSOLUTE: 0.4 K/UL (ref 0–1.3)
MONOCYTES RELATIVE PERCENT: 7.4 %
NEUTROPHILS ABSOLUTE: 3.3 K/UL (ref 1.7–7.7)
NEUTROPHILS RELATIVE PERCENT: 57.2 %
NITRITE, URINE: NEGATIVE
PDW BLD-RTO: 14.4 % (ref 12.4–15.4)
PH UA: 7.5 (ref 5–8)
PHOSPHORUS: 3.3 MG/DL (ref 2.5–4.9)
PLATELET # BLD: 216 K/UL (ref 135–450)
PMV BLD AUTO: 8.9 FL (ref 5–10.5)
POTASSIUM SERPL-SCNC: 4 MMOL/L (ref 3.5–5.1)
PROTEIN UA: NEGATIVE MG/DL
RBC # BLD: 4.94 M/UL (ref 4–5.2)
RBC UA: NORMAL /HPF (ref 0–4)
SODIUM BLD-SCNC: 138 MMOL/L (ref 136–145)
SPECIFIC GRAVITY UA: 1.01 (ref 1–1.03)
URIC ACID, SERUM: 7.5 MG/DL (ref 2.6–6)
URINE TYPE: ABNORMAL
UROBILINOGEN, URINE: 0.2 E.U./DL
WBC # BLD: 5.8 K/UL (ref 4–11)
WBC UA: NORMAL /HPF (ref 0–5)

## 2021-11-19 PROCEDURE — 82570 ASSAY OF URINE CREATININE: CPT

## 2021-11-19 PROCEDURE — 85025 COMPLETE CBC W/AUTO DIFF WBC: CPT

## 2021-11-19 PROCEDURE — 36415 COLL VENOUS BLD VENIPUNCTURE: CPT

## 2021-11-19 PROCEDURE — 81001 URINALYSIS AUTO W/SCOPE: CPT

## 2021-11-19 PROCEDURE — 82043 UR ALBUMIN QUANTITATIVE: CPT

## 2021-11-19 PROCEDURE — 80069 RENAL FUNCTION PANEL: CPT

## 2021-11-19 PROCEDURE — 84550 ASSAY OF BLOOD/URIC ACID: CPT

## 2022-02-02 ENCOUNTER — NURSE ONLY (OUTPATIENT)
Dept: ENDOCRINOLOGY | Age: 67
End: 2022-02-02
Payer: MEDICARE

## 2022-02-02 DIAGNOSIS — M81.0 AGE-RELATED OSTEOPOROSIS WITHOUT CURRENT PATHOLOGICAL FRACTURE: Primary | ICD-10-CM

## 2022-02-02 PROCEDURE — 96372 THER/PROPH/DIAG INJ SC/IM: CPT | Performed by: NURSE PRACTITIONER

## 2022-02-02 NOTE — PROGRESS NOTES
Prolia injection given to [facility] ÁNGELA RODRIGUEZ         Patient. [address] [phone]. Lot #    [id number]          Exp:  05/24         Injection given in   Left       Arm. Patient advised to wait in office 20 minutes in case of reaction such as SOB, rash, hives, itching.      Office supplied

## 2022-02-07 ENCOUNTER — HOSPITAL ENCOUNTER (OUTPATIENT)
Dept: WOMENS IMAGING | Age: 67
Discharge: HOME | End: 2022-02-07
Payer: MEDICARE

## 2022-02-07 VITALS — BODY MASS INDEX: 26.66 KG/M2 | WEIGHT: 160 LBS | HEIGHT: 65 IN

## 2022-02-07 DIAGNOSIS — Z12.31 VISIT FOR SCREENING MAMMOGRAM: ICD-10-CM

## 2022-02-07 PROCEDURE — 77067 SCR MAMMO BI INCL CAD: CPT

## 2022-02-28 ENCOUNTER — OFFICE VISIT (OUTPATIENT)
Dept: ORTHOPEDIC SURGERY | Age: 67
End: 2022-02-28
Payer: MEDICARE

## 2022-02-28 VITALS — RESPIRATION RATE: 15 BRPM | BODY MASS INDEX: 26.66 KG/M2 | WEIGHT: 160 LBS | HEIGHT: 65 IN

## 2022-02-28 DIAGNOSIS — M47.816 LUMBAR SPONDYLOSIS: ICD-10-CM

## 2022-02-28 DIAGNOSIS — M54.50 LUMBAR SPINE PAIN: ICD-10-CM

## 2022-02-28 DIAGNOSIS — M51.36 DDD (DEGENERATIVE DISC DISEASE), LUMBAR: Primary | ICD-10-CM

## 2022-02-28 PROCEDURE — G8484 FLU IMMUNIZE NO ADMIN: HCPCS | Performed by: PHYSICIAN ASSISTANT

## 2022-02-28 PROCEDURE — G8417 CALC BMI ABV UP PARAM F/U: HCPCS | Performed by: PHYSICIAN ASSISTANT

## 2022-02-28 PROCEDURE — 1036F TOBACCO NON-USER: CPT | Performed by: PHYSICIAN ASSISTANT

## 2022-02-28 PROCEDURE — 3017F COLORECTAL CA SCREEN DOC REV: CPT | Performed by: PHYSICIAN ASSISTANT

## 2022-02-28 PROCEDURE — G8399 PT W/DXA RESULTS DOCUMENT: HCPCS | Performed by: PHYSICIAN ASSISTANT

## 2022-02-28 PROCEDURE — 1090F PRES/ABSN URINE INCON ASSESS: CPT | Performed by: PHYSICIAN ASSISTANT

## 2022-02-28 PROCEDURE — G8427 DOCREV CUR MEDS BY ELIG CLIN: HCPCS | Performed by: PHYSICIAN ASSISTANT

## 2022-02-28 PROCEDURE — 99214 OFFICE O/P EST MOD 30 MIN: CPT | Performed by: PHYSICIAN ASSISTANT

## 2022-02-28 PROCEDURE — 1123F ACP DISCUSS/DSCN MKR DOCD: CPT | Performed by: PHYSICIAN ASSISTANT

## 2022-02-28 PROCEDURE — 4040F PNEUMOC VAC/ADMIN/RCVD: CPT | Performed by: PHYSICIAN ASSISTANT

## 2022-02-28 RX ORDER — PREDNISONE 10 MG/1
TABLET ORAL
Qty: 21 TABLET | Refills: 0 | Status: SHIPPED | OUTPATIENT
Start: 2022-02-28 | End: 2022-07-13

## 2022-02-28 RX ORDER — DENOSUMAB 60 MG/ML
60 INJECTION SUBCUTANEOUS ONCE
COMMUNITY

## 2022-02-28 RX ORDER — METHYLPREDNISOLONE 4 MG/1
TABLET ORAL
Qty: 1 KIT | Refills: 0 | Status: SHIPPED | OUTPATIENT
Start: 2022-02-28 | End: 2022-07-13

## 2022-02-28 SDOH — HEALTH STABILITY: PHYSICAL HEALTH: ON AVERAGE, HOW MANY MINUTES DO YOU ENGAGE IN EXERCISE AT THIS LEVEL?: 40 MIN

## 2022-02-28 SDOH — HEALTH STABILITY: PHYSICAL HEALTH: ON AVERAGE, HOW MANY DAYS PER WEEK DO YOU ENGAGE IN MODERATE TO STRENUOUS EXERCISE (LIKE A BRISK WALK)?: 6 DAYS

## 2022-02-28 ASSESSMENT — LIFESTYLE VARIABLES
HOW MANY STANDARD DRINKS CONTAINING ALCOHOL DO YOU HAVE ON A TYPICAL DAY: 98
HOW OFTEN DO YOU HAVE A DRINK CONTAINING ALCOHOL: NEVER
HOW OFTEN DO YOU HAVE A DRINK CONTAINING ALCOHOL: 1
HOW OFTEN DO YOU HAVE SIX OR MORE DRINKS ON ONE OCCASION: 1
HOW MANY STANDARD DRINKS CONTAINING ALCOHOL DO YOU HAVE ON A TYPICAL DAY: PATIENT DECLINED

## 2022-02-28 ASSESSMENT — PATIENT HEALTH QUESTIONNAIRE - PHQ9
SUM OF ALL RESPONSES TO PHQ9 QUESTIONS 1 & 2: 0
SUM OF ALL RESPONSES TO PHQ QUESTIONS 1-9: 0
SUM OF ALL RESPONSES TO PHQ QUESTIONS 1-9: 0
2. FEELING DOWN, DEPRESSED OR HOPELESS: 0
1. LITTLE INTEREST OR PLEASURE IN DOING THINGS: 0
SUM OF ALL RESPONSES TO PHQ QUESTIONS 1-9: 0
SUM OF ALL RESPONSES TO PHQ QUESTIONS 1-9: 0

## 2022-02-28 NOTE — PROGRESS NOTES
 EPIDURAL STEROID INJECTION N/A 2019    MIDLINE LUMBAR FIVE SACRAL ONE EPIDURAL STEROID INJECTION SITE CONFIRMED BY FLUOROSCOPY performed by Yudelka Solares MD at [address] Left 2016    Cystoscopy, with left stent placement    TONSILLECTOMY AND ADENOIDECTOMY      UPPER GASTROINTESTINAL ENDOSCOPY N/A 2021    EGD BIOPSY performed by Thony Serrano MD at [address] [facility]     Current Medications:     Current Outpatient Medications:     atorvastatin (LIPITOR) 40 MG tablet, TAKE ONE TABLET BY MOUTH DAILY, Disp: 90 tablet, Rfl: 1    nebivolol (BYSTOLIC) 5 MG tablet, TAKE ONE TABLET BY MOUTH DAILY, Disp: 90 tablet, Rfl: 1    pantoprazole (PROTONIX) 40 MG tablet, , Disp: , Rfl:     Potassium Citrate ER 15 MEQ (1620 MG) TBCR, 2 times daily , Disp: , Rfl:     calcitRIOL (ROCALTROL) 0.25 MCG capsule, Take 0.25 mcg by mouth every other day, Disp: , Rfl:     aspirin 81 MG tablet, Take 81 mg by mouth daily, Disp: , Rfl:     Melatonin 5 MG CHEW, Take by mouth nightly , Disp: , Rfl:   Allergies:  Codeine, Lisinopril, Norvasc [amlodipine besylate], and Triamterene-hctz  Social History:    reports that she has never smoked. She has never used smokeless tobacco. She reports that she does not drink alcohol and does not use drugs.   Family History:   Family History   Problem Relation Age of Onset    Heart Disease Father         MI  at  age 48   [facility] High Blood Pressure Sister     Diabetes Sister     Kidney Disease Sister         transplant     Breast Cancer Sister     High Blood Pressure Mother     High Cholesterol Mother     Arthritis Mother         gout    Heart Disease Mother     Cancer Mother     Heart Disease Brother     High Cholesterol Brother     High Blood Pressure Brother        REVIEW OF SYSTEMS: Full ROS reviewed & scanned into chart  CONSTITUTIONAL: Denies unexplained weight loss, fevers   SKIN: Denies active skin conditions   ENT: Denies dizziness, nosebleeds  RESPIRATORY: Denies current dyspnea, difficulty breathing  CARDIOVASCULAR: Denies chest pain   NEUROLOGICAL: Denies stroke, unsteady gait or progressive weakness  PSYCHOLOGICAL: Denies anxiety, depression   HEMATOLOGIC: Denies blood disorders, cancer  ENDOCRINE: Denies excessive thirst, urination, heat/cold  GI: Denies ulcer, nausea, vomiting, diarrhea   : Denies bowel or bladder incontinence       PHYSICAL EXAM:    Vitals: Last menstrual period 07/11/2005, not currently breastfeeding. GENERAL EXAM:  · General Apparence: Patient is adequately groomed with no evidence of malnutrition. · Orientation: The patient is oriented to time, place and person. · Mood & Affect:The patient's mood and affect are appropriate   · Vascular: Examination reveals no swelling tenderness in upper or lower extremities. Good capillary refill  · Lymphatic: The lymphatic examination bilaterally reveals all areas to be without enlargement or induration  · Sensation: Sensation is intact without deficit  · Coordination/Balance: Good coordination       LUMBAR/SACRAL EXAMINATION:  · Inspection: Local inspection shows no step-off or bruising. Lumbar alignment is normal.  Sagittal and Coronal balance is neutral.      · Palpation:   No evidence of tenderness at the midline. No tenderness bilaterally at the paraspinal or trochanters. There is no step-off or paraspinal spasm. · Range of Motion: Decreased range of motion mostly with lumbar extension  · Strength:   Strength testing is 5/5 in all muscle groups tested. · Special Tests:   Straight leg raise and crossed SLR negative. Leg length and pelvis level.  0 out of 5 Michelle's signs. · Skin: There are no rashes, ulcerations or lesions. · Reflexes: Reflexes are symmetrically 2+ at the patellar and ankle tendons. Clonus absent bilaterally at the feet.   · Gait & station: Normal  · Additional Examinations:   · RIGHT LOWER EXTREMITY: Inspection/examination of the right lower extremity does not show any tenderness, deformity or injury. Range of motion is full. There is no gross instability. There are no rashes, ulcerations or lesions. Strength and tone are normal.  ·   · LEFT LOWER EXTREMITY:  Inspection/examination of the left lower extremity does not show any tenderness, deformity or injury. Range of motion is full. There is no gross instability. There are no rashes, ulcerations or lesions. Strength and tone are normal.    Diagnostic Testing: X-rays were ordered and reviewed of the lumbar spine. 2 views. AP and lateral views. They demonstrate loss of normal lumbar lordosis. Lumbar degenerative disc disease most pronounced at L5-S1. Facet joint arthritis present      Impression:    Lumbar DDD  Lumbar spine stenosis  Lumbar spondylosis        Plan: Patient is placed on a 20-10-Medrol prednisone taper. She will start physical therapy. She is already an established patient with Jose Nicholson PA-C and will follow up with her in approximately 2 weeks or sooner if problems arise. I discussed with Dyana Quirogarashard that her history, symptoms, signs and imaging are most consistent with Lumbar DDD, spondylosis, and stenosis. We reviewed the natural history of these conditions and treatment options ranging from conservative measures (rest, icing, activity modification, physical therapy, pain meds, cortisone injection) to surgical options. In terms of treatment, I recommended continuing with rest, icing, avoidance of painful activities, NSAIDs or pain meds as tolerated, and physical therapy. If these are not effective, cortisone injection can be considered. We discussed surgical options as well, should conservative measures fail.

## 2022-03-01 ENCOUNTER — OFFICE VISIT (OUTPATIENT)
Dept: FAMILY MEDICINE CLINIC | Age: 67
End: 2022-03-01
Payer: MEDICARE

## 2022-03-01 ENCOUNTER — TELEPHONE (OUTPATIENT)
Dept: FAMILY MEDICINE CLINIC | Age: 67
End: 2022-03-01

## 2022-03-01 VITALS
HEART RATE: 62 BPM | SYSTOLIC BLOOD PRESSURE: 124 MMHG | BODY MASS INDEX: 27.35 KG/M2 | OXYGEN SATURATION: 97 % | DIASTOLIC BLOOD PRESSURE: 70 MMHG | WEIGHT: 160.2 LBS | HEIGHT: 64 IN

## 2022-03-01 DIAGNOSIS — Z00.00 ROUTINE GENERAL MEDICAL EXAMINATION AT A HEALTH CARE FACILITY: Primary | ICD-10-CM

## 2022-03-01 DIAGNOSIS — E78.00 PURE HYPERCHOLESTEROLEMIA: ICD-10-CM

## 2022-03-01 DIAGNOSIS — I10 ESSENTIAL HYPERTENSION: ICD-10-CM

## 2022-03-01 DIAGNOSIS — N20.0 RENAL STONES: ICD-10-CM

## 2022-03-01 DIAGNOSIS — E27.8 ADRENAL NODULE (HCC): ICD-10-CM

## 2022-03-01 DIAGNOSIS — I25.10 CORONARY ARTERY DISEASE INVOLVING NATIVE HEART WITHOUT ANGINA PECTORIS, UNSPECIFIED VESSEL OR LESION TYPE: ICD-10-CM

## 2022-03-01 DIAGNOSIS — N18.30 STAGE 3 CHRONIC KIDNEY DISEASE, UNSPECIFIED WHETHER STAGE 3A OR 3B CKD (HCC): ICD-10-CM

## 2022-03-01 DIAGNOSIS — I70.1 RENAL ARTERY STENOSIS (HCC): ICD-10-CM

## 2022-03-01 DIAGNOSIS — Z00.00 INITIAL MEDICARE ANNUAL WELLNESS VISIT: ICD-10-CM

## 2022-03-01 LAB
A/G RATIO: 2.3 (ref 1.1–2.2)
ALBUMIN SERPL-MCNC: 4.6 G/DL (ref 3.4–5)
ALP BLD-CCNC: 75 U/L (ref 40–129)
ALT SERPL-CCNC: 20 U/L (ref 10–40)
ANION GAP SERPL CALCULATED.3IONS-SCNC: 14 MMOL/L (ref 3–16)
AST SERPL-CCNC: 24 U/L (ref 15–37)
BILIRUB SERPL-MCNC: 0.5 MG/DL (ref 0–1)
BUN BLDV-MCNC: 18 MG/DL (ref 7–20)
CALCIUM SERPL-MCNC: 9.9 MG/DL (ref 8.3–10.6)
CHLORIDE BLD-SCNC: 100 MMOL/L (ref 99–110)
CHOLESTEROL, TOTAL: 149 MG/DL (ref 0–199)
CO2: 27 MMOL/L (ref 21–32)
CREAT SERPL-MCNC: 1.3 MG/DL (ref 0.6–1.2)
GFR AFRICAN AMERICAN: 50
GFR NON-AFRICAN AMERICAN: 41
GLUCOSE BLD-MCNC: 86 MG/DL (ref 70–99)
HDLC SERPL-MCNC: 57 MG/DL (ref 40–60)
LDL CHOLESTEROL CALCULATED: 67 MG/DL
POTASSIUM SERPL-SCNC: 4.4 MMOL/L (ref 3.5–5.1)
SODIUM BLD-SCNC: 141 MMOL/L (ref 136–145)
TOTAL PROTEIN: 6.6 G/DL (ref 6.4–8.2)
TRIGL SERPL-MCNC: 123 MG/DL (ref 0–150)
VLDLC SERPL CALC-MCNC: 25 MG/DL

## 2022-03-01 PROCEDURE — 4040F PNEUMOC VAC/ADMIN/RCVD: CPT | Performed by: FAMILY MEDICINE

## 2022-03-01 PROCEDURE — 3017F COLORECTAL CA SCREEN DOC REV: CPT | Performed by: FAMILY MEDICINE

## 2022-03-01 PROCEDURE — G8417 CALC BMI ABV UP PARAM F/U: HCPCS | Performed by: FAMILY MEDICINE

## 2022-03-01 PROCEDURE — G0438 PPPS, INITIAL VISIT: HCPCS | Performed by: FAMILY MEDICINE

## 2022-03-01 PROCEDURE — 1090F PRES/ABSN URINE INCON ASSESS: CPT | Performed by: FAMILY MEDICINE

## 2022-03-01 PROCEDURE — G8427 DOCREV CUR MEDS BY ELIG CLIN: HCPCS | Performed by: FAMILY MEDICINE

## 2022-03-01 PROCEDURE — 36415 COLL VENOUS BLD VENIPUNCTURE: CPT | Performed by: FAMILY MEDICINE

## 2022-03-01 PROCEDURE — 1036F TOBACCO NON-USER: CPT | Performed by: FAMILY MEDICINE

## 2022-03-01 PROCEDURE — 99214 OFFICE O/P EST MOD 30 MIN: CPT | Performed by: FAMILY MEDICINE

## 2022-03-01 PROCEDURE — 1123F ACP DISCUSS/DSCN MKR DOCD: CPT | Performed by: FAMILY MEDICINE

## 2022-03-01 PROCEDURE — G8399 PT W/DXA RESULTS DOCUMENT: HCPCS | Performed by: FAMILY MEDICINE

## 2022-03-01 PROCEDURE — G8484 FLU IMMUNIZE NO ADMIN: HCPCS | Performed by: FAMILY MEDICINE

## 2022-03-01 RX ORDER — NEBIVOLOL 5 MG/1
TABLET ORAL
Qty: 90 TABLET | Refills: 1 | Status: SHIPPED | OUTPATIENT
Start: 2022-03-01 | End: 2022-05-23 | Stop reason: SDUPTHER

## 2022-03-01 NOTE — TELEPHONE ENCOUNTER
Patient is needing her next prolia after August 2nd 2022. She no longer has a endocrinologist that used to order and administer for her. She talked to Dr Rush Thurston at today's visit and Dr Rush Thurston stated that she would take over giving Marina her prolia injections. She said she can be contacted through [employer] when the next one is due.

## 2022-03-01 NOTE — PATIENT INSTRUCTIONS
Personalized Preventive Plan for Malvin Tran - 3/1/2022  Medicare offers a range of preventive health benefits. Some of the tests and screenings are paid in full while other may be subject to a deductible, co-insurance, and/or copay. Some of these benefits include a comprehensive review of your medical history including lifestyle, illnesses that may run in your family, and various assessments and screenings as appropriate. After reviewing your medical record and screening and assessments performed today your provider may have ordered immunizations, labs, imaging, and/or referrals for you. A list of these orders (if applicable) as well as your Preventive Care list are included within your After Visit Summary for your review. Other Preventive Recommendations:    · A preventive eye exam performed by an eye specialist is recommended every 1-2 years to screen for glaucoma; cataracts, macular degeneration, and other eye disorders. · A preventive dental visit is recommended every 6 months. · Try to get at least 150 minutes of exercise per week or 10,000 steps per day on a pedometer . · Order or download the FREE \"Exercise & Physical Activity: Your Everyday Guide\" from [employer]. Call 5-211.480.5554 or search [employer] online. · You need 7093-0417 mg of calcium and 4675-4289 IU of vitamin D per day. It is possible to meet your calcium requirement with diet alone, but a vitamin D supplement is usually necessary to meet this goal.  · When exposed to the sun, use a sunscreen that protects against both UVA and UVB radiation with an SPF of 30 or greater. Reapply every 2 to 3 hours or after sweating, drying off with a towel, or swimming. · Always wear a seat belt when traveling in a car. Always wear a helmet when riding a bicycle or motorcycle.

## 2022-03-01 NOTE — PROGRESS NOTES
Medicare Annual Wellness Visit    [id number] [facility] is here for Medicare AWV (pt is doing a Welcome To Medicare visit today, is fasting for bw )    Assessment & Plan   Vince Ly was seen today for medicare awv. Diagnoses and all orders for this visit:    Routine general medical examination at a health care facility    Essential hypertension, well controlled continue Bystolic    Stage 3 chronic kidney disease, unspecified whether stage 3a or 3b CKD ([address] stable labs are pending continue follow-up with nephrology    Renal artery stenosis ([address] blood pressure stable continue current regimen, continue follow-up with cardiology and nephrology    Adrenal nodule ([address] functionally and active, radiologically unchanged per endocrinology note in 2020    Pure hypercholesterolemia, stable labs are pending continue Lipitor 40 mg    Coronary artery disease involving native heart without angina pectoris, unspecified vessel or lesion type, stable continue baby aspirin continue follow-up with cardiology    Renal stones, stable no recent stone activity    Initial Medicare annual wellness visit         Recommendations for Preventive Services Due: see orders and patient instructions/AVS.  Recommended screening schedule for the next 5-10 years is provided to the patient in written form: see Patient Instructions/AVS.     Return for Medicare Annual Wellness Visit in 1 year. Subjective   The following acute and/or chronic problems were also addressed today:  Encounter Diagnoses   Name Primary?     Routine general medical examination at a health care facility Yes    Essential hypertension     Stage 3 chronic kidney disease, unspecified whether stage 3a or 3b CKD ([address]     Renal artery stenosis (HCC)     Adrenal nodule ([address]     Pure hypercholesterolemia     Coronary artery disease involving native heart without angina pectoris, unspecified vessel or lesion type     Renal stones     Initial Medicare annual wellness visit Patient's complete Health Risk Assessment and screening values have been reviewed and are found in Flowsheets. The following problems were reviewed today and where indicated follow up appointments were made and/or referrals ordered. Positive Risk Factor Screenings with Interventions:             General Health and ACP:  General  In general, how would you say your health is?: Very Good  In the past 7 days, have you experienced any of the following: New or Increased Pain, New or Increased Fatigue, Loneliness, Social Isolation, Stress or Anger?: (!) Yes  Select all that apply: (!) New or Increased Pain  Do you get the social and emotional support that you need?: Yes  Do you have a Living Will?: Yes    Advance Directives     Power of  Living Will ACP-Advance Directive ACP-Power of     Not on File Filed on 12/15/20 Filed Not on File      General Health Risk Interventions:  · Patient will bring a copy of her living will in to be scanned              Objective   Vitals:    03/01/22 1139   BP: 124/70   Pulse: 62   SpO2: 97%   Weight: 160 lb 3.2 oz (72.7 kg)   Height: 5' 4.1\" (1.628 m)      Body mass index is 27.41 kg/m². General well-nourished well-developed no apparent distress  HEENT EOMI  Neck no thyromegaly no carotid bruit  CV regular rate and rhythm, no murmur  Abdomen soft  Neuro cranial nerves intact, no motor deficits, alert and oriented      Allergies   Allergen Reactions    Codeine Rash, Nausea And Vomiting and Shortness Of Breath    Lisinopril      cough    Norvasc [Amlodipine Besylate]      swelling    Triamterene-Hctz      Renal insufficiency     Prior to Visit Medications    Medication Sig Taking?  Authorizing Provider   denosumab (PROLIA) 60 MG/ML SOSY SC injection Inject 60 mg into the skin once Yes Historical Provider, MD   predniSONE (DELTASONE) 10 MG tablet Take 1 tablet by mouth twice a day for 7 days, then take 1 tablet by mouth once a day for 7 days, then take Medrol Dosepak Yes Omayra Bustamante PA-C   methylPREDNISolone (MEDROL, ABIGAIL,) 4 MG tablet Take by mouth. Yes Omayra Bustamante PA-C   atorvastatin (LIPITOR) 40 MG tablet TAKE ONE TABLET BY MOUTH DAILY Yes Giselle Underwood MD   nebivolol (BYSTOLIC) 5 MG tablet TAKE ONE TABLET BY MOUTH DAILY Yes Giselle Underwood MD   pantoprazole (PROTONIX) 40 MG tablet  Yes Historical Provider, MD   Potassium Citrate ER 15 MEQ (1620 MG) TBCR 2 times daily  Yes Historical Provider, MD   calcitRIOL (ROCALTROL) 0.25 MCG capsule Take 0.25 mcg by mouth every other day Yes Historical Provider, MD   aspirin 81 MG tablet Take 81 mg by mouth daily Yes Historical Provider, MD   Melatonin 5 MG CHEW Take by mouth nightly  Yes Historical Provider, MD [place] (Including outside providers/suppliers regularly involved in providing care):   Patient Care Team:  Giselle Underwood MD as PCP - David Chu MD as PCP - Batsheva Pulliam MD as PCP - [facility] Empaneled Provider  Kyra Moreno MD as PCP - Endocrinology (Endocrinology)  Jack Lunsford MD as Consulting Physician (Rheumatology)  Jyothi Linton MD as Consulting Physician (Cardiology)    Reviewed and updated this visit:  Tobacco  Allergies  Meds  Med Hx  Surg Hx  Soc Hx  Fam Hx               Andry Stain presents for   Chief Complaint   Patient presents with    Medicare AWV     pt is doing a Welcome To Medicare visit today, is fasting for bw         ASSESSMENT:   Diagnosis Orders   1. Routine general medical examination at a health care facility, see AWV above    2. Essential hypertension, well controlled continue Bystolic 5 mg nebivolol (BYSTOLIC) 5 MG tablet    Lipid Panel   3. Stage 3 chronic kidney disease, unspecified whether stage 3a or 3b CKD ([address] stable labs are pending continue follow-up with nephrology    4. Renal artery stenosis ([address] stable continue follow-up with cardiology    5.  Adrenal nodule ([address] functionally inactive, no need for further radiology monitoring    6. Pure hypercholesterolemia, continue Lipitor labs are pending Comprehensive Metabolic Panel   7. Coronary artery disease involving native heart without angina pectoris, unspecified vessel or lesion type, continue follow-up with cardiology currently stable continue    8. Renal stones, stable continue current medicines and follow-up with urology    9. Initial Medicare annual wellness visit          Plan:  1)  Medication: continue current medication regimen unchanged, medications are working and tolerated, continue as listed  2)  Recheck in 6 months, sooner should new symptoms or problems arise. 3) LLR          SUBJECTIVE:  Yvette Dumont is a 73 y.o. female who presents for her initial Medicare wellness visit. She also was seen for evaluation of chronic medical issues including CKD, renal artery stenosis, adrenal nodule, CAD, renal stones, hypertension and hyperlipidemia. She indicates that she is feeling well and denies any symptoms referable to her elevated blood pressure. Specifically denies chest pain, palpitations, dyspnea, orthopnea, PND or peripheral edema or neuro sx. No anorexia, , or leg cramps noted. Current medication regimen is as listed below. She denies any side effects of medication, and has been taking it regularly. Lab Results   Component Value Date    LDLCALC 65 09/03/2021    LDLDIRECT 125 (H) 07/30/2018       Overall, patient is doing well. Developed back pain 2 days ago she was seen at the after-hours clinic at orthopedics and started on steroids for her back. Her pain is now much improved from 2 days ago. She has been seeing endocrinology for management of her osteoporosis. She receives Prolia shots every 6 months. With the loss of endocrinology here, she needs someone to take this over. We will take care of that for her. Her blood pressure has been well controlled on Bystolic. No need to make any changes. Her last creatinine level was 1.5 checked in December.  Her last nephrology note was reviewed. Her renal or stenosis is stable. She has a history of adrenal nodule measuring 11 mm. This was diagnosed in 2020. She saw endocrinology and his functional status was in active, there is no radiographic change in a year so they said no further imaging was needed to follow. CAD was diagnosed by coronary calcium score. She is on aspirin and sees cardiology on a yearly basis. She has had no recent stone since the summer 2021. She will continue follow-up with urology. She monitors her blood pressures at home and they have been under good control. She is fasting for her lipids today. She has lost 30 pounds with the help of weight watchers. She continues to work towards her goal of 150 pounds    Current Outpatient Medications   Medication Sig Dispense Refill    nebivolol (BYSTOLIC) 5 MG tablet TAKE ONE TABLET BY MOUTH DAILY 90 tablet 1    denosumab (PROLIA) 60 MG/ML SOSY SC injection Inject 60 mg into the skin once      predniSONE (DELTASONE) 10 MG tablet Take 1 tablet by mouth twice a day for 7 days, then take 1 tablet by mouth once a day for 7 days, then take Medrol Dosepak 21 tablet 0    methylPREDNISolone (MEDROL, ABIGAIL,) 4 MG tablet Take by mouth. 1 kit 0    atorvastatin (LIPITOR) 40 MG tablet TAKE ONE TABLET BY MOUTH DAILY 90 tablet 1    pantoprazole (PROTONIX) 40 MG tablet       Potassium Citrate ER 15 MEQ (1620 MG) TBCR 2 times daily       calcitRIOL (ROCALTROL) 0.25 MCG capsule Take 0.25 mcg by mouth every other day      aspirin 81 MG tablet Take 81 mg by mouth daily      Melatonin 5 MG CHEW Take by mouth nightly        No current facility-administered medications for this visit.        Allergies   Allergen Reactions    Codeine Rash, Nausea And Vomiting and Shortness Of Breath    Lisinopril      cough    Norvasc [Amlodipine Besylate]      swelling    Triamterene-Hctz      Renal insufficiency       Social History     Tobacco Use    Smoking status: Never Smoker    Smokeless tobacco: Never Used   Substance Use Topics    Alcohol use: No       OBJECTIVE:   /70   Pulse 62   Ht 5' 4.1\" (1.628 m)   Wt 160 lb 3.2 oz (72.7 kg)   LMP 07/11/2005   SpO2 97%   BMI 27.41 kg/m²   NAD  Neck no bruit or JVD  S1 and S2 normal, no murmurs, clicks, gallops or rubs. Regular rate and rhythm. Chest is clear; no wheezes or rales. No edema or JVD. Neuro alert, no CN or motor deficits  Psych nl mood, thought and judgement                EMR Dragon/transcription disclaimer:  Much of this encounter note is electronic transcription/translation of spoken language to printed texts. The electronic translation of spoken language may be erroneous, or at times, nonsensical words or phrases may be inadvertently transcribed.   Although I have reviewed the note for such errors, some may still exist.

## 2022-03-02 ENCOUNTER — HOSPITAL ENCOUNTER (OUTPATIENT)
Dept: PHYSICAL THERAPY | Age: 67
Setting detail: THERAPIES SERIES
Discharge: HOME | End: 2022-03-02
Payer: MEDICARE

## 2022-03-02 PROCEDURE — 97110 THERAPEUTIC EXERCISES: CPT | Performed by: PHYSICAL THERAPIST

## 2022-03-02 PROCEDURE — 97161 PT EVAL LOW COMPLEX 20 MIN: CPT | Performed by: PHYSICAL THERAPIST

## 2022-03-02 PROCEDURE — 97140 MANUAL THERAPY 1/> REGIONS: CPT | Performed by: PHYSICAL THERAPIST

## 2022-03-02 NOTE — PLAN OF CARE
[place] [address] and [facility], [address]  Phone: [phone]  Fax [phone]    Physical Therapy Certification    Dear Referring Practitioner: Leslie Sher PA-C,    We had the pleasure of evaluating the following patient for physical therapy services at [address] [place] [address]. A summary of our findings can be found in the initial assessment below. This includes our plan of care. If you have any questions or concerns regarding these findings, please do not hesitate to contact me at the office phone number checked above. Thank you for the referral.       Physician Signature:_______________________________Date:__________________  By signing above (or electronic signature), therapists plan is approved by physician    Patient: Luther Paulson   : 1955   MRN: [id number]  Referring Physician: Referring Practitioner: Leslie Sher PA-C      Evaluation Date: 3/2/2022      Medical Diagnosis Information:  Diagnosis: M54.50 (ICD-10-CM) - Lumbar spine painM51.36 (ICD-10-CM) - DDD (degenerative disc disease), [id number] (ICD-10-CM) - Lumbar spondylosis   Treatment Diagnosis: M51.36 DDD                                         Insurance information: PT Insurance Information: Medicare       Precautions/ Contra-indications: none    C-SSRS Triggered by Intake questionnaire (Past 2 wk assessment):   [x] No, Questionnaire did not trigger screening.   [] Yes, Patient intake triggered further evaluation      [] C-SSRS Screening completed  [] PCP notified via Plan of Care  [] Emergency services notified     Latex Allergy:  [x]NO      []YES  Preferred Language for Healthcare:   [x]English       []other:    SUBJECTIVE: Patient stated complaint:Low back pain with radicular symptoms into R LE. Pt reports pain started over the weekend when she was getting dressed.  Pt reports pain was severe when initially occurred and went to after-hours clinic on 2/28/2022. X-rays were taken, showing DDD most pronounced at L5-S1 with facet joint arthritis. Pt reports pain has been decreasing since initial onset, however is more intense as the day goes on. She has been icing which has helped with symptoms. Relevant Medical History: Hyperlipidemia, hypertension, kidney disease, low back pain (2019)  Functional Disability Index/G-Codes:    modODI: 24/55 48% disability     Pain Scale: 4-7/10  Easing factors: Ice, rest  Provocative factors: twisting, leaning back, \"moving wrong\", transitioning sit to stand    Type: []Constant   [x]Intermittent  []Radiating [x]Localized []other:     Numbness/Tingling: denies    Occupation/School: retired teacher    Living Status/Prior Level of Function: Independent with ADLs and IADLs, partakes in walking and low impact aerobics. OBJECTIVE: * denotes pain    ROM     LUMBAR FLEX FT to TCJ    LUMBAR EXT 25%    Sidebend FT to KJL 2\" *   Rotation 40 45    LEFT RIGHT   HIP Flex WNL WNL   HIP ER WNL WNL   HIP IR WNL WNL        Knee Flex     Knee ext     Hamstring FLEX     Piriformis                Strength  LEFT RIGHT   MfA     TrA 3*    HIP Flexors 3+ 3+   HIP Abductors 3+ 3+   Hip Ext 3+ 3+        Knee EXT (quad) 4+ 4+   Knee Flex (HS) 4+ 4+   Ankle DF 5 5   Ankle PF 5 5   Great Toe Ext       Reflexes/Sensation:    [x]Dermatomes/Myotomes intact    []UE Reflexes     []Normal []Hypo      []Hyper   [x]LE Reflexes     [x]Normal []Hypo      []Hyper   []Babinski/Clonus/Hoffmans:    []Other:    Joint mobility:    []Normal    [x]Hypo: lumbar  and uPAs in Sidelying   []Hyper    Palpation: tenderness in lumbar and thoracic paraspinals (R>L), particularly at T10-T12 and L4-S1 levels. Tenderness to L4-S1 SP. Functional Mobility/Transfers: slow transitions from side lying to supine to upright.  Cautious movement    Gait: WNL    Orthopedic Special Tests:   - B SLR  - B AURE   - B FADIR (slight pain in back with L FADIR)  L risk   TUG score (>12s at risk):     [] Falls education provided, including       G-Codes:       ASSESSMENT:   Functional Impairments:     [x]Noted lumbar/proximal hip hypomobility   []Noted lumbosacral and/or generalized hypermobility   [x]Decreased Lumbosacral/hip/LE functional ROM   [x]Decreased core/proximal hip strength and neuromuscular control    [x]Decreased LE functional strength    []Abnormal reflexes/sensation/myotomal/dermatomal deficits  []Reduced balance/proprioceptive control    []other:      Functional Activity Limitations (from functional questionnaire and intake)   []Reduced ability to tolerate prolonged functional positions   [x]Reduced ability or difficulty with changes of positions or transfers between positions   [x]Reduced ability to maintain good posture and demonstrate good body mechanics with sitting, bending, and lifting   []Reduced ability to sleep   [] Reduced ability or tolerance with driving and/or computer work   [x]Reduced ability to perform lifting, reaching, carrying tasks   []Reduced ability to squat   []Reduced ability to forward bend   [x]Reduced ability to ambulate prolonged functional periods/distances/surfaces   [x]Reduced ability to ascend/descend stairs   []other:       Participation Restrictions   [x]Reduced participation in self care activities   [x]Reduced participation in home management activities   []Reduced participation in work activities   [x]Reduced participation in social activities. []Reduced participation in sport/recreation activities. Classification:   []Signs/symptoms consistent with Lumbar instability/stabilization subgroup. []Signs/symptoms consistent with Lumbar mobilization/manipulation subgroup, myotomes and dermatomes intact. Meets manipulation criteria.     []Signs/symptoms consistent with Lumbar direction specific/centralization subgroup   []Signs/symptoms consistent with Lumbar traction subgroup     [x]Signs/symptoms consistent with lumbar facet dysfunction   []Signs/symptoms consistent with lumbar stenosis type dysfunction   []Signs/symptoms consistent with nerve root involvement including myotome & dermatome dysfunction   []Signs/symptoms consistent with post-surgical status including: decreased ROM, strength and function. []signs/symptoms consistent with pathology which may benefit from Dry needling     []other:      Prognosis/Rehab Potential:      []Excellent   [x]Good    []Fair   []Poor    Tolerance of evaluation/treatment:    []Excellent   [x]Good    []Fair   []Poor  Physical Therapy Evaluation Complexity Justification  [x] A history of present problem with:  [] no personal factors and/or comorbidities that impact the plan of care;  []1-2 personal factors and/or comorbidities that impact the plan of care  [x]3 personal factors and/or comorbidities that impact the plan of care  [x] An examination of body systems using standardized tests and measures addressing any of the following: body structures and functions (impairments), activity limitations, and/or participation restrictions;:  [x] a total of 1-2 or more elements   [] a total of 3 or more elements   [] a total of 4 or more elements   [x] A clinical presentation with:  [x] stable and/or uncomplicated characteristics   [] evolving clinical presentation with changing characteristics  [] unstable and unpredictable characteristics;   [x] Clinical decision making of [x] low, [] moderate, [] high complexity using standardized patient assessment instrument and/or measurable assessment of functional outcome.     [x] EVAL (LOW) 52737 (typically 20 minutes face-to-face)  [] EVAL (MOD) 64975 (typically 30 minutes face-to-face)  [] EVAL (HIGH) 43799 (typically 45 minutes face-to-face)  [] RE-EVAL         PLAN: Begin PT focusing on: proximal hip mobilizations, LB mobs, LB core activation, proximal hip activation, and HEP    Frequency/Duration:  2 days per week for 6 Weeks:  Interventions:  [x] Therapeutic exercise including: strength training, ROM, for LE, Glutes and core   [x]  NMR activation and proprioception for glutes , LE and Core   [x]  Manual therapy as indicated for Hip complex, LE and spine to include: Dry Needling/IASTM, STM, PROM, Gr I-IV mobilizations, manipulation. [x]  Modalities as needed that may include: thermal agents, E-stim, Biofeedback, US, iontophoresis as indicated  [x]  Patient education on joint protection, postural re-education, activity modification, progression of HEP. HEP instruction:   Access Code: [email or web address]  URL: [email or web address]  Date: 03/02/2022  Prepared by: Braxton Khan    Exercises  Cat-Camel - 1 x daily - 5-7 x weekly - 2 sets - 10 reps  Quadruped Thoracic Rotation - Reach Under - 1 x daily - 5-7 x weekly - 2 sets - 10 reps  Quadruped Full Range Thoracic Rotation with Reach - 1 x daily - 5-7 x weekly - 2 sets - 10-12 reps  Sidelying Open Book Thoracic Lumbar Rotation and Extension - 1 x daily - 5-7 x weekly - 2 sets - 10-12 reps  Supine March - 1 x daily - 5-7 x weekly - 2 sets - 8-10 reps  Supine Transversus Abdominis Bracing with Leg Extension - 1 x daily - 5-7 x weekly - 2 sets - 8-10 reps       GOALS:  Patient stated goal: Pt will eliminate pain and increase mobility. [] Progressing: [] Met: [] Not Met: [] Adjusted    Therapist goals for Patient:   Short Term Goals: To be achieved in: 2 weeks  1. Independent in HEP and progression per patient tolerance, in order to prevent re-injury. [] Progressing: [] Met: [] Not Met: [] Adjusted  2. Patient will have a decrease in pain to facilitate improvement in movement, function, and ADLs as indicated by Functional Deficits. [] Progressing: [] Met: [] Not Met: [] Adjusted      Long Term Goals: To be achieved in:6 weeks  1. Disability index score of 30% or less for the modODI  to assist with reaching prior level of function. [] Progressing: [] Met: [] Not Met: [] Adjusted  2.  Patient will demonstrate increased lumbar extension and R SB AROM to WNL to allow for proper joint functioning as indicated by patients difficulty twisting and getting dressed. [] Progressing: [] Met: [] Not Met: [] Adjusted  3. Patient will demonstrate an increase in Strength to 4+/5 for hip extension, abduction, and abdominal to allow for proper functional mobility as indicated by patients difficulty with prolonged walking and standing. [] Progressing: [] Met: [] Not Met: [] Adjusted  4. Patient will return to ADLs such as getting dressed and taking the stairs without increased symptoms or restriction. [] Progressing: [] Met: [] Not Met: [] Adjusted        Electronically signed by:  Mary Lou Thibodeaux PT   Paralwilmar Apt, SPT. Therapist was present, directed the patient's care, made skilled judgement, and was responsible for assessment and treatment of the patient.

## 2022-03-02 NOTE — FLOWSHEET NOTE
[facility],  [address] [place]  Phone: [phone]  Fax [phone]    Physical Therapy Treatment Note/ Progress Report:           Date:  3/2/2022    Patient Name:  Mane Amaral    :  1955  MRN: [id number]  Restrictions/Precautions:    Medical/Treatment Diagnosis Information:  · Diagnosis: M54.50 (ICD-10-CM) - Lumbar spine painM51.36 (ICD-10-CM) - DDD (degenerative disc disease), [id number] (ICD-10-CM) - Lumbar spondylosis  · Treatment Diagnosis: M51.36 DDD  Insurance/Certification information:  PT Insurance Information: Medicare  Physician Information:  Referring Practitioner: Basim Cano PA-C  Has the plan of care been signed (Y/N):        []  Yes  [x]  No     Date of Patient follow up with Physician: 3/14/22 Kavon Banegas PA-C      Is this a Progress Report:     []  Yes  [x]  No        If Yes:  Date Range for reporting period:  Beginning 3/2/22  Ending    Progress report will be due (10 Rx or 30 days whichever is less): 18      Recertification will be due (POC Duration  / 90 days whichever is less): 22        Visit # Insurance Allowable Auth Required   In-person 1 [facility] []  Yes []  No    Telehealth   []  Yes []  No    Total            Functional Scale: modODI: 2455 48% disability    Date assessed:  3/2/22      Therapy Diagnosis/Practice Pattern: F, conservative    Number of Comorbidities:  []0     []1-2    [x]3+    Latex Allergy:  [x]NO      []YES  Preferred Language for Healthcare:   [x]English       []other:      Pain level:  eval -7/10    SUBJECTIVE:  See eval    OBJECTIVE: See eval   Observation:    Test measurements:  Assess seated SIJ/sacral alignment?     RESTRICTIONS/PRECAUTIONS: none    Exercises/Interventions:     Therapeutic Ex (84185) Sets/reps/sec Notes/CUES   Cat-Camel x15 HEP   Thread the Needle x10 R/L HEP   Open Books x10 R/L HEP        Supine March: up-up-down-down 2x10 HEP Supine LE extension 2x10 HEP                       Pt ed: eval findings, POC, HEP, importance of core strength, icing at home x6'         Manual Intervention (45500) 9'    STM B lumbar and thoracic paraspinals  x6' In sidelying   Sacral Distraction  x3' In sidelying                       NMR re-education (83702)  CUES NEEDED                                                Therapeutic Activity (64510)                                       Therapeutic Exercise and NMR EXR  [x] (89606) Provided verbal/tactile cueing for activities related to strengthening, flexibility, endurance, ROM  for improvements in proximal hip and core control with self care, mobility, lifting and ambulation.  [] (93791) Provided verbal/tactile cueing for activities related to improving balance, coordination, kinesthetic sense, posture, motor skill, proprioception  to assist with core control in self care, mobility, lifting, and ambulation.      Therapeutic Activities:    [] (17032 or 39303) Provided verbal/tactile cueing for activities related to improving balance, coordination, kinesthetic sense, posture, motor skill, proprioception and motor activation to allow for proper function  with self care and ADLs  [] (27077) Provided training and instruction to the patient for proper core and proximal hip recruitment and positioning with ambulation re-education     Home Exercise Program:    [x] (44025) Reviewed/Progressed HEP activities related to strengthening, flexibility, endurance, ROM of core, proximal hip and LE for functional self-care, mobility, lifting and ambulation   [] (10990) Reviewed/Progressed HEP activities related to improving balance, coordination, kinesthetic sense, posture, motor skill, proprioception of core, proximal hip and LE for self care, mobility, lifting, and ambulation      Manual Treatments:  PROM / STM / Oscillations-Mobs:  G-I, II, III, IV (PA's, Inf., Post.)  [x] (43152) Provided manual therapy to mobilize proximal Progressing: [] Met: [] Not Met: [] Adjusted  4. Patient will return to ADLs such as getting dressed and taking the stairs without increased symptoms or restriction. [] Progressing: [] Met: [] Not Met: [] Adjusted    Progression Towards Functional goals:  [] Patient is progressing as expected towards functional goals listed. [] Progression is slowed due to complexities listed. [] Progression has been slowed due to co-morbidities. [x] Plan just implemented, too soon to assess goals progression  [] Other:     Overall Progression Towards Functional goals/ Treatment Progress Update:  [] Patient is progressing as expected towards functional goals listed. [] Progression is slowed due to complexities/Impairments listed. [] Progression has been slowed due to co-morbidities. [x] Plan just implemented, too soon to assess goals progression <30days   [] Goals require adjustment due to lack of progress  [] Patient is not progressing as expected and requires additional follow up with physician  [] Other    Prognosis for POC: [x] Good [] Fair  [] Poor      Patient requires continued skilled intervention: [x] Yes  [] No    Treatment/Activity Tolerance:  [x] Patient able to complete treatment  [] Patient limited by fatigue  [] Patient limited by pain    [] Patient limited by other medical complications  [] Other:     ASSESSMENT: Pt demonstrates decreased lumbar spine mobility and core stability.     Return to Play: (if applicable)   []  Stage 1: Intro to Strength   []  Stage 2: Return to Run and Strength   []  Stage 3: Return to Jump and Strength   []  Stage 4: Dynamic Strength and Agility   []  Stage 5: Sport Specific Training     []  Ready to Return to Play, Meets All Above Stages   []  Not Ready for Return to Sports   Comments:                         PLAN: See eval  [] Continue per plan of care [] Alter current plan (see comments above)  [x] Plan of care initiated [] Hold pending MD visit [] Discharge      Electronically signed by:  Rosales Dalton, PT   Luz Maza, GABBIE. Therapist was present, directed the patient's care, made skilled judgement, and was responsible for assessment and treatment of the patient. Note: If patient does not return for scheduled/ recommended follow up visits, this note will serve as a discharge from care along with most recent update on progress. HEP instruction:   Access Code: [email or web address]  URL: [email or web address]  Date: 03/02/2022  Prepared by: Rosales Dalton     Exercises  Cat-Camel - 1 x daily - 5-7 x weekly - 2 sets - 10 reps  Quadruped Thoracic Rotation - Reach Under - 1 x daily - 5-7 x weekly - 2 sets - 10 reps  Quadruped Full Range Thoracic Rotation with Reach - 1 x daily - 5-7 x weekly - 2 sets - 10-12 reps  Sidelying Open Book Thoracic Lumbar Rotation and Extension - 1 x daily - 5-7 x weekly - 2 sets - 10-12 reps  Supine March - 1 x daily - 5-7 x weekly - 2 sets - 8-10 reps  Supine Transversus Abdominis Bracing with Leg Extension - 1 x daily - 5-7 x weekly - 2 sets - 8-10 reps

## 2022-03-07 ENCOUNTER — HOSPITAL ENCOUNTER (OUTPATIENT)
Dept: PHYSICAL THERAPY | Age: 67
Setting detail: THERAPIES SERIES
Discharge: HOME | End: 2022-03-07
Payer: MEDICARE

## 2022-03-07 PROCEDURE — 97110 THERAPEUTIC EXERCISES: CPT | Performed by: PHYSICAL THERAPIST

## 2022-03-07 PROCEDURE — 97140 MANUAL THERAPY 1/> REGIONS: CPT | Performed by: PHYSICAL THERAPIST

## 2022-03-07 NOTE — FLOWSHEET NOTE
[facility], [address]  Phone: [phone]  Fax [phone]    Physical Therapy Treatment Note/ Progress Report:           Date:  3/7/2022    Patient Name:  Omi Bauer    :  1955  MRN: [id number]  Restrictions/Precautions:    Medical/Treatment Diagnosis Information:  · Diagnosis: M54.50 (ICD-10-CM) - Lumbar spine painM51.36 (ICD-10-CM) - DDD (degenerative disc disease), [id number] (ICD-10-CM) - Lumbar spondylosis  · Treatment Diagnosis: M51.36 DDD  Insurance/Certification information:  PT Insurance Information: Medicare  Physician Information:  Referring Practitioner: Clementina Salmon PA-C  Has the plan of care been signed (Y/N):        [x]  Yes  []  No     Date of Patient follow up with Physician: 3/14/22 Loretta Liu PA-C      Is this a Progress Report:     []  Yes  [x]  No         If Yes:  Date Range for reporting period:  Beginning 3/2/22  Ending    Progress report will be due (10 Rx or 30 days whichever is less): 7/9/15      Recertification will be due (POC Duration  / 90 days whichever is less): 22        Visit # Insurance Allowable Auth Required   In-person [address] []  Yes []  No    Telehealth   []  Yes []  No    Total            Functional Scale: modODI:  48% disability    Date assessed:  3/2/22      Therapy Diagnosis/Practice Pattern: F, conservative    Number of Comorbidities:  []0     []1-2    [x]3+    Latex Allergy:  [x]NO      []YES  Preferred Language for Healthcare:   [x]English       []other:      Pain level:  eval -7/10 Current \"sore\"/10    SUBJECTIVE:  Pt reports her overall pain level continues to improve. She continues to note pain end of day or with increased activity (trimming bushes), but she is managing well with her HEP.     OBJECTIVE: See eval   Observation:    Test measurements:  Seated SIJ screen: R sacral base deep and tender, iliac crests level    RESTRICTIONS/PRECAUTIONS: none    Exercises/Interventions:     Therapeutic Ex (31106) Sets/reps/sec Notes/CUES   Cat-Camel HEP   Thread the Needle HEP   Open Books HEP   SL clam, rev clam w/ TA x15 ea R/L HEP   Supine March: up-up-down-down HEP   Supine LE extension HEP   Supine fig 4 S 2 ways 2x20\" ea R/L                   Pt ed: , POC, HEP, importance of core strength and ongoing exercise program beyond PT,  x3'         Manual Intervention (47989) 15'    STM B lumbar and thoracic paraspinals, R post hip  x12' In sidelying   Sacral Distraction  x3' In sidelying                       NMR re-education (07195)  CUES NEEDED   TT row w/ TA and small held squat  3/4 tandem stance B shoulder ext Black 2x10  Black x10 R/L HEP  HEP   TT anti rotation side step 2 green x8 R/L HEP                                      Therapeutic Activity (31919)                                       Therapeutic Exercise and NMR EXR  [x] (26090) Provided verbal/tactile cueing for activities related to strengthening, flexibility, endurance, ROM  for improvements in proximal hip and core control with self care, mobility, lifting and ambulation.  [] (55320) Provided verbal/tactile cueing for activities related to improving balance, coordination, kinesthetic sense, posture, motor skill, proprioception  to assist with core control in self care, mobility, lifting, and ambulation.      Therapeutic Activities:    [] (70044 or 65805) Provided verbal/tactile cueing for activities related to improving balance, coordination, kinesthetic sense, posture, motor skill, proprioception and motor activation to allow for proper function  with self care and ADLs  [] (73635) Provided training and instruction to the patient for proper core and proximal hip recruitment and positioning with ambulation re-education     Home Exercise Program:    [x] (86549) Reviewed/Progressed HEP activities related to strengthening, flexibility, endurance, ROM of core, proximal hip and LE for functional self-care, mobility, lifting and ambulation   [] (56025) Reviewed/Progressed HEP activities related to improving balance, coordination, kinesthetic sense, posture, motor skill, proprioception of core, proximal hip and LE for self care, mobility, lifting, and ambulation      Manual Treatments:  PROM / STM / Oscillations-Mobs:  G-I, II, III, IV (PA's, Inf., Post.)  [x] (64746) Provided manual therapy to mobilize proximal hip and LS spine soft tissue/joints for the purpose of modulating pain, promoting relaxation,  increasing ROM, reducing/eliminating soft tissue swelling/inflammation/restriction, improving soft tissue extensibility and allowing for proper ROM for normal function with self care, mobility, lifting and ambulation. Modalities:   Seated lumbar CP x 15'    Charges  Timed Code Treatment Minutes: 40   Total Treatment Minutes: 55     Medicare total (add KX at $2000)       [] EVAL (LOW) 42475   [] EVAL (MOD) 07820   [] EVAL (HIGH) 88065   [] RE-EVAL     [x] CO(34221) x  2   [] IONTO  [] NMR (39126) x     [] VASO  [x] Manual (45614) x 1      [] Other:  [] TA x      [] Mech Traction (19197)  [] ES(attended) (76765)      [] ES (un) (05475):     Goals:   GOALS:  Patient stated goal: Pt will eliminate pain and increase mobility. [] Progressing: [] Met: [] Not Met: [] Adjusted    Therapist goals for Patient:   Short Term Goals: To be achieved in: 2 weeks  1. Independent in HEP and progression per patient tolerance, in order to prevent re-injury. [] Progressing: [] Met: [] Not Met: [] Adjusted  2. Patient will have a decrease in pain to facilitate improvement in movement, function, and ADLs as indicated by Functional Deficits. [] Progressing: [] Met: [] Not Met: [] Adjusted      Long Term Goals: To be achieved in:6 weeks  1. Disability index score of 30% or less for the modODI  to assist with reaching prior level of function. [] Progressing: [] Met: [] Not Met: [] Adjusted  2. visits minimal if able and pt ed re: spacing out visits. Return to Play: (if applicable)   []  Stage 1: Intro to Strength   []  Stage 2: Return to Run and Strength   []  Stage 3: Return to Jump and Strength   []  Stage 4: Dynamic Strength and Agility   []  Stage 5: Sport Specific Training     []  Ready to Return to Play, Meets All Above Stages   []  Not Ready for Return to Sports   Comments:                         PLAN: See eval  [x] Continue per plan of care [] Alter current plan (see comments above)  [] Plan of care initiated [] Hold pending MD visit [] Discharge      Electronically signed by:  Lucía Abreu, PT           Note: If patient does not return for scheduled/ recommended follow up visits, this note will serve as a discharge from care along with most recent update on progress. Access Code: [email or web address]  URL: [email or web address]  Date: 03/07/2022  Prepared by: Lucía Abreu    Exercises  Cat-Camel - 1 x daily - 5-7 x weekly - 2 sets - 10 reps  Quadruped Thoracic Rotation - Reach Under - 1 x daily - 5-7 x weekly - 2 sets - 10 reps  Quadruped Full Range Thoracic Rotation with Reach - 1 x daily - 5-7 x weekly - 2 sets - 10-12 reps  Sidelying Open Book Thoracic Lumbar Rotation and Extension - 1 x daily - 5-7 x weekly - 2 sets - 10-12 reps  Supine March - 1 x daily - 5-7 x weekly - 2 sets - 8-10 reps  Supine Transversus Abdominis Bracing with Leg Extension - 1 x daily - 5-7 x weekly - 2 sets - 8-10 reps  Clamshell - 1 x daily - 3-4 x weekly - 2-3 sets - 10-15 reps  Sidelying Reverse Clamshell - 1 x daily - 7 x weekly - 2-3 sets - 10-15 reps  Squatting Shoulder Row with Anchored Resistance - 1 x daily - 3-4 x weekly - 2-3 sets - 10-15 reps  Standing Shoulder Extension with Resistance - 1 x daily - 7 x weekly - 1-2 sets - 10 reps  Anti-Rotation Sidestepping with Resistance - 1 x daily - 3-4 x weekly - 1-2 sets - 8-10 reps

## 2022-03-09 ENCOUNTER — APPOINTMENT (OUTPATIENT)
Dept: PHYSICAL THERAPY | Age: 67
End: 2022-03-09
Payer: MEDICARE

## 2022-03-16 ENCOUNTER — HOSPITAL ENCOUNTER (OUTPATIENT)
Dept: PHYSICAL THERAPY | Age: 67
Discharge: HOME | End: 2022-03-16
Payer: MEDICARE

## 2022-03-16 PROCEDURE — 97140 MANUAL THERAPY 1/> REGIONS: CPT | Performed by: PHYSICAL THERAPIST

## 2022-03-16 PROCEDURE — 97110 THERAPEUTIC EXERCISES: CPT | Performed by: PHYSICAL THERAPIST

## 2022-03-16 PROCEDURE — 97112 NEUROMUSCULAR REEDUCATION: CPT | Performed by: PHYSICAL THERAPIST

## 2022-03-16 NOTE — DISCHARGE SUMMARY
[facility], [address] [place]  Phone: [phone]  Fax [phone]    Physical Therapy Treatment Note/ Progress Report:           Date:  3/16/2022    Patient Name:  Aspen Vegas    :  1955  MRN: [id number]  Restrictions/Precautions:    Medical/Treatment Diagnosis Information:  · Diagnosis: M54.50 (ICD-10-CM) - Lumbar spine painM51.36 (ICD-10-CM) - DDD (degenerative disc disease), [id number] (ICD-10-CM) - Lumbar spondylosis  · Treatment Diagnosis: M51.36 DDD  Insurance/Certification information:  PT Insurance Information: Medicare  Physician Information:  Referring Practitioner: Sarah Hasnen PA-C  Has the plan of care been signed (Y/N):        [x]  Yes  []  No     Date of Patient follow up with Physician: 3/14/22 Yessica June PA-C      Is this a Progress Report:     []  Yes  [x]  No         If Yes:  Date Range for reporting period:  Beginning 3/2/22  Ending    Progress report will be due (10 Rx or 30 days whichever is less): 76      Recertification will be due (POC Duration  / 90 days whichever is less): 22        Visit # Insurance Allowable Auth Required   In-person 3 [address] []  Yes []  No    Telehealth   []  Yes []  No    Total            Functional Scale: modODI:  48% disability    Date assessed:  3/2/22     modODI  2% disability   Date assessed: 3/16/22      Therapy Diagnosis/Practice Pattern: F, conservative    Number of Comorbidities:  []0     []1-2    [x]3+    Latex Allergy:  [x]NO      []YES  Preferred Language for Healthcare:   [x]English       []other:      Pain level:  eval 4-7/10 Current 0/10    SUBJECTIVE:  Pt reports pain has decreased significantly. She reports that she has been able to perform house work and go on walks without any pain. She reports that she has a good routine with her HEP and feels ready to discharge today.  Pt has questions about technique for picking up new grandchild safely. OBJECTIVE:    Observation:    Test measurements:  Lumbar ROM: Flexion: FT to toes; Ext: 75%; B SB: FT to KJL;     RESTRICTIONS/PRECAUTIONS: none    Exercises/Interventions:     Therapeutic Ex (69899) Sets/reps/sec Notes/CUES   Cat-Camel HEP   Thread the Needle HEP   Open Books HEP   SL clam, rev clam w/ TA  HEP   Supine March: up-up-down-down HEP   Supine LE extension HEP   Supine fig 4 S 2 ways 2x20\" ea R/L    Philippe quad stretch 2x30\" L    Supine IT band stretch 2x30\"         Pt ed: , POC, HEP, importance of core strength and ongoing exercise program beyond PT,  x3'         Manual Intervention (58773) 15'    STM B lumbar and thoracic paraspinals, R post hip ; TFL, Quad x15' In sidelying   Sacral Distraction   In sidelying                       NMR re-education (51100)  CUES NEEDED   TT row w/ TA and small held squat  3/4 tandem stance B shoulder ext Black 2x10  Black x10 R/L HEP  HEP   TT anti rotation side step 2 green x8 R/L HEP        8lb medicine ball \"crib \" x10 Cues for setting core before lifting from crib   8 lb medicine ball lift from table x10 Emphasis on using squat                       Therapeutic Activity (13483)                                       Therapeutic Exercise and NMR EXR  [x] (46723) Provided verbal/tactile cueing for activities related to strengthening, flexibility, endurance, ROM  for improvements in proximal hip and core control with self care, mobility, lifting and ambulation.  [] (70305) Provided verbal/tactile cueing for activities related to improving balance, coordination, kinesthetic sense, posture, motor skill, proprioception  to assist with core control in self care, mobility, lifting, and ambulation.      Therapeutic Activities:    [] (39022 or 23009) Provided verbal/tactile cueing for activities related to improving balance, coordination, kinesthetic sense, posture, motor skill, proprioception and motor activation to allow for proper function movement, function, and ADLs as indicated by Functional Deficits. [] Progressing: [x] Met: [] Not Met: [] Adjusted      Long Term Goals: To be achieved in:6 weeks  1. Disability index score of 30% or less for the modODI  to assist with reaching prior level of function. [] Progressing: [x] Met: [] Not Met: [] Adjusted  2. Patient will demonstrate increased lumbar extension and R SB AROM to WNL to allow for proper joint functioning as indicated by patients difficulty twisting and getting dressed. [] Progressing: [x] Met: [] Not Met: [] Adjusted  3. Patient will demonstrate an increase in Strength to 4+/5 for hip extension, abduction, and abdominal to allow for proper functional mobility as indicated by patients difficulty with prolonged walking and standing. [] Progressing: [x] Met: [] Not Met: [] Adjusted  4. Patient will return to ADLs such as getting dressed and taking the stairs without increased symptoms or restriction. [] Progressing: [x] Met: [] Not Met: [] Adjusted    Progression Towards Functional goals:  [x] Patient is progressing as expected towards functional goals listed. [] Progression is slowed due to complexities listed. [] Progression has been slowed due to co-morbidities. [] Plan just implemented, too soon to assess goals progression  [] Other:     Overall Progression Towards Functional goals/ Treatment Progress Update:  [x] Patient is progressing as expected towards functional goals listed. [] Progression is slowed due to complexities/Impairments listed. [] Progression has been slowed due to co-morbidities.   [] Plan just implemented, too soon to assess goals progression <30days   [] Goals require adjustment due to lack of progress  [] Patient is not progressing as expected and requires additional follow up with physician  [] Other    Prognosis for POC: [x] Good [] Fair  [] Poor      Patient requires continued skilled intervention: [] Yes  [x] No    Treatment/Activity Tolerance:  [x] Patient able to complete treatment  [] Patient limited by fatigue  [] Patient limited by pain    [] Patient limited by other medical complications  [] Other:     ASSESSMENT: Pt demonstrates improved lumbar ROM and functional strength since starting physical therapy. She reports significant decrease in pain during activity. HEP progressed and reviewed with patient today, which she demonstrates understanding of and ability to independently continue at home. Pt is fit for D/C to [facility] with pilates classes. Return to Play: (if applicable)   []  Stage 1: Intro to Strength   []  Stage 2: Return to Run and Strength   []  Stage 3: Return to Jump and Strength   []  Stage 4: Dynamic Strength and Agility   []  Stage 5: Sport Specific Training     []  Ready to Return to Play, Meets All Above Stages   []  Not Ready for Return to Sports   Comments:                         PLAN: D/C to [facility] and Pilates   [x] Continue per plan of care [] Alter current plan (see comments above)  [] Plan of care initiated [] Hold pending MD visit [x] Discharge      Electronically signed by:  BRYCE Hdz SPT. Therapist was present, directed the patient's care, made skilled judgement, and was responsible for assessment and treatment of the patient. Note: If patient does not return for scheduled/ recommended follow up visits, this note will serve as a discharge from care along with most recent update on progress. Access Code: [email or web address]  URL: [email or web address]  Date: 03/07/2022  Prepared by: Muna Villavicencio    Exercises  Cat-Camel - 1 x daily - 5-7 x weekly - 2 sets - 10 reps  Quadruped Thoracic Rotation - Reach Under - 1 x daily - 5-7 x weekly - 2 sets - 10 reps  Quadruped Full Range Thoracic Rotation with Reach - 1 x daily - 5-7 x weekly - 2 sets - 10-12 reps  Sidelying Open Book Thoracic Lumbar Rotation and Extension - 1 x daily - 5-7 x weekly - 2 sets - 10-12 reps  Supine March - 1 x daily - 5-7 x weekly - 2 sets - 8-10 reps  Supine Transversus Abdominis Bracing with Leg Extension - 1 x daily - 5-7 x weekly - 2 sets - 8-10 reps  Clamshell - 1 x daily - 3-4 x weekly - 2-3 sets - 10-15 reps  Sidelying Reverse Clamshell - 1 x daily - 7 x weekly - 2-3 sets - 10-15 reps  Squatting Shoulder Row with Anchored Resistance - 1 x daily - 3-4 x weekly - 2-3 sets - 10-15 reps  Standing Shoulder Extension with Resistance - 1 x daily - 7 x weekly - 1-2 sets - 10 reps  Anti-Rotation Sidestepping with Resistance - 1 x daily - 3-4 x weekly - 1-2 sets - 8-10 reps

## 2022-03-21 ENCOUNTER — HOSPITAL ENCOUNTER (OUTPATIENT)
Dept: PHYSICAL THERAPY | Age: 67
Discharge: HOME | End: 2022-03-21
Payer: MEDICARE

## 2022-03-21 PROCEDURE — 9990000030 HC GAP PILATES: Performed by: SPECIALIST/TECHNOLOGIST

## 2022-03-21 NOTE — FLOWSHEET NOTE
[facility], [address] [place]  Phone: [phone]  Fax [phone]      Date:  3/21/2022    Patient Name:  Jaclyn Solis    :  1955  MRN: [id number]  Restrictions/Precautions:    Medical/Treatment Diagnosis Information:  ·   Diagnosis: M54.50 (ICD-10-CM) - Lumbar spine painM51.36 (ICD-10-CM) - DDD (degenerative disc disease), noidczS57.816 (ICD-10-CM) - Lumbar spondylosis  · Treatment Diagnosis: M51.36 DDD  ·   ·    Physician Information:   Luisa Arguello    Patient is Post-Op [] Yes   [x] No     DOS:      Visit number:       3/21/22         Subjective D/C from PT for DDD.                     $35                   Objective Per PT  · Diagnosis: M54.50 (ICD-10-CM) - Lumbar spine painM51.36 (ICD-10-CM) - DDD (degenerative disc disease), jyznmrR53.816 (ICD-10-CM) - Lumbar spondylosis  · Treatment Diagnosis: M51.36 DDD    Hamstring B 80                   Goals 1. Strengthen TA  2. Increase flexibility   3. Continue gaining strides with physical workout                   Reformer Exercises Walking 2R 2x10  Squats 2R1Y II and ER 2x10         Pelvic Stabilization   Bridges 3R 2x10 ADD  Do abs first next time                                       Trunk Stabilization 1R1Y 10x          TA series - Y's biking                             Hip Disassociation 2R1Y 2x10          Arcs, circles          Flossing 2R 15x                   Scapular Stabilization Seated on box          Rows 2R 15x                             Thoracic Mobility                                        General ROM Hamstring 1 min          Hip flexor 1 min                             Other                                        Summary/Comments Tolerated well.  Going to see how she feels this week to decide if she wants to continue                           Electronically signed by:  David Atkins, MS, LAT, ATC

## 2022-03-28 ENCOUNTER — HOSPITAL ENCOUNTER (OUTPATIENT)
Dept: PHYSICAL THERAPY | Age: 67
Discharge: HOME | End: 2022-03-28
Payer: MEDICARE

## 2022-03-28 PROCEDURE — 9990000029 HC GAP PACKAGE: Performed by: SPECIALIST/TECHNOLOGIST

## 2022-03-28 NOTE — FLOWSHEET NOTE
[facility],  [address] [facility] [place]  Phone: [phone]  Fax [phone]      Date:  3/28/2022    Patient Name:  Renetta Campos    :  1955  MRN: [id number]  Restrictions/Precautions:    Medical/Treatment Diagnosis Information:  ·   Diagnosis: M54.50 (ICD-10-CM) - Lumbar spine painM51.36 (ICD-10-CM) - DDD (degenerative disc disease), sqvqrpF58.816 (ICD-10-CM) - Lumbar spondylosis  · Treatment Diagnosis: M51.36 DDD  ·      Physician Information:   Alexandra Meyers    Patient is Post-Op [] Yes   [x] No     DOS:      Visit number: $210 3/28/22       3/21/22 3/28/22        Subjective D/C from PT for DDD. [place] good after last session, no back pain                   7/7 1/7 2/7 3/7 4/7 5/7 6/7             Objective Per PT  · Diagnosis: M54.50 (ICD-10-CM) - Lumbar spine painM51.36 (ICD-10-CM) - DDD (degenerative disc disease), stcsqxH33.816 (ICD-10-CM) - Lumbar spondylosis  · Treatment Diagnosis: M51.36 DDD    Hamstring B 80 LBP 3/10 VAS on avg    Volunteers at [employer] - involves a lot of bending, reaching, lifting. Goals 1. Strengthen TA  2. Increase flexibility   3.  Continue gaining strides with physical workout                   Reformer Exercises Walking 2R 2x10  Squats 2R1Y II and ER 2x10 2x10  2x10  Raises 2x10  U squat 2R 15x        Pelvic Stabilization   Bridges 3R 2x10 ADD  Do abs first next time 2x10 ADD  Better with abs first - continue          Standing ABD 1R1Y 2x10                            Trunk Stabilization 1R1Y 10x 10x         TA series - Y's biking No new exercises                            Hip Disassociation 2R1Y 2x10 2x10         Arcs, circles          Flossing 2R 15x D/C                  Scapular Stabilization Seated on box          Rows 2R 15x 15x                            Thoracic Mobility                                        General ROM Hamstring 1 min 1 min         Hip flexor 1 min 1 min Post capsule 1 min                  Other                                        Summary/Comments Tolerated well.  Going to see how she feels this week to decide if she wants to continue Continue POC                          Electronically signed by:  Erasmo Sutton MS, LAT, ATC

## 2022-04-14 ENCOUNTER — HOSPITAL ENCOUNTER (OUTPATIENT)
Dept: PHYSICAL THERAPY | Age: 67
Discharge: HOME | End: 2022-04-14

## 2022-04-14 NOTE — FLOWSHEET NOTE
[facility], [address] [facility]  [facility], [address] [place] [address] [place]  Phone: [phone]  Fax [phone]      Date:  2022    Patient Name:  Jaskaran Perez    :  1955  MRN: [id number]  Restrictions/Precautions:    Medical/Treatment Diagnosis Information:  ·   Diagnosis: M54.50 (ICD-10-CM) - Lumbar spine painM51.36 (ICD-10-CM) - DDD (degenerative disc disease), qgpzkdI51.816 (ICD-10-CM) - Lumbar spondylosis  · Treatment Diagnosis: M51.36 DDD  ·      Physician Information:   Femi Ballesteros    Patient is Post-Op [] Yes   [x] No     DOS:      Visit number: $210 3/28/22       3/21/22 3/28/22 4/14/22       Subjective D/C from PT for DDD. [place] good after last session, no back pain Doing well                  7/7 1/7 2/7 3/7 4/7 5/7 6/7             Objective Per PT  · Diagnosis: M54.50 (ICD-10-CM) - Lumbar spine painM51.36 (ICD-10-CM) - DDD (degenerative disc disease), trehujB97.816 (ICD-10-CM) - Lumbar spondylosis  · Treatment Diagnosis: M51.36 DDD    Hamstring B 80 LBP 3/10 VAS on avg    Volunteers at [employer] - involves a lot of bending, reaching, lifting. Goals 1. Strengthen TA  2. Increase flexibility   3.  Continue gaining strides with physical workout                   Reformer Exercises Walking 2R 2x10  Squats 2R1Y II and ER 2x10 2x10  2x10  Raises 2x10  U squat 2R 15x 2x10  2x10  2x10  15x       Pelvic Stabilization   Bridges 3R 2x10 ADD  Do abs first next time 2x10 ADD  Better with abs first - continue 2x10         Standing ABD 1R1Y 2x10 2x10                           Trunk Stabilization 1R1Y 10x 10x 15x        TA series - Y's biking No new exercises                            Hip Disassociation 2R1Y 2x10 2x10 2x10        Arcs, circles          Flossing 2R 15x D/C                  Scapular Stabilization Seated on box          Rows 2R 15x 15x                            Thoracic Mobility                                        General ROM Hamstring 1 min 1 min 1 min        Hip flexor 1 min 1 min 1 min         Post capsule 1 min 1 min                 Other                                        Summary/Comments Tolerated well.  Going to see how she feels this week to decide if she wants to continue Continue POC                          Electronically signed by:  Cody Zhang MS, LAT, ATC

## 2022-04-18 ENCOUNTER — HOSPITAL ENCOUNTER (OUTPATIENT)
Dept: PHYSICAL THERAPY | Age: 67
Discharge: HOME | End: 2022-04-18

## 2022-04-18 NOTE — FLOWSHEET NOTE
[facility] [facility], [address] [place]  Phone: [phone]  Fax [phone]      Date:  2022    Patient Name:  Ilene Ambrosio    :  1955  MRN: [id number]  Restrictions/Precautions:    Medical/Treatment Diagnosis Information:  ·   Diagnosis: M54.50 (ICD-10-CM) - Lumbar spine painM51.36 (ICD-10-CM) - DDD (degenerative disc disease), znhnmpA67.816 (ICD-10-CM) - Lumbar spondylosis  · Treatment Diagnosis: M51.36 DDD  ·      Physician Information:   Candelario Luz    Patient is Post-Op [] Yes   [x] No     DOS:      Visit number: $210 3/28/22       3/21/22 3/28/22 4/14/22 4/18/22      Subjective D/C from PT for DDD. [place] good after last session, no back pain Doing well Doing well                 7/7 1/7 2/7 3/7 4/7 5/7 6/7             Objective Per PT  · Diagnosis: M54.50 (ICD-10-CM) - Lumbar spine painM51.36 (ICD-10-CM) - DDD (degenerative disc disease), fogrvuE73.816 (ICD-10-CM) - Lumbar spondylosis  · Treatment Diagnosis: M51.36 DDD    Hamstring B 80 LBP 3/10 VAS on avg    Volunteers at [employer] - involves a lot of bending, reaching, lifting. Goals 1. Strengthen TA  2. Increase flexibility   3.  Continue gaining strides with physical workout                   Reformer Exercises Walking 2R 2x10  Squats 2R1Y II and ER 2x10 2x10  2x10  Raises 2x10  U squat 2R 15x 2x10  2x10  2x10  15x       Pelvic Stabilization   Bridges 3R 2x10 ADD  Do abs first next time 2x10 ADD  Better with abs first - continue 2x10         Standing ABD 1R1Y 2x10 2x10                           Trunk Stabilization 1R1Y 10x 10x 15x        TA series - Y's biking No new exercises                            Hip Disassociation 2R1Y 2x10 2x10 2x10        Arcs, circles          Flossing 2R 15x D/C                  Scapular Stabilization Seated on box          Rows 2R 15x 15x                            Thoracic Mobility General ROM Hamstring 1 min 1 min 1 min        Hip flexor 1 min 1 min 1 min         Post capsule 1 min 1 min                 Other                                        Summary/Comments Tolerated well.  Going to see how she feels this week to decide if she wants to continue Continue POC                          Electronically signed by:  Monica Mon MS, LAT, ATC

## 2022-04-25 ENCOUNTER — HOSPITAL ENCOUNTER (OUTPATIENT)
Dept: PHYSICAL THERAPY | Age: 67
Discharge: HOME | End: 2022-04-25

## 2022-04-25 NOTE — FLOWSHEET NOTE
[facility], [address] [place]  Phone: [phone]  Fax [phone]      Date:  2022    Patient Name:  Merlyn Louie    :  1955  MRN: [id number]  Restrictions/Precautions:    Medical/Treatment Diagnosis Information:  ·   Diagnosis: M54.50 (ICD-10-CM) - Lumbar spine painM51.36 (ICD-10-CM) - DDD (degenerative disc disease), sjrxcvY48.816 (ICD-10-CM) - Lumbar spondylosis  · Treatment Diagnosis: M51.36 DDD  ·      Physician Information:   Danilo Jennings    Patient is Post-Op [] Yes   [x] No     DOS:      Visit number: $210 3/28/22       3/21/22 3/28/22 4/14/22 4/18/22 4/25/22     Subjective D/C from PT for DDD. [place] good after last session, no back pain Doing well Doing well Doing well                7/7 1/7 2/7 3/7 4/7 5/7 6/7             Objective Per PT  · Diagnosis: M54.50 (ICD-10-CM) - Lumbar spine painM51.36 (ICD-10-CM) - DDD (degenerative disc disease), joezzoV48.816 (ICD-10-CM) - Lumbar spondylosis  · Treatment Diagnosis: M51.36 DDD    Hamstring B 80 LBP 3/10 VAS on avg    Volunteers at [employer] - involves a lot of bending, reaching, lifting. Goals 1. Strengthen TA  2. Increase flexibility   3.  Continue gaining strides with physical workout                   Reformer Exercises Walking 2R 2x10  Squats 2R1Y II and ER 2x10 2x10  2x10  Raises 2x10  U squat 2R 15x 2x10  2x10  2x10  15x       Pelvic Stabilization   Bridges 3R 2x10 ADD  Do abs first next time 2x10 ADD  Better with abs first - continue 2x10         Standing ABD 1R1Y 2x10 2x10                           Trunk Stabilization 1R1Y 10x 10x 15x        TA series - Y's biking No new exercises                            Hip Disassociation 2R1Y 2x10 2x10 2x10        Arcs, circles          Flossing 2R 15x D/C                  Scapular Stabilization Seated on box          Rows 2R 15x 15x                            Thoracic Mobility General ROM Hamstring 1 min 1 min 1 min        Hip flexor 1 min 1 min 1 min         Post capsule 1 min 1 min                 Other                                        Summary/Comments Tolerated well.  Going to see how she feels this week to decide if she wants to continue Continue POC                          Electronically signed by:  Mallika Frank MS, LAT, ATC

## 2022-04-28 ENCOUNTER — HOSPITAL ENCOUNTER (OUTPATIENT)
Dept: PHYSICAL THERAPY | Age: 67
Discharge: HOME | End: 2022-04-28

## 2022-05-02 ENCOUNTER — HOSPITAL ENCOUNTER (OUTPATIENT)
Dept: PHYSICAL THERAPY | Age: 67
Discharge: HOME | End: 2022-05-02

## 2022-05-02 NOTE — FLOWSHEET NOTE
[facility], [address] [place]  Phone: [phone]  Fax [phone]      Date:  2022    Patient Name:  Raisa Rizvi    :  1955  MRN: [id number]  Restrictions/Precautions:    Medical/Treatment Diagnosis Information:  ·   Diagnosis: M54.50 (ICD-10-CM) - Lumbar spine painM51.36 (ICD-10-CM) - DDD (degenerative disc disease), dyvdkuU25.816 (ICD-10-CM) - Lumbar spondylosis  · Treatment Diagnosis: M51.36 DDD  ·      Physician Information:   Soraida Connors    Patient is Post-Op [] Yes   [x] No     DOS:      Visit number: $210 3/28/22       3/21/22 3/28/22 4/14/22 4/18/22 4/25/22 4/28/22 5/2/22   Subjective D/C from PT for DDD. [place] good after last session, no back pain Doing well Doing well Doing well First week of 2x per week Doing well - stiff from doing yard work              7/7 1/7 2/7 3/7 4/7 5/7 6/7             Objective Per PT  · Diagnosis: M54.50 (ICD-10-CM) - Lumbar spine painM51.36 (ICD-10-CM) - DDD (degenerative disc disease), bultxiB68.816 (ICD-10-CM) - Lumbar spondylosis  · Treatment Diagnosis: M51.36 DDD    Hamstring B 80 LBP 3/10 VAS on avg    Volunteers at [employer] - involves a lot of bending, reaching, lifting. Modify as needed             Goals 1. Strengthen TA  2. Increase flexibility   3.  Continue gaining strides with physical workout                   Reformer Exercises Walking 2R 2x10  Squats 2R1Y II and ER 2x10 2x10  2x10  Raises 2x10  U squat 2R 15x 2x10  2x10  2x10  15x 2x10  2x10  2x10  15x 2x10  2x10  2x10  15x 2x10  2x10  2x10  15x 2x10  2x10  2x10  15x   Pelvic Stabilization   Bridges 3R 2x10 ADD  Do abs first next time 2x10 ADD  Better with abs first - continue 2x10 2x10 2x10 2x10 2x10     Standing ABD 1R1Y 2x10 2x10 2x10 2x10 2x10 2x10                       Trunk Stabilization 1R1Y 10x 10x 15x 15x 15x 15x 15x    TA series - Y's biking No new exercises                            Hip Disassociation 2R1Y 2x10 2x10 2x10 2x10 2x10 2x10 2x10    Arcs, circles          Flossing 2R 15x D/C                  Scapular Stabilization Seated on box          Rows 2R 15x 15x  15x 15x 15x 15x                       Thoracic Mobility                                        General ROM Hamstring 1 min 1 min 1 min 1 min 1 min 1 min 1 min    Hip flexor 1 min 1 min 1 min 1 min 1 min 1 min 1 min     Post capsule 1 min 1 min 1 min 1 min 1 min 1 min             Other                                        Summary/Comments Tolerated well.  Going to see how she feels this week to decide if she wants to continue Continue POC                          Electronically signed by:  Genesis Hammer, MS, LAT, ATC

## 2022-05-05 ENCOUNTER — HOSPITAL ENCOUNTER (OUTPATIENT)
Dept: PHYSICAL THERAPY | Age: 67
Discharge: HOME | End: 2022-05-05

## 2022-05-05 NOTE — FLOWSHEET NOTE
[facility], [address] [place]  Phone: [phone]  Fax [phone]      Date:  2022    Patient Name:  Merlyn Louie    :  1955  MRN: [id number]  Restrictions/Precautions:    Medical/Treatment Diagnosis Information:  ·   Diagnosis: M54.50 (ICD-10-CM) - Lumbar spine painM51.36 (ICD-10-CM) - DDD (degenerative disc disease), drnlwdT45.816 (ICD-10-CM) - Lumbar spondylosis  · Treatment Diagnosis: M51.36 DDD  ·      Physician Information:   Danilo Jennings    Patient is Post-Op [] Yes   [x] No     DOS:      Visit number: $210 3/28/22       5/5/22         Subjective Doing well                    7/7 1/7 2/7 3/7 4/7 5/7 6/7             Objective Per PT  · Diagnosis: M54.50 (ICD-10-CM) - Lumbar spine painM51.36 (ICD-10-CM) - DDD (degenerative disc disease), nzitfrL79.816 (ICD-10-CM) - Lumbar spondylosis  · Treatment Diagnosis: M51.36 DDD    Hamstring B 80 LBP 3/10 VAS on avg    Volunteers at [employer] - involves a lot of bending, reaching, lifting. Modify as needed             Goals 1. Strengthen TA  2. Increase flexibility   3.  Continue gaining strides with physical workout                   Reformer Exercises Walking 2R 2x10  Squats 2R1Y II and ER 2x10 2x10  2x10  Raises 2x10  U squat 2R 15x 2x10  2x10  2x10  15x 2x10  2x10  2x10  15x 2x10  2x10  2x10  15x 2x10  2x10  2x10  15x 2x10  2x10  2x10  15x   Pelvic Stabilization   Bridges 3R 2x10 ADD  Do abs first next time 2x10 ADD  Better with abs first - continue 2x10 2x10 2x10 2x10 2x10     Standing ABD 1R1Y 2x10 2x10 2x10 2x10 2x10 2x10                       Trunk Stabilization 1R1Y 10x 10x 15x 15x 15x 15x 15x    TA series - Y's biking No new exercises                            Hip Disassociation 2R1Y 2x10 2x10 2x10 2x10 2x10 2x10 2x10    Arcs, circles          Flossing 2R 15x D/C                  Scapular Stabilization Seated on box          Rows 2R 15x 15x  15x 15x 15x 15x Thoracic Mobility                                        General ROM Hamstring 1 min 1 min 1 min 1 min 1 min 1 min 1 min    Hip flexor 1 min 1 min 1 min 1 min 1 min 1 min 1 min    1 min Post capsule 1 min 1 min 1 min 1 min 1 min 1 min             Other                                        Summary/Comments Tolerated well.  Going to see how she feels this week to decide if she wants to continue Continue POC                          Electronically signed by:  Nolvia Jacobo MS, LAT, ATC

## 2022-05-09 ENCOUNTER — HOSPITAL ENCOUNTER (OUTPATIENT)
Dept: PHYSICAL THERAPY | Age: 67
Discharge: HOME | End: 2022-05-09

## 2022-05-09 PROCEDURE — 9990000029 HC GAP PACKAGE: Performed by: SPECIALIST/TECHNOLOGIST

## 2022-05-09 NOTE — FLOWSHEET NOTE
[facility], [address] [place]  Phone: [phone]  Fax [phone]      Date:  2022    Patient Name:  Chen Kinsey    :  1955  MRN: [id number]  Restrictions/Precautions:    Medical/Treatment Diagnosis Information:  ·   Diagnosis: M54.50 (ICD-10-CM) - Lumbar spine painM51.36 (ICD-10-CM) - DDD (degenerative disc disease), fqqczwW65.816 (ICD-10-CM) - Lumbar spondylosis  · Treatment Diagnosis: M51.36 DDD  ·      Physician Information:   Kat Sees    Patient is Post-Op [] Yes   [x] No     DOS:      Visit number: $210 3/28/22, 22        Subjective Doing well Doing well                   7/7 1/7 2/7 3/7 4/7 5/7 6/7             Objective Per PT  · Diagnosis: M54.50 (ICD-10-CM) - Lumbar spine painM51.36 (ICD-10-CM) - DDD (degenerative disc disease), jhgtnuG72.816 (ICD-10-CM) - Lumbar spondylosis  · Treatment Diagnosis: M51.36 DDD    Hamstring B 80 VAS - 3/10 (asked 22)    Volunteers at [place] [address] - involves a lot of bending, reaching, lifting. Modify as needed             Goals 1. Strengthen TA  2. Increase flexibility   3.  Continue gaining strides with physical workout                   Reformer Exercises Walking 2R 2x10  Squats 2R1Y II and ER 2x10 2x10  2x10  Raises 2x10  U squat 2R 15x 2x10  2x10  2x10  15x 2x10  2x10  2x10  15x 2x10  2x10  2x10  15x 2x10  2x10  2x10  15x 2x10  2x10  2x10  15x   Pelvic Stabilization   Bridges 3R 2x10 ADD  Do abs first next time 2x10 ADD  Better with abs first - continue 2x10 2x10 2x10 2x10 2x10     Standing ABD 1R1Y 2x10 2x10 2x10 2x10 2x10 2x10                       Trunk Stabilization 1R1Y 10x 12x 15x 15x 15x 15x 15x    TA series - Y's biking                             Hip Disassociation 2R1Y 2x10 2x10 2x10 2x10 2x10 2x10 2x10    Arcs, circles          Flossing 2R 15x D/C                  Scapular Stabilization Seated on box          Rows 2R 15x 15x  15x 15x 15x 15x     ext                  Thoracic Mobility  Thoracic AAROM 1R 10x                                      General ROM Hamstring 1 min 1 min 1 min 1 min 1 min 1 min 1 min    Hip flexor 1 min 1 min 1 min 1 min 1 min 1 min 1 min    1 min Post capsule 1 min 1 min 1 min 1 min 1 min 1 min             Other  HSC 1R 10x                                      Summary/Comments Tolerated well.  Going to see how she feels this week to decide if she wants to continue Continue POC                          Electronically signed by:  Nilsa Etienne MS, LAT, ATC

## 2022-05-12 ENCOUNTER — HOSPITAL ENCOUNTER (OUTPATIENT)
Dept: PHYSICAL THERAPY | Age: 67
Discharge: HOME | End: 2022-05-12

## 2022-05-16 ENCOUNTER — HOSPITAL ENCOUNTER (OUTPATIENT)
Dept: PHYSICAL THERAPY | Age: 67
Discharge: HOME | End: 2022-05-16

## 2022-05-16 NOTE — FLOWSHEET NOTE
[facility], [address] [place]  Phone: [phone]  Fax [phone]      Date:  2022    Patient Name:  Margarito Rodriguez    :  1955  MRN: [id number]  Restrictions/Precautions:    Medical/Treatment Diagnosis Information:  ·   Diagnosis: M54.50 (ICD-10-CM) - Lumbar spine painM51.36 (ICD-10-CM) - DDD (degenerative disc disease), gdkhbtI25.816 (ICD-10-CM) - Lumbar spondylosis  · Treatment Diagnosis: M51.36 DDD  ·      Physician Information:   Sudha Frias    Patient is Post-Op [] Yes   [x] No     DOS:      Visit number: $210 3/28/22, 22       Subjective Doing well Doing well Doing well - noticed she doesn't have to take a break washing dishes due to back pain. 7/7 1/7 2/7 3/7 4/7 5/7 6/7             Objective Per PT  · Diagnosis: M54.50 (ICD-10-CM) - Lumbar spine painM51.36 (ICD-10-CM) - DDD (degenerative disc disease), idlblwO56.816 (ICD-10-CM) - Lumbar spondylosis  · Treatment Diagnosis: M51.36 DDD    Hamstring B 80 VAS - 3/10 (asked 22)    Volunteers at [employer] - involves a lot of bending, reaching, lifting. Modify as needed             Goals 1. Strengthen TA  2. Increase flexibility   3.  Continue gaining strides with physical workout                   Reformer Exercises Walking 2R 2x10  Squats 2R1Y II and ER 2x10 2x10  2x10  Raises 2x10  U squat 2R 15x 2x10  2x10  2x10  15x 2x10  2x10  2x10  15x 2x10  2x10  2x10  15x 2x10  2x10  2x10  15x 2x10  2x10  2x10  15x   Pelvic Stabilization   Bridges 3R 2x10 ADD  Do abs first next time 2x10 ADD  Better with abs first - continue 2x10 2x10 2x10 2x10 2x10     Standing ABD 1R1Y 2x10 2x10 2x10 2x10 2x10 2x10                       Trunk Stabilization 1R1Y 10x 12x 15x 15x 15x 15x 15x    TA series - Y's biking                             Hip Disassociation 2R1Y 2x10 2x10 2x10 2x10 2x10 2x10 2x10    Arcs, circles          Flossing 2R 15x D/C                  Scapular Stabilization Seated on box          Rows 2R 15x 15x 15x 15x 15x 15x 15x     ext                  Thoracic Mobility  Thoracic AAROM 1R 10x 10x                                     General ROM Hamstring 1 min 1 min 1 min 1 min 1 min 1 min 1 min    Hip flexor 1 min 1 min 1 min 1 min 1 min 1 min 1 min    1 min Post capsule 1 min 1 min 1 min 1 min 1 min 1 min             Other  HSC 1R 10x                                      Summary/Comments Tolerated well.  Going to see how she feels this week to decide if she wants to continue Continue POC                          Electronically signed by:  Neha Davis, MS, LAT, ATC

## 2022-05-23 ENCOUNTER — OFFICE VISIT (OUTPATIENT)
Dept: CARDIOLOGY CLINIC | Age: 67
End: 2022-05-23
Payer: MEDICARE

## 2022-05-23 VITALS
BODY MASS INDEX: 27.14 KG/M2 | SYSTOLIC BLOOD PRESSURE: 122 MMHG | WEIGHT: 159 LBS | OXYGEN SATURATION: 98 % | HEART RATE: 63 BPM | DIASTOLIC BLOOD PRESSURE: 68 MMHG | HEIGHT: 64 IN

## 2022-05-23 DIAGNOSIS — I70.1 RENAL ARTERY STENOSIS (HCC): ICD-10-CM

## 2022-05-23 DIAGNOSIS — I10 PRIMARY HYPERTENSION: Primary | ICD-10-CM

## 2022-05-23 DIAGNOSIS — E78.00 PURE HYPERCHOLESTEROLEMIA: ICD-10-CM

## 2022-05-23 DIAGNOSIS — I10 ESSENTIAL HYPERTENSION: ICD-10-CM

## 2022-05-23 PROCEDURE — 1123F ACP DISCUSS/DSCN MKR DOCD: CPT | Performed by: INTERNAL MEDICINE

## 2022-05-23 PROCEDURE — G8417 CALC BMI ABV UP PARAM F/U: HCPCS | Performed by: INTERNAL MEDICINE

## 2022-05-23 PROCEDURE — 99214 OFFICE O/P EST MOD 30 MIN: CPT | Performed by: INTERNAL MEDICINE

## 2022-05-23 PROCEDURE — 93000 ELECTROCARDIOGRAM COMPLETE: CPT | Performed by: INTERNAL MEDICINE

## 2022-05-23 PROCEDURE — 1036F TOBACCO NON-USER: CPT | Performed by: INTERNAL MEDICINE

## 2022-05-23 PROCEDURE — 1090F PRES/ABSN URINE INCON ASSESS: CPT | Performed by: INTERNAL MEDICINE

## 2022-05-23 PROCEDURE — 3017F COLORECTAL CA SCREEN DOC REV: CPT | Performed by: INTERNAL MEDICINE

## 2022-05-23 PROCEDURE — G8399 PT W/DXA RESULTS DOCUMENT: HCPCS | Performed by: INTERNAL MEDICINE

## 2022-05-23 PROCEDURE — G8427 DOCREV CUR MEDS BY ELIG CLIN: HCPCS | Performed by: INTERNAL MEDICINE

## 2022-05-23 RX ORDER — NEBIVOLOL 2.5 MG/1
TABLET ORAL
Qty: 90 TABLET | Refills: 3
Start: 2022-05-23 | End: 2022-08-22

## 2022-05-23 NOTE — PATIENT INSTRUCTIONS
Plan:  Decrease Bystolic to 2.5 mg for a month, if blood pressure is running less than 135/85 then you can stop it   Cardiac medications reviewed including indications and pertinent side effects. Medication list updated at this visit.    Check blood pressure and heart rate at home a few times per week- keep a log with dates and times and bring to office visit   Regular exercise and following a healthy diet encouraged   Follow up with me in 1 year

## 2022-05-26 ENCOUNTER — HOSPITAL ENCOUNTER (OUTPATIENT)
Dept: PHYSICAL THERAPY | Age: 67
Discharge: HOME | End: 2022-05-26

## 2022-05-26 NOTE — FLOWSHEET NOTE
[facility], [address] [place]  Phone: [phone]  Fax [phone]      Date:  2022    Patient Name:  Luke Mosqueda    :  1955  MRN: [id number]  Restrictions/Precautions:    Medical/Treatment Diagnosis Information:  ·   Diagnosis: M54.50 (ICD-10-CM) - Lumbar spine painM51.36 (ICD-10-CM) - DDD (degenerative disc disease), [id number] (ICD-10-CM) - Lumbar spondylosis  · Treatment Diagnosis: M51.36 DDD  ·      Physician Information:   Renea Tom    Patient is Post-Op [] Yes   [x] No     DOS:      Visit number: $210 3/28/22, 22      Subjective Doing well Doing well Doing well - noticed she doesn't have to take a break washing dishes due to back pain. Doing well                 7/7 1/7 2/7 3/7 4/7 5/7 6/7             Objective Per PT  · Diagnosis: M54.50 (ICD-10-CM) - Lumbar spine painM51.36 (ICD-10-CM) - DDD (degenerative disc disease), [id number] (ICD-10-CM) - Lumbar spondylosis  · Treatment Diagnosis: M51.36 DDD    Hamstring B 80 VAS - 3/10 (asked 22)    Volunteers at [employer] - involves a lot of bending, reaching, lifting. Modify as needed             Goals 1. Strengthen TA  2. Increase flexibility   3.  Continue gaining strides with physical workout                   Reformer Exercises Walking 2R 2x10  Squats 2R1Y II and ER 2x10 2x10  2x10  Raises 2x10  U squat 2R 15x 2x10  2x10  2x10  15x 2x10  2x10  2x10  15x 2x10  2x10  2x10  15x 2x10  2x10  2x10  15x 2x10  2x10  2x10  15x   Pelvic Stabilization   Bridges 3R 2x10 ADD  Do abs first next time 2x10 ADD  Better with abs first - continue 2x10 2x10 2x10 2x10 2x10     Standing ABD 1R1Y 2x10 2x10 2x10 2x10 2x10 2x10                       Trunk Stabilization 1R1Y 10x 12x 15x 15x 15x 15x 15x    TA series - Y's biking                             Hip Disassociation 2R1Y 2x10 2x10 2x10 2x10 2x10 2x10 2x10    Arcs, circles Flossing 2R 15x D/C                  Scapular Stabilization Seated on box          Rows 2R 15x 15x 15x 15x 15x 15x 15x     ext                  Thoracic Mobility  Thoracic AAROM 1R 10x 10x                                     General ROM Hamstring 1 min 1 min 1 min 1 min 1 min 1 min 1 min    Hip flexor 1 min 1 min 1 min 1 min 1 min 1 min 1 min    1 min Post capsule 1 min 1 min 1 min 1 min 1 min 1 min             Other  HSC 1R 10x                                      Summary/Comments Tolerated well.  Going to see how she feels this week to decide if she wants to continue Continue POC                          Electronically signed by:  Mallika Frank, MS, LAT, ATC

## 2022-06-02 ENCOUNTER — HOSPITAL ENCOUNTER (OUTPATIENT)
Dept: PHYSICAL THERAPY | Age: 67
Discharge: HOME | End: 2022-06-02

## 2022-06-02 NOTE — FLOWSHEET NOTE
Arcs, circles          Flossing 2R 15x D/C                  Scapular Stabilization Seated on box          Rows 2R 15x 15x 15x 15x 15x 15x 15x     ext                  Thoracic Mobility  Thoracic AAROM 1R 10x 10x                                     General ROM Hamstring 1 min 1 min 1 min 1 min 1 min 1 min 1 min    Hip flexor 1 min 1 min 1 min 1 min 1 min 1 min 1 min    1 min Post capsule 1 min 1 min 1 min 1 min 1 min 1 min             Other  HSC 1R 10x                                      Summary/Comments Tolerated well.  Going to see how she feels this week to decide if she wants to continue Continue POC                          Electronically signed by:  Nilsa Etienne MS, LAT, ATC

## 2022-06-06 ENCOUNTER — HOSPITAL ENCOUNTER (OUTPATIENT)
Dept: PHYSICAL THERAPY | Age: 67
Discharge: HOME | End: 2022-06-06

## 2022-06-06 NOTE — FLOWSHEET NOTE
2x10 2x10 2x10    Arcs, circles          Flossing 2R 15x D/C                  Scapular Stabilization Seated on box          Rows 2R 15x 15x 15x 15x 15x 15x 15x     ext                  Thoracic Mobility  Thoracic AAROM 1R 10x 10x                                     General ROM Hamstring 1 min 1 min 1 min 1 min 1 min 1 min 1 min    Hip flexor 1 min 1 min 1 min 1 min 1 min 1 min 1 min    1 min Post capsule 1 min 1 min 1 min 1 min 1 min 1 min             Other  HSC 1R 10x                                      Summary/Comments Tolerated well.  Going to see how she feels this week to decide if she wants to continue Continue POC                          Electronically signed by:  Herbert Millan, MS, LAT, ATC

## 2022-06-09 ENCOUNTER — HOSPITAL ENCOUNTER (OUTPATIENT)
Dept: PHYSICAL THERAPY | Age: 67
Discharge: HOME | End: 2022-06-09

## 2022-06-09 NOTE — FLOWSHEET NOTE
[facility], [address] [place]  Phone: [phone]  Fax [phone]      Date:  2022    Patient Name:  Chen Kinsey    :  1955  MRN: [id number]  Restrictions/Precautions:    Medical/Treatment Diagnosis Information:  ·   Diagnosis: M54.50 (ICD-10-CM) - Lumbar spine painM51.36 (ICD-10-CM) - DDD (degenerative disc disease), [id number] (ICD-10-CM) - Lumbar spondylosis  · Treatment Diagnosis: M51.36 DDD  ·      Physician Information:   Kat Sees    Patient is Post-Op [] Yes   [x] No     DOS:      Visit number: $210 3/28/22, 22   Subjective Doing well Doing well Doing well - noticed she doesn't have to take a break washing dishes due to back pain. Doing well Doing well Doing well Doing well              7/7 1/7 2/7 3/7 4/7 5/7 6/7             Objective Per PT  · Diagnosis: M54.50 (ICD-10-CM) - Lumbar spine painM51.36 (ICD-10-CM) - DDD (degenerative disc disease), [id number] (ICD-10-CM) - Lumbar spondylosis  · Treatment Diagnosis: M51.36 DDD    Hamstring B 80 VAS - 3/10 (asked 22)    Volunteers at [place] [address] - involves a lot of bending, reaching, lifting. Hamstring B 80             Goals 1. Strengthen TA  2. Increase flexibility   3.  Continue gaining strides with physical workout                   Reformer Exercises Walking 2R 2x10  Squats 2R1Y II and ER 2x10 2x10  2x10  Raises 2x10  U squat 2R 15x 2x10  2x10  2x10  15x 2x10  2x10  2x10  15x 2x10  2x10  2x10  15x 2x10  2x10  2x10  15x 2x10  2x10  2x10  15x   Pelvic Stabilization   Bridges 3R 2x10 ADD  Do abs first next time 2x10 ADD  Better with abs first - continue 2x10 2x10 2x10 2x10 2x10     Standing ABD 1R1Y 2x10 2x10 2x10 2x10 2x10 2x10                       Trunk Stabilization 1R1Y 10x 12x 15x 15x 15x 15x 15x    TA series - Y's biking                             Hip Disassociation 2R1Y 2x10 2x10 2x10 2x10 2x10 2x10 2x10    Arcs, circles          Flossing 2R 15x D/C                  Scapular Stabilization Seated on box          Rows 2R 15x 15x 15x 15x 15x 15x 15x     ext                  Thoracic Mobility  Thoracic AAROM 1R 10x 10x                                     General ROM Hamstring 1 min 1 min 1 min 1 min 1 min 1 min 1 min    Hip flexor 1 min 1 min 1 min 1 min 1 min 1 min 1 min    1 min Post capsule 1 min 1 min 1 min 1 min 1 min 1 min             Other  HSC 1R 10x                                      Summary/Comments Tolerated well.  Going to see how she feels this week to decide if she wants to continue Continue POC                          Electronically signed by:  Diana Piña, MS, LAT, ATC

## 2022-06-13 ENCOUNTER — HOSPITAL ENCOUNTER (OUTPATIENT)
Dept: PHYSICAL THERAPY | Age: 67
Discharge: HOME | End: 2022-06-13

## 2022-06-13 NOTE — FLOWSHEET NOTE
[facility] [facility], [address] [place]  Phone: [phone]  Fax [phone]      Date:  2022    Patient Name:  Jamal Jimenez    :  1955  MRN: [id number]  Restrictions/Precautions:    Medical/Treatment Diagnosis Information:  ·   Diagnosis: M54.50 (ICD-10-CM) - Lumbar spine painM51.36 (ICD-10-CM) - DDD (degenerative disc disease), [id number] (ICD-10-CM) - Lumbar spondylosis  · Treatment Diagnosis: M51.36 DDD  ·      Physician Information:   Jacqueline Tabares    Patient is Post-Op [] Yes   [x] No     DOS:      Visit number: $210 3/28/22, 22         Subjective Doing well - just kind of tired from doing so much this weekend. 7/7 1/7 2/7 3/7 4/7 5/7 6/7             Objective Per PT  · Diagnosis: M54.50 (ICD-10-CM) - Lumbar spine painM51.36 (ICD-10-CM) - DDD (degenerative disc disease), [id number] (ICD-10-CM) - Lumbar spondylosis  · Treatment Diagnosis: M51.36 DDD    Hamstring B 80 VAS - 3/10 (asked 22)    Volunteers at [employer] - involves a lot of bending, reaching, lifting. Hamstring B 80             Goals 1. Strengthen TA  2. Increase flexibility   3.  Continue gaining strides with physical workout                   Reformer Exercises Walking 2R 2x10  Squats 2R1Y II and ER 2x10 2x10  2x10  Raises 2x10  U squat 2R 15x 2x10  2x10  2x10  15x 2x10  2x10  2x10  15x 2x10  2x10  2x10  15x 2x10  2x10  2x10  15x 2x10  2x10  2x10  15x   Pelvic Stabilization   Bridges 3R 2x10 ADD  Do abs first next time 2x10 ADD  Better with abs first - continue 2x10 2x10 2x10 2x10 2x10     Standing ABD 1R1Y 2x10 2x10 2x10 2x10 2x10 2x10                       Trunk Stabilization 1R1Y 10x 12x 15x 15x 15x 15x 15x    TA series - Y's biking                             Hip Disassociation 2R1Y 2x10 2x10 2x10 2x10 2x10 2x10 2x10    Arcs, circles          Flossing 2R 15x D/C                  Scapular Stabilization Seated on box          Rows 2R 15x 15x 15x 15x 15x 15x 15x     ext                  Thoracic Mobility  Thoracic AAROM 1R 10x 10x                                     General ROM Hamstring 1 min 1 min 1 min 1 min 1 min 1 min 1 min    Hip flexor 1 min 1 min 1 min 1 min 1 min 1 min 1 min    1 min Post capsule 1 min 1 min 1 min 1 min 1 min 1 min             Other  HSC 1R 10x                                      Summary/Comments Tolerated well.  Going to see how she feels this week to decide if she wants to continue Continue POC                          Electronically signed by:  Vero Palomino, MS, LAT, ATC

## 2022-06-16 ENCOUNTER — HOSPITAL ENCOUNTER (OUTPATIENT)
Age: 67
Discharge: HOME | End: 2022-06-16
Payer: MEDICARE

## 2022-06-16 LAB
ALBUMIN SERPL-MCNC: 4.2 G/DL (ref 3.4–5)
ANION GAP SERPL CALCULATED.3IONS-SCNC: 12 MMOL/L (ref 3–16)
BILIRUBIN URINE: NEGATIVE
BLOOD, URINE: NEGATIVE
BUN BLDV-MCNC: 16 MG/DL (ref 7–20)
CALCIUM SERPL-MCNC: 9.9 MG/DL (ref 8.3–10.6)
CHLORIDE BLD-SCNC: 103 MMOL/L (ref 99–110)
CLARITY: CLEAR
CO2: 27 MMOL/L (ref 21–32)
COLOR: ABNORMAL
CREAT SERPL-MCNC: 1.4 MG/DL (ref 0.6–1.2)
CREATININE URINE: 61.8 MG/DL (ref 28–259)
GFR AFRICAN AMERICAN: 46
GFR NON-AFRICAN AMERICAN: 38
GLUCOSE BLD-MCNC: 88 MG/DL (ref 70–99)
GLUCOSE URINE: NEGATIVE MG/DL
KETONES, URINE: NEGATIVE MG/DL
LEUKOCYTE ESTERASE, URINE: ABNORMAL
MICROALBUMIN UR-MCNC: <1.2 MG/DL
MICROALBUMIN/CREAT UR-RTO: NORMAL MG/G (ref 0–30)
MICROSCOPIC EXAMINATION: YES
NITRITE, URINE: NEGATIVE
PH UA: 7.5 (ref 5–8)
PHOSPHORUS: 3.2 MG/DL (ref 2.5–4.9)
POTASSIUM SERPL-SCNC: 4.6 MMOL/L (ref 3.5–5.1)
PROTEIN UA: NEGATIVE MG/DL
RBC UA: NORMAL /HPF (ref 0–4)
SODIUM BLD-SCNC: 142 MMOL/L (ref 136–145)
SPECIFIC GRAVITY UA: 1.01 (ref 1–1.03)
URIC ACID, SERUM: 7.7 MG/DL (ref 2.6–6)
URINE TYPE: ABNORMAL
UROBILINOGEN, URINE: 0.2 E.U./DL
WBC UA: NORMAL /HPF (ref 0–5)

## 2022-06-16 PROCEDURE — 80069 RENAL FUNCTION PANEL: CPT

## 2022-06-16 PROCEDURE — 82043 UR ALBUMIN QUANTITATIVE: CPT

## 2022-06-16 PROCEDURE — 84550 ASSAY OF BLOOD/URIC ACID: CPT

## 2022-06-16 PROCEDURE — 81001 URINALYSIS AUTO W/SCOPE: CPT

## 2022-06-16 PROCEDURE — 82570 ASSAY OF URINE CREATININE: CPT

## 2022-06-16 PROCEDURE — 83970 ASSAY OF PARATHORMONE: CPT

## 2022-06-16 PROCEDURE — 36415 COLL VENOUS BLD VENIPUNCTURE: CPT

## 2022-06-17 LAB — PARATHYROID HORMONE INTACT: 38.9 PG/ML (ref 14–72)

## 2022-06-20 ENCOUNTER — HOSPITAL ENCOUNTER (OUTPATIENT)
Dept: PHYSICAL THERAPY | Age: 67
Discharge: HOME | End: 2022-06-20

## 2022-06-20 PROCEDURE — 9990000029 HC GAP PACKAGE: Performed by: SPECIALIST/TECHNOLOGIST

## 2022-06-20 NOTE — FLOWSHEET NOTE
[facility], [address] [place]  Phone: [phone]  Fax [phone]      Date:  2022    Patient Name:  Jamal Jimenez    :  1955  MRN: [id number]  Restrictions/Precautions:    Medical/Treatment Diagnosis Information:  ·   Diagnosis: M54.50 (ICD-10-CM) - Lumbar spine painM51.36 (ICD-10-CM) - DDD (degenerative disc disease), [id number] (ICD-10-CM) - Lumbar spondylosis  · Treatment Diagnosis: M51.36 DDD  ·      Physician Information:   Jacqueline Tabares    Patient is Post-Op [] Yes   [x] No     DOS:      Visit number: $210 3/28/22, 22, 22        Subjective Doing well - just kind of tired from doing so much this weekend. Noticed back was more sore/tight with inactivity due to heat this past week. 7/7 1/7 2/7 3/7 4/7 5/7 6/7             Objective Per PT  · Diagnosis: M54.50 (ICD-10-CM) - Lumbar spine painM51.36 (ICD-10-CM) - DDD (degenerative disc disease), pigekuU95.816 (ICD-10-CM) - Lumbar spondylosis  · Treatment Diagnosis: M51.36 DDD    Hamstring B 80 Adding home exercise plan at least 1x/day to keep moving to decrease back pain     Hamstring B 80             Goals 1. Strengthen TA  2. Increase flexibility   3.  Continue gaining strides with physical workout                   Reformer Exercises Walking 2R 2x10  Squats 2R1Y II and ER 2x10 2x10  2x10  Raises 2x10  U squat 2R 15x 2x10  2x10  2x10  15x 2x10  2x10  2x10  15x 2x10  2x10  2x10  15x 2x10  2x10  2x10  15x 2x10  2x10  2x10  15x   Pelvic Stabilization   Bridges 3R 2x10 ADD  Do abs first next time 2x10 ADD  Better with abs first - continue 2x10 2x10 2x10 2x10 2x10     Standing ABD 1R1Y 2x10 2x10 2x10 2x10 2x10 2x10                       Trunk Stabilization 1R1Y 10x 12x 15x 15x 15x 15x 15x    TA series - Y's biking                             Hip Disassociation 2R1Y 2x10 2x10 2x10 2x10 2x10 2x10 2x10    Arcs, circles Flossing 2R 15x D/C                  Scapular Stabilization Seated on box          Rows 2R 15x 15x 15x 15x 15x 15x 15x     ext                  Thoracic Mobility  Thoracic AAROM 1R 10x 10x                                     General ROM Hamstring 1 min 1 min 1 min 1 min 1 min 1 min 1 min    Hip flexor 1 min 1 min 1 min 1 min 1 min 1 min 1 min    1 min Post capsule 1 min 1 min 1 min 1 min 1 min 1 min             Other  HSC 1R 10x                                      Summary/Comments Tolerated well.  Going to see how she feels this week to decide if she wants to continue Continue POC                          Electronically signed by:  Дмитрий Nieto, MS, LAT, ATC

## 2022-06-27 ENCOUNTER — HOSPITAL ENCOUNTER (OUTPATIENT)
Dept: PHYSICAL THERAPY | Age: 67
Discharge: HOME | End: 2022-06-27

## 2022-06-27 NOTE — FLOWSHEET NOTE
[facility] [facility],  [address] [place]  Phone: [phone]  Fax [phone]      Date:  2022    Patient Name:  Ousmane Pino    :  1955  MRN: [id number]  Restrictions/Precautions:    Medical/Treatment Diagnosis Information:  ·   Diagnosis: M54.50 (ICD-10-CM) - Lumbar spine painM51.36 (ICD-10-CM) - DDD (degenerative disc disease), [id number] (ICD-10-CM) - Lumbar spondylosis  · Treatment Diagnosis: M51.36 DDD  ·      Physician Information:   Elina Reyes    Patient is Post-Op [] Yes   [x] No     DOS:      Visit number: $210 3/28/22, 22, 22       Subjective Doing well - just kind of tired from doing so much this weekend. Noticed back was more sore/tight with inactivity due to heat this past week. Has been having increased LBP - may be caused by R lateral knee pain. 7/7 1/7 2/7 3/7 4/7 5/7 6/7             Objective Per PT  · Diagnosis: M54.50 (ICD-10-CM) - Lumbar spine painM51.36 (ICD-10-CM) - DDD (degenerative disc disease), [id number] (ICD-10-CM) - Lumbar spondylosis  · Treatment Diagnosis: M51.36 DDD    Hamstring B 80 Adding home exercise plan at least 1x/day to keep moving to decrease back pain R knee pain no BJORN. 2-3/10 pain began during the middle of the week last week    Back pain is located more to L side vs central.     Noticed knee pain first than back pain. Neuropathy in R toes (2-4). Hamstring B 80             Goals 1. Strengthen TA  2. Increase flexibility   3.  Continue gaining strides with physical workout                   Reformer Exercises Walking 2R 2x10  Squats 2R1Y II and ER 2x10 2x10  2x10  Raises 2x10  U squat 2R 15x 2x10  2x10 no wide ER  2x10   2x10  2x10  2x10  15x 2x10  2x10  2x10  15x 2x10  2x10  2x10  15x 2x10  2x10  2x10  15x   Pelvic Stabilization   Bridges 3R 2x10 ADD  Do abs first next time 2x10 ADD  Better with abs first - continue 2x10  30 sec rest in between sets 2x10 2x10 2x10 2x10     Standing ABD 1R1Y 2x10  2x10 2x10 2x10 2x10                       Trunk Stabilization 1R1Y 10x 12x 2x10x 15x 15x 15x 15x    TA series - Y's biking  30 sec rest in between sets                           Hip Disassociation 2R1Y 2x10 2x10 15x 2x10 2x10 2x10 2x10    Arcs, circles          Flossing 2R 15x D/C                  Scapular Stabilization Seated on box          Rows 2R 15x 15x  15x 15x 15x 15x     ext                  Thoracic Mobility  Thoracic AAROM 1R 10x [place] with ball 2 blue 10x                                     General ROM Hamstring 1 min 1 min 1 min 1 min 1 min 1 min 1 min    Hip flexor 1 min 1 min 1 min 1 min 1 min 1 min 1 min    1 min Post capsule 1 min 1 min 1 min 1 min 1 min 1 min             Other  HSC 1R 10x                                      Summary/Comments Tolerated well.  Going to see how she feels this week to decide if she wants to continue Continue POC                          Electronically signed by:  Ramona Jimenes, MS, LAT, ATC

## 2022-06-30 DIAGNOSIS — E78.00 PURE HYPERCHOLESTEROLEMIA: ICD-10-CM

## 2022-06-30 RX ORDER — ATORVASTATIN CALCIUM 40 MG/1
TABLET, FILM COATED ORAL
Qty: 90 TABLET | Refills: 3 | Status: SHIPPED | OUTPATIENT
Start: 2022-06-30

## 2022-06-30 NOTE — TELEPHONE ENCOUNTER
Pt would like [facility] [place] refill.      [facility] 5/23/2022

## 2022-07-07 ENCOUNTER — HOSPITAL ENCOUNTER (OUTPATIENT)
Dept: PHYSICAL THERAPY | Age: 67
Discharge: HOME | End: 2022-07-07

## 2022-07-07 NOTE — FLOWSHEET NOTE
[facility],  [address]  Phone: [phone]  Fax [phone]      Date:  2022    Patient Name:  Kristen Dick    :  1955  MRN: [id number]  Restrictions/Precautions:    Medical/Treatment Diagnosis Information:  ·   Diagnosis: M54.50 (ICD-10-CM) - Lumbar spine painM51.36 (ICD-10-CM) - DDD (degenerative disc disease), [id number] (ICD-10-CM) - Lumbar spondylosis  · Treatment Diagnosis: M51.36 DDD  ·      Physician Information:   Alejandra Patricio    Patient is Post-Op [] Yes   [x] No     DOS:      Visit number: $210 3/28/22, 22, 22      Subjective Doing well - just kind of tired from doing so much this weekend. Noticed back was more sore/tight with inactivity due to heat this past week. Has been having increased LBP - may be caused by R lateral knee pain. Was feeling better, but today has not been a good day. Feels very stiff and having difficulty moving                 7/7 1/7 2/7 3/7 4/7 5/7 6/7             Objective Per PT  · Diagnosis: M54.50 (ICD-10-CM) - Lumbar spine painM51.36 (ICD-10-CM) - DDD (degenerative disc disease), [id number] (ICD-10-CM) - Lumbar spondylosis  · Treatment Diagnosis: M51.36 DDD    Hamstring B 80 Adding home exercise plan at least 1x/day to keep moving to decrease back pain R knee pain no BJORN. 2-3/10 pain began during the middle of the week last week    Back pain is located more to L side vs central.     Noticed knee pain first than back pain. Neuropathy in R toes (2-4). Hamstring B 80             Goals 1. Strengthen TA  2. Increase flexibility   3.  Continue gaining strides with physical workout                   Reformer Exercises Walking 2R 2x10  Squats 2R1Y II and ER 2x10 2x10  2x10  Raises 2x10  U squat 2R 15x 2x10  2x10 no wide ER  2x10   2x10  2x10  2x10  15x 2x10  2x10  2x10  15x 2x10  2x10  2x10  15x 2x10  2x10  2x10  15x Pelvic Stabilization   Bridges 3R 2x10 ADD  Do abs first next time 2x10 ADD  Better with abs first - continue 2x10  30 sec rest in between sets 2x10 2x10 2x10 2x10     Standing ABD 1R1Y 2x10  2x10 2x10 2x10 2x10                       Trunk Stabilization 1R1Y 10x 12x 2x10x 15x 15x 15x 15x    TA series - Y's biking  30 sec rest in between sets                           Hip Disassociation 2R1Y 2x10 2x10 15x 2x10 2x10 2x10 2x10    Arcs, circles          Flossing 2R 15x D/C                  Scapular Stabilization Seated on box          Rows 2R 15x 15x  15x 15x 15x 15x     ext                  Thoracic Mobility  Thoracic AAROM 1R 10x [place] with ball 2 blue 10x                                     General ROM Hamstring 1 min 1 min 1 min 1 min 1 min 1 min 1 min    Hip flexor 1 min 1 min 1 min 1 min 1 min 1 min 1 min    1 min Post capsule 1 min 1 min 1 min 1 min 1 min 1 min             Other  HSC 1R 10x                                      Summary/Comments Tolerated well.  Going to see how she feels this week to decide if she wants to continue Continue POC                          Electronically signed by:  Sarah Cannon, MS, LAT, ATC

## 2022-07-11 ENCOUNTER — OFFICE VISIT (OUTPATIENT)
Dept: ORTHOPEDIC SURGERY | Age: 67
End: 2022-07-11
Payer: MEDICARE

## 2022-07-11 ENCOUNTER — HOSPITAL ENCOUNTER (OUTPATIENT)
Dept: PHYSICAL THERAPY | Age: 67
Discharge: HOME | End: 2022-07-11

## 2022-07-11 VITALS — RESPIRATION RATE: 15 BRPM | BODY MASS INDEX: 27.31 KG/M2 | HEIGHT: 64 IN | WEIGHT: 160 LBS

## 2022-07-11 DIAGNOSIS — M65.9 SYNOVITIS OF RIGHT KNEE: ICD-10-CM

## 2022-07-11 DIAGNOSIS — M25.561 RIGHT KNEE PAIN, UNSPECIFIED CHRONICITY: Primary | ICD-10-CM

## 2022-07-11 PROCEDURE — 1123F ACP DISCUSS/DSCN MKR DOCD: CPT | Performed by: PHYSICIAN ASSISTANT

## 2022-07-11 PROCEDURE — 99213 OFFICE O/P EST LOW 20 MIN: CPT | Performed by: PHYSICIAN ASSISTANT

## 2022-07-11 PROCEDURE — G8399 PT W/DXA RESULTS DOCUMENT: HCPCS | Performed by: PHYSICIAN ASSISTANT

## 2022-07-11 PROCEDURE — 1090F PRES/ABSN URINE INCON ASSESS: CPT | Performed by: PHYSICIAN ASSISTANT

## 2022-07-11 PROCEDURE — G8417 CALC BMI ABV UP PARAM F/U: HCPCS | Performed by: PHYSICIAN ASSISTANT

## 2022-07-11 PROCEDURE — G8427 DOCREV CUR MEDS BY ELIG CLIN: HCPCS | Performed by: PHYSICIAN ASSISTANT

## 2022-07-11 PROCEDURE — 1036F TOBACCO NON-USER: CPT | Performed by: PHYSICIAN ASSISTANT

## 2022-07-11 PROCEDURE — 3017F COLORECTAL CA SCREEN DOC REV: CPT | Performed by: PHYSICIAN ASSISTANT

## 2022-07-11 NOTE — PROGRESS NOTES
Subjective:      Patient ID: Adam Cottrell is a 78 y.o. female who is here in the [address] [place], [place] [address]y 19 [address]- for an initial evaluation of right anterior knee pain. She states approximately 1 month ago she walked into a piece of furniture and struck her knee. Since that time she has had discomfort in the anterior knee. She reports pain with stairs and chairs. She denies mechanical symptoms such as locking and catching. Pain Scale 7/10 VAS. Location of pain anterior lateral knee just inferior to the patella. Pain is worse with stairs, chairs. Pain improves with ice, elevation. Previous treatments have included Tylenol. Cannot take NSAIDs due to CKD. Review of Systems:  I have reviewed the clinically relevant past medical history, medications, allergies, family history, social history, and 13 point Review of Systems from the patient's recent history form & documented any details relevant to today's presenting complaints in the history above. The patient's self-reported past medical history, medications, allergies, family history, social history, and Review of Systems form from today's date have been scanned into the chart under the \"Media\" tab. As outlined in the HPI. Negative for fever or chills. Negative for poly-joint pain, swelling and stiffness. She denies numbness or tingling into the affected extremity.      Past Medical History:   Diagnosis Date    Actinic keratosis     Breast disorder     CAD (coronary artery disease)     Chronic kidney disease     Hyperlipidemia     Hypertension     Infertility, female     S/P colonoscopy 2009    Tendinitis of foot     UTI (urinary tract infection)        Family History   Problem Relation Age of Onset    Heart Disease Father         [place]  at  age 48   Eitan High Blood Pressure Sister     Diabetes Sister     Kidney Disease Sister         transplant 2019    Breast Cancer Sister     High Blood Pressure Mother    Eitan High Cholesterol Mother     Arthritis Mother         gout    Heart Disease Mother     Cancer Mother     Heart Disease Brother     High Cholesterol Brother     High Blood Pressure Brother        Past Surgical History:   Procedure Laterality Date    ABDOMEN SURGERY      stone removed from bile duct    ANGIOPLASTY      renal    BREAST BIOPSY      BREAST SURGERY      duct removal    BUNIONECTOMY Left 12/15/2020    MODIFIED YAZMIN BUNIONECTOMY, MODIFIED AKIN OSTEOTOMY, MODIFIED WEIL SECOND METATARSAL, PROXIMAL INTERPHALANGEAL JOINT ARTHRODESIS DIGITS TWO THREE FOUR LEFT FOOT performed by Aquilino Wilkinson DPM at [place] [address]  12/1992    COLONOSCOPY  8/1/2009    COLONOSCOPY N/A 10/23/2019    COLONOSCOPY POLYPECTOMY SNARE/COLD BIOPSY performed by He Boyd MD at [address] [facility] N/A 7/29/2019    MIDLINE LUMBAR FIVE SACRAL ONE EPIDURAL STEROID INJECTION SITE CONFIRMED BY FLUOROSCOPY performed by Miladys Galarza MD at [facility] Left 01/11/2016    Cystoscopy, with left stent placement    TONSILLECTOMY AND ADENOIDECTOMY      UPPER GASTROINTESTINAL ENDOSCOPY N/A 4/7/2021    EGD BIOPSY performed by He Boyd MD at [address] [place] [address] History     Occupational History    Not on file   Tobacco Use    Smoking status: Never Smoker    Smokeless tobacco: Never Used   Vaping Use    Vaping Use: Never used   Substance and Sexual Activity    Alcohol use: No    Drug use: No    Sexual activity: Not Currently       Current Outpatient Medications   Medication Sig Dispense Refill    atorvastatin (LIPITOR) 40 MG tablet TAKE ONE TABLET BY MOUTH DAILY 90 tablet 3    denosumab (PROLIA) 60 MG/ML SOSY SC injection Inject 60 mg into the skin once      pantoprazole (PROTONIX) 40 MG tablet       Potassium Citrate ER 15 MEQ (1620 MG) TBCR 2 times daily       calcitRIOL (ROCALTROL) 0.25 MCG capsule Take 0.25 mcg by mouth every other day      aspirin 81 MG tablet Take 81 mg by mouth daily      nebivolol (BYSTOLIC) 2.5 MG tablet TAKE ONE TABLET BY MOUTH DAILY (Patient not taking: Reported on 7/11/2022) 90 tablet 3    predniSONE (DELTASONE) 10 MG tablet Take 1 tablet by mouth twice a day for 7 days, then take 1 tablet by mouth once a day for 7 days, then take Medrol Dosepak (Patient not taking: Reported on 5/23/2022) 21 tablet 0    methylPREDNISolone (MEDROL, ABIGAIL,) 4 MG tablet Take by mouth. (Patient not taking: Reported on 5/23/2022) 1 kit 0    Melatonin 5 MG CHEW Take by mouth nightly  (Patient not taking: Reported on 7/11/2022)       No current facility-administered medications for this visit. Allergies   Allergen Reactions    Codeine Rash, Nausea And Vomiting and Shortness Of Breath    Lisinopril      cough    Norvasc [Amlodipine Besylate]      swelling    Triamterene-Hctz      Renal insufficiency         Objective:     She is alert, oriented x 3, pleasant, well nourished, developed and in no acute distress. Resp 15   Ht 5' 4\" (1.626 m)   Wt 160 lb (72.6 kg)   LMP 07/11/2005   BMI 27.46 kg/m²      Examination of the right knee: The alignment of the knee is neutral.   There is not erythema. There is no soft tissue swelling. There is a mild effusion. ROM-  Extension 0           -   Flexion  130   There mild pain associated with ROM testing. Medial joint line non tender to palpation. Lateral joint line non tender to palpation. Retro patellar crepitus is present. There is no pain with resisted knee extension. There is no crepitus along the joint line with ROM testing. Varus Stress testing does not produce pain and does not show laxity. Valgus Stress testing does not produce pain and does not show laxity. Lachman's test is negative. Anterior Drawer test is negative. Posterior Drawer test is negative  Ligia's Test is negative.    Patellar Compression testing does reproduce pain. Extensor Mechanism is  intact. Lower extremities:  She has 5/5 motor strength of bilateral lower extremities. She has a negative straight leg raise, bilaterally. Deep tendon reflexes at knees and achilles are 2+. Sensation is intact to light touch L3 to S1 bilaterally. She has no clonus. Examination of the lower extremities shows intact perfusion to both lower extremities. She has no cyanosis and digigts are warm to touch, capillary refill is less than 2 seconds. She has no edema noted. She has intact skin without lacerations or abrasions, no significant erythema, rashes or skin lesions. X Rays: were performed in the office today:   AP, PA Standing, Lateral and Sunrise views of right knee:  No acute fractures or dislocations noted. There is minimal patellar subluxation. Additional Tests reviewed: none  Additional Outside Records reviewed: none    Diagnosis:       ICD-10-CM    1. Right knee pain, unspecified chronicity  M25.561 XR KNEE RIGHT (MIN 4 VIEWS)   2. Synovitis of right knee  M65.9         Assessment and Plan:       Assessment:  Right anterior knee pain - probable synovitis or may be a traumatic plica. I had an extensive discussion with Ms. Marina Mclean regarding the natural history, etiology, and long term consequences of her condition. I have presented reasonable alternatives to the patient's proposed care, treatment, and services. Risks and benefits of the treatment options also reviewed in detail. I have outlined a treatment plan with them. She has had full opportunity to ask her questions. I have answered them all to her satisfaction. I feel that Ms. Marina Mclean understands our discussion today. Plan:  Medications-Tylenol discussed for pain. Rest, Ice, Compression and Elevation  Activity restriction/ Modification discussed.      PT- A home exercise program was instructed today including ROM exercises and strengthening exercises. The patient verbalized understanding of these exercises as well as the importance of the exercise program to promote return of normal function. If pain intensifies or other problems arise you are to notify the office. Follow up- 1-2 weeks with Dr Alem Julien   Call or return to clinic if these symptoms worsen or fail to improve as anticipated. Juvenal Hewitt PA-C   Senior Physician Assistant   [facility]                                         Disclaimer: This note was generated with use of a verbal recognition program (DRAGON) and an attempt was made to check for errors. It is possible that there are still dictated errors within this office note. If so, please bring any significant errors to my attention for an addendum. All efforts were made to ensure that this office note is accurate.

## 2022-07-11 NOTE — FLOWSHEET NOTE
[place] [address] [facility],  [address] [place]  Phone: [phone]  Fax [phone]      Date:  2022    Patient Name:  Medina Estrada    :  1955  MRN: [id number]  Restrictions/Precautions:    Medical/Treatment Diagnosis Information:  ·   Diagnosis: M54.50 (ICD-10-CM) - Lumbar spine painM51.36 (ICD-10-CM) - DDD (degenerative disc disease), [id number] (ICD-10-CM) - Lumbar spondylosis  · Treatment Diagnosis: M51.36 DDD  ·      Physician Information:   Noé Ulloa    Patient is Post-Op [] Yes   [x] No     DOS:      Visit number: $210 3/28/22, 22, 22     Subjective Doing well - just kind of tired from doing so much this weekend. Noticed back was more sore/tight with inactivity due to heat this past week. Has been having increased LBP - may be caused by R lateral knee pain. Was feeling better, but today has not been a good day. Feels very stiff and having difficulty moving Going to [address] [place] [address] after Pilates for R knee pain    Back pain was really bad yesterday. May be due to prolonged standing                7/7 1/7 2/7 3/7 4/7 5/7 6/7             Objective Per PT  · Diagnosis: M54.50 (ICD-10-CM) - Lumbar spine painM51.36 (ICD-10-CM) - DDD (degenerative disc disease), [id number] (ICD-10-CM) - Lumbar spondylosis  · Treatment Diagnosis: M51.36 DDD    Hamstring B 80 Adding home exercise plan at least 1x/day to keep moving to decrease back pain R knee pain no BJORN. 2-310 pain began during the middle of the week last week    Back pain is located more to L side vs central.     Noticed knee pain first than back pain. Neuropathy in R toes (2-4). Hamstring B 80             Goals 1. Strengthen TA  2. Increase flexibility   3.  Continue gaining strides with physical workout                   Reformer Exercises Walking 2R 2x10  Squats 2R1Y II and ER 2x10 2x10  2x10  Raises 2x10  U squat 2R 15x 2x10  2x10 no wide ER  2x10   2x10  2x10  2x10  15x 2x10  2x10  2x10  15x 2x10  2x10  2x10  15x 2x10  2x10  2x10  15x   Pelvic Stabilization   Bridges 3R 2x10 ADD  Do abs first next time 2x10 ADD  Better with abs first - continue 2x10  30 sec rest in between sets 2x10 2x10 2x10 2x10     Standing ABD 1R1Y 2x10  2x10 2x10 2x10 2x10                       Trunk Stabilization 1R1Y 10x 12x 2x10x 15x 15x 15x 15x    TA series - Y's biking  30 sec rest in between sets                           Hip Disassociation 2R1Y 2x10 2x10 15x 2x10 2x10 2x10 2x10    Arcs, circles          Flossing 2R 15x D/C                  Scapular Stabilization Seated on box          Rows 2R 15x 15x  15x 15x 15x 15x     ext                  Thoracic Mobility  Thoracic AAROM 1R 10x [place] with ball 2 blue 10x                                     General ROM Hamstring 1 min 1 min 1 min 1 min 1 min 1 min 1 min    Hip flexor 1 min 1 min 1 min 1 min 1 min 1 min 1 min    1 min Post capsule 1 min 1 min 1 min 1 min 1 min 1 min             Other  HSC 1R 10x                                      Summary/Comments Tolerated well.  Going to see how she feels this week to decide if she wants to continue Continue POC                          Electronically signed by:  Douglas Cruz, MS, LAT, ATC

## 2022-07-13 ENCOUNTER — PATIENT MESSAGE (OUTPATIENT)
Dept: ORTHOPEDIC SURGERY | Age: 67
End: 2022-07-13

## 2022-07-13 RX ORDER — METHYLPREDNISOLONE 4 MG/1
TABLET ORAL
Qty: 1 KIT | Refills: 0 | Status: SHIPPED | OUTPATIENT
Start: 2022-07-13 | End: 2022-07-25 | Stop reason: ALTCHOICE

## 2022-07-13 NOTE — TELEPHONE ENCOUNTER
From: Mitchell Delgado  To: Deanna Gallardo  Sent: 7/13/2022 12:28 PM EDT  Subject: Right knee    Hi I was in clinic Mon night for inflammation of synovial fluid on right knee. Could you prescribe a Med Dose izzy to see if that would take care of this before deciding to do cortisone shot? Thanks.

## 2022-07-18 ENCOUNTER — HOSPITAL ENCOUNTER (OUTPATIENT)
Dept: PHYSICAL THERAPY | Age: 67
Discharge: HOME | End: 2022-07-18

## 2022-07-18 NOTE — FLOWSHEET NOTE
[place] [facility] [facility], [address]  Phone: [phone]  Fax [phone]      Date:  2022    Patient Name:  Nicolas Manrique    :  1955  MRN: [id number]  Restrictions/Precautions:    Medical/Treatment Diagnosis Information:    Diagnosis: M54.50 (ICD-10-CM) - Lumbar spine painM51.36 (ICD-10-CM) - DDD (degenerative disc disease), [id number] (ICD-10-CM) - Lumbar spondylosis  Treatment Diagnosis: M51.36 DDD       Physician Information:   Minor Snellen    Patient is Post-Op [] Yes   [x] No     DOS:      Visit number: $210 3/28/22, 22, 22    Subjective Doing well - just kind of tired from doing so much this weekend. Noticed back was more sore/tight with inactivity due to heat this past week. Has been having increased LBP - may be caused by R lateral knee pain. Was feeling better, but today has not been a good day. Feels very stiff and having difficulty moving Going to [address] [place] [address] after Pilates for R knee pain    Back pain was really bad yesterday. May be due to prolonged standing Dx synovitis. Steroid and F/U with [facility]. Pain has decreased and as a result back pain has decreased. 7/7 1/7 2/7 3/7 4/7 5/7 6/7             Objective Per PT  Diagnosis: M54.50 (ICD-10-CM) - Lumbar spine painM51.36 (ICD-10-CM) - DDD (degenerative disc disease), [id number] (ICD-10-CM) - Lumbar spondylosis  Treatment Diagnosis: M51.36 DDD    Hamstring B 80 Adding home exercise plan at least 1x/day to keep moving to decrease back pain R knee pain no BJORN. 2-310 pain began during the middle of the week last week    Back pain is located more to L side vs central.     Noticed knee pain first than back pain. Neuropathy in R toes (2-4). Hamstring B 80             Goals 1. Strengthen TA  2. Increase flexibility   3.  Continue gaining strides with physical workout Reformer Exercises Walking 2R 2x10  Squats 2R1Y II and ER 2x10 2x10  2x10  Raises 2x10  U squat 2R 15x 2x10  2x10 no wide ER  2x10  2x10  2x10  2x10  15x 2x10  2x10  2x10  15x 2x10  2x10  2x10  15x 2x10  2x10  2x10  15x   Pelvic Stabilization   Bridges 3R 2x10 ADD  Do abs first next time 2x10 ADD  Better with abs first - continue 2x10  30 sec rest in between sets 2x10 2x10 2x10 2x10     Standing ABD 1R1Y 2x10 2x10 2x10 2x10 2x10                       Trunk Stabilization 1R1Y 10x 12x 2x10x 15x 15x 15x 15x    TA series - Y's biking  30 sec rest in between sets                           Hip Disassociation 2R1Y 2x10 2x10 15x 2x10 2x10 2x10 2x10    Arcs, circles          Flossing 2R 15x D/C                  Scapular Stabilization Seated on box          Rows 2R 15x 15x  15x 15x 15x 15x     ext                  Thoracic Mobility  Thoracic AAROM 1R 10x [place] with ball 2 blue 10x                                     General ROM Hamstring 1 min 1 min 1 min 1 min 1 min 1 min 1 min    Hip flexor 1 min 1 min 1 min 1 min 1 min 1 min 1 min    1 min Post capsule 1 min 1 min 1 min 1 min 1 min 1 min             Other  HSC 1R 10x                                      Summary/Comments Tolerated well.  Going to see how she feels this week to decide if she wants to continue Continue POC                          Electronically signed by:  Jeffrey Rouse, MS, LAT, ATC

## 2022-07-25 ENCOUNTER — OFFICE VISIT (OUTPATIENT)
Dept: ORTHOPEDIC SURGERY | Age: 67
End: 2022-07-25
Payer: MEDICARE

## 2022-07-25 VITALS — WEIGHT: 160 LBS | HEIGHT: 64 IN | BODY MASS INDEX: 27.31 KG/M2

## 2022-07-25 DIAGNOSIS — M25.561 ACUTE PAIN OF RIGHT KNEE: ICD-10-CM

## 2022-07-25 DIAGNOSIS — S80.01XA CONTUSION OF RIGHT KNEE, INITIAL ENCOUNTER: Primary | ICD-10-CM

## 2022-07-25 DIAGNOSIS — M22.41 CHONDROMALACIA PATELLAE OF RIGHT KNEE: ICD-10-CM

## 2022-07-25 PROCEDURE — 1036F TOBACCO NON-USER: CPT | Performed by: FAMILY MEDICINE

## 2022-07-25 PROCEDURE — 1090F PRES/ABSN URINE INCON ASSESS: CPT | Performed by: FAMILY MEDICINE

## 2022-07-25 PROCEDURE — 20610 DRAIN/INJ JOINT/BURSA W/O US: CPT | Performed by: FAMILY MEDICINE

## 2022-07-25 PROCEDURE — G8427 DOCREV CUR MEDS BY ELIG CLIN: HCPCS | Performed by: FAMILY MEDICINE

## 2022-07-25 PROCEDURE — 3017F COLORECTAL CA SCREEN DOC REV: CPT | Performed by: FAMILY MEDICINE

## 2022-07-25 PROCEDURE — G8399 PT W/DXA RESULTS DOCUMENT: HCPCS | Performed by: FAMILY MEDICINE

## 2022-07-25 PROCEDURE — L1812 KO ELASTIC W/JOINTS PRE OTS: HCPCS | Performed by: FAMILY MEDICINE

## 2022-07-25 PROCEDURE — 1123F ACP DISCUSS/DSCN MKR DOCD: CPT | Performed by: FAMILY MEDICINE

## 2022-07-25 PROCEDURE — 99203 OFFICE O/P NEW LOW 30 MIN: CPT | Performed by: FAMILY MEDICINE

## 2022-07-25 PROCEDURE — G8417 CALC BMI ABV UP PARAM F/U: HCPCS | Performed by: FAMILY MEDICINE

## 2022-07-25 RX ORDER — TRIAMCINOLONE ACETONIDE 40 MG/ML
80 INJECTION, SUSPENSION INTRA-ARTICULAR; INTRAMUSCULAR ONCE
Status: COMPLETED | OUTPATIENT
Start: 2022-07-25 | End: 2022-07-25

## 2022-07-25 RX ORDER — BUPIVACAINE HYDROCHLORIDE 2.5 MG/ML
2 INJECTION, SOLUTION INFILTRATION; PERINEURAL ONCE
Status: COMPLETED | OUTPATIENT
Start: 2022-07-25 | End: 2022-07-25

## 2022-07-25 RX ORDER — LIDOCAINE HYDROCHLORIDE 10 MG/ML
1 INJECTION, SOLUTION INFILTRATION; PERINEURAL ONCE
Status: COMPLETED | OUTPATIENT
Start: 2022-07-25 | End: 2022-07-25

## 2022-07-25 RX ADMIN — BUPIVACAINE HYDROCHLORIDE 5 MG: 2.5 INJECTION, SOLUTION INFILTRATION; PERINEURAL at 11:01

## 2022-07-25 RX ADMIN — TRIAMCINOLONE ACETONIDE 80 MG: 40 INJECTION, SUSPENSION INTRA-ARTICULAR; INTRAMUSCULAR at 11:02

## 2022-07-25 RX ADMIN — LIDOCAINE HYDROCHLORIDE 1 ML: 10 INJECTION, SOLUTION INFILTRATION; PERINEURAL at 11:02

## 2022-07-25 NOTE — PROGRESS NOTES
Subjective:      Patient ID: Rafael Corbin is a 78 y.o. female who is here in the [id number] [place], [place] [address]y [address]- for an initial evaluation of right anterior knee pain. She states approximately 1 month ago she walked into a piece of furniture and struck her knee. Since that time she has had discomfort in the anterior knee. She reports pain with stairs and chairs. She denies mechanical symptoms such as locking and catching. Roselyn Hui is a very pleasant retired white female who does have a history of mechanical low back pain is a patient of Dr. Heriberto Rubinstein who is being seen today in kind consultation from Norma Mcclelland for evaluation of an injury to her right knee. She states that in early June 2022 she was helping her daughter move and her house was somewhat cluttered and believes she struck the anterior portion of her right knee against the edge of a metal table. There is no pop or crack although she did have immediate pain her pain symptoms do progress or worsen after several days to the point where is difficulty performing her exercise program going up and down stairs and doing her Pilates. Most of her pain is anterior in nature and she has had some very mild swelling. Initially she treated herself with Tylenol but avoided anti-inflammatories given her history of kidney dysfunction but was eventually seen by Norma Mcclelland on 7/11/2022 where x-rays did not reveal high-grade degenerative changes and was placed on a Medrol pack which has helped her symptoms substantially at about 50 to 60% but is still unable to walk and perform her exercises. She is have some occasional subtle buckling and denies active locking or catching. No previous history of knee injury. She is being seen today for orthopedic and sports consultation with imaging. Pain Scale 7/10 VAS. Location of pain anterior lateral knee just inferior to the patella. Pain is worse with stairs, chairs.    Pain improves with ice, elevation. Previous treatments have included Tylenol. Cannot take NSAIDs due to CKD. Review of Systems:  I have reviewed the clinically relevant past medical history, medications, allergies, family history, social history, and 13 point Review of Systems from the patient's recent history form & documented any details relevant to today's presenting complaints in the history above. The patient's self-reported past medical history, medications, allergies, family history, social history, and Review of Systems form from today's date have been scanned into the chart under the \"Media\" tab. As outlined in the HPI. Negative for fever or chills. Negative for poly-joint pain, swelling and stiffness. She denies numbness or tingling into the affected extremity.      Past Medical History:   Diagnosis Date    Actinic keratosis     Breast disorder     CAD (coronary artery disease)     Chronic kidney disease     Hyperlipidemia     Hypertension     Infertility, female     S/P colonoscopy 2009    Tendinitis of foot     UTI (urinary tract infection)        Family History   Problem Relation Age of Onset    Heart Disease Father         MI  at  age 48    High Blood Pressure Sister     Diabetes Sister     Kidney Disease Sister         transplant     Breast Cancer Sister     High Blood Pressure Mother     High Cholesterol Mother     Arthritis Mother         gout    Heart Disease Mother     Cancer Mother     Heart Disease Brother     High Cholesterol Brother     High Blood Pressure Brother        Past Surgical History:   Procedure Laterality Date    ABDOMEN SURGERY      stone removed from bile duct    ANGIOPLASTY      renal    BREAST BIOPSY      BREAST SURGERY      duct removal    BUNIONECTOMY Left 12/15/2020    MODIFIED AYZMIN BUNIONECTOMY, MODIFIED AKIN OSTEOTOMY, MODIFIED WEIL SECOND METATARSAL, PROXIMAL INTERPHALANGEAL JOINT ARTHRODESIS DIGITS TWO THREE FOUR LEFT FOOT performed by Josselyn Schreiber DPM at [facility]    CHOLECYSTECTOMY  12/1992    COLONOSCOPY  8/1/2009    COLONOSCOPY N/A 10/23/2019    COLONOSCOPY POLYPECTOMY SNARE/COLD BIOPSY performed by Susie Taylor MD at [address] 7/29/2019    MIDLINE LUMBAR FIVE SACRAL ONE EPIDURAL STEROID INJECTION SITE CONFIRMED BY FLUOROSCOPY performed by Jonel Rendon MD at [facility] Left 01/11/2016    Cystoscopy, with left stent placement    TONSILLECTOMY AND ADENOIDECTOMY      UPPER GASTROINTESTINAL ENDOSCOPY N/A 4/7/2021    EGD BIOPSY [facility] [address] [facility] [place],  [employer] History     Occupational History    Not on file   Tobacco Use    Smoking status: Never    Smokeless tobacco: Never   Vaping Use    Vaping Use: Never used   Substance and Sexual Activity    Alcohol use: No    Drug use: No    Sexual activity: Not Currently       Current Outpatient Medications   Medication Sig Dispense Refill    diclofenac sodium (VOLTAREN) 1 % GEL Apply 4 g topically 4 times daily as needed for Pain 100 g 3    atorvastatin (LIPITOR) 40 MG tablet TAKE ONE TABLET BY MOUTH DAILY 90 tablet 3    denosumab (PROLIA) 60 MG/ML SOSY SC injection Inject 60 mg into the skin once      pantoprazole (PROTONIX) 40 MG tablet       Potassium Citrate ER 15 MEQ (1620 MG) TBCR 2 times daily       calcitRIOL (ROCALTROL) 0.25 MCG capsule Take 0.25 mcg by mouth every other day      aspirin 81 MG tablet Take 81 mg by mouth daily      nebivolol (BYSTOLIC) 2.5 MG tablet TAKE ONE TABLET BY MOUTH DAILY (Patient not taking: No sig reported) 90 tablet 3    Melatonin 5 MG CHEW Take by mouth nightly  (Patient not taking: No sig reported)       No current facility-administered medications for this visit.        Allergies   Allergen Reactions    Codeine Rash, Nausea And Vomiting and Shortness Of Breath    Lisinopril      cough    Norvasc [Amlodipine Besylate]      swelling Triamterene-Hctz      Renal insufficiency         Objective:     She is alert, oriented x 3, pleasant, well nourished, developed and in no acute distress. Ht 5' 4\" (1.626 m)   Wt 160 lb (72.6 kg)   LMP 07/11/2005   BMI 27.46 kg/m²      Examination of the right knee: The alignment of the knee is neutral.   There is not erythema. There is no soft tissue swelling. There is a mild effusion. ROM-  Extension 0           -   Flexion  130   There mild pain associated with ROM testing. Medial joint line non tender to palpation. Lateral joint line non tender to palpation. Retro patellar crepitus is present. There is no pain with resisted knee extension. There is no crepitus along the joint line with ROM testing. Varus Stress testing does not produce pain and does not show laxity. Valgus Stress testing does not produce pain and does not show laxity. Lachman's test is negative. Anterior Drawer test is negative. Posterior Drawer test is negative  Ligia's Test is negative. Patellar Compression testing does reproduce pain. When she rates it about a 5-6 out of 10. Extensor Mechanism is  intact. Lower extremities:  She has 5/5 motor strength of bilateral lower extremities. She has a negative straight leg raise, bilaterally. Deep tendon reflexes at knees and achilles are 2+. Sensation is intact to light touch L3 to S1 bilaterally. She has no clonus. Examination of the lower extremities shows intact perfusion to both lower extremities. She has no cyanosis and digigts are warm to touch, capillary refill is less than 2 seconds. She has no edema noted. She has intact skin without lacerations or abrasions, no significant erythema, rashes or skin lesions. X Rays: were performed in the office on 7/11/2022  AP, PA Standing, Lateral and Sunrise views of right knee:  No acute fractures or dislocations noted. There is minimal patellar subluxation.            Additional Tests reviewed: none  Additional Outside Records reviewed: none    Diagnosis:       ICD-10-CM    1. Contusion of right knee, initial encounter  S80. 01XA OK ARTHROCENTESIS ASPIR&/INJ MAJOR JT/BURSA W/O US     diclofenac sodium (VOLTAREN) 1 % GEL     triamcinolone acetonide (KENALOG-40) injection 80 mg     Ambulatory referral to Physical Therapy     Bre Economy Hinged Knee WrapAround Brace     bupivacaine (MARCAINE) 0.25 % injection 5 mg     lidocaine 1 % injection 1 mL      2. Chondromalacia patellae of right knee  M22.41 OK ARTHROCENTESIS ASPIR&/INJ MAJOR JT/BURSA W/O US     diclofenac sodium (VOLTAREN) 1 % GEL     triamcinolone acetonide (KENALOG-40) injection 80 mg     Ambulatory referral to Physical Therapy     Bre [employer] Hinged Knee WrapAround Brace     bupivacaine (MARCAINE) 0.25 % injection 5 mg     lidocaine 1 % injection 1 mL      3. Acute pain of right knee  M25.561 OK ARTHROCENTESIS ASPIR&/INJ MAJOR JT/BURSA W/O US     diclofenac sodium (VOLTAREN) 1 % GEL     triamcinolone acetonide (KENALOG-40) injection 80 mg     Ambulatory referral to Physical Therapy     S[employer] Hinged Knee WrapAround Brace     bupivacaine (MARCAINE) 0.25 % injection 5 mg     lidocaine 1 % injection 1 mL           Assessment and Plan:       Assessment:  #1.  6-week status post posttraumatic right knee aggravation chondromalacia patella with ongoing knee pain secondary to knee contusion      I had an extensive discussion with Ms. Marina Mclean regarding the natural history, etiology, and long term consequences of her condition. I have presented reasonable alternatives to the patient's proposed care, treatment, and services. Risks and benefits of the treatment options also reviewed in detail. I have outlined a treatment plan with them. She has had full opportunity to ask her questions. I have answered them all to her satisfaction. I feel that Ms. Marina Mclean understands our discussion today.        Plan: Treatment options were discussed with Bobby Gaitan today. We did review her plain films and exam findings. She does not appear to have a great deal of degenerative changes involving her knee on plain films but has been having ongoing pain symptoms. After discussing options, we did opt to inject her right knee today using 2 cc of kenalog, 2 cc Marcaine, 1 cc Xylocaine. We did recommend Voltaren gel but I would have her get the approval from her nephrologist on taking this as she does have only 1 partially functioning kidney. We did place her in a wraparound knee brace and will start her in a short course of therapy and have the therapist review her exercise program.  We will see her back in about 4 weeks and consider imaging if she is failing to improve. She will contact us in the interim with questions or concerns. Jamar Hernandez MD    Disclaimer: This note was generated with use of a verbal recognition program (DRAGON) and an attempt was made to check for errors. It is possible that there are still dictated errors within this office note. If so, please bring any significant errors to my attention for an addendum. All efforts were made to ensure that this office note is accurate.

## 2022-08-01 ENCOUNTER — HOSPITAL ENCOUNTER (OUTPATIENT)
Dept: PHYSICAL THERAPY | Age: 67
Setting detail: THERAPIES SERIES
Discharge: HOME | End: 2022-08-01
Payer: MEDICARE

## 2022-08-01 ENCOUNTER — TELEPHONE (OUTPATIENT)
Dept: ORTHOPEDIC SURGERY | Age: 67
End: 2022-08-01

## 2022-08-01 DIAGNOSIS — S80.01XA CONTUSION OF RIGHT KNEE, INITIAL ENCOUNTER: ICD-10-CM

## 2022-08-01 DIAGNOSIS — M25.561 RIGHT KNEE PAIN, UNSPECIFIED CHRONICITY: ICD-10-CM

## 2022-08-01 DIAGNOSIS — M25.561 ACUTE PAIN OF RIGHT KNEE: ICD-10-CM

## 2022-08-01 DIAGNOSIS — M22.41 CHONDROMALACIA PATELLAE OF RIGHT KNEE: Primary | ICD-10-CM

## 2022-08-01 DIAGNOSIS — M51.36 DDD (DEGENERATIVE DISC DISEASE), LUMBAR: ICD-10-CM

## 2022-08-01 PROCEDURE — G0283 ELEC STIM OTHER THAN WOUND: HCPCS | Performed by: PHYSICAL THERAPIST

## 2022-08-01 PROCEDURE — 97110 THERAPEUTIC EXERCISES: CPT | Performed by: PHYSICAL THERAPIST

## 2022-08-01 PROCEDURE — 97161 PT EVAL LOW COMPLEX 20 MIN: CPT | Performed by: PHYSICAL THERAPIST

## 2022-08-01 PROCEDURE — 97140 MANUAL THERAPY 1/> REGIONS: CPT | Performed by: PHYSICAL THERAPIST

## 2022-08-01 RX ORDER — GABAPENTIN 300 MG/1
CAPSULE ORAL
Qty: 90 CAPSULE | Refills: 1 | Status: SHIPPED | OUTPATIENT
Start: 2022-08-01 | End: 2022-10-31 | Stop reason: ALTCHOICE

## 2022-08-01 RX ORDER — METHYLPREDNISOLONE 4 MG/1
TABLET ORAL
Qty: 21 KIT | Refills: 0 | Status: SHIPPED | OUTPATIENT
Start: 2022-08-01 | End: 2022-08-22 | Stop reason: ALTCHOICE

## 2022-08-01 NOTE — TELEPHONE ENCOUNTER
SPOKE TO PATIENT WHILE SHE WAS HERE FOR THERAPY, DR. Roshni So WILL CALL IN A STEROID PACK AS WELL AS GABAPENTIN. ADVISED PATIENT TO MAKE APPT WITH SANTI BRISCOE FOR HER BACK AS THIS IS PRESENTING POTENTIALLY MORE LIKE A FLARE OF HER LUMBAR SPINE. WILL SEE PATIENT IN THE OFFICE ON Wednesday TO SEE IF THOSE TWO MEDICINES ARE HELPING AT ALL.

## 2022-08-01 NOTE — PLAN OF CARE
[facility], [address] [place] [address], [address] [place] [address]  Phone: [phone]  Fax [phone]     Physical Therapy Certification    Dear  Dr Shweta Burroughs,    We had the pleasure of evaluating the following patient for physical therapy services at [address] [place] [address]. A summary of our findings can be found in the initial assessment below. This includes our plan of care. If you have any questions or concerns regarding these findings, please do not hesitate to contact me at the office phone number checked above. Thank you for the referral.       Physician Signature:_______________________________Date:__________________  By signing above (or electronic signature), therapists plan is approved by physician    Patient: Elsy Wilson   : 1955   MRN: [id number]  Referring Physician:  Dr Shweta Burroughs      Evaluation Date: 2022      Medical Diagnosis Information:  Diagnosis: M25.561 (ICD-10-CM) - Acute pain of right knee, S80.01XA (ICD-10-CM) - Contusion of right knee, initial encounter, M22.41 (ICD-10-CM) - Chondromalacia patellae of right knee   Treatment Diagnosis: M25.561 (ICD-10-CM) - Acute pain of right knee                                         Insurance information: PT Insurance Information: [employer] mutual       Precautions/ Contra-indications: none    C-SSRS Triggered by Intake questionnaire (Past 2 wk assessment):   [x] No, Questionnaire did not trigger screening.   [] Yes, Patient intake triggered further evaluation      [] C-SSRS Screening completed  [] PCP notified via Plan of Care  [] Emergency services notified     Latex Allergy:  [x]NO      []YES  Preferred Language for Healthcare:   [x]English       []other:    SUBJECTIVE: Patient stated complaint: Pt reports she hit her knee against a low bookcase about 1 month ago. Tried modifying things in Pilates, but wasn't getting better.   She was seen in After hours clinic and placed on steroid pack. Followed up with Dr Tika Narvaez and given a hinged knee brace. Over the weekend she notes is starting to have more pain in her shin and getting dorsal foot numbness.     Relevant Medical History:Hyperlipidemia, hypertension, kidney disease, low back pain (2019)  Functional Disability Index: FOTO knee 21/100    Pain Scale: 4-10/10  Easing factors: ice, Tramadol helps slightly, sitting with leg propped up  Provocative factors: sleeping, sitting, walking, position transitions,  kneeling, stairs, squatting    Type: [x]Constant   []Intermittent  [x]Radiating []Localized []other:     Numbness/Tingling: lateral R calf and dorsal foot    Occupation/School:retired teacher    Living Status/Prior Level of Function: Independent with ADLs and IADLs, lives alone, stairs in the home with handrail, enjoys walking and Pilates for exercise    OBJECTIVE:     ROM LEFT RIGHT   HIP Flex     HIP Abd     HIP Ext     HIP IR     HIP ER     Knee ext  0   Knee Flex  133   Ankle PF  60   Ankle DF  0   Ankle In     Ankle Ev     Strength  LEFT RIGHT   HIP Flexors 4 4   HIP Abductors     HIP Ext     Hip ER     Knee EXT (quad) 5 4 w/ pain   Knee Flex (HS) 4+ 4+   Ankle DF 5 4   Ankle PF 5 seated 5 seated   Ankle Inv 4+ 4+   Ankle EV 5 4- w/ pain        Circumference  Mid apex  7 cm prox             Reflexes/Sensation:    []Dermatomes/Myotomes intact    []Reflexes equal and normal bilaterally   []Other:    Joint mobility: pain with patellar mobs   []Normal    []Hypo   []Hyper    Palpation: tenderness @ fibular head with + tinnels that reproduces her pain and NT, pain lateral knee, distal ITB, dorsal foot and ties    Functional Mobility/Transfers: indep with increased pain    Posture: decreased WBing R LE    Bandages/Dressings/Incisions: NA    Gait: (include devices/WB status) antalgic R LE with decreased TKE in stance and decreased push off, decreased R stance time with decreased R wt shift    Orthopedic Special Tests: + SLR, +tinnels, + ITB/HS tightness                       [x] Patient history, allergies, meds reviewed. Medical chart reviewed. See intake form. Review Of Systems (ROS):  [x]Performed Review of systems (Integumentary, CardioPulmonary, Neurological) by intake and observation. Intake form has been scanned into medical record. Patient has been instructed to contact their primary care physician regarding ROS issues if not already being addressed at this time. Co-morbidities/Complexities (which will affect course of rehabilitation):   []None           Arthritic conditions   []Rheumatoid arthritis (M05.9)  [x]Osteoarthritis (M19.91)   Cardiovascular conditions   [x]Hypertension (I10)  [x]Hyperlipidemia (E78.5)  []Angina pectoris (I20)  []Atherosclerosis (I70)   Musculoskeletal conditions   [x]Disc pathology   []Congenital spine pathologies   []Prior surgical intervention  [x]Osteoporosis (M81.8)  []Osteopenia (M85.8)   Endocrine conditions   []Hypothyroid (E03.9)  []Hyperthyroid Gastrointestinal conditions   []Constipation (W49.46)   Metabolic conditions   []Morbid obesity (E66.01)  []Diabetes type 1(E10.65) or 2 (E11.65)   []Neuropathy (G60.9)     Pulmonary conditions   []Asthma (J45)  []Coughing   []COPD (J44.9)   Psychological Disorders  []Anxiety (F41.9)  []Depression (F32.9)   []Other:   []Other:          Barriers to/and or personal factors that will affect rehab potential:              []Age  []Sex              []Motivation/Lack of Motivation                        [x]Co-Morbidities              []Cognitive Function, education/learning barriers              []Environmental, home barriers              []profession/work barriers  []past PT/medical experience  []other:  Justification:     Falls Risk Assessment (30 days):   [x] Falls Risk assessed and no intervention required.   [] Falls Risk assessed and Patient requires intervention due to being higher risk   TUG score (>12s at risk):     [] Falls education provided, including           ASSESSMENT:   Functional Impairments:     []Noted lumbar/proximal hip/LE joint hypomobility   [x]Decreased LE functional ROM   [x]Decreased core/proximal hip strength and neuromuscular control   [x]Decreased LE functional strength   [x]Reduced balance/proprioceptive control   []other:      Functional Activity Limitations (from functional questionnaire and intake)   [x]Reduced ability to tolerate prolonged functional positions   [x]Reduced ability or difficulty with changes of positions or transfers between positions   [x]Reduced ability to maintain good posture and demonstrate good body mechanics with sitting, bending, and lifting   [x]Reduced ability to sleep   [x] Reduced ability or tolerance with driving and/or computer work   [x]Reduced ability to perform lifting, carrying tasks   [x]Reduced ability to squat   [x]Reduced ability to forward bend   [x]Reduced ability to ambulate prolonged functional periods/distances/surfaces   [x]Reduced ability to ascend/descend stairs   [x]Reduced ability to run, hop, cut or jump   []other:    Participation Restrictions   [x]Reduced participation in self care activities   [x]Reduced participation in home management activities   []Reduced participation in work activities   [x]Reduced participation in social activities. []Reduced participation in sport/recreation activities. Classification :    []Signs/symptoms consistent with post-surgical status including decreased ROM, strength and function.    []Signs/symptoms consistent with joint sprain/strain   []Signs/symptoms consistent with patella-femoral syndrome   [x]Signs/symptoms consistent with knee OA/hip OA   []Signs/symptoms consistent with internal derangement of knee/Hip   [x]Signs/symptoms consistent with functional hip weakness/NMR control      []Signs/symptoms consistent with tendinitis/tendinosis    [x]signs/symptoms consistent with pathology which may benefit from Dry needling [x]other:  signs/symptoms consistent with peroneal nerve inflammation    Prognosis/Rehab Potential:      []Excellent   [x]Good    []Fair   []Poor    Tolerance of evaluation/treatment:    []Excellent   []Good    [x]Fair   []Poor  Physical Therapy Evaluation Complexity Justification  [x] A history of present problem with:  [] no personal factors and/or comorbidities that impact the plan of care;  []1-2 personal factors and/or comorbidities that impact the plan of care  [x]3 personal factors and/or comorbidities that impact the plan of care  [x] An examination of body systems using standardized tests and measures addressing any of the following: body structures and functions (impairments), activity limitations, and/or participation restrictions;:  [x] a total of 1-2 or more elements   [] a total of 3 or more elements   [] a total of 4 or more elements   [x] A clinical presentation with:  [x] stable and/or uncomplicated characteristics   [] evolving clinical presentation with changing characteristics  [] unstable and unpredictable characteristics;   [x] Clinical decision making of [x] low, [] moderate, [] high complexity using standardized patient assessment instrument and/or measurable assessment of functional outcome. [x] EVAL (LOW) 41920 (typically 20 minutes face-to-face)  [] EVAL (MOD) 16267 (typically 30 minutes face-to-face)  [] EVAL (HIGH) 57731 (typically 45 minutes face-to-face)  [] RE-EVAL       PLAN:   Frequency/Duration:  2 days per week for 8 Weeks:  Interventions:  [x]  Therapeutic exercise including: strength training, ROM, for Lower extremity and core   [x]  NMR activation and proprioception for LE, Glutes and Core   [x]  Manual therapy as indicated for LE, Hip and spine to include: Dry Needling/IASTM, STM, PROM, Gr I-IV mobilizations.    [x] Modalities as needed that may include: thermal agents, E-stim, Biofeedback, US, iontophoresis as indicated  [x] Patient education on joint protection, postural re-education, activity modification, progression of HEP. HEP instruction:   Access Code: [email or web address]  URL: [email or web address]  Date: 08/01/2022  Prepared by: Sara Calhoun    Exercises  Supine Ankle Pumps - 1-2 x daily - 7 x weekly - 1-3 sets - 10 reps  Supine Ankle Inversion Eversion AROM - 1-2 x daily - 7 x weekly - 1-3 sets - 10 reps  Supine 90/90 Sciatic Nerve Glide with Knee Flexion/Extension - 1 x daily - 7 x weekly - 3 sets - 10 reps  Seated Long Arc Quad with Hip Adduction - 1 x daily - 7 x weekly - 1-3 sets - 10 reps      GOALS:  Patient stated goal: decrease pain with daily activity  [] Progressing: [] Met: [] Not Met: [] Adjusted    Therapist goals for Patient:   Short Term Goals: To be achieved in: 2 weeks  1. Independent in HEP and progression per patient tolerance, in order to prevent re-injury. [] Progressing: [] Met: [] Not Met: [] Adjusted  2. Patient will have a decrease in pain to facilitate improvement in movement, function, and ADLs as indicated by Functional Deficits. [] Progressing: [] Met: [] Not Met: [] Adjusted    Long Term Goals: To be achieved in: 8 weeks  1. Disability index score of 52% or more per FOTO to assist with reaching prior level of function. [] Progressing: [] Met: [] Not Met: [] Adjusted  2. Patient will demonstrate increased AROM to 10 deg DF and lumbar flex/ext to [facility] to allow for proper joint functioning with dressing and ADL's.   [] Progressing: [] Met: [] Not Met: [] Adjusted  3. Patient will demonstrate an increase in Strength to 5/5 R knee and ankle to allow for proper functional mobility with squatting and stairs. [] Progressing: [] Met: [] Not Met: [] Adjusted  4. Patient will return to walking at least 39' to go grocery shopping without increased symptoms or restriction. [] Progressing: [] Met: [] Not Met: [] Adjusted  5. Pt will be able to resume Pilates post rehab program for ongoing conditioning and healthy lifestyle.     [] Progressing: [] Met: [] Not Met: [] Adjusted     Electronically signed by:  Alissa Hoff, PT

## 2022-08-01 NOTE — TELEPHONE ENCOUNTER
General Question     Subject: REQUEST FOR A SOONER APPOINTMENT  Patient and /or Facility Request: Ruben Graft  Contact Number: [phone]    PATIENT CALLED REQUESTING TO BE SEEN AS SOON AS POSSIBLE FOR HER RIGHT KNEE PAIN. PATIENT IS SCHEDULED FOR THIS Wednesday BUT WANTS  A SOONER APPOINTMENT DUE TO PAIN. PLEASE CONTACT PATIENT AT THE ABOVE NUMBER.

## 2022-08-02 ENCOUNTER — NURSE ONLY (OUTPATIENT)
Dept: FAMILY MEDICINE CLINIC | Age: 67
End: 2022-08-02
Payer: MEDICARE

## 2022-08-02 DIAGNOSIS — M81.8 OTHER OSTEOPOROSIS WITHOUT CURRENT PATHOLOGICAL FRACTURE: Primary | ICD-10-CM

## 2022-08-02 PROCEDURE — 96372 THER/PROPH/DIAG INJ SC/IM: CPT | Performed by: FAMILY MEDICINE

## 2022-08-02 NOTE — PROGRESS NOTES
Name and dose of Injection: Brett Eaton  Lot #: [id number]   name: Bull Ivy date: 10/2024  Site / Route: Left arm  NDC: 88962-342-52  Date given: August 2, 2022  VIS given / date: No /     Administered by: jose

## 2022-08-03 ENCOUNTER — OFFICE VISIT (OUTPATIENT)
Dept: ORTHOPEDIC SURGERY | Age: 67
End: 2022-08-03
Payer: MEDICARE

## 2022-08-03 ENCOUNTER — HOSPITAL ENCOUNTER (OUTPATIENT)
Dept: PHYSICAL THERAPY | Age: 67
Setting detail: THERAPIES SERIES
Discharge: HOME | End: 2022-08-03
Payer: MEDICARE

## 2022-08-03 VITALS — BODY MASS INDEX: 26.46 KG/M2 | HEIGHT: 64 IN | WEIGHT: 155 LBS

## 2022-08-03 DIAGNOSIS — M22.41 CHONDROMALACIA PATELLAE OF RIGHT KNEE: ICD-10-CM

## 2022-08-03 DIAGNOSIS — M54.16 RIGHT LUMBAR RADICULOPATHY: ICD-10-CM

## 2022-08-03 DIAGNOSIS — M51.36 DDD (DEGENERATIVE DISC DISEASE), LUMBAR: Primary | ICD-10-CM

## 2022-08-03 DIAGNOSIS — M43.16 SPONDYLOLISTHESIS OF LUMBAR REGION: ICD-10-CM

## 2022-08-03 DIAGNOSIS — M25.561 ACUTE PAIN OF RIGHT KNEE: ICD-10-CM

## 2022-08-03 PROCEDURE — 1123F ACP DISCUSS/DSCN MKR DOCD: CPT | Performed by: FAMILY MEDICINE

## 2022-08-03 PROCEDURE — G8399 PT W/DXA RESULTS DOCUMENT: HCPCS | Performed by: FAMILY MEDICINE

## 2022-08-03 PROCEDURE — 97140 MANUAL THERAPY 1/> REGIONS: CPT | Performed by: PHYSICAL THERAPIST

## 2022-08-03 PROCEDURE — 1036F TOBACCO NON-USER: CPT | Performed by: FAMILY MEDICINE

## 2022-08-03 PROCEDURE — G8427 DOCREV CUR MEDS BY ELIG CLIN: HCPCS | Performed by: FAMILY MEDICINE

## 2022-08-03 PROCEDURE — 1090F PRES/ABSN URINE INCON ASSESS: CPT | Performed by: FAMILY MEDICINE

## 2022-08-03 PROCEDURE — G8417 CALC BMI ABV UP PARAM F/U: HCPCS | Performed by: FAMILY MEDICINE

## 2022-08-03 PROCEDURE — 99214 OFFICE O/P EST MOD 30 MIN: CPT | Performed by: FAMILY MEDICINE

## 2022-08-03 PROCEDURE — 3017F COLORECTAL CA SCREEN DOC REV: CPT | Performed by: FAMILY MEDICINE

## 2022-08-03 PROCEDURE — 97110 THERAPEUTIC EXERCISES: CPT | Performed by: PHYSICAL THERAPIST

## 2022-08-03 NOTE — PROGRESS NOTES
Initial evaluation acute onset right-sided mechanical back pain with right leg pain with associated numbness and tingling and known history of previously documented severe spinal stenosis previously established with Jesse Callahan and Dr. Devin Skinner. Recently seen 7/25/2022 for right knee contusion and aggravation chondromalacia patella        Subjective:      Patient ID: Benny Mckee is a 78 y.o. female who is here in the [id number] [place], [place] [address]y [address]- for an initial evaluation of right anterior knee pain. She states approximately 1 month ago she walked into a piece of furniture and struck her knee. Since that time she has had discomfort in the anterior knee. She reports pain with stairs and chairs. She denies mechanical symptoms such as locking and catching. Serena Bullard is a very pleasant retired white female who does have a history of mechanical low back pain is a patient of Dr. Gracie Brittle who is being seen today in kind consultation from Rae Guido for evaluation of an injury to her right knee. She states that in early June 2022 she was helping her daughter move and her house was somewhat cluttered and believes she struck the anterior portion of her right knee against the edge of a metal table. There is no pop or crack although she did have immediate pain her pain symptoms do progress or worsen after several days to the point where is difficulty performing her exercise program going up and down stairs and doing her Pilates. Most of her pain is anterior in nature and she has had some very mild swelling. Initially she treated herself with Tylenol but avoided anti-inflammatories given her history of kidney dysfunction but was eventually seen by Rae Guido on 7/11/2022 where x-rays did not reveal high-grade degenerative changes and was placed on a Medrol pack which has helped her symptoms substantially at about 50 to 60% but is still unable to walk and perform her exercises.   She is have some occasional subtle buckling and denies active locking or catching. No previous history of knee injury. She is being seen today for orthopedic and sports consultation with imaging. Pain Scale 7/10 VAS. Location of pain anterior lateral knee just inferior to the patella. Pain is worse with stairs, chairs. Pain improves with ice, elevation. Previous treatments have included Tylenol. Cannot take NSAIDs due to CKD. Was initially evaluated primarily for her knee on 7/25/2022 and is now almost 2 months out from her knee contusion with aggravation chondromalacia patella. She was seen in the office and did not exhibit any evidence of fracturing although she did have some evidence of low-grade osteoarthritis and chondromalacia we did start her in therapy and performed a injection to her knee and started on Voltaren gel and knee bracing and was improving with regard to her actual knee symptoms. Her more pressing complaint is over this past weekend since roughly 7/30/2022 she began to notice very severe throbbing and aching to her right leg which was suspicious for radiculopathy mostly in the L5 distribution and has noted she had been previously established with Dr. Star Hylton and [place] Gee with her last lumbar MRI being 7/15/2019 showing severe spinal stenosis centrally at L4-5 secondary to disc protrusion and facet arthropathy with ligamentous thickening. There is left lateral recess stenosis primarily at L3-4 with severe left foraminal narrowing at L5-S1 spondylitic in nature. She states that her worsening symptoms and inability to walk over the weekend felt much like nerve pain but she has never had this on the right-hand side and was having a difficult time sleeping. She did mention this to her therapist at her evaluation on 8/1/2022 and they did come over and speak with us we started her on a Medrol Dosepak and a gabapentin taper.   This is resulted in about a 60% improvement of her symptoms. Her last epidural with Dr. Oliver Carter was is in 2020. Review of Systems:  I have reviewed the clinically relevant past medical history, medications, allergies, family history, social history, and 13 point Review of Systems from the patient's recent history form & documented any details relevant to today's presenting complaints in the history above. The patient's self-reported past medical history, medications, allergies, family history, social history, and Review of Systems form from today's date have been scanned into the chart under the \"Media\" tab. As outlined in the HPI. Negative for fever or chills. Negative for poly-joint pain, swelling and stiffness. She denies numbness or tingling into the affected extremity.      Past Medical History:   Diagnosis Date    Actinic keratosis     Breast disorder     CAD (coronary artery disease)     Chronic kidney disease     Hyperlipidemia     Hypertension     Infertility, female     S/P colonoscopy 2009    Tendinitis of foot     UTI (urinary tract infection)        Family History   Problem Relation Age of Onset    Heart Disease Father         MI  at  age 48    High Blood Pressure Sister     Diabetes Sister     Kidney Disease Sister         transplant     Breast Cancer Sister     High Blood Pressure Mother     High Cholesterol Mother     Arthritis Mother         gout    Heart Disease Mother     Cancer Mother     Heart Disease Brother     High Cholesterol Brother     High Blood Pressure Brother        Past Surgical History:   Procedure Laterality Date    ABDOMEN SURGERY      stone removed from bile duct    ANGIOPLASTY      renal    BREAST BIOPSY      BREAST SURGERY      duct removal    BUNIONECTOMY Left 12/15/2020    MODIFIED YAZMIN BUNIONECTOMY, MODIFIED AKIN OSTEOTOMY, MODIFIED WEIL SECOND METATARSAL, PROXIMAL INTERPHALANGEAL JOINT ARTHRODESIS DIGITS TWO THREE FOUR LEFT FOOT performed by Arnaldo Jalloh DPM at [address] [place] [address] 12/1992    COLONOSCOPY  8/1/2009    COLONOSCOPY N/A 10/23/2019    COLONOSCOPY POLYPECTOMY SNARE/COLD BIOPSY performed by Karolina Cespedes MD at [facility] N/A 7/29/2019    MIDLINE LUMBAR FIVE SACRAL ONE EPIDURAL STEROID INJECTION SITE CONFIRMED BY FLUOROSCOPY performed by Amrit Wilkins MD at [place] [address] Left 01/11/2016    Cystoscopy, with left stent placement    TONSILLECTOMY AND ADENOIDECTOMY      UPPER GASTROINTESTINAL ENDOSCOPY N/A 4/7/2021    EGD BIOPSY performed by Karolina eCspedes MD at [address] [place] [address] History     Occupational History    Not on file   Tobacco Use    Smoking status: Never    Smokeless tobacco: Never   Vaping Use    Vaping Use: Never used   Substance and Sexual Activity    Alcohol use: No    Drug use: No    Sexual activity: Not Currently       Current Outpatient Medications   Medication Sig Dispense Refill    methylPREDNISolone (MEDROL DOSEPACK) 4 MG tablet Take by mouth as directed. 21 kit 0    gabapentin (NEURONTIN) 300 MG capsule Take one tablet once daily for 2 days. Then take one tablet twice daily for 2 days.  Then take one tablet three times daily for rest 90 capsule 1    diclofenac sodium (VOLTAREN) 1 % GEL Apply 4 g topically 4 times daily as needed for Pain 100 g 3    atorvastatin (LIPITOR) 40 MG tablet TAKE ONE TABLET BY MOUTH DAILY 90 tablet 3    denosumab (PROLIA) 60 MG/ML SOSY SC injection Inject 60 mg into the skin once      pantoprazole (PROTONIX) 40 MG tablet       Potassium Citrate ER 15 MEQ (1620 MG) TBCR 2 times daily       calcitRIOL (ROCALTROL) 0.25 MCG capsule Take 0.25 mcg by mouth every other day      aspirin 81 MG tablet Take 81 mg by mouth daily      Melatonin 5 MG CHEW Take by mouth nightly      nebivolol (BYSTOLIC) 2.5 MG tablet TAKE ONE TABLET BY MOUTH DAILY (Patient not taking: No sig reported) 90 tablet 3     No current facility-administered medications for this visit. Allergies   Allergen Reactions    Codeine Rash, Nausea And Vomiting and Shortness Of Breath    Lisinopril      cough    Norvasc [Amlodipine Besylate]      swelling    Triamterene-Hctz      Renal insufficiency         Objective:     She is alert, oriented x 3, pleasant, well nourished, developed and in no acute distress. Ht 5' 4\" (1.626 m)   Wt 155 lb (70.3 kg)   LMP 07/11/2005   BMI 26.61 kg/m²      Examination of the right knee: The alignment of the knee is neutral.   There is not erythema. There is no soft tissue swelling. There is a mild effusion. ROM-  Extension 0           -   Flexion  130   There mild pain associated with ROM testing. Medial joint line non tender to palpation. Lateral joint line non tender to palpation. Retro patellar crepitus is present. There is no pain with resisted knee extension. There is no crepitus along the joint line with ROM testing. Varus Stress testing does not produce pain and does not show laxity. Valgus Stress testing does not produce pain and does not show laxity. Lachman's test is negative. Anterior Drawer test is negative. Posterior Drawer test is negative  Ligia's Test is negative. Patellar Compression testing does reproduce pain. When she rates it about a 5-6 out of 10. Extensor Mechanism is  intact. Lower extremities:  She has 5/5 motor strength of bilateral lower extremities. She has a negative straight leg raise, bilaterally. Deep tendon reflexes at knees and achilles are 2+. Sensation is intact to light touch L3 to S1 bilaterally. She has no clonus. Examination of the lower extremities shows intact perfusion to both lower extremities. She has no cyanosis and digigts are warm to touch, capillary refill is less than 2 seconds. She has no edema noted. She has intact skin without lacerations or abrasions, no significant erythema, rashes or skin lesions.         Lumbar spine evaluation obtained today in the office does reveal tenderness over lumbar paraspinals and lower lumbar facets right greater than left. She does have central gluteal tenderness worse on the right than left. She can only forward flex to about 30 to 40 degrees and does have painful extension with a 50% reduction in lateral bending rotation. She does appear to have a trace positive straight leg raise on the right negative on the left today. There is not appear to be focal lower extremity motor deficits. X Rays: were performed in the office on 7/11/2022  AP, PA Standing, Lateral and Sunrise views of right knee:  No acute fractures or dislocations noted. There is minimal patellar subluxation. MRI scan Lumbar [facility] 7/15/2019 as listed above per Vernie Dubin  Narrative   EXAMINATION:   MRI OF Antonio Goyal WITHOUT CONTRAST, 7/15/2019 10:43 am       TECHNIQUE:   Multiplanar multisequence MRI of the lumbar spine was performed without the   administration of intravenous contrast.       COMPARISON:   None       HISTORY:   ORDERING SYSTEM PROVIDED HISTORY: DDD (degenerative disc disease), lumbar   TECHNOLOGIST PROVIDED HISTORY:   [facility] [phone]   Reason for exam:->Back pain   Reason for Exam: DDD (degenerative disc disease), lumbar   Acuity: Acute   Type of Exam: Initial       FINDINGS:   BONES/ALIGNMENT: There is grade 1 retrolisthesis of L5 relative to S1   measuring 3 mm. Alignment is otherwise normal.  There is no acute fracture. There is disc desiccation and mild disc space narrowing at L5-S1. Intervertebral disc heights are otherwise normal.  There is no spondylolysis   evident. SPINAL CORD: The conus medullaris is normal in size and signal intensities   and terminates normally. SOFT TISSUES: There is no paraspinal mass identified. There is severe left   renal atrophy. L1-L2: There is no disc bulge or protrusion present.   There is no significant   spinal canal stenosis or neural foraminal narrowing present. There is   bilateral facet arthropathy. L2-L3: There is a disc bulge and facet arthropathy. There is no significant   spinal canal stenosis or neural foraminal narrowing. L3-L4: There is a disc bulge which lateralizes to the left. There is   effacement of the left lateral recess and mild left neural foraminal   narrowing. There is no significant right neural foraminal narrowing. Mild   spinal canal stenosis is present. Facet arthropathy and thickening of the   ligamentum flavum is present. L4-L5: There is a disc bulge and superimposed focal 4 mm central disc   protrusion. There is facet arthropathy and thickening of the ligamentum   flavum. There is severe spinal canal stenosis. No significant neural   foraminal narrowing is present. L5-S1: There is a disc bulge and osteophyte formation. There is facet   arthropathy and thickening of the ligamentum flavum. There is mild spinal   canal stenosis. Severe left neural foraminal narrowing is present. No   significant right neural foraminal narrowing is present. Impression   1. Severe spinal canal stenosis at L4-L5 secondary to a disc bulge, central   disc protrusion, facet arthropathy and thickening of the ligamentum flavum. 2. Mild spinal canal stenosis, mild left neural foraminal narrowing and   effacement of the left lateral recess at L3-L4 secondary to a disc bulge. 3. Mild spinal canal stenosis and severe left neural foraminal narrowing at   L5-S1 secondary to a disc bulge, facet arthropathy, thickening of the   ligamentum flavum and osteophyte formation. Additional Tests reviewed: none  Additional Outside Records reviewed: none    Diagnosis:       ICD-10-CM    1.  DDD (degenerative disc disease), lumbar  M51.36 MRI LUMBAR SPINE WO CONTRAST     Ambulatory referral to Physical Therapy     CANCELED: Clarissa Jeter Extensor Lumbosacral Support Brace (Warm and Form) 2. Spondylolisthesis of lumbar region  M43.16 MRI LUMBAR SPINE WO CONTRAST     Ambulatory referral to Physical Therapy     CANCELED: Naomie Dunks and Ward Extensor Lumbosacral Support Brace (Warm and Form)      3. Right lumbar radiculopathy  M54.16 MRI LUMBAR SPINE WO CONTRAST     Ambulatory referral to Physical Therapy     CANCELED: Naomie Dunks and Ward Extensor Lumbosacral Support Brace (Warm and Form)      4. Chondromalacia patellae of right knee  M22.41       5. Acute pain of right knee  M25.561            Assessment and Plan:       Assessment:  #1.  7-week status post posttraumatic improving right knee aggravation chondromalacia patella with ongoing knee pain secondary to knee contusion    #2.  5 days status post acute onset primarily right-sided mechanical low back pain with suspected right L5 radiculopathy and previously documented severe canal stenosis treated previously with epidurals by Dr. Cristiano Almaraz and followed by Herminia Brunson had an extensive discussion with Ms. Marina Mclean regarding the natural history, etiology, and long term consequences of her condition. I have presented reasonable alternatives to the patient's proposed care, treatment, and services. Risks and benefits of the treatment options also reviewed in detail. I have outlined a treatment plan with them. She has had full opportunity to ask her questions. I have answered them all to her satisfaction. I feel that Ms. Marina Mclean understands our discussion today. Plan: Treatment options were discussed with [facility] today. We did review her plain films and exam findings. She does not appear to have a great deal of degenerative changes involving her knee on plain films but has been having ongoing pain symptoms. She states that starting treatment for her knee compared to last week she has improved moderately and to get benefit from her steroid injection.   Recommend she continues with her Voltaren gel upon approval from her

## 2022-08-03 NOTE — FLOWSHEET NOTE
[facility], [address]  Phone: [phone]  Fax [phone]    Physical Therapy Treatment Note/ Progress Report:           Date:  8/3/2022    Patient Name:  Avery Ward    :  1955  MRN: [id number]  Restrictions/Precautions:    Medical/Treatment Diagnosis Information:  Diagnosis: M25.561 (ICD-10-CM) - Acute pain of right knee, S80.01XA (ICD-10-CM) - Contusion of right knee, initial encounter, M22.41 (ICD-10-CM) - Chondromalacia patellae of right knee  Treatment Diagnosis: M25.561 (ICD-10-CM) - Acute pain of right knee  Insurance/Certification information:  PT Insurance Information: /Med mutual  Physician Information:   Dr Hugh Dance  Has the plan of care been signed (Y/N):        [x]  Yes  []  No     Date of Patient follow up with Physician: 8/3/22      Is this a Progress Report:     []  Yes  [x]  No        If Yes:  Date Range for reporting period:  Beginnin22  Ending:     Progress report will be due (10 Rx or 30 days whichever is less):        Recertification will be due (POC Duration  / 90 days whichever is less): 8 weeks      Visit # Insurance Allowable Auth Required   In-person 2 [facility] []  Yes []  No    Telehealth   []  Yes []  No    Total          Functional Scale: FOTO 21/100    Date assessed:  22      Therapy Diagnosis/Practice Pattern:E      Number of Comorbidities:  []0     []1-2    [x]3+    Latex Allergy:  [x]NO      []YES  Preferred Language for Healthcare:   [x]English       []other:      Pain level:  eval 4-10/10 Current 2/10 w/ meds    SUBJECTIVE:  Pt reports when her meds kick in she feels SO much better. Denies numbness entering clinic     OBJECTIVE: See eval  Observation:   Test measurements:  MMTR hip abd 4-; AROM lumbar flex: fingers to top of ankles, ext mod decr both without radic, SB R 1\"above jt line, L at knee joint line, no radicular pain.     RESTRICTIONS/PRECAUTIONS: lumbar DDD/stenosis    Exercises/Interventions:     Therapeutic Ex (40308) Sets/sec/Reps Notes/CUES   Ankle/foot AROM PF w/ toes/DF, inv/ev HEP  HEP   Supine nerve glide knee flex/ext w/ DF/PF HEP   LAQ w/ add HEP   Supine LTR x15 HEP   Supine bridge w/ add 2x10 HEP   SL hip abd w/ TA  SL clam w. TA X10  x10 HEP  HEP   Supine fig 4 S  Supine ITB w/ strap  Supine quad S X30\"  X30\"  X30\" HEP  HEP  HEP        Wall 1/4 squat w/ TA x15 HEP             Pt ed: eval findings, POC, HEP, posture, heat vs ice, time spent communicating back and forth with Ramandeep Ornelas, ATC working with Dr Kaitlin Fulton re: meds, back vs peroneal nerve symptoms x18'    Manual Intervention (91487)     STM to peroneals/ant tib, pat mobs x10' In SL w/ pillow between knees                            NMR re-education (76755)  CUES NEEDED                                                Therapeutic Activity (93159)                                     Therapeutic Exercise and NMR EXR  [x] (60590) Provided verbal/tactile cueing for activities related to strengthening, flexibility, endurance, ROM for improvements in LE, proximal hip, and core control with self care, mobility, lifting, ambulation.  [] (88186) Provided verbal/tactile cueing for activities related to improving balance, coordination, kinesthetic sense, posture, motor skill, proprioception  to assist with LE, proximal hip, and core control in self care, mobility, lifting, ambulation and eccentric single leg control.      NMR and Therapeutic Activities:    [] (91476 or 53904) Provided verbal/tactile cueing for activities related to improving balance, coordination, kinesthetic sense, posture, motor skill, proprioception and motor activation to allow for proper function of core, proximal hip and LE with self care and ADLs  [] (39232) Gait Re-education- Provided training and instruction to the patient for proper LE, core and proximal hip recruitment and positioning and eccentric body weight control with ambulation re-education including up and down stairs     Home Exercise Program:    [x] (55535) Reviewed/Progressed HEP activities related to strengthening, flexibility, endurance, ROM of core, proximal hip and LE for functional self-care, mobility, lifting and ambulation/stair navigation   [] (11613)Reviewed/Progressed HEP activities related to improving balance, coordination, kinesthetic sense, posture, motor skill, proprioception of core, proximal hip and LE for self care, mobility, lifting, and ambulation/stair navigation      Manual Treatments:  PROM / STM / Oscillations-Mobs:  G-I, II, III, IV (PA's, Inf., Post.)  [x] (90395) Provided manual therapy to mobilize LE, proximal hip and/or LS spine soft tissue/joints for the purpose of modulating pain, promoting relaxation,  increasing ROM, reducing/eliminating soft tissue swelling/inflammation/restriction, improving soft tissue extensibility and allowing for proper ROM for normal function with self care, mobility, lifting and ambulation. Modalities:  w    [x] GAME READY (VASO)- for significant edema, swelling, pain control. Charges  Timed Code Treatment Minutes: 45   Total Treatment Minutes: 45     Medicare total (add KX at $2000)  Prior episode of care this year     [] EVAL (LOW) 20120   [] EVAL (MOD) 13788  [] EVAL (HIGH) 16635   [] RE-EVAL     [x] NW(16207) x2     [] IONTO  [] NMR (42395) x     [] VASO  [x] Manual (24056) x 1     [] Other:  [] TA x      [] Mech Traction (21652)  [] ES(attended) (64064)      [] ES (un) (89037):       GOALS:  Patient stated goal: decrease pain with daily activity  [] Progressing: [] Met: [] Not Met: [] Adjusted    Therapist goals for Patient:   Short Term Goals: To be achieved in: 2 weeks  1. Independent in HEP and progression per patient tolerance, in order to prevent re-injury. [] Progressing: [] Met: [] Not Met: [] Adjusted  2.  Patient will have a decrease in pain to facilitate improvement in movement, function, and ADLs as indicated by Functional Deficits. [] Progressing: [] Met: [] Not Met: [] Adjusted    Long Term Goals: To be achieved in: 8 weeks  1. Disability index score of 52% or more per FOTO to assist with reaching prior level of function. [] Progressing: [] Met: [] Not Met: [] Adjusted  2. Patient will demonstrate increased AROM to 10 deg DF and lumbar flex/ext to [facility] to allow for proper joint functioning with dressing and ADL's.   [] Progressing: [] Met: [] Not Met: [] Adjusted  3. Patient will demonstrate an increase in Strength to 5/5 R knee and ankle to allow for proper functional mobility with squatting and stairs. [] Progressing: [] Met: [] Not Met: [] Adjusted  4. Patient will return to walking at least 39' to go grocery shopping without increased symptoms or restriction. [] Progressing: [] Met: [] Not Met: [] Adjusted  5. Pt will be able to resume Pilates post rehab program for ongoing conditioning and healthy lifestyle. [] Progressing: [] Met: [] Not Met: [] Adjusted     Progression Towards Functional goals:  [] Patient is progressing as expected towards functional goals listed. [] Progression is slowed due to complexities listed. [] Progression has been slowed due to co-morbidities. [x] Plan just implemented, too soon to assess goals progression  [] Other:         Overall Progression Towards Functional goals/ Treatment Progress Update:  [] Patient is progressing as expected towards functional goals listed. [] Progression is slowed due to complexities/Impairments listed. [] Progression has been slowed due to co-morbidities.   [x] Plan just implemented, too soon to assess goals progression <30days   [] Goals require adjustment due to lack of progress  [] Patient is not progressing as expected and requires additional follow up with physician  [] Other    Prognosis for POC: [x] Good [] Fair  [] Poor      Patient requires continued skilled intervention: [x] Yes  [] No    Treatment/Activity Tolerance:  [x] Patient able to complete treatment  [] Patient limited by fatigue  [] Patient limited by pain    [] Patient limited by other medical complications  [] Other:     ASSESSMENT: greatly improved tolerance to activity and able to progress HEP as noted. Return to Play: (if applicable)   []  Stage 1: Intro to Strength   []  Stage 2: Return to Run and Strength   []  Stage 3: Return to Jump and Strength   []  Stage 4: Dynamic Strength and Agility   []  Stage 5: Sport Specific Training     []  Ready to Return to Play, Meets All Above Stages   []  Not Ready for Return to Sports   Comments:                               PLAN: See eval  [x] Continue per plan of care [] Alter current plan (see comments above)  [] Plan of care initiated [] Hold pending MD visit [] Discharge      Electronically signed by:  Jameson Fofana PT    Note: If patient does not return for scheduled/ recommended follow up visits, this note will serve as a discharge from care along with most recent update on progress. HEP instruction:   Access Code: [email or web address]  URL: [email or web address]  Date: 08/03/2022  Prepared by: Jameson Fofana    Exercises  Supine Ankle Pumps - 1-2 x daily - 7 x weekly - 1-3 sets - 10 reps  Supine Ankle Inversion Eversion AROM - 1-2 x daily - 7 x weekly - 1-3 sets - 10 reps  Supine 90/90 Sciatic Nerve Glide with Knee Flexion/Extension - 1 x daily - 7 x weekly - 3 sets - 10 reps  Seated Long Arc Quad with Hip Adduction - 1 x daily - 7 x weekly - 1-3 sets - 10 reps  Supine ITB Stretch with Strap - 1 x daily - 7 x weekly - 3 sets - 20-30 seconds hold  Supine Figure 4 Piriformis Stretch (Mirrored) - 1 x daily - 7 x weekly - 3 sets - 30 seconds hold  Supine Quadriceps Stretch with Strap on Table - 1 x daily - 7 x weekly - 3 sets - 30 seconds hold  Supine Lower Trunk Rotation - 1 x daily - 7 x weekly - 1-2 sets - 10 reps  Supine Bridge with Mini Swiss Ball Between Knees - 1 x daily - 7 x weekly - 2-3 sets - 10 reps  Sidelying Hip Abduction - 1 x daily - 3 x weekly - 2-3 sets - 10 reps  Clamshell - 1 x daily - 3 x weekly - 2-3 sets - 10 reps  Wall Quarter Squat - 1 x daily - 3 x weekly - 2 sets - 10 reps        MEDICARE CAP EXCEPTION DOCUMENTATION      I certify that this patient meets one of the below criteria necessary for becoming an exception to the Medicare cap on therapy services:    []  The patient has a condition identified by an ICD-10 code that has a direct and significant impact on the need for therapy. (Significantly impacts the rate of recovery.)                   []  The patient has a complexity identified by an ICD-10 code that has a direct and significant impact on the need for therapy. (Significantly impacts the rate of recovery and is associated with a primary condition.)         []  The patient has associated variables that influence the amount of treatment to include:  Social support, self-efficacy/motivation, prognosis, time since onset/acuity. [x]  The patient has generalized musculoskeletal conditions or a condition affecting multiple sites that will have a direct impact on the rate of recovery. [x]  The patient had a prior episode of outpatient therapy during this calendar year for a different condition. []  The patient has a mental or cognitive disorder in addition to the condition being treated that will have a direct and significant impact on the rate of recovery.

## 2022-08-10 ENCOUNTER — HOSPITAL ENCOUNTER (OUTPATIENT)
Dept: PHYSICAL THERAPY | Age: 67
Discharge: HOME | End: 2022-08-10
Payer: MEDICARE

## 2022-08-10 PROCEDURE — 97110 THERAPEUTIC EXERCISES: CPT | Performed by: PHYSICAL THERAPIST

## 2022-08-10 PROCEDURE — 97016 VASOPNEUMATIC DEVICE THERAPY: CPT | Performed by: PHYSICAL THERAPIST

## 2022-08-10 PROCEDURE — 97112 NEUROMUSCULAR REEDUCATION: CPT | Performed by: PHYSICAL THERAPIST

## 2022-08-10 NOTE — FLOWSHEET NOTE
[facility], [address]  Phone: [phone]  Fax [phone]    Physical Therapy Treatment Note/ Progress Report:           Date:  8/10/2022    Patient Name:  Sofiya Mondragon    :  1955  MRN: [id number]  Restrictions/Precautions:    Medical/Treatment Diagnosis Information:  Diagnosis: M25.561 (ICD-10-CM) - Acute pain of right knee, S80.01XA (ICD-10-CM) - Contusion of right knee, initial encounter, M22.41 (ICD-10-CM) - Chondromalacia patellae of right knee  Treatment Diagnosis: M25.561 (ICD-10-CM) - Acute pain of right knee  Insurance/Certification information:  PT Insurance Information: [facility]/Med mutual  Physician Information:   Dr Zoraida Le  Has the plan of care been signed (Y/N):        [x]  Yes  []  No     Date of Patient follow up with Physician: 8/3/22      Is this a Progress Report:     []  Yes  [x]  No        If Yes:  Date Range for reporting period:  Beginnin22  Ending:     Progress report will be due (10 Rx or 30 days whichever is less): 04       Recertification will be due (POC Duration  / 90 days whichever is less): 8 weeks      Visit # Insurance Allowable Auth Required   In-person 3 [address] []  Yes []  No    Telehealth   []  Yes []  No    Total          Functional Scale: FOTO 21/100    Date assessed:  22      Therapy Diagnosis/Practice Pattern:E      Number of Comorbidities:  []0     []1-2    [x]3+    Latex Allergy:  [x]NO      []YES  Preferred Language for Healthcare:   [x]English       []other:      Pain level:  eval 4-10/10 Current 2/10 w/ meds Soreness    SUBJECTIVE:  Pt reports seeing Dr. Zoraida Le last week. MRI  performed last week. Just finished steroid pack on . Reports having an episode of knee giving way on Monday when outside on deck and didn't have knee brace on currently.  Currently upon arrival soreness is about 2/10 lateral knee and has intermittent numbness R dorsal aspect of foot. Knee feels like it is swollen. OBJECTIVE: New orders to add lumbar rehab to PT from Dr. Manasa Middleton. Seefelicia Fields on 8/22/22. Lumbar flexion: fingers to top of ankles  without pain, Ext: Buttock pain noted on R during movement. Pain noted with lateral flexion in center and to the Right low back. Intact toe/heel walking. Reflexes intact. - Clonus. - SLR/Slump testing. Minimal to no tenderness noted peroneals with palpation this visit R LE. Observation:   Test measurements:  MMTR hip abd 4-; Girth measurements:MP: R 36.4cm,  L; 35.7cm SP: 37.2cm R,  35.5 cm    RESTRICTIONS/PRECAUTIONS: lumbar DDD/stenosis    Exercises/Interventions: + TB with Clams. Progressed exercises this visit.     Therapeutic Ex (04975) Sets/sec/Reps Notes/CUES   Ankle/foot AROM PF w/ toes/DF, inv/ev HEP  HEP   Supine nerve glide knee flex/ext w/ DF/PF HEP   LAQ w/ add HEP   Supine LTR x15 HEP   Supine bridge w/ add 2x10 HEP   SL hip abd w/ TA  SL clam w. TA X10  x10 HEP  + TB HEP   Supine fig 4 S  Supine ITB w/ strap  Supine quad S X30\"  X30\"  X30\" HEP  HEP  HEP        Wall 1/4 squat w/ TA with SB 2x10 reps HEP   Standing Hip abd LVL  2x10 reps No band with L LE        Pt ed: eval findings, POC, HEP, posture, heat vs ice, time spent communicating back and forth with [place], ATC working with Dr Manasa Middleton re: meds, back vs peroneal nerve symptoms x18'    Manual Intervention (59700)     STM to peroneals/ant tib, pat mobs                          NMR re-education (05321)  CUES NEEDED        Hodges taping for medial glides of patella X                                       Therapeutic Activity (55451)                                     Therapeutic Exercise and NMR EXR  [x] (85933) Provided verbal/tactile cueing for activities related to strengthening, flexibility, endurance, ROM for improvements in LE, proximal hip, and core control with self care, mobility, lifting, ambulation.  [] (75845) Provided verbal/tactile cueing for activities related to improving balance, coordination, kinesthetic sense, posture, motor skill, proprioception  to assist with LE, proximal hip, and core control in self care, mobility, lifting, ambulation and eccentric single leg control. NMR and Therapeutic Activities:    [x] (38412 or 84628) Provided verbal/tactile cueing for activities related to improving balance, coordination, kinesthetic sense, posture, motor skill, proprioception and motor activation to allow for proper function of core, proximal hip and LE with self care and ADLs  [] (78907) Gait Re-education- Provided training and instruction to the patient for proper LE, core and proximal hip recruitment and positioning and eccentric body weight control with ambulation re-education including up and down stairs     Home Exercise Program:    [x] (32483) Reviewed/Progressed HEP activities related to strengthening, flexibility, endurance, ROM of core, proximal hip and LE for functional self-care, mobility, lifting and ambulation/stair navigation   [] (93701)Reviewed/Progressed HEP activities related to improving balance, coordination, kinesthetic sense, posture, motor skill, proprioception of core, proximal hip and LE for self care, mobility, lifting, and ambulation/stair navigation      Manual Treatments:  PROM / STM / Oscillations-Mobs:  G-I, II, III, IV (PA's, Inf., Post.)  [x] (40019) Provided manual therapy to mobilize LE, proximal hip and/or LS spine soft tissue/joints for the purpose of modulating pain, promoting relaxation,  increasing ROM, reducing/eliminating soft tissue swelling/inflammation/restriction, improving soft tissue extensibility and allowing for proper ROM for normal function with self care, mobility, lifting and ambulation. Modalities:  w    [x] GAME READY (VASO)- for significant edema, swelling, pain control.      Charges  Timed Code Treatment Minutes: 39'   Total Treatment Minutes: 61'     Medicare total (add KX at $2000)  Prior episode of care this year     [] EVAL (LOW) 30179   [] EVAL (MOD) 30922  [] EVAL (HIGH) 08253   [] RE-EVAL     [x] CL(19329) x2     [] IONTO  [x] NMR (30296) x 1    [x] VASO  [] Manual (74503) x     [] Other:  [] TA x      [] Mech Traction (19991)  [] ES(attended) (85831)      [] ES (un) (03794):       GOALS:  Patient stated goal: decrease pain with daily activity  [] Progressing: [] Met: [] Not Met: [] Adjusted    Therapist goals for Patient:   Short Term Goals: To be achieved in: 2 weeks  1. Independent in HEP and progression per patient tolerance, in order to prevent re-injury. [] Progressing: [] Met: [] Not Met: [] Adjusted  2. Patient will have a decrease in pain to facilitate improvement in movement, function, and ADLs as indicated by Functional Deficits. [] Progressing: [] Met: [] Not Met: [] Adjusted    Long Term Goals: To be achieved in: 8 weeks  1. Disability index score of 52% or more per FOTO to assist with reaching prior level of function. [] Progressing: [] Met: [] Not Met: [] Adjusted  2. Patient will demonstrate increased AROM to 10 deg DF and lumbar flex/ext to [facility] to allow for proper joint functioning with dressing and ADL's.   [] Progressing: [] Met: [] Not Met: [] Adjusted  3. Patient will demonstrate an increase in Strength to 5/5 R knee and ankle to allow for proper functional mobility with squatting and stairs. [] Progressing: [] Met: [] Not Met: [] Adjusted  4. Patient will return to walking at least 39' to go grocery shopping without increased symptoms or restriction. [] Progressing: [] Met: [] Not Met: [] Adjusted  5. Pt will be able to resume Pilates post rehab program for ongoing conditioning and healthy lifestyle. [] Progressing: [] Met: [] Not Met: [] Adjusted     Progression Towards Functional goals:  [] Patient is progressing as expected towards functional goals listed. [] Progression is slowed due to complexities listed. [] Progression has been slowed due to co-morbidities.   [x] Plan just implemented, too soon to assess goals progression  [] Other:         Overall Progression Towards Functional goals/ Treatment Progress Update:  [] Patient is progressing as expected towards functional goals listed. [] Progression is slowed due to complexities/Impairments listed. [] Progression has been slowed due to co-morbidities. [x] Plan just implemented, too soon to assess goals progression <30days   [] Goals require adjustment due to lack of progress  [] Patient is not progressing as expected and requires additional follow up with physician  [] Other    Prognosis for POC: [x] Good [] Fair  [] Poor      Patient requires continued skilled intervention: [x] Yes  [] No    Treatment/Activity Tolerance:  [x] Patient able to complete treatment  [] Patient limited by fatigue  [] Patient limited by pain    [] Patient limited by other medical complications  [] Other:     ASSESSMENT: no increased pain during exercises. Increased swelling noted R knee in comparison to L knee. + See alves taping to R knee this visit. and TB to progress hip strengthening. Assess response. No issues with walking after taping this visit. No radicular symptoms noted with lumbar AROM this visit. - Neurologic findings as well. Return to Play: (if applicable)   []  Stage 1: Intro to Strength   []  Stage 2: Return to Run and Strength   []  Stage 3: Return to Jump and Strength   []  Stage 4: Dynamic Strength and Agility   []  Stage 5: Sport Specific Training     []  Ready to Return to Play, Meets All Above Stages   []  Not Ready for Return to Sports   Comments:                               PLAN: Assess response to taping. Progress CKC as tolerated and lumbar stabilization exercises.   [x] Continue per plan of care [] Alter current plan (see comments above)  [] Plan of care initiated [] Hold pending MD visit [] Discharge      Electronically signed by:  Shayna Sanchez, [address] [place], [place] [address]T-    Note: If patient does not return for scheduled/ recommended follow up visits, this note will serve as a discharge from care along with most recent update on progress. HEP instruction:   Access Code: [email or web address]  URL: [email or web address]  Date: 08/03/2022  Prepared by: Alysha Dixon    Exercises  Supine Ankle Pumps - 1-2 x daily - 7 x weekly - 1-3 sets - 10 reps  Supine Ankle Inversion Eversion AROM - 1-2 x daily - 7 x weekly - 1-3 sets - 10 reps  Supine 90/90 Sciatic Nerve Glide with Knee Flexion/Extension - 1 x daily - 7 x weekly - 3 sets - 10 reps  Seated Long Arc Quad with Hip Adduction - 1 x daily - 7 x weekly - 1-3 sets - 10 reps  Supine ITB Stretch with Strap - 1 x daily - 7 x weekly - 3 sets - 20-30 seconds hold  Supine Figure 4 Piriformis Stretch (Mirrored) - 1 x daily - 7 x weekly - 3 sets - 30 seconds hold  Supine Quadriceps Stretch with Strap on Table - 1 x daily - 7 x weekly - 3 sets - 30 seconds hold  Supine Lower Trunk Rotation - 1 x daily - 7 x weekly - 1-2 sets - 10 reps  Supine Bridge with Mini Swiss Ball Between Knees - 1 x daily - 7 x weekly - 2-3 sets - 10 reps  Sidelying Hip Abduction - 1 x daily - 3 x weekly - 2-3 sets - 10 reps  Clamshell - 1 x daily - 3 x weekly - 2-3 sets - 10 reps  Wall Quarter Squat - 1 x daily - 3 x weekly - 2 sets - 10 reps        MEDICARE CAP EXCEPTION DOCUMENTATION      I certify that this patient meets one of the below criteria necessary for becoming an exception to the Medicare cap on therapy services:    []  The patient has a condition identified by an ICD-10 code that has a direct and significant impact on the need for therapy. (Significantly impacts the rate of recovery.)                   []  The patient has a complexity identified by an ICD-10 code that has a direct and significant impact on the need for therapy.   (Significantly impacts the rate of recovery and is associated with a primary condition.)         []  The patient has associated variables that influence the amount of treatment to include: Social support, self-efficacy/motivation, prognosis, time since onset/acuity. [x]  The patient has generalized musculoskeletal conditions or a condition affecting multiple sites that will have a direct impact on the rate of recovery. [x]  The patient had a prior episode of outpatient therapy during this calendar year for a different condition. []  The patient has a mental or cognitive disorder in addition to the condition being treated that will have a direct and significant impact on the rate of recovery.

## 2022-08-12 ENCOUNTER — HOSPITAL ENCOUNTER (OUTPATIENT)
Dept: PHYSICAL THERAPY | Age: 67
Setting detail: THERAPIES SERIES
Discharge: HOME | End: 2022-08-12
Payer: MEDICARE

## 2022-08-12 PROCEDURE — 97112 NEUROMUSCULAR REEDUCATION: CPT | Performed by: PHYSICAL THERAPIST

## 2022-08-12 PROCEDURE — 97110 THERAPEUTIC EXERCISES: CPT | Performed by: PHYSICAL THERAPIST

## 2022-08-12 NOTE — FLOWSHEET NOTE
[facility], [address]  Phone: [phone]  Fax [phone]    Physical Therapy Treatment Note/ Progress Report:           Date:  2022    Patient Name:  Sherif Jarrell    :  1955  MRN: [id number]  Restrictions/Precautions:    Medical/Treatment Diagnosis Information:  Diagnosis: M25.561 (ICD-10-CM) - Acute pain of right knee, S80.01XA (ICD-10-CM) - Contusion of right knee, initial encounter, M22.41 (ICD-10-CM) - Chondromalacia patellae of right knee  Treatment Diagnosis: M25.561 (ICD-10-CM) - Acute pain of right knee  Insurance/Certification information:  PT Insurance Information: [employer]/Med mutual  Physician Information:   Dr Nataliya Danielle  Has the plan of care been signed (Y/N):        [x]  Yes  []  No     Date of Patient follow up with Physician: 02 with Danny Wray and Dr. Nataliya Danielle. Is this a Progress Report:     []  Yes  [x]  No        If Yes:  Date Range for reporting period:  Beginnin22  Ending:     Progress report will be due (10 Rx or 30 days whichever is less): 59       Recertification will be due (POC Duration  / 90 days whichever is less): 8 weeks      Visit # Insurance Allowable Auth Required   In-person [address] []  Yes []  No    Telehealth   []  Yes []  No    Total          Functional Scale: FOTO 21/    Date assessed:  22      Therapy Diagnosis/Practice Pattern:E      Number of Comorbidities:  []0     []1-2    [x]3+    Latex Allergy:  [x]NO      []YES  Preferred Language for Healthcare:   [x]English       []other:      Pain level:  eval 4-10/10 Current 2/10 w/ meds Soreness    SUBJECTIVE:  Pt reports seeing Dr. Nataliya Danielle last week. MRI  performed last week. Just finished steroid pack on . Reports having one episode of knee giving way since last visit. Reports taping helped knee feel secure last visit.   Currently upon arrival soreness is about 2/10 lateral knee and has intermittent numbness R dorsal aspect of foot. OBJECTIVE: New orders to add lumbar rehab to PT from Dr. Edil Monson. Nils Fields on 8/22/22. Lumbar flexion: fingers to top of ankles  without pain, Ext: Buttock pain noted on R during movement. Pain noted with lateral flexion in center and to the Right low back. Intact toe/heel walking. Reflexes intact. - Clonus. - SLR/Slump testing. Minimal to no tenderness noted peroneals with palpation this visit R LE. Observation:   Test measurements:  MMTR hip abd 4-; Girth measurements:RESTRICTIONS/PRECAUTIONS: lumbar DDD/stenosis    Exercises/Interventions: + TB with Clams. Progressed exercises this visit.     Therapeutic Ex (07440) Sets/sec/Reps Notes/CUES   Ankle/foot AROM PF w/ toes/DF, inv/ev HEP  HEP   Supine nerve glide knee flex/ext w/ DF/PF HEP   LAQ w/ add ball squeeze 2# 2x15 reps HEP   Mini SLR 2x10 reps + to HEP   Supine LTR Supine bridge w/ add SL hip abd w/ TA  SL clam w. TA Supine fig 4 S  Supine ITB w/ strap  Supine quad S X30\"  X30\"  X30\" HEP  HEP  HEP   Bike for ROM 5'    Incline  H30\" x5    LP B 60 #/[employer] 40# R LE 2x15 reps    Standing HR 2x15 reps    Wall 1/4 squat w/ TA with SB Standing Hip abd LVL  2x10 reps No band with L LE   Side-stepping at wall OVL 3 laps Above knee/BK/ at ankles x1 lap each + HEP   Pt ed: eval findings, POC, HEP, posture, heat vs ice, time spent communicating back and forth with Sloane Finnegan ATC working with Dr Edil Monson re: meds, back vs peroneal nerve symptoms x18'    Manual Intervention (14744)     STM to peroneals/ant tib, pat mobs                          NMR re-education (13377)  CUES NEEDED        Tracie taping for medial glides of patella X         Tandem stance on Airex H15 x3 reps    SLS R LE                         Therapeutic Activity (94280)                                     Therapeutic Exercise and NMR EXR  [x] (78839) Provided verbal/tactile cueing for activities related to strengthening, flexibility, endurance, ROM for improvements in LE, proximal hip, and core control with self care, mobility, lifting, ambulation.  [] (49332) Provided verbal/tactile cueing for activities related to improving balance, coordination, kinesthetic sense, posture, motor skill, proprioception  to assist with LE, proximal hip, and core control in self care, mobility, lifting, ambulation and eccentric single leg control. NMR and Therapeutic Activities:    [x] (74043 or 35599) Provided verbal/tactile cueing for activities related to improving balance, coordination, kinesthetic sense, posture, motor skill, proprioception and motor activation to allow for proper function of core, proximal hip and LE with self care and ADLs  [] (71424) Gait Re-education- Provided training and instruction to the patient for proper LE, core and proximal hip recruitment and positioning and eccentric body weight control with ambulation re-education including up and down stairs     Home Exercise Program:    [x] (35192) Reviewed/Progressed HEP activities related to strengthening, flexibility, endurance, ROM of core, proximal hip and LE for functional self-care, mobility, lifting and ambulation/stair navigation   [] (38210)Reviewed/Progressed HEP activities related to improving balance, coordination, kinesthetic sense, posture, motor skill, proprioception of core, proximal hip and LE for self care, mobility, lifting, and ambulation/stair navigation      Manual Treatments:  PROM / STM / Oscillations-Mobs:  G-I, II, III, IV (PA's, Inf., Post.)  [x] (30851) Provided manual therapy to mobilize LE, proximal hip and/or LS spine soft tissue/joints for the purpose of modulating pain, promoting relaxation,  increasing ROM, reducing/eliminating soft tissue swelling/inflammation/restriction, improving soft tissue extensibility and allowing for proper ROM for normal function with self care, mobility, lifting and ambulation. Modalities:  CP x15 min to R knee after treatment.    [] GAME READY (VASO)- Patient is progressing as expected towards functional goals listed. [] Progression is slowed due to complexities listed. [] Progression has been slowed due to co-morbidities. [x] Plan just implemented, too soon to assess goals progression  [] Other:         Overall Progression Towards Functional goals/ Treatment Progress Update:  [] Patient is progressing as expected towards functional goals listed. [] Progression is slowed due to complexities/Impairments listed. [] Progression has been slowed due to co-morbidities. [x] Plan just implemented, too soon to assess goals progression <30days   [] Goals require adjustment due to lack of progress  [] Patient is not progressing as expected and requires additional follow up with physician  [] Other    Prognosis for POC: [x] Good [] Fair  [] Poor      Patient requires continued skilled intervention: [x] Yes  [] No    Treatment/Activity Tolerance:  [x] Patient able to complete treatment  [] Patient limited by fatigue  [] Patient limited by pain    [] Patient limited by other medical complications  [] Other:     ASSESSMENT: No increased pain during exercises, just fatigued in quad muscles. Progressed HEP this visit. Patient easily fatigued during and after exercises. Still struggling with hip abd with TB exercise with standing on R LE but had no difficulty with side stepping this visit. Pt requires skilled intervention to restore ROM, strength, functional endurance and balance in order to perform ADLs without significant symptoms or limitations.      Return to Play: (if applicable)   []  Stage 1: Intro to Strength   []  Stage 2: Return to Run and Strength   []  Stage 3: Return to Jump and Strength   []  Stage 4: Dynamic Strength and Agility   []  Stage 5: Sport Specific Training     []  Ready to Return to Play, Meets All Above Stages   []  Not Ready for Return to Sports   Comments:                               PLAN:  Progress CKC as tolerated and lumbar stabilization exercises. [x] Continue per plan of care [] Alter current plan (see comments above)  [] Plan of care initiated [] Hold pending MD visit [] Discharge      Electronically signed by:  Fran Gorman, [address] [place] [address]    Note: If patient does not return for scheduled/ recommended follow up visits, this note will serve as a discharge from care along with most recent update on progress. Access Code: [email or web address]  URL: [email or web address]  Date: 08/12/2022  Prepared by: Fran Gorman    Exercises  Supine Ankle Pumps - 1-2 x daily - 7 x weekly - 1-3 sets - 10 reps  Supine Ankle Inversion Eversion AROM - 1-2 x daily - 7 x weekly - 1-3 sets - 10 reps  Supine 90/90 Sciatic Nerve Glide with Knee Flexion/Extension - 1 x daily - 7 x weekly - 3 sets - 10 reps  Seated Long Arc Quad with Hip Adduction - 1 x daily - 7 x weekly - 1-3 sets - 10 reps  Supine ITB Stretch with Strap - 1 x daily - 7 x weekly - 3 sets - 20-30 seconds hold  Supine Figure 4 Piriformis Stretch (Mirrored) - 1 x daily - 7 x weekly - 3 sets - 30 seconds hold  Supine Quadriceps Stretch with Strap on Table - 1 x daily - 7 x weekly - 3 sets - 30 seconds hold  Supine Lower Trunk Rotation - 1 x daily - 7 x weekly - 1-2 sets - 10 reps  Supine Bridge with Mini Swiss Ball Between Knees - 1 x daily - 7 x weekly - 2-3 sets - 10 reps  Sidelying Hip Abduction - 1 x daily - 3 x weekly - 2-3 sets - 10 reps  Clamshell - 1 x daily - 3 x weekly - 2-3 sets - 10 reps  Wall Quarter Squat - 1 x daily - 3 x weekly - 2 sets - 10 reps  Small Range Straight Leg Raise - 1 x daily - 7 x weekly - 2 sets - 10 reps - 5 hold  Standing Hip Abduction with Resistance at Ankles and Counter Support - 1 x daily - 7 x weekly - 2-3 sets - 10 reps - 5 hold  Side Stepping with Counter Support - 1 x daily - 7 x weekly - 3 sets - 10 reps - 2 hold  Heel Raises with Counter Support - 1 x daily - 7 x weekly - 2-3 sets - 10 reps - 2 hold  Single Leg Stance with Support - 1 x daily - 7 x weekly - 1 sets - 5 reps - 15 hold      MEDICARE CAP EXCEPTION DOCUMENTATION      I certify that this patient meets one of the below criteria necessary for becoming an exception to the Medicare cap on therapy services:    []  The patient has a condition identified by an ICD-10 code that has a direct and significant impact on the need for therapy. (Significantly impacts the rate of recovery.)                   []  The patient has a complexity identified by an ICD-10 code that has a direct and significant impact on the need for therapy. (Significantly impacts the rate of recovery and is associated with a primary condition.)         []  The patient has associated variables that influence the amount of treatment to include:  Social support, self-efficacy/motivation, prognosis, time since onset/acuity. [x]  The patient has generalized musculoskeletal conditions or a condition affecting multiple sites that will have a direct impact on the rate of recovery. [x]  The patient had a prior episode of outpatient therapy during this calendar year for a different condition. []  The patient has a mental or cognitive disorder in addition to the condition being treated that will have a direct and significant impact on the rate of recovery.

## 2022-08-17 ENCOUNTER — HOSPITAL ENCOUNTER (OUTPATIENT)
Dept: PHYSICAL THERAPY | Age: 67
Discharge: HOME | End: 2022-08-17
Payer: MEDICARE

## 2022-08-17 PROCEDURE — 97140 MANUAL THERAPY 1/> REGIONS: CPT | Performed by: PHYSICAL THERAPIST

## 2022-08-17 PROCEDURE — 97112 NEUROMUSCULAR REEDUCATION: CPT | Performed by: PHYSICAL THERAPIST

## 2022-08-17 PROCEDURE — 97110 THERAPEUTIC EXERCISES: CPT | Performed by: PHYSICAL THERAPIST

## 2022-08-17 NOTE — FLOWSHEET NOTE
[facility], [address]  Phone: [phone]  Fax [phone]    Physical Therapy Treatment Note/ Progress Report:           Date:  2022    Patient Name:  Judie Montalvo    :  1955  MRN: [id number]  Restrictions/Precautions:    Medical/Treatment Diagnosis Information:  Diagnosis: M25.561 (ICD-10-CM) - Acute pain of right knee, S80.01XA (ICD-10-CM) - Contusion of right knee, initial encounter, M22.41 (ICD-10-CM) - Chondromalacia patellae of right knee  Treatment Diagnosis: M25.561 (ICD-10-CM) - Acute pain of right knee  Insurance/Certification information:  PT Insurance Information: /Med mutual  Physician Information:   Dr Ml Matthews  Has the plan of care been signed (Y/N):        [x]  Yes  []  No     Date of Patient follow up with Physician: 02 with Adelaida Cedeño and Dr. Ml Matthews. Is this a Progress Report:     []  Yes  [x]  No        If Yes:  Date Range for reporting period:  Beginnin22  Ending:     Progress report will be due (10 Rx or 30 days whichever is less):        Recertification will be due (POC Duration  / 90 days whichever is less): 8 weeks      Visit # Insurance Allowable Auth Required   In-person [address] []  Yes []  No    Telehealth   []  Yes []  No    Total          Functional Scale: FOTO 21/100    Date assessed:  22      Therapy Diagnosis/Practice Pattern:E      Number of Comorbidities:  []0     []1-2    [x]3+    Latex Allergy:  [x]NO      []YES  Preferred Language for Healthcare:   [x]English       []other:      Pain level:  eval 4-10/10 Current 0/10 upon arrival in knee. SUBJECTIVE:  Pt reports taking the gabapentin over the weekend. So far  Reports taping helped knee feel secure last visit. Reports no knee pain since last visit, only has intermittent numbness R dorsal aspect of foot.  Had massage this morning and reports that the massage therapist said her R ITB was tighter than left. Reports no pain in low back noted recently. OBJECTIVE: New orders to add lumbar rehab to PT from Dr. Edil Monson. Seefelicia Fields on 8/22/22. Lumbar flnstructed that patient may start walking on level surfaces at home outside short distances. Observation:   Test measurements:  MMTR hip abd 4-; Girth measurements:RESTRICTIONS/PRECAUTIONS: lumbar DDD/stenosis    Exercises/Interventions: + TB with Clams. Progressed exercises this visit.     Therapeutic Ex (20899) Sets/sec/Reps Notes/CUES   Ankle/foot AROM PF w/ toes/DF, inv/ev HEP  HEP   Supine nerve glide knee flex/ext w/ DF/PF HEP   LAQ w/ add ball squeeze 2# 2x15 reps HEP   Mini SLR 2x10 reps + to HEP   Supine LTR Supine bridge w/ add SL hip abd w/ TA  SL clam w. TA Supine fig 4 S  Supine ITB w/ strap  Supine quad S X30\"  X30\"  X30\" HEP  HEP  HEP   Bike for ROM 6'    Incline  H30\" x5    LP B 70 #/[employer] 50# R LE 2x15 reps    Standing HR 2x15 reps    Wall 1/4 squat w/ TA with SB Standing Hip abd LVL  No band with L LE   Side-stepping at wall OVL Pt ed: eval findings, POC, HEP, posture, heat vs ice, time spent communicating back and forth with Sloane Finnegan, ATC working with Dr Edil Monson re: meds, back vs peroneal nerve symptoms    Manual Intervention (96701)     STM to peroneals/ant tib, pat mobs      ITB stick massage  8'                   NMR re-education (10235)  CUES NEEDED        Hodges taping for medial glides of patella X         Tandem stance on Airex H15 x3 reps    SLS R LE          Gliders post/PL /lateral  X10 reps each R/L             Therapeutic Activity (56859)                                     Therapeutic Exercise and NMR EXR  [x] (85510) Provided verbal/tactile cueing for activities related to strengthening, flexibility, endurance, ROM for improvements in LE, proximal hip, and core control with self care, mobility, lifting, ambulation.  [] (58554) Provided verbal/tactile cueing for activities related to improving balance, coordination, kinesthetic sense, posture, motor skill, proprioception  to assist with LE, proximal hip, and core control in self care, mobility, lifting, ambulation and eccentric single leg control. NMR and Therapeutic Activities:    [x] (45278 or 18504) Provided verbal/tactile cueing for activities related to improving balance, coordination, kinesthetic sense, posture, motor skill, proprioception and motor activation to allow for proper function of core, proximal hip and LE with self care and ADLs  [] (72595) Gait Re-education- Provided training and instruction to the patient for proper LE, core and proximal hip recruitment and positioning and eccentric body weight control with ambulation re-education including up and down stairs     Home Exercise Program:    [x] (04637) Reviewed/Progressed HEP activities related to strengthening, flexibility, endurance, ROM of core, proximal hip and LE for functional self-care, mobility, lifting and ambulation/stair navigation   [] (43583)Reviewed/Progressed HEP activities related to improving balance, coordination, kinesthetic sense, posture, motor skill, proprioception of core, proximal hip and LE for self care, mobility, lifting, and ambulation/stair navigation      Manual Treatments:  PROM / STM / Oscillations-Mobs:  G-I, II, III, IV (PA's, Inf., Post.)  [x] (90461) Provided manual therapy to mobilize LE, proximal hip and/or LS spine soft tissue/joints for the purpose of modulating pain, promoting relaxation,  increasing ROM, reducing/eliminating soft tissue swelling/inflammation/restriction, improving soft tissue extensibility and allowing for proper ROM for normal function with self care, mobility, lifting and ambulation. Modalities:  CP x15 min to R knee after treatment. [] GAME READY (VASO)- for significant edema, swelling, pain control.      Charges  Timed Code Treatment Minutes: 54'   Total Treatment Minutes: 72'     Medicare total (add KX at $2000)  Prior episode of care this year [] EVAL (LOW) 35521   [] EVAL (MOD) 87207  [] EVAL (HIGH) 37168   [] RE-EVAL     [x] ER(00324) x2     [] IONTO  [x] NMR (15873) x 1  [] VASO  [x] Manual (97960) x 1    [] Other:  [] TA x      [] Mech Traction (48350)  [] ES(attended) (59916)      [] ES (un) (96984):       GOALS:  Patient stated goal: decrease pain with daily activity  [] Progressing: [] Met: [] Not Met: [] Adjusted    Therapist goals for Patient:   Short Term Goals: To be achieved in: 2 weeks  1. Independent in HEP and progression per patient tolerance, in order to prevent re-injury. [] Progressing: [] Met: [] Not Met: [] Adjusted  2. Patient will have a decrease in pain to facilitate improvement in movement, function, and ADLs as indicated by Functional Deficits. [] Progressing: [] Met: [] Not Met: [] Adjusted    Long Term Goals: To be achieved in: 8 weeks  1. Disability index score of 52% or more per FOTO to assist with reaching prior level of function. [] Progressing: [] Met: [] Not Met: [] Adjusted  2. Patient will demonstrate increased AROM to 10 deg DF and lumbar flex/ext to [facility] to allow for proper joint functioning with dressing and ADL's.   [] Progressing: [] Met: [] Not Met: [] Adjusted  3. Patient will demonstrate an increase in Strength to 5/5 R knee and ankle to allow for proper functional mobility with squatting and stairs. [] Progressing: [] Met: [] Not Met: [] Adjusted  4. Patient will return to walking at least 39' to go grocery shopping without increased symptoms or restriction. [] Progressing: [] Met: [] Not Met: [] Adjusted  5. Pt will be able to resume Pilates post rehab program for ongoing conditioning and healthy lifestyle. [] Progressing: [] Met: [] Not Met: [] Adjusted     Progression Towards Functional goals:  [] Patient is progressing as expected towards functional goals listed. [] Progression is slowed due to complexities listed. [] Progression has been slowed due to co-morbidities.   [x] Plan just implemented, too soon to assess goals progression  [] Other:         Overall Progression Towards Functional goals/ Treatment Progress Update:  [] Patient is progressing as expected towards functional goals listed. [] Progression is slowed due to complexities/Impairments listed. [] Progression has been slowed due to co-morbidities. [x] Plan just implemented, too soon to assess goals progression <30days   [] Goals require adjustment due to lack of progress  [] Patient is not progressing as expected and requires additional follow up with physician  [] Other    Prognosis for POC: [x] Good [] Fair  [] Poor      Patient requires continued skilled intervention: [x] Yes  [] No    Treatment/Activity Tolerance:  [x] Patient able to complete treatment  [] Patient limited by fatigue  [] Patient limited by pain    [] Patient limited by other medical complications  [] Other:     ASSESSMENT: No increased pain during exercises, just fatigued in quad muscles. patient tolerated new exercises well, but had slight discomfort with LP this visit in knee. Tenderness noted with ITB stick massage but better after treatment. Patient responding well to taping techniques. Pt requires skilled intervention to restore ROM, strength, functional endurance and balance in order to perform ADLs without significant symptoms or limitations. Return to Play: (if applicable)   []  Stage 1: Intro to Strength   []  Stage 2: Return to Run and Strength   []  Stage 3: Return to Jump and Strength   []  Stage 4: Dynamic Strength and Agility   []  Stage 5: Sport Specific Training     []  Ready to Return to Play, Meets All Above Stages   []  Not Ready for Return to Sports   Comments:                               PLAN:  Progress CKC as tolerated and lumbar stabilization exercises.   [x] Continue per plan of care [] Alter current plan (see comments above)  [] Plan of care initiated [] Hold pending MD visit [] Discharge      Electronically signed by:  Angie Hummel [facility] AT [place], [address] OMT-C    Note: If patient does not return for scheduled/ recommended follow up visits, this note will serve as a discharge from care along with most recent update on progress. Access Code: [email or web address]  URL: [email or web address]  Date: 08/12/2022  Prepared by: Mely Quiñonez    Exercises  Supine Ankle Pumps - 1-2 x daily - 7 x weekly - 1-3 sets - 10 reps  Supine Ankle Inversion Eversion AROM - 1-2 x daily - 7 x weekly - 1-3 sets - 10 reps  Supine 90/90 Sciatic Nerve Glide with Knee Flexion/Extension - 1 x daily - 7 x weekly - 3 sets - 10 reps  Seated Long Arc Quad with Hip Adduction - 1 x daily - 7 x weekly - 1-3 sets - 10 reps  Supine ITB Stretch with Strap - 1 x daily - 7 x weekly - 3 sets - 20-30 seconds hold  Supine Figure 4 Piriformis Stretch (Mirrored) - 1 x daily - 7 x weekly - 3 sets - 30 seconds hold  Supine Quadriceps Stretch with Strap on Table - 1 x daily - 7 x weekly - 3 sets - 30 seconds hold  Supine Lower Trunk Rotation - 1 x daily - 7 x weekly - 1-2 sets - 10 reps  Supine Bridge with Mini Swiss Ball Between Knees - 1 x daily - 7 x weekly - 2-3 sets - 10 reps  Sidelying Hip Abduction - 1 x daily - 3 x weekly - 2-3 sets - 10 reps  Clamshell - 1 x daily - 3 x weekly - 2-3 sets - 10 reps  Wall Quarter Squat - 1 x daily - 3 x weekly - 2 sets - 10 reps  Small Range Straight Leg Raise - 1 x daily - 7 x weekly - 2 sets - 10 reps - 5 hold  Standing Hip Abduction with Resistance at Ankles and Counter Support - 1 x daily - 7 x weekly - 2-3 sets - 10 reps - 5 hold  Side Stepping with Counter Support - 1 x daily - 7 x weekly - 3 sets - 10 reps - 2 hold  Heel Raises with Counter Support - 1 x daily - 7 x weekly - 2-3 sets - 10 reps - 2 hold  Single Leg Stance with Support - 1 x daily - 7 x weekly - 1 sets - 5 reps - 15 hold      MEDICARE CAP EXCEPTION DOCUMENTATION      I certify that this patient meets one of the below criteria necessary for becoming an exception to the Medicare cap on therapy services:    []  The patient has a condition identified by an ICD-10 code that has a direct and significant impact on the need for therapy. (Significantly impacts the rate of recovery.)                   []  The patient has a complexity identified by an ICD-10 code that has a direct and significant impact on the need for therapy. (Significantly impacts the rate of recovery and is associated with a primary condition.)         []  The patient has associated variables that influence the amount of treatment to include:  Social support, self-efficacy/motivation, prognosis, time since onset/acuity. [x]  The patient has generalized musculoskeletal conditions or a condition affecting multiple sites that will have a direct impact on the rate of recovery. [x]  The patient had a prior episode of outpatient therapy during this calendar year for a different condition. []  The patient has a mental or cognitive disorder in addition to the condition being treated that will have a direct and significant impact on the rate of recovery.

## 2022-08-19 ENCOUNTER — HOSPITAL ENCOUNTER (OUTPATIENT)
Dept: PHYSICAL THERAPY | Age: 67
Discharge: HOME | End: 2022-08-19
Payer: MEDICARE

## 2022-08-19 PROCEDURE — 97016 VASOPNEUMATIC DEVICE THERAPY: CPT | Performed by: PHYSICAL THERAPIST

## 2022-08-19 PROCEDURE — 97140 MANUAL THERAPY 1/> REGIONS: CPT | Performed by: PHYSICAL THERAPIST

## 2022-08-19 PROCEDURE — 97110 THERAPEUTIC EXERCISES: CPT | Performed by: PHYSICAL THERAPIST

## 2022-08-19 PROCEDURE — 97112 NEUROMUSCULAR REEDUCATION: CPT | Performed by: PHYSICAL THERAPIST

## 2022-08-19 NOTE — FLOWSHEET NOTE
[facility], [address]  Phone: [phone]  Fax [phone]    Physical Therapy Treatment Note/ Progress Report:           Date:  2022    Patient Name:  Margarito Martell    :  1955  MRN: [id number]  Restrictions/Precautions:    Medical/Treatment Diagnosis Information:  Diagnosis: M25.561 (ICD-10-CM) - Acute pain of right knee, S80.01XA (ICD-10-CM) - Contusion of right knee, initial encounter, M22.41 (ICD-10-CM) - Chondromalacia patellae of right knee  Treatment Diagnosis: M25.561 (ICD-10-CM) - Acute pain of right knee  Insurance/Certification information:  PT Insurance Information: [employer] mutual  Physician Information:   Dr Alis Dominique  Has the plan of care been signed (Y/N):        [x]  Yes  []  No     Date of Patient follow up with Physician: 02 with Lucrecia Bran and Dr. Alis Dominique. Is this a Progress Report:     []  Yes  [x]  No        If Yes:  Date Range for reporting period:  Beginnin22  Ending:     Progress report will be due (10 Rx or 30 days whichever is less):        Recertification will be due (POC Duration  / 90 days whichever is less): 8 weeks      Visit # Insurance Allowable Auth Required   In-person [address] []  Yes []  No    Telehealth   []  Yes []  No    Total          Functional Scale: FOTO 21/100    Date assessed:  22      Therapy Diagnosis/Practice Pattern:E      Number of Comorbidities:  []0     []1-2    [x]3+    Latex Allergy:  [x]NO      []YES  Preferred Language for Healthcare:   [x]English       []other:      Pain level:  eval 4-10/10 Current 0/10 upon arrival in knee. SUBJECTIVE:  Patient reports yesterday was walking to lay down on her bed when she passed out sliding down fall to the floor off edge of bed. Laid on floor for 10 minutes than on bed for 1 hour afterwards.   Hit right chest, right knee and right ankle  Most sore at chest.  More active and walking around notices less limping. Has not had any passing out episodes over the past 40 years. Taking 3 Gabapentin since Saturday. Did have pill an hour before episode. Patient did bridges, stretches and LAQ prior to PT this morning. OBJECTIVE: New orders to add lumbar rehab to PT from Dr. Abigail Land. Sees Diamond on 8/22/22. Lumbar flnstructed that patient may start walking on level surfaces at home outside short distances. Observation:   Test measurements:  MMTR hip abd 4-; Girth measurements:RESTRICTIONS/PRECAUTIONS: lumbar DDD/stenosis    Exercises/Interventions: + TB with Clams. Progressed exercises this visit.     Therapeutic Ex (97688) Sets/sec/Reps Notes/CUES   Ankle/foot AROM PF w/ toes/DF, inv/ev HEP  HEP   Supine nerve glide knee flex/ext w/ DF/PF HEP   LAQ w/ add ball squeeze 2# HEP   Mini SLR 2# 2x10 reps + to HEP   Supine LTR Supine bridge w/ add SL hip abd w/ TA  SL clam w. TA OVL 2 x 10 Supine fig 4 S  Supine ITB w/ strap  Supine quad S HEP  HEP  HEP   Bike for ROM 6'    Incline  H30\" x 3    LP B 70 #/[employer] 50# R LE 2x15 reps    Standing HR 2x15 reps    Wall 1/4 squat w/ TA with SB with ADD Standing Hip abd - [facility]  45# 2 x 10 No band with L LE   Side-stepping at wall OVL 3 laps Above knee/BK/ at ankles x1 lap each + HEP   Pt ed: eval findings, POC, HEP, posture, heat vs ice, time spent communicating back and forth with Ja Barbour ATC working with Dr Abigail Land re: meds, back vs peroneal nerve symptoms    Manual Intervention ([phone])     STM to peroneals/ant tib, pat mobs Manual GS/ HS stretch  X 3 min     ITB STM  5'                   NMR re-education (36451)  CUES NEEDED        Tracie taping for medial glides of patella X         Tandem stance on Airex    SLS R LE 3 x 15\"          Gliders post/PL /lateral  X10 reps each R/L- small range   LSD 4 in 2 x 10         Therapeutic Activity (08755)                                     Therapeutic Exercise and NMR EXR  [x] (44418) Provided verbal/tactile cueing for activities related to strengthening, flexibility, endurance, ROM for improvements in LE, proximal hip, and core control with self care, mobility, lifting, ambulation.  [] (83020) Provided verbal/tactile cueing for activities related to improving balance, coordination, kinesthetic sense, posture, motor skill, proprioception  to assist with LE, proximal hip, and core control in self care, mobility, lifting, ambulation and eccentric single leg control.      NMR and Therapeutic Activities:    [x] (87968 or 85010) Provided verbal/tactile cueing for activities related to improving balance, coordination, kinesthetic sense, posture, motor skill, proprioception and motor activation to allow for proper function of core, proximal hip and LE with self care and ADLs  [] (13911) Gait Re-education- Provided training and instruction to the patient for proper LE, core and proximal hip recruitment and positioning and eccentric body weight control with ambulation re-education including up and down stairs     Home Exercise Program:    [x] (78071) Reviewed/Progressed HEP activities related to strengthening, flexibility, endurance, ROM of core, proximal hip and LE for functional self-care, mobility, lifting and ambulation/stair navigation   [] (64215)Reviewed/Progressed HEP activities related to improving balance, coordination, kinesthetic sense, posture, motor skill, proprioception of core, proximal hip and LE for self care, mobility, lifting, and ambulation/stair navigation      Manual Treatments:  PROM / STM / Oscillations-Mobs:  G-I, II, III, IV (PA's, Inf., Post.)  [x] (21588) Provided manual therapy to mobilize LE, proximal hip and/or LS spine soft tissue/joints for the purpose of modulating pain, promoting relaxation,  increasing ROM, reducing/eliminating soft tissue swelling/inflammation/restriction, improving soft tissue extensibility and allowing for proper ROM for normal function with self care, mobility, lifting and ambulation. Modalities:   [x] GAME READY (VASO)- for significant edema, swelling, pain control. Charges  Timed Code Treatment Minutes: 40'   Total Treatment Minutes: 54'     Medicare total (add KX at $2000)  Prior episode of care this year     [] EVAL (LOW) 73636   [] EVAL (MOD) 88929  [] EVAL (HIGH) 51183   [] RE-EVAL     [x] GW(17147) x1     [] IONTO  [x] NMR (29323) x 1  [x] VASO  [x] Manual (11789) x 1    [] Other:  [] TA x      [] Mech Traction (23920)  [] ES(attended) (69317)      [] ES (un) (97756):       GOALS:  Patient stated goal: decrease pain with daily activity  [] Progressing: [] Met: [] Not Met: [] Adjusted    Therapist goals for Patient:   Short Term Goals: To be achieved in: 2 weeks  1. Independent in HEP and progression per patient tolerance, in order to prevent re-injury. [] Progressing: [] Met: [] Not Met: [] Adjusted  2. Patient will have a decrease in pain to facilitate improvement in movement, function, and ADLs as indicated by Functional Deficits. [] Progressing: [] Met: [] Not Met: [] Adjusted    Long Term Goals: To be achieved in: 8 weeks  1. Disability index score of 52% or more per FOTO to assist with reaching prior level of function. [] Progressing: [] Met: [] Not Met: [] Adjusted  2. Patient will demonstrate increased AROM to 10 deg DF and lumbar flex/ext to [facility] to allow for proper joint functioning with dressing and ADL's.   [] Progressing: [] Met: [] Not Met: [] Adjusted  3. Patient will demonstrate an increase in Strength to 5/5 R knee and ankle to allow for proper functional mobility with squatting and stairs. [] Progressing: [] Met: [] Not Met: [] Adjusted  4. Patient will return to walking at least 39' to go grocery shopping without increased symptoms or restriction. [] Progressing: [] Met: [] Not Met: [] Adjusted  5. Pt will be able to resume Pilates post rehab program for ongoing conditioning and healthy lifestyle.     [] Progressing: [] Met: [] Not Met: [] Adjusted     Progression Towards Functional goals:  [] Patient is progressing as expected towards functional goals listed. [] Progression is slowed due to complexities listed. [] Progression has been slowed due to co-morbidities. [x] Plan just implemented, too soon to assess goals progression  [] Other:         Overall Progression Towards Functional goals/ Treatment Progress Update:  [] Patient is progressing as expected towards functional goals listed. [] Progression is slowed due to complexities/Impairments listed. [] Progression has been slowed due to co-morbidities. [x] Plan just implemented, too soon to assess goals progression <30days   [] Goals require adjustment due to lack of progress  [] Patient is not progressing as expected and requires additional follow up with physician  [] Other    Prognosis for POC: [x] Good [] Fair  [] Poor      Patient requires continued skilled intervention: [x] Yes  [] No    Treatment/Activity Tolerance:  [x] Patient able to complete treatment  [] Patient limited by fatigue  [] Patient limited by pain    [] Patient limited by other medical complications  [] Other:     ASSESSMENT:  Due to tenderness and bruising, held using IASTM tool today in clinic and performed lighter massage to ITB. Patient tolerated eccentric work with LSD well although fatigued. Demonstrating weakness of hip abductors. Patient responding well to taping techniques. Pt requires skilled intervention to restore ROM, strength, functional endurance and balance in order to perform ADLs without significant symptoms or limitations.      Return to Play: (if applicable)   []  Stage 1: Intro to Strength   []  Stage 2: Return to Run and Strength   []  Stage 3: Return to Jump and Strength   []  Stage 4: Dynamic Strength and Agility   []  Stage 5: Sport Specific Training     []  Ready to Return to Play, Meets All Above Stages   []  Not Ready for Return to Sports   Comments: PLAN:  Progress CKC as tolerated and lumbar stabilization exercises. [x] Continue per plan of care [] Alter current plan (see comments above)  [] Plan of care initiated [] Hold pending MD visit [] Discharge      Electronically signed by:  Pranay Edmond, PT 0    Note: If patient does not return for scheduled/ recommended follow up visits, this note will serve as a discharge from care along with most recent update on progress. Access Code: [email or web address]  URL: [email or web address]  Date: 08/12/2022  Prepared by: Marino Mckinley    Exercises  Supine Ankle Pumps - 1-2 x daily - 7 x weekly - 1-3 sets - 10 reps  Supine Ankle Inversion Eversion AROM - 1-2 x daily - 7 x weekly - 1-3 sets - 10 reps  Supine 90/90 Sciatic Nerve Glide with Knee Flexion/Extension - 1 x daily - 7 x weekly - 3 sets - 10 reps  Seated Long Arc Quad with Hip Adduction - 1 x daily - 7 x weekly - 1-3 sets - 10 reps  Supine ITB Stretch with Strap - 1 x daily - 7 x weekly - 3 sets - 20-30 seconds hold  Supine Figure 4 Piriformis Stretch (Mirrored) - 1 x daily - 7 x weekly - 3 sets - 30 seconds hold  Supine Quadriceps Stretch with Strap on Table - 1 x daily - 7 x weekly - 3 sets - 30 seconds hold  Supine Lower Trunk Rotation - 1 x daily - 7 x weekly - 1-2 sets - 10 reps  Supine Bridge with Mini Swiss Ball Between Knees - 1 x daily - 7 x weekly - 2-3 sets - 10 reps  Sidelying Hip Abduction - 1 x daily - 3 x weekly - 2-3 sets - 10 reps  Clamshell - 1 x daily - 3 x weekly - 2-3 sets - 10 reps  Wall Quarter Squat - 1 x daily - 3 x weekly - 2 sets - 10 reps  Small Range Straight Leg Raise - 1 x daily - 7 x weekly - 2 sets - 10 reps - 5 hold  Standing Hip Abduction with Resistance at Ankles and Counter Support - 1 x daily - 7 x weekly - 2-3 sets - 10 reps - 5 hold  Side Stepping with Counter Support - 1 x daily - 7 x weekly - 3 sets - 10 reps - 2 hold  Heel Raises with Counter Support - 1 x daily - 7 x weekly - 2-3 sets - 10 reps - 2 hold  Single Leg

## 2022-08-22 ENCOUNTER — OFFICE VISIT (OUTPATIENT)
Dept: ORTHOPEDIC SURGERY | Age: 67
End: 2022-08-22
Payer: MEDICARE

## 2022-08-22 VITALS — HEIGHT: 64 IN | WEIGHT: 155 LBS | BODY MASS INDEX: 26.46 KG/M2

## 2022-08-22 DIAGNOSIS — M25.561 RIGHT KNEE PAIN, UNSPECIFIED CHRONICITY: ICD-10-CM

## 2022-08-22 DIAGNOSIS — M54.16 RIGHT LUMBAR RADICULOPATHY: ICD-10-CM

## 2022-08-22 DIAGNOSIS — M48.062 LUMBAR STENOSIS WITH NEUROGENIC CLAUDICATION: ICD-10-CM

## 2022-08-22 DIAGNOSIS — M51.36 DDD (DEGENERATIVE DISC DISEASE), LUMBAR: Primary | ICD-10-CM

## 2022-08-22 DIAGNOSIS — M22.41 CHONDROMALACIA PATELLAE OF RIGHT KNEE: Primary | ICD-10-CM

## 2022-08-22 DIAGNOSIS — S80.01XA CONTUSION OF RIGHT KNEE, INITIAL ENCOUNTER: ICD-10-CM

## 2022-08-22 PROCEDURE — G8417 CALC BMI ABV UP PARAM F/U: HCPCS | Performed by: FAMILY MEDICINE

## 2022-08-22 PROCEDURE — 1036F TOBACCO NON-USER: CPT | Performed by: PHYSICIAN ASSISTANT

## 2022-08-22 PROCEDURE — G8417 CALC BMI ABV UP PARAM F/U: HCPCS | Performed by: PHYSICIAN ASSISTANT

## 2022-08-22 PROCEDURE — 99204 OFFICE O/P NEW MOD 45 MIN: CPT | Performed by: PHYSICIAN ASSISTANT

## 2022-08-22 PROCEDURE — G8399 PT W/DXA RESULTS DOCUMENT: HCPCS | Performed by: PHYSICIAN ASSISTANT

## 2022-08-22 PROCEDURE — 1123F ACP DISCUSS/DSCN MKR DOCD: CPT | Performed by: FAMILY MEDICINE

## 2022-08-22 PROCEDURE — 99214 OFFICE O/P EST MOD 30 MIN: CPT | Performed by: FAMILY MEDICINE

## 2022-08-22 PROCEDURE — G8399 PT W/DXA RESULTS DOCUMENT: HCPCS | Performed by: FAMILY MEDICINE

## 2022-08-22 PROCEDURE — 3017F COLORECTAL CA SCREEN DOC REV: CPT | Performed by: PHYSICIAN ASSISTANT

## 2022-08-22 PROCEDURE — G8427 DOCREV CUR MEDS BY ELIG CLIN: HCPCS | Performed by: FAMILY MEDICINE

## 2022-08-22 PROCEDURE — G8427 DOCREV CUR MEDS BY ELIG CLIN: HCPCS | Performed by: PHYSICIAN ASSISTANT

## 2022-08-22 PROCEDURE — 1090F PRES/ABSN URINE INCON ASSESS: CPT | Performed by: PHYSICIAN ASSISTANT

## 2022-08-22 PROCEDURE — 1123F ACP DISCUSS/DSCN MKR DOCD: CPT | Performed by: PHYSICIAN ASSISTANT

## 2022-08-22 PROCEDURE — 1090F PRES/ABSN URINE INCON ASSESS: CPT | Performed by: FAMILY MEDICINE

## 2022-08-22 PROCEDURE — 3017F COLORECTAL CA SCREEN DOC REV: CPT | Performed by: FAMILY MEDICINE

## 2022-08-22 PROCEDURE — 1036F TOBACCO NON-USER: CPT | Performed by: FAMILY MEDICINE

## 2022-08-22 NOTE — PROGRESS NOTES
tab  Past Medical History:   Diagnosis Date    Actinic keratosis     Breast disorder     CAD (coronary artery disease)     Chronic kidney disease     Hyperlipidemia     Hypertension     Infertility, female     S/P colonoscopy 08/2009    Tendinitis of foot     UTI (urinary tract infection)       Past Surgical History:     Past Surgical History:   Procedure Laterality Date    ABDOMEN SURGERY      stone removed from bile duct    ANGIOPLASTY      renal    BREAST BIOPSY      BREAST SURGERY      duct removal    BUNIONECTOMY Left 12/15/2020    MODIFIED YAZMIN BUNIONECTOMY, MODIFIED AKIN OSTEOTOMY, MODIFIED WEIL SECOND METATARSAL, PROXIMAL INTERPHALANGEAL JOINT ARTHRODESIS DIGITS TWO THREE FOUR LEFT FOOT performed by Dean Villarreal DPM at [address] [place] [address]  12/1992    COLONOSCOPY  8/1/2009    COLONOSCOPY N/A 10/23/2019    COLONOSCOPY POLYPECTOMY SNARE/COLD BIOPSY performed by Nguyễn Mooney MD at [address] [place] [address] Drive N/A 7/29/2019    MIDLINE LUMBAR FIVE SACRAL ONE EPIDURAL STEROID INJECTION SITE CONFIRMED BY FLUOROSCOPY performed by Al Moss MD at [place] [address] Left 01/11/2016    Cystoscopy, with left stent placement    TONSILLECTOMY AND ADENOIDECTOMY      UPPER GASTROINTESTINAL ENDOSCOPY N/A 4/7/2021    EGD BIOPSY performed by Nguyễn Mooney MD at [address] [place] [address]     Current Medications:     Current Outpatient Medications:     methylPREDNISolone (MEDROL DOSEPACK) 4 MG tablet, Take by mouth as directed., Disp: 21 kit, Rfl: 0    gabapentin (NEURONTIN) 300 MG capsule, Take one tablet once daily for 2 days. Then take one tablet twice daily for 2 days.  Then take one tablet three times daily for rest, Disp: 90 capsule, Rfl: 1    diclofenac sodium (VOLTAREN) 1 % GEL, Apply 4 g topically 4 times daily as needed for Pain, Disp: 100 g, Rfl: 3    atorvastatin (LIPITOR) 40 MG tablet, TAKE ONE TABLET BY MOUTH DAILY, Disp: 90 tablet, Rfl: 3    denosumab (PROLIA) 60 MG/ML SOSY SC injection, Inject 60 mg into the skin once, Disp: , Rfl:     pantoprazole (PROTONIX) 40 MG tablet, , Disp: , Rfl:     Potassium Citrate ER 15 MEQ (1620 MG) TBCR, 2 times daily , Disp: , Rfl:     calcitRIOL (ROCALTROL) 0.25 MCG capsule, Take 0.25 mcg by mouth every other day, Disp: , Rfl:     aspirin 81 MG tablet, Take 81 mg by mouth daily, Disp: , Rfl:     Melatonin 5 MG CHEW, Take by mouth nightly, Disp: , Rfl:   Allergies:  Codeine, Lisinopril, Norvasc [amlodipine besylate], and Triamterene-hctz  Social History:    reports that she has never smoked. She has never used smokeless tobacco. She reports that she does not drink alcohol and does not use drugs. Family History:   Family History   Problem Relation Age of Onset    Heart Disease Father         MI  at  age 48    High Blood Pressure Sister     Diabetes Sister     Kidney Disease Sister         transplant 2019    Breast Cancer Sister     High Blood Pressure Mother     High Cholesterol Mother     Arthritis Mother         gout    Heart Disease Mother     Cancer Mother     Heart Disease Brother     High Cholesterol Brother     High Blood Pressure Brother        REVIEW OF SYSTEMS: Full ROS reviewed & scanned into chart  CONSTITUTIONAL: Denies unexplained weight loss, fevers   SKIN: Denies active skin conditions   ENT: Denies dizziness, nosebleeds  RESPIRATORY: Denies current dyspnea, difficulty breathing  CARDIOVASCULAR: Denies chest pain   NEUROLOGICAL: Denies stroke, unsteady gait or progressive weakness  PSYCHOLOGICAL: Denies anxiety, depression   HEMATOLOGIC: Denies blood disorders, cancer  ENDOCRINE: Denies excessive thirst, urination, heat/cold  GI: Denies ulcer, nausea, vomiting, diarrhea   : Denies bowel or bladder incontinence       PHYSICAL EXAM:    Vitals: Height 5' 4\" (1.626 m), weight 155 lb (70.3 kg), last menstrual period 2005, not currently breastfeeding.      GENERAL EXAM:  General Apparence: Patient is adequately groomed with no evidence of malnutrition. Orientation: The patient is oriented to time, place and person. Mood & Affect:The patient's mood and affect are appropriate   Lymphatic: The lymphatic examination bilaterally reveals all areas to be without enlargement or induration  Sensation: Sensation is intact without deficit  Coordination/Balance: Good coordination     LUMBAR/SACRAL EXAMINATION:  Inspection: Local inspection shows no step-off or bruising. Lumbar alignment is normal.  Sagittal and Coronal balance is neutral.      Palpation:   No evidence of tenderness at the midline. Range of Motion: Mild loss of flexion mild to moderate loss extension more discomfort with extension today  Strength:   Strength testing is 5/5 in all muscle groups tested. Special Tests:   Straight leg raise and crossed SLR negative. Leg length and pelvis level.  0 out of 5 Michelle's signs. Skin: There are no rashes, ulcerations or lesions. Reflexes: Reflexes are symmetrically 2+ at the patellar and ankle tendons. Clonus absent bilaterally at the feet. Gait & station: Normal unassisted  Additional Examinations:   RIGHT LOWER EXTREMITY: Inspection/examination of the right lower extremity does not show any deformity. There is no gross instability. There are no rashes, ulcerations or lesions. Strength and tone are normal.    LEFT LOWER EXTREMITY:  Inspection/examination of the left lower extremity does not show any tenderness, deformity or injury. Range of motion is full. There is no gross instability. There are no rashes, ulcerations or lesions. Strength and tone are normal.    Diagnostic Testing:    Lumbar MRI scan report independently reviewed from August 2022 showing multilevel DDD/spondylosis with severe central stenosis L4-5, multilevel facet arthropathy.   Hepatomegaly suspected    Lumbar MRI from 2019 has shown severe central stenosis L4-5 and facet arthropathy     Hugh Dance notes reviewed    Labs reviewed June 2022 normal BUN, creatinine 1.4, 2020 hemoglobin A1c 5.6      Impression:  1) Chronic recurrent LBP, right lumbar radiculitis--aggravated since July  2) Severe CS L4-5--stable  3) Subacute right knee pain/swelling--Dr. Posada  4) CKD, hepatomegaly, CAD      Plan:   1) We reviewed her recent lumbar MRI scan today in detail compared to prior. We discussed right L4-5 LESI #1 new series. Procedure risk and benefits were discussed. Pamphlet provided for more information. She will call if wishing to proceed.     2) PT for above, flexion based    3) Discussed concerning signs and symptoms    4) F/u 1mo for re-evaluation     5) Discuss hepatomegaly with PCP, will send copy of report           Daisy Izquierdo PA-C, MPAS  Board Certified by the [facility]

## 2022-08-22 NOTE — PROGRESS NOTES
FU acute onset right-sided mechanical back pain with right leg pain with associated numbness and tingling and known history of previously documented severe spinal stenosis previously established with Tevin Del Real and Dr. Gil Beach. Recently seen 7/25/2022 for right knee contusion and aggravation chondromalacia patella        Subjective:      Patient ID: Jefferson Servin is a 78 y.o. female who is here in the [id number] [place], [place] [address]y [address]- for an initial evaluation of right anterior knee pain. She states approximately 1 month ago she walked into a piece of furniture and struck her knee. Since that time she has had discomfort in the anterior knee. She reports pain with stairs and chairs. She denies mechanical symptoms such as locking and catching. Najma is a very pleasant retired white female who does have a history of mechanical low back pain is a patient of Dr. Dennise Paz who is being seen today in kind consultation from Shonda Cardenas for evaluation of an injury to her right knee. She states that in early June 2022 she was helping her daughter move and her house was somewhat cluttered and believes she struck the anterior portion of her right knee against the edge of a metal table. There is no pop or crack although she did have immediate pain her pain symptoms do progress or worsen after several days to the point where is difficulty performing her exercise program going up and down stairs and doing her Pilates. Most of her pain is anterior in nature and she has had some very mild swelling. Initially she treated herself with Tylenol but avoided anti-inflammatories given her history of kidney dysfunction but was eventually seen by Shonda Cardenas on 7/11/2022 where x-rays did not reveal high-grade degenerative changes and was placed on a Medrol pack which has helped her symptoms substantially at about 50 to 60% but is still unable to walk and perform her exercises.   She is have some occasional subtle buckling and denies active locking or catching. No previous history of knee injury. She is being seen today for orthopedic and sports consultation with imaging. Pain Scale 7/10 VAS. Location of pain anterior lateral knee just inferior to the patella. Pain is worse with stairs, chairs. Pain improves with ice, elevation. Previous treatments have included Tylenol. Cannot take NSAIDs due to CKD. Was initially evaluated primarily for her knee on 7/25/2022 and is now almost 2 months out from her knee contusion with aggravation chondromalacia patella. She was seen in the office and did not exhibit any evidence of fracturing although she did have some evidence of low-grade osteoarthritis and chondromalacia we did start her in therapy and performed a injection to her knee and started on Voltaren gel and knee bracing and was improving with regard to her actual knee symptoms. Her more pressing complaint is over this past weekend since roughly 7/30/2022 she began to notice very severe throbbing and aching to her right leg which was suspicious for radiculopathy mostly in the L5 distribution and has noted she had been previously established with Dr. Katie Carrasco and Chrai Cox with her last lumbar MRI being 7/15/2019 showing severe spinal stenosis centrally at L4-5 secondary to disc protrusion and facet arthropathy with ligamentous thickening. There is left lateral recess stenosis primarily at L3-4 with severe left foraminal narrowing at L5-S1 spondylitic in nature. She states that her worsening symptoms and inability to walk over the weekend felt much like nerve pain but she has never had this on the right-hand side and was having a difficult time sleeping. She did mention this to her therapist at her evaluation on 8/1/2022 and they did come over and speak with us we started her on a Medrol Dosepak and a gabapentin taper. This is resulted in about a 60% improvement of her symptoms.   Her last epidural with Dr. Rosi Brock was is in 2020. We last saw Ralph Conner for her knee in the office on 7/25/2022. She was felt to have reached experiencing residuals from a knee contusion with a fall in June 2022. She presents back today stating that her knee symptoms have improved only about 25%. She is working her therapy exercises doing taping and does utilize her brace and has continued with her Voltaren gel. She had actually been experiencing increased numbness and tingling and did have her evaluation post imaging with Camila Alfaro today and conservative treatment was recommended although the potential for repeat L4-5 epidurals were discussed as well. She still feels as if her knee is going to give out most of her pain is with walking and going up and down stairs as well as positional changes. Most of her pain is anterior in nature although she may have had several episodes of catching. Swelling is improved. At most she would rate her knee improvement at about 25%. Review of Systems:  I have reviewed the clinically relevant past medical history, medications, allergies, family history, social history, and 13 point Review of Systems from the patient's recent history form & documented any details relevant to today's presenting complaints in the history above. The patient's self-reported past medical history, medications, allergies, family history, social history, and Review of Systems form from today's date have been scanned into the chart under the \"Media\" tab. As outlined in the HPI. Negative for fever or chills. Negative for poly-joint pain, swelling and stiffness. She denies numbness or tingling into the affected extremity.      Past Medical History:   Diagnosis Date    Actinic keratosis     Breast disorder     CAD (coronary artery disease)     Chronic kidney disease     Hyperlipidemia     Hypertension     Infertility, female     S/P colonoscopy 08/2009    Tendinitis of foot     UTI (urinary tract infection)        Family History   Problem Relation Age of Onset    Heart Disease Father         MI  at  age 48    High Blood Pressure Sister     Diabetes Sister     Kidney Disease Sister         transplant 2019    Breast Cancer Sister     High Blood Pressure Mother     High Cholesterol Mother     Arthritis Mother         gout    Heart Disease Mother     Cancer Mother     Heart Disease Brother     High Cholesterol Brother     High Blood Pressure Brother        Past Surgical History:   Procedure Laterality Date    ABDOMEN SURGERY      stone removed from bile duct    ANGIOPLASTY      renal    BREAST BIOPSY      BREAST SURGERY      duct removal    BUNIONECTOMY Left 12/15/2020    MODIFIED YAZMIN BUNIONECTOMY, MODIFIED AKIN OSTEOTOMY, MODIFIED WEIL SECOND METATARSAL, PROXIMAL INTERPHALANGEAL JOINT ARTHRODESIS DIGITS TWO THREE FOUR LEFT FOOT performed by Dean Villarreal DPM at [address] [place] [address]  1992    COLONOSCOPY  2009    COLONOSCOPY N/A 10/23/2019    COLONOSCOPY POLYPECTOMY SNARE/COLD BIOPSY performed by Nguyễn Mooney MD at [address] [place] [address] N/A 2019    MIDLINE LUMBAR FIVE SACRAL ONE EPIDURAL STEROID INJECTION SITE CONFIRMED BY FLUOROSCOPY performed by Al Moss MD at [place] [id number] Left 2016    Cystoscopy, with left stent placement    TONSILLECTOMY AND ADENOIDECTOMY      UPPER GASTROINTESTINAL ENDOSCOPY N/A 2021    EGD BIOPSY performed by Nguyễn Mooney MD at [address] [place] [address] History     Occupational History    Not on file   Tobacco Use    Smoking status: Never    Smokeless tobacco: Never   Vaping Use    Vaping Use: Never used   Substance and Sexual Activity    Alcohol use: No    Drug use: No    Sexual activity: Not Currently       Current Outpatient Medications   Medication Sig Dispense Refill    gabapentin (NEURONTIN) 300 MG capsule Take one tablet once daily for 2 days. Then take one tablet twice daily for 2 days. Then take one tablet three times daily for rest 90 capsule 1    diclofenac sodium (VOLTAREN) 1 % GEL Apply 4 g topically 4 times daily as needed for Pain 100 g 3    atorvastatin (LIPITOR) 40 MG tablet TAKE ONE TABLET BY MOUTH DAILY 90 tablet 3    denosumab (PROLIA) 60 MG/ML SOSY SC injection Inject 60 mg into the skin once      pantoprazole (PROTONIX) 40 MG tablet       Potassium Citrate ER 15 MEQ (1620 MG) TBCR 2 times daily       calcitRIOL (ROCALTROL) 0.25 MCG capsule Take 0.25 mcg by mouth every other day      aspirin 81 MG tablet Take 81 mg by mouth daily      Melatonin 5 MG CHEW Take by mouth nightly       No current facility-administered medications for this visit. Allergies   Allergen Reactions    Codeine Rash, Nausea And Vomiting and Shortness Of Breath    Lisinopril      cough    Norvasc [Amlodipine Besylate]      swelling    Triamterene-Hctz      Renal insufficiency         Objective:     She is alert, oriented x 3, pleasant, well nourished, developed and in no acute distress. LMP 07/11/2005      Examination of the right knee: The alignment of the knee is neutral.   There is not erythema. There is no soft tissue swelling. There is a mild effusion. ROM-  Extension 0           -   Flexion  130   There mild pain associated with ROM testing. Medial joint line non tender to palpation. Lateral joint line non tender to palpation. Retro patellar crepitus is present. There is no pain with resisted knee extension. There is no crepitus along the joint line with ROM testing. Varus Stress testing does not produce pain and does not show laxity. Valgus Stress testing does not produce pain and does not show laxity. Lachman's test is negative. Anterior Drawer test is negative. Posterior Drawer test is negative  Ligia's Test is negative. Patellar Compression testing does continue to reproduce pain.   When she rates it about a 5-6 out of 10. Extensor Mechanism is  intact. Lower extremities:  She has 5/5 motor strength of bilateral lower extremities. She has a negative straight leg raise, bilaterally. Deep tendon reflexes at knees and achilles are 2+. Sensation is intact to light touch L3 to S1 bilaterally. She has no clonus. Examination of the lower extremities shows intact perfusion to both lower extremities. She has no cyanosis and digigts are warm to touch, capillary refill is less than 2 seconds. She has no edema noted. She has intact skin without lacerations or abrasions, no significant erythema, rashes or skin lesions. Lumbar spine evaluation obtained today in the office does reveal tenderness over lumbar paraspinals and lower lumbar facets right greater than left. She does have central gluteal tenderness worse on the right than left. She can only forward flex to about 30 to 40 degrees and does have painful extension with a 50% reduction in lateral bending rotation. She does appear to have a trace positive straight leg raise on the right negative on the left today. There is not appear to be focal lower extremity motor deficits. X Rays: were performed in the office on 7/11/2022  AP, PA Standing, Lateral and Sunrise views of right knee:  No acute fractures or dislocations noted. There is minimal patellar subluxation.      MRI scan Lumbar [facility] at [facility] 7/15/2019 as listed above per Bianca Rosado  Narrative   [facility]IN[facility]:   [facility] 7/15/2019 10:43 am       TECHNIQUE:   Multiplanar multisequence MRI of the lumbar spine was performed without the   administration of intravenous contrast.       COMPARISON:   None       HISTORY:   ORDERING SYSTEM PROVIDED HISTORY: DDD (degenerative disc disease), lumbar   TECHNOLOGIST PROVIDED HISTORY:   [facility] [id number]   Reason for exam:->Back pain   Reason for Exam: DDD (degenerative disc disease), ligamentum flavum. 2. Mild spinal canal stenosis, mild left neural foraminal narrowing and   effacement of the left lateral recess at L3-L4 secondary to a disc bulge. 3. Mild spinal canal stenosis and severe left neural foraminal narrowing at   L5-S1 secondary to a disc bulge, facet arthropathy, thickening of the   ligamentum flavum and osteophyte formation. Additional Tests reviewed: none  Additional Outside Records reviewed: none    Diagnosis:       ICD-10-CM    1. Chondromalacia patellae of right knee  M22.41 MRI KNEE RIGHT WO CONTRAST      2. Right knee pain, unspecified chronicity  M25.561 MRI KNEE RIGHT WO CONTRAST      3. Contusion of right knee, initial encounter  S80. 01XA MRI KNEE RIGHT WO CONTRAST           Assessment and Plan:       Assessment:  #1.  11-week status post posttraumatic persistent right knee aggravation chondromalacia patella with ongoing knee pain secondary to knee contusion    #2.  5 days status post acute onset primarily right-sided mechanical low back pain with suspected right L5 radiculopathy and previously documented severe canal stenosis treated previously with epidurals by Dr. Ahmet Segura and followed by Sowmya Turcios had an extensive discussion with Ms. Marina Mclean regarding the natural history, etiology, and long term consequences of her condition. I have presented reasonable alternatives to the patient's proposed care, treatment, and services. Risks and benefits of the treatment options also reviewed in detail. I have outlined a treatment plan with them. She has had full opportunity to ask her questions. I have answered them all to her satisfaction. I feel that Ms. Marina Mclean understands our discussion today. Plan: Treatment options were discussed with Meena Wilson today. We did review her plain films and exam findings.   She does not appear to have a great deal of degenerative changes involving her knee on plain films but has been having ongoing pain symptoms. She states that starting treatment for her knee compared to last week she has improved moderately and to get transient benefit from her steroid injection. At most she is only improved about 25%. Recommend she continues with her Voltaren gel upon approval from her nephrologist as she is only 1 functional kidney and will continue the bracing and physical therapy. Her more pressing complaint with her last visit was her back pain did have her updated MRI which did appear to so stable findings. She was evaluated by Rock Donaldson who recommended continued conservative treatment although potentially repeating her right-sided L4-5 epidural steroid injections were discussed in detail. She is thinking about this and will keep her follow-up with Rock Donaldson. Icing and activity modification was discussed. I would like for her to have an MRI of her right knee to evaluate for bone bruising and to more thoroughly evaluate the degree of her underlying right knee osteoarthritis and to evaluate for meniscal tearing. Potential for a trial of viscosupplementation was discussed. She will continue with her Voltaren gel as well as her gabapentin 300 mg 3 times daily. Icing and activity modification use of her knee brace was recommended she will continue with supervised therapy. We will see her back post MRI and she will contact us in the interim with questions or concerns . Aj Mario MD    Disclaimer: This note was generated with use of a verbal recognition program (DRAGON) and an attempt was made to check for errors. It is possible that there are still dictated errors within this office note. If so, please bring any significant errors to my attention for an addendum. All efforts were made to ensure that this office note is accurate.

## 2022-08-24 ENCOUNTER — HOSPITAL ENCOUNTER (OUTPATIENT)
Dept: PHYSICAL THERAPY | Age: 67
Setting detail: THERAPIES SERIES
Discharge: HOME | End: 2022-08-24
Payer: MEDICARE

## 2022-08-24 PROCEDURE — 97110 THERAPEUTIC EXERCISES: CPT | Performed by: PHYSICAL THERAPIST

## 2022-08-24 PROCEDURE — 97112 NEUROMUSCULAR REEDUCATION: CPT | Performed by: PHYSICAL THERAPIST

## 2022-08-24 NOTE — FLOWSHEET NOTE
[facility] [facility], [address]  Phone: [phone]  Fax [phone]    Physical Therapy Treatment Note/ Progress Report:           Date:  2022    Patient Name:  Mirna Taylor    :  1955  MRN: [id number]  Restrictions/Precautions:    Medical/Treatment Diagnosis Information:  Diagnosis: M25.561 (ICD-10-CM) - Acute pain of right knee, S80.01XA (ICD-10-CM) - Contusion of right knee, initial encounter, M22.41 (ICD-10-CM) - Chondromalacia patellae of right knee  Treatment Diagnosis: M25.561 (ICD-10-CM) - Acute pain of right knee  Insurance/Certification information:  PT Insurance Information: [employer]  Physician Information:   Dr Killian Meyer  Has the plan of care been signed (Y/N):        [x]  Yes  []  No     Date of Patient follow up with Physician: 02 with Omayra Lua and Dr. Killian Meyer. Is this a Progress Report: FOTO N.V    []  Yes  [x]  No        If Yes:  Date Range for reporting period:  Beginnin22  Ending:     Progress report will be due (10 Rx or 30 days whichever is less):        Recertification will be due (POC Duration  / 90 days whichever is less): 8 weeks      Visit # Insurance Allowable Auth Required   In-person [address] []  Yes []  No    Telehealth   []  Yes []  No    Total          Functional Scale: FOTO     Date assessed:  22      Therapy Diagnosis/Practice Pattern:E      Number of Comorbidities:  []0     []1-2    [x]3+    Latex Allergy:  [x]NO      []YES  Preferred Language for Healthcare:   [x]English       []other:      Pain level:  eval 4-10/10 Current 0/10 upon arrival in knee. SUBJECTIVE: Reports seeing Omayra Lua for back. Also has new orders for low back. Has formal eval scheduled for next week. Has MRI ordered for R knee to R/O meniscus and assess OA of R knee. Scheduled for next week. Reports taping still helps overall.   OBJECTIVE: New orders to add lumbar rehab to PT from Dr. Washington Orozco. Sees [facility] on 8/22/22. Lumbar flnstructed that patient may start walking on level surfaces at home outside short distances. Observation:   Test measurements:  MMTR hip abd 4-; Girth measurements:RESTRICTIONS/PRECAUTIONS: lumbar DDD/stenosis    Exercises/Interventions: + TB with Clams. Progressed exercises this visit.     Therapeutic Ex (42601) Sets/sec/Reps Notes/CUES   Ankle/foot AROM PF w/ toes/DF, inv/ev HEP  HEP   Supine nerve glide knee flex/ext w/ DF/PF HEP   LAQ w/ add ball squeeze 2# HEP   Mini SLR 2# 2x10 reps + to HEP   Supine LTR Supine bridge w/ add SL hip abd w/ TA  SL clam w. TA OVL 2 x 10 Supine fig 4 S  Supine ITB w/ strap  Supine quad S HEP  HEP  HEP   Bike for ROM 6'    Incline  H30\" x 3    LP B 70 #/[employer] 60# R LE 2x15 reps    Standing HR 2x15 reps    Wall 1/4 squat w/ TA with SB with ADD Standing Hip abd - [facility] Linch  45# 2 x 15 Side-stepping at wall OVL Pt ed: eval findings, POC, HEP, posture, heat vs ice, time spent communicating back and forth with Monica Alvarado, HARRIETT working with Dr Washington Orozco re: meds, back vs peroneal nerve symptoms    Manual Intervention (24045)     STM to peroneals/ant tib, pat mobs Manual GS/ HS stretch  X 3 min     ITB STM                    NMR re-education (26670)  CUES NEEDED        Hodges taping for medial glides of patella X         Tandem stance on Airex    SLS R LE 3 x 15\"          Gliders post/PL /lateral  h R/L- small range   LSD         Therapeutic Activity (62973)                                     Therapeutic Exercise and NMR EXR  [x] (74457) Provided verbal/tactile cueing for activities related to strengthening, flexibility, endurance, ROM for improvements in LE, proximal hip, and core control with self care, mobility, lifting, ambulation.  [] (75254) Provided verbal/tactile cueing for activities related to improving balance, coordination, kinesthetic sense, posture, motor skill, proprioception  to assist with LE, proximal hip, and core control in self care, mobility, lifting, ambulation and eccentric single leg control. NMR and Therapeutic Activities:    [x] (79484 or 96525) Provided verbal/tactile cueing for activities related to improving balance, coordination, kinesthetic sense, posture, motor skill, proprioception and motor activation to allow for proper function of core, proximal hip and LE with self care and ADLs  [] (46286) Gait Re-education- Provided training and instruction to the patient for proper LE, core and proximal hip recruitment and positioning and eccentric body weight control with ambulation re-education including up and down stairs     Home Exercise Program:    [x] (94657) Reviewed/Progressed HEP activities related to strengthening, flexibility, endurance, ROM of core, proximal hip and LE for functional self-care, mobility, lifting and ambulation/stair navigation   [] (87797)Reviewed/Progressed HEP activities related to improving balance, coordination, kinesthetic sense, posture, motor skill, proprioception of core, proximal hip and LE for self care, mobility, lifting, and ambulation/stair navigation      Manual Treatments:  PROM / STM / Oscillations-Mobs:  G-I, II, III, IV (PA's, Inf., Post.)  [x] (02134) Provided manual therapy to mobilize LE, proximal hip and/or LS spine soft tissue/joints for the purpose of modulating pain, promoting relaxation,  increasing ROM, reducing/eliminating soft tissue swelling/inflammation/restriction, improving soft tissue extensibility and allowing for proper ROM for normal function with self care, mobility, lifting and ambulation. Modalities:  CP x15 min to R knee after treatment. [] GAME READY (VASO)- for significant edema, swelling, pain control.      Charges  Timed Code Treatment Minutes: 40'   Total Treatment Minutes: 54'     Medicare total (add KX at $2000)  Prior episode of care this year     [] EVAL (LOW) 66891   [] EVAL (MOD) 99246  [] EVAL (HIGH) 83515   [] RE-EVAL     [x] ZU(26423) x2    [] IONTO  [x] NMR (35685) x 1  [] VASO  [] Manual (45528) x     [] Other:  [] TA x      [] Mech Traction (87803)  [] ES(attended) (54619)      [] ES (un) (91175):       GOALS:  Patient stated goal: decrease pain with daily activity  [] Progressing: [] Met: [] Not Met: [] Adjusted    Therapist goals for Patient:   Short Term Goals: To be achieved in: 2 weeks  1. Independent in HEP and progression per patient tolerance, in order to prevent re-injury. [] Progressing: [] Met: [] Not Met: [] Adjusted  2. Patient will have a decrease in pain to facilitate improvement in movement, function, and ADLs as indicated by Functional Deficits. [] Progressing: [] Met: [] Not Met: [] Adjusted    Long Term Goals: To be achieved in: 8 weeks  1. Disability index score of 52% or more per FOTO to assist with reaching prior level of function. [] Progressing: [] Met: [] Not Met: [] Adjusted  2. Patient will demonstrate increased AROM to 10 deg DF and lumbar flex/ext to [facility] to allow for proper joint functioning with dressing and ADL's.   [] Progressing: [] Met: [] Not Met: [] Adjusted  3. Patient will demonstrate an increase in Strength to 5/5 R knee and ankle to allow for proper functional mobility with squatting and stairs. [] Progressing: [] Met: [] Not Met: [] Adjusted  4. Patient will return to walking at least 39' to go grocery shopping without increased symptoms or restriction. [] Progressing: [] Met: [] Not Met: [] Adjusted  5. Pt will be able to resume Pilates post rehab program for ongoing conditioning and healthy lifestyle. [] Progressing: [] Met: [] Not Met: [] Adjusted     Progression Towards Functional goals:  [] Patient is progressing as expected towards functional goals listed. [] Progression is slowed due to complexities listed. [] Progression has been slowed due to co-morbidities.   [x] Plan just implemented, too soon to assess goals progression  [] Other:         Overall Progression Towards Functional goals/ Treatment Progress Update:  [] Patient is progressing as expected towards functional goals listed. [] Progression is slowed due to complexities/Impairments listed. [] Progression has been slowed due to co-morbidities. [x] Plan just implemented, too soon to assess goals progression <30days   [] Goals require adjustment due to lack of progress  [] Patient is not progressing as expected and requires additional follow up with physician  [] Other    Prognosis for POC: [x] Good [] Fair  [] Poor      Patient requires continued skilled intervention: [x] Yes  [] No    Treatment/Activity Tolerance:  [x] Patient able to complete treatment  [] Patient limited by fatigue  [] Patient limited by pain    [] Patient limited by other medical complications  [] Other:     ASSESSMENT:  Due to tenderness and bruising, held using IASTM tool today in clinic. Able to tolerate and progress weights with machine work this visit. Demonstrating weakness of hip abductors. Patient responding well to taping techniques. Pt requires skilled intervention to restore ROM, strength, functional endurance and balance in order to perform ADLs without significant symptoms or limitations. Return to Play: (if applicable)   []  Stage 1: Intro to Strength   []  Stage 2: Return to Run and Strength   []  Stage 3: Return to Jump and Strength   []  Stage 4: Dynamic Strength and Agility   []  Stage 5: Sport Specific Training     []  Ready to Return to Play, Meets All Above Stages   []  Not Ready for Return to Sports   Comments:                               PLAN:  Progress CKC as tolerated and lumbar stabilization exercises.   [x] Continue per plan of care [] Alter current plan (see comments above)  [] Plan of care initiated [] Hold pending MD visit [] Discharge      Electronically signed by:  French Garcia, PT 0    Note: If patient does not return for scheduled/ recommended follow up visits, this note will serve as a discharge from care along with most recent update on progress. Access Code: [email or web address]  URL: [email or web address]  Date: 08/12/2022  Prepared by: Christ Cogan    Exercises  Supine Ankle Pumps - 1-2 x daily - 7 x weekly - 1-3 sets - 10 reps  Supine Ankle Inversion Eversion AROM - 1-2 x daily - 7 x weekly - 1-3 sets - 10 reps  Supine 90/90 Sciatic Nerve Glide with Knee Flexion/Extension - 1 x daily - 7 x weekly - 3 sets - 10 reps  Seated Long Arc Quad with Hip Adduction - 1 x daily - 7 x weekly - 1-3 sets - 10 reps  Supine ITB Stretch with Strap - 1 x daily - 7 x weekly - 3 sets - 20-30 seconds hold  Supine Figure 4 Piriformis Stretch (Mirrored) - 1 x daily - 7 x weekly - 3 sets - 30 seconds hold  Supine Quadriceps Stretch with Strap on Table - 1 x daily - 7 x weekly - 3 sets - 30 seconds hold  Supine Lower Trunk Rotation - 1 x daily - 7 x weekly - 1-2 sets - 10 reps  Supine Bridge with Mini Swiss Ball Between Knees - 1 x daily - 7 x weekly - 2-3 sets - 10 reps  Sidelying Hip Abduction - 1 x daily - 3 x weekly - 2-3 sets - 10 reps  Clamshell - 1 x daily - 3 x weekly - 2-3 sets - 10 reps  Wall Quarter Squat - 1 x daily - 3 x weekly - 2 sets - 10 reps  Small Range Straight Leg Raise - 1 x daily - 7 x weekly - 2 sets - 10 reps - 5 hold  Standing Hip Abduction with Resistance at Ankles and Counter Support - 1 x daily - 7 x weekly - 2-3 sets - 10 reps - 5 hold  Side Stepping with Counter Support - 1 x daily - 7 x weekly - 3 sets - 10 reps - 2 hold  Heel Raises with Counter Support - 1 x daily - 7 x weekly - 2-3 sets - 10 reps - 2 hold  Single Leg Stance with Support - 1 x daily - 7 x weekly - 1 sets - 5 reps - 15 hold      MEDICARE CAP EXCEPTION DOCUMENTATION      I certify that this patient meets one of the below criteria necessary for becoming an exception to the Medicare cap on therapy services:    []  The patient has a condition identified by an ICD-10 code that has a direct and significant impact on the need for therapy.  (Significantly impacts the rate of recovery.)                   []  The patient has a complexity identified by an ICD-10 code that has a direct and significant impact on the need for therapy. (Significantly impacts the rate of recovery and is associated with a primary condition.)         []  The patient has associated variables that influence the amount of treatment to include:  Social support, self-efficacy/motivation, prognosis, time since onset/acuity. [x]  The patient has generalized musculoskeletal conditions or a condition affecting multiple sites that will have a direct impact on the rate of recovery. [x]  The patient had a prior episode of outpatient therapy during this calendar year for a different condition. []  The patient has a mental or cognitive disorder in addition to the condition being treated that will have a direct and significant impact on the rate of recovery.

## 2022-08-26 ENCOUNTER — HOSPITAL ENCOUNTER (OUTPATIENT)
Dept: PHYSICAL THERAPY | Age: 67
Setting detail: THERAPIES SERIES
Discharge: HOME | End: 2022-08-26
Payer: MEDICARE

## 2022-08-26 PROCEDURE — 97140 MANUAL THERAPY 1/> REGIONS: CPT | Performed by: PHYSICAL THERAPIST

## 2022-08-26 PROCEDURE — 97110 THERAPEUTIC EXERCISES: CPT | Performed by: PHYSICAL THERAPIST

## 2022-08-26 PROCEDURE — 97112 NEUROMUSCULAR REEDUCATION: CPT | Performed by: PHYSICAL THERAPIST

## 2022-08-26 NOTE — FLOWSHEET NOTE
[facility], [address]  Phone: [phone]  Fax [phone]    Physical Therapy Treatment Note/ Progress Report:           Date:  2022    Patient Name:  Sofiya Mondragon    :  1955  MRN: [id number]  Restrictions/Precautions:    Medical/Treatment Diagnosis Information:  Diagnosis: M25.561 (ICD-10-CM) - Acute pain of right knee, S80.01XA (ICD-10-CM) - Contusion of right knee, initial encounter, M22.41 (ICD-10-CM) - Chondromalacia patellae of right knee  Treatment Diagnosis: M25.561 (ICD-10-CM) - Acute pain of right knee  Insurance/Certification information:  PT Insurance Information: MC/Med mutual  Physician Information:   Dr Zoraida Le  Has the plan of care been signed (Y/N):        [x]  Yes  []  No     Date of Patient follow up with Physician: 02 with Renuka Dalton and Dr. Zoraida Le. Is this a Progress Report: N.V for knee    []  Yes  [x]  No        If Yes:  Date Range for reporting period:  Beginnin22  Ending:     Progress report will be due (10 Rx or 30 days whichever is less):        Recertification will be due (POC Duration  / 90 days whichever is less): 8 weeks      Visit # Insurance Allowable Auth Required   In-person 7 [facility] []  Yes []  No    Telehealth   []  Yes []  No    Total          Functional Scale: FOTO 21/100    Date assessed:  22    Functional Scale: FOTO  47/100    Date assessed:22  Therapy Diagnosis/Practice Pattern:E      Number of Comorbidities:  []0     []1-2    [x]3+    Latex Allergy:  [x]NO      []YES  Preferred Language for Healthcare:   [x]English       []other:      Pain level:  eval 4-10/10 Current 2/10 upon arrival in knee. SUBJECTIVE: Reports stamina is getting better overall. Reports walking [id number] steps without knee pain. Will feel the tightness/stiffness lateral LE thigh and calf.   OBJECTIVE: Lumbar flnstructed that patient may start walking on level surfaces at home outside short distances. + IASTM this visit. Observation:   Test measurements:  MMTR hip abd 4-; Girth measurements:OBJECTIVE  Test used 8/1/22 Current Score   Pain Summary 0-10 4-10    Functional questionnaire FOTO 21 47   Functional Testing       Knee flex 133    ROM Knee ext 0     Ankle ever R/L 4- p, 5    Strength Knee ext R/L 4 P, 5     Hip abd R/L 4-,     RESTRICTIONS/PRECAUTIONS: lumbar DDD/stenosis    Exercises/Interventions: + TB with Clams. Progressed exercises this visit.     Therapeutic Ex (73867) Sets/sec/Reps Notes/CUES   Ankle/foot AROM PF w/ toes/DF, inv/ev HEP  HEP   Supine nerve glide knee flex/ext w/ DF/PF HEP   LAQ w/ add ball squeeze 2# HEP   Mini SLR 2# 2x10 reps + to HEP   Supine LTR Supine bridge w/ add SL hip abd w/ TA  SL clam w. TA OVL 2 x 10 Supine fig 4 S  Supine ITB w/ strap  Supine quad S HEP  HEP  HEP   Bike for ROM 6'    Incline  H30\" x 3    LP B 70 #/[employer] 60# R LE 2x15 reps    Standing HR 2x15 reps    Wall 1/4 squat w/ TA with SB with ADD Standing Hip abd - [facility] [place]  45# 2 x 15 Side-stepping at wall OVL Pt ed: eval findings, POC, HEP, posture, heat vs ice, time spent communicating back and forth with Louise Velasquez ATC working with Dr Mikie Cruz re: meds, back vs peroneal nerve symptoms    Manual Intervention (48788)     STM to peroneals/ant tib, pat mobs Manual GS/ HS stretch  X 3 min     IASTM HG to distal ITB 5'                   NMR re-education (66317)  CUES NEEDED        Tracie taping for medial glides of patella X         Tandem stance on Airex    SLS R LE 3 x 15\"          Gliders post/PL /lateral  h R/L- small range   LSD    CC retro walking 15# with SLS 5 laps    CC side stepping 15# with SLS 5 laps ea    Therapeutic Activity (58853)                                     Therapeutic Exercise and NMR EXR  [x] (92457) Provided verbal/tactile cueing for activities related to strengthening, flexibility, endurance, ROM for improvements in LE, proximal hip, and core control with self care, mobility, lifting, ambulation.  [] (89140) Provided verbal/tactile cueing for activities related to improving balance, coordination, kinesthetic sense, posture, motor skill, proprioception  to assist with LE, proximal hip, and core control in self care, mobility, lifting, ambulation and eccentric single leg control. NMR and Therapeutic Activities:    [x] (29604 or 64528) Provided verbal/tactile cueing for activities related to improving balance, coordination, kinesthetic sense, posture, motor skill, proprioception and motor activation to allow for proper function of core, proximal hip and LE with self care and ADLs  [] (88816) Gait Re-education- Provided training and instruction to the patient for proper LE, core and proximal hip recruitment and positioning and eccentric body weight control with ambulation re-education including up and down stairs     Home Exercise Program:    [x] (06791) Reviewed/Progressed HEP activities related to strengthening, flexibility, endurance, ROM of core, proximal hip and LE for functional self-care, mobility, lifting and ambulation/stair navigation   [] (65597)Reviewed/Progressed HEP activities related to improving balance, coordination, kinesthetic sense, posture, motor skill, proprioception of core, proximal hip and LE for self care, mobility, lifting, and ambulation/stair navigation      Manual Treatments:  PROM / STM / Oscillations-Mobs:  G-I, II, III, IV (PA's, Inf., Post.)  [x] (09262) Provided manual therapy to mobilize LE, proximal hip and/or LS spine soft tissue/joints for the purpose of modulating pain, promoting relaxation,  increasing ROM, reducing/eliminating soft tissue swelling/inflammation/restriction, improving soft tissue extensibility and allowing for proper ROM for normal function with self care, mobility, lifting and ambulation. Modalities:  CP x15 min to R knee after treatment.    [] GAME READY (VASO)- for significant edema, swelling, pain control. Charges  Timed Code Treatment Minutes: 54'   Total Treatment Minutes: 72'     Medicare total (add KX at $2000)  Prior episode of care this year     [] EVAL (LOW) 53887   [] EVAL (MOD) 57783  [] EVAL (HIGH) 63720   [] RE-EVAL     [x] MX(75009) x2    [] IONTO  [x] NMR (96652) x 1  [] VASO  [x] Manual (64585) x  1   [] Other:  [] TA x      [] Mech Traction (72166)  [] ES(attended) (92537)      [] ES (un) (38107):       GOALS:  Patient stated goal: decrease pain with daily activity  [] Progressing: [] Met: [] Not Met: [] Adjusted    Therapist goals for Patient:   Short Term Goals: To be achieved in: 2 weeks  1. Independent in HEP and progression per patient tolerance, in order to prevent re-injury. [] Progressing: [] Met: [] Not Met: [] Adjusted  2. Patient will have a decrease in pain to facilitate improvement in movement, function, and ADLs as indicated by Functional Deficits. [] Progressing: [] Met: [] Not Met: [] Adjusted    Long Term Goals: To be achieved in: 8 weeks  1. Disability index score of 52% or more per FOTO to assist with reaching prior level of function. [] Progressing: [] Met: [] Not Met: [] Adjusted  2. Patient will demonstrate increased AROM to 10 deg DF and lumbar flex/ext to [facility] to allow for proper joint functioning with dressing and ADL's.   [] Progressing: [] Met: [] Not Met: [] Adjusted  3. Patient will demonstrate an increase in Strength to 5/5 R knee and ankle to allow for proper functional mobility with squatting and stairs. [] Progressing: [] Met: [] Not Met: [] Adjusted  4. Patient will return to walking at least 39' to go grocery shopping without increased symptoms or restriction. [] Progressing: [] Met: [] Not Met: [] Adjusted  5. Pt will be able to resume Pilates post rehab program for ongoing conditioning and healthy lifestyle.     [] Progressing: [] Met: [] Not Met: [] Adjusted     Progression Towards Functional goals:  [] Patient is progressing as expected towards functional goals listed. [] Progression is slowed due to complexities listed. [] Progression has been slowed due to co-morbidities. [x] Plan just implemented, too soon to assess goals progression  [] Other:         Overall Progression Towards Functional goals/ Treatment Progress Update:  [] Patient is progressing as expected towards functional goals listed. [] Progression is slowed due to complexities/Impairments listed. [] Progression has been slowed due to co-morbidities. [x] Plan just implemented, too soon to assess goals progression <30days   [] Goals require adjustment due to lack of progress  [] Patient is not progressing as expected and requires additional follow up with physician  [] Other    Prognosis for POC: [x] Good [] Fair  [] Poor      Patient requires continued skilled intervention: [x] Yes  [] No    Treatment/Activity Tolerance:  [x] Patient able to complete treatment  [] Patient limited by fatigue  [] Patient limited by pain    [] Patient limited by other medical complications  [] Other:     ASSESSMENT:  + IASTM to distal ITB/quad this visit. Increased crepitus noted. Assess response. Patient responding well to taping techniques. Pt requires skilled intervention to restore ROM, strength, functional endurance and balance in order to perform ADLs without significant symptoms or limitations. Return to Play: (if applicable)   []  Stage 1: Intro to Strength   []  Stage 2: Return to Run and Strength   []  Stage 3: Return to Jump and Strength   []  Stage 4: Dynamic Strength and Agility   []  Stage 5: Sport Specific Training     []  Ready to Return to Play, Meets All Above Stages   []  Not Ready for Return to Sports   Comments:                               PLAN:  Progress CKC as tolerated with emphasis on hip abductors.  Progress noted for knee N.V.  [x] Continue per plan of care [] Alter current plan (see comments above)  [] Plan of care initiated [] Hold pending MD visit [] Discharge      Electronically signed by:  Britany Campo, [address]    Note: If patient does not return for scheduled/ recommended follow up visits, this note will serve as a discharge from care along with most recent update on progress. Access Code: [email or web address]  URL: [email or web address]  Date: 08/12/2022  Prepared by: Britany Campo    Exercises  Supine Ankle Pumps - 1-2 x daily - 7 x weekly - 1-3 sets - 10 reps  Supine Ankle Inversion Eversion AROM - 1-2 x daily - 7 x weekly - 1-3 sets - 10 reps  Supine 90/90 Sciatic Nerve Glide with Knee Flexion/Extension - 1 x daily - 7 x weekly - 3 sets - 10 reps  Seated Long Arc Quad with Hip Adduction - 1 x daily - 7 x weekly - 1-3 sets - 10 reps  Supine ITB Stretch with Strap - 1 x daily - 7 x weekly - 3 sets - 20-30 seconds hold  Supine Figure 4 Piriformis Stretch (Mirrored) - 1 x daily - 7 x weekly - 3 sets - 30 seconds hold  Supine Quadriceps Stretch with Strap on Table - 1 x daily - 7 x weekly - 3 sets - 30 seconds hold  Supine Lower Trunk Rotation - 1 x daily - 7 x weekly - 1-2 sets - 10 reps  Supine Bridge with Mini Swiss Ball Between Knees - 1 x daily - 7 x weekly - 2-3 sets - 10 reps  Sidelying Hip Abduction - 1 x daily - 3 x weekly - 2-3 sets - 10 reps  Clamshell - 1 x daily - 3 x weekly - 2-3 sets - 10 reps  Wall Quarter Squat - 1 x daily - 3 x weekly - 2 sets - 10 reps  Small Range Straight Leg Raise - 1 x daily - 7 x weekly - 2 sets - 10 reps - 5 hold  Standing Hip Abduction with Resistance at Ankles and Counter Support - 1 x daily - 7 x weekly - 2-3 sets - 10 reps - 5 hold  Side Stepping with Counter Support - 1 x daily - 7 x weekly - 3 sets - 10 reps - 2 hold  Heel Raises with Counter Support - 1 x daily - 7 x weekly - 2-3 sets - 10 reps - 2 hold  Single Leg Stance with Support - 1 x daily - 7 x weekly - 1 sets - 5 reps - 15 hold      MEDICARE CAP EXCEPTION DOCUMENTATION      I certify that this patient meets one of the below criteria necessary for becoming an exception to the Medicare cap on therapy services:    []  The patient has a condition identified by an ICD-10 code that has a direct and significant impact on the need for therapy. (Significantly impacts the rate of recovery.)                   []  The patient has a complexity identified by an ICD-10 code that has a direct and significant impact on the need for therapy. (Significantly impacts the rate of recovery and is associated with a primary condition.)         []  The patient has associated variables that influence the amount of treatment to include:  Social support, self-efficacy/motivation, prognosis, time since onset/acuity. [x]  The patient has generalized musculoskeletal conditions or a condition affecting multiple sites that will have a direct impact on the rate of recovery. [x]  The patient had a prior episode of outpatient therapy during this calendar year for a different condition. []  The patient has a mental or cognitive disorder in addition to the condition being treated that will have a direct and significant impact on the rate of recovery.

## 2022-08-29 ENCOUNTER — HOSPITAL ENCOUNTER (OUTPATIENT)
Dept: PHYSICAL THERAPY | Age: 67
Setting detail: THERAPIES SERIES
Discharge: HOME | End: 2022-08-29
Payer: MEDICARE

## 2022-08-29 PROCEDURE — 97140 MANUAL THERAPY 1/> REGIONS: CPT | Performed by: PHYSICAL THERAPIST

## 2022-08-29 PROCEDURE — 97112 NEUROMUSCULAR REEDUCATION: CPT | Performed by: PHYSICAL THERAPIST

## 2022-08-29 PROCEDURE — 97110 THERAPEUTIC EXERCISES: CPT | Performed by: PHYSICAL THERAPIST

## 2022-08-29 NOTE — FLOWSHEET NOTE
R knee. Wants to defer taping today for that reason. IASTM helped last visit without the bruising noted. OBJECTIVE: Lumbar flnstructed that patient may start walking on level surfaces at home outside short distances. Observation:   Test measurements:  Girth measurements:OBJECTIVE  Test used 8/1/22 8/29/22   Pain Summary 0-10 4-10 0-5/10   Functional questionnaire FOTO 21 47   Functional Testing Ankle DF R/L  +8    Knee flex 133 133   ROM Knee ext 0 0    Ankle ever R/L 4- p, 5 4+,5   Strength Knee ext R/L 4 P, 5 4+, 5    Hip abd R/L 4-, 4, 4    RESTRICTIONS/PRECAUTIONS: lumbar DDD/stenosis    Exercises/Interventions: + TB with Clams. Progressed exercises this visit.     Therapeutic Ex (07853) Sets/sec/Reps Notes/CUES   Ankle/foot AROM PF w/ toes/DF, inv/ev HEP  HEP   Supine nerve glide knee flex/ext w/ DF/PF HEP   LAQ w/ add ball squeeze 2# HEP   Mini SLR + to HEP   Supine LTR Supine bridge w/ add SL hip abd w/ TA  SL clam with heel elevated 2 x 10 Floating clams H5 2x10 rep   Supine fig 4 S  Supine ITB w/ strap  Supine quad S HEP  HEP  HEP   Bike for ROM 6'    Incline  H30\" x 3    LP B 70 #/[employer] 60# R LE    Standing HR 2x15 reps    Wall 1/4 squat w/ TA with SB with ADD SB bridges  H5 2x10 reps Standing Hip abd - [facility] [place]  45# 2 x 15 Side-stepping at wall OVL Pt ed: eval findings, POC, HEP, posture, heat vs ice, time spent communicating back and forth with Alis Gamble ATC working with Dr Shweta Burroughs re: meds, back vs peroneal nerve symptoms    Manual Intervention ([address] [facility])     STM to peroneals/ant tib, pat mobs Manual GS/ HS stretch  X 3 min     IASTM HG to distal ITB 10'                   NMR re-education (47737)  CUES NEEDED        Tracie taping for medial glides of patella X         Tandem stance on Airex    SLS R LE 3 x 15\"          Gliders post/PL /lateral  h R/L- small range   LSD    CC retro walking 15# with SLS    CC side stepping 15# with SLS    Therapeutic Activity (58760) Therapeutic Exercise and NMR EXR  [x] (34893) Provided verbal/tactile cueing for activities related to strengthening, flexibility, endurance, ROM for improvements in LE, proximal hip, and core control with self care, mobility, lifting, ambulation.  [] (99726) Provided verbal/tactile cueing for activities related to improving balance, coordination, kinesthetic sense, posture, motor skill, proprioception  to assist with LE, proximal hip, and core control in self care, mobility, lifting, ambulation and eccentric single leg control.      NMR and Therapeutic Activities:    [x] (82983 or 11981) Provided verbal/tactile cueing for activities related to improving balance, coordination, kinesthetic sense, posture, motor skill, proprioception and motor activation to allow for proper function of core, proximal hip and LE with self care and ADLs  [] (05187) Gait Re-education- Provided training and instruction to the patient for proper LE, core and proximal hip recruitment and positioning and eccentric body weight control with ambulation re-education including up and down stairs     Home Exercise Program:    [x] (90899) Reviewed/Progressed HEP activities related to strengthening, flexibility, endurance, ROM of core, proximal hip and LE for functional self-care, mobility, lifting and ambulation/stair navigation   [] (33180)Reviewed/Progressed HEP activities related to improving balance, coordination, kinesthetic sense, posture, motor skill, proprioception of core, proximal hip and LE for self care, mobility, lifting, and ambulation/stair navigation      Manual Treatments:  PROM / STM / Oscillations-Mobs:  G-I, II, III, IV (PA's, Inf., Post.)  [x] (56446) Provided manual therapy to mobilize LE, proximal hip and/or LS spine soft tissue/joints for the purpose of modulating pain, promoting relaxation,  increasing ROM, reducing/eliminating soft tissue swelling/inflammation/restriction, improving soft tissue extensibility and allowing for proper ROM for normal function with self care, mobility, lifting and ambulation. Modalities:  CP x15 min to R knee after treatment. [] GAME READY (VASO)- for significant edema, swelling, pain control. Charges  Timed Code Treatment Minutes: 48'   Total Treatment Minutes: 72'     Medicare total (add KX at $2000)  Prior episode of care this year     [] EVAL (LOW) 45808   [] EVAL (MOD) 23975  [] EVAL (HIGH) 96609   [] RE-EVAL     [x] MP(20246) x1    [] IONTO  [x] NMR (40571) x 1  [] VASO  [x] Manual (83776) x  1   [] Other:  [] TA x      [] Mech Traction (70820)  [] ES(attended) (75870)      [] ES (un) (54898):       GOALS:  Patient stated goal: decrease pain with daily activity  [] Progressing: [x] Met: [] Not Met: [] Adjusted    Therapist goals for Patient:   Short Term Goals: To be achieved in: 2 weeks  1. Independent in HEP and progression per patient tolerance, in order to prevent re-injury. [] Progressing: [x] Met: [] Not Met: [] Adjusted  2. Patient will have a decrease in pain to facilitate improvement in movement, function, and ADLs as indicated by Functional Deficits. [] Progressing: [x] Met: [] Not Met: [] Adjusted    Long Term Goals: To be achieved in: 8 weeks  1. Disability index score of 52% or more per FOTO to assist with reaching prior level of function. [x] Progressing: [] Met: [] Not Met: [] Adjusted  2. Patient will demonstrate increased AROM to 10 deg DF and lumbar flex/ext to [facility] to allow for proper joint functioning with dressing and ADL's. [x] Progressing: [] Met: [] Not Met: [] Adjusted  3. Patient will demonstrate an increase in Strength to 5/5 R knee and ankle to allow for proper functional mobility with squatting and stairs. [x] Progressing: [] Met: [] Not Met: [] Adjusted  4. Patient will return to walking at least 39' to go grocery shopping without increased symptoms or restriction. [x] Progressing: [] Met: [] Not Met: [] Adjusted  5.  Pt will be able to resume Pilates post rehab program for ongoing conditioning and healthy lifestyle. [x] Progressing: [] Met: [] Not Met: [] Adjusted     Progression Towards Functional goals:  [x] Patient is progressing as expected towards functional goals listed. [] Progression is slowed due to complexities listed. [] Progression has been slowed due to co-morbidities. [] Plan just implemented, too soon to assess goals progression  [] Other:         Overall Progression Towards Functional goals/ Treatment Progress Update:  [x] Patient is progressing as expected towards functional goals listed. [] Progression is slowed due to complexities/Impairments listed. [] Progression has been slowed due to co-morbidities. [] Plan just implemented, too soon to assess goals progression <30days   [] Goals require adjustment due to lack of progress  [] Patient is not progressing as expected and requires additional follow up with physician  [] Other    Prognosis for POC: [x] Good [] Fair  [] Poor      Patient requires continued skilled intervention: [x] Yes  [] No    Treatment/Activity Tolerance:  [x] Patient able to complete treatment  [] Patient limited by fatigue  [] Patient limited by pain    [] Patient limited by other medical complications  [] Other:     ASSESSMENT:  Patient has made progress with decreased pain, increased functional mobility and increased strength since initial visit. Patient scheduled to have MRI tomorrow for knee . Recommend Cont PT to address above deficits. Pt requires skilled intervention to restore ROM, strength, functional endurance and balance in order to perform ADLs without significant symptoms or limitations.      Return to Play: (if applicable)   []  Stage 1: Intro to Strength   []  Stage 2: Return to Run and Strength   []  Stage 3: Return to Jump and Strength   []  Stage 4: Dynamic Strength and Agility   []  Stage 5: Sport Specific Training     []  Ready to Return to Play, Meets All Above Stages   [] Not Ready for Return to Sports   Comments:                               PLAN:  Progress CKC as tolerated with emphasis on hip abductors. [x] Continue per plan of care [] Alter current plan (see comments above)  [] Plan of care initiated [] Hold pending MD visit [] Discharge      Electronically signed by:  John Flores, [address]    Note: If patient does not return for scheduled/ recommended follow up visits, this note will serve as a discharge from care along with most recent update on progress. Access Code: [email or web address]  URL: [email or web address]  Date: 08/12/2022  Prepared by: John Flores    Exercises  Supine Ankle Pumps - 1-2 x daily - 7 x weekly - 1-3 sets - 10 reps  Supine Ankle Inversion Eversion AROM - 1-2 x daily - 7 x weekly - 1-3 sets - 10 reps  Supine 90/90 Sciatic Nerve Glide with Knee Flexion/Extension - 1 x daily - 7 x weekly - 3 sets - 10 reps  Seated Long Arc Quad with Hip Adduction - 1 x daily - 7 x weekly - 1-3 sets - 10 reps  Supine ITB Stretch with Strap - 1 x daily - 7 x weekly - 3 sets - 20-30 seconds hold  Supine Figure 4 Piriformis Stretch (Mirrored) - 1 x daily - 7 x weekly - 3 sets - 30 seconds hold  Supine Quadriceps Stretch with Strap on Table - 1 x daily - 7 x weekly - 3 sets - 30 seconds hold  Supine Lower Trunk Rotation - 1 x daily - 7 x weekly - 1-2 sets - 10 reps  Supine Bridge with Mini Swiss Ball Between Knees - 1 x daily - 7 x weekly - 2-3 sets - 10 reps  Sidelying Hip Abduction - 1 x daily - 3 x weekly - 2-3 sets - 10 reps  Clamshell - 1 x daily - 3 x weekly - 2-3 sets - 10 reps  Wall Quarter Squat - 1 x daily - 3 x weekly - 2 sets - 10 reps  Small Range Straight Leg Raise - 1 x daily - 7 x weekly - 2 sets - 10 reps - 5 hold  Standing Hip Abduction with Resistance at Ankles and Counter Support - 1 x daily - 7 x weekly - 2-3 sets - 10 reps - 5 hold  Side Stepping with Counter Support - 1 x daily - 7 x weekly - 3 sets - 10 reps - 2 hold  Heel Raises with Counter Support - 1 x daily - 7 x weekly - 2-3 sets - 10 reps - 2 hold  Single Leg Stance with Support - 1 x daily - 7 x weekly - 1 sets - 5 reps - 15 hold      MEDICARE CAP EXCEPTION DOCUMENTATION      I certify that this patient meets one of the below criteria necessary for becoming an exception to the Medicare cap on therapy services:    []  The patient has a condition identified by an ICD-10 code that has a direct and significant impact on the need for therapy. (Significantly impacts the rate of recovery.)                   []  The patient has a complexity identified by an ICD-10 code that has a direct and significant impact on the need for therapy. (Significantly impacts the rate of recovery and is associated with a primary condition.)         []  The patient has associated variables that influence the amount of treatment to include:  Social support, self-efficacy/motivation, prognosis, time since onset/acuity. [x]  The patient has generalized musculoskeletal conditions or a condition affecting multiple sites that will have a direct impact on the rate of recovery. [x]  The patient had a prior episode of outpatient therapy during this calendar year for a different condition. []  The patient has a mental or cognitive disorder in addition to the condition being treated that will have a direct and significant impact on the rate of recovery.

## 2022-08-31 ENCOUNTER — HOSPITAL ENCOUNTER (OUTPATIENT)
Dept: PHYSICAL THERAPY | Age: 67
Discharge: HOME | End: 2022-08-31
Payer: MEDICARE

## 2022-08-31 PROCEDURE — 97110 THERAPEUTIC EXERCISES: CPT | Performed by: PHYSICAL THERAPIST

## 2022-08-31 PROCEDURE — 97112 NEUROMUSCULAR REEDUCATION: CPT | Performed by: PHYSICAL THERAPIST

## 2022-08-31 PROCEDURE — 97161 PT EVAL LOW COMPLEX 20 MIN: CPT | Performed by: PHYSICAL THERAPIST

## 2022-08-31 NOTE — PLAN OF CARE
Reports low back noted constantly 2-3/10 and will radiate into the R/L buttock region intermittently. Back pain has been noted since 2018. Previous PT March 2022 for a few visits    Relevant Medical History:Hyperlipidemia, hypertension, kidney disease, low back pain (2019)  Functional Disability Index/G-Codes: FOTO: 51/100  Risk : 52/100, Goal 62/100    Height5'4 Weight:155  Pain Scale: 3/10  Easing factors: exercises, stretches, sitting,   Provocative factors: standing, stairs, prolonged walking     Type: [x]Constant   []Intermittent  []Radiating []Localized []other:     Numbness/Tingling: Denies LE. Occupation/School: retired teacher    Living Status/Prior Level of Function: Independent with ADLs and IADLs, Independent with ADLs and IADLs, lives alone, stairs in the home with handrail, enjoys walking and Pilates for exercise. OBJECTIVE:     ROM     LUMBAR FLEX 48 Tight in hamstrings   LUMBAR EXT 15    Sidebend [facility] WFL   Rotation [facility]    LEFT RIGHT   HIP Flex [facility] WFL   HIP Abd     HIP ER [facility]   HIP IR     Knee Flex     Knee ext     Hamstring FLEX Mild R Mild R   Piriformis  Mild R Mild R             Strength  LEFT RIGHT   MfA     TrA 4-    HIP Flexors 4- 4-   HIP Abductors 4 4   HIP ER 4 4   Hip IR 4- 4-   Knee EXT (quad) 5 4+   Knee Flex (HS)     Ankle DF 5 4+   Ankle PF     Great Toe Ext 4 4     Reflexes/Sensation:    [x]Dermatomes/Myotomes intact    []UE Reflexes     [x]Normal []Hypo      []Hyper   []LE Reflexes     [x]Normal []Hypo      []Hyper   []Babinski/Clonus/Hoffmans: -   [x]Other:    Joint mobility: Lumbar-sacral joint. []Normal    [x]Hypo   []Hyper    Palpation: Minimal tenderness B greater trochanters, L piriformis tenderness> R with palpation    Functional Mobility/Transfers: IND    Posture: WFL Sym iliac crest/PSIS    Bandages/Dressings/Incisions: NA    Gait: (include devices/WB status) WFL, Decreased stride length and pelvis/trunk dissociation.      Orthopedic Special Tests: - Slump, -SLR, - standing /sitting flexion test, - supine to sit. [x] Patient history, allergies, meds reviewed. Medical chart reviewed. See intake form. Review Of Systems (ROS):  [x]Performed Review of systems (Integumentary, CardioPulmonary, Neurological) by intake and observation. Intake form has been scanned into medical record. Patient has been instructed to contact their primary care physician regarding ROS issues if not already being addressed at this time. Co-morbidities/Complexities (which will affect course of rehabilitation):   []None           Arthritic conditions   []Rheumatoid arthritis (M05.9)  [x]Osteoarthritis (M19.91)   Cardiovascular conditions   [x]Hypertension (I10)  [x]Hyperlipidemia (E78.5)  []Angina pectoris (I20)  []Atherosclerosis (I70)   Musculoskeletal conditions   [x]Disc pathology   []Congenital spine pathologies   []Prior surgical intervention  [x]Osteoporosis (M81.8)  []Osteopenia (M85.8)   Endocrine conditions   []Hypothyroid (E03.9)  []Hyperthyroid Gastrointestinal conditions   []Constipation (L14.05)   Metabolic conditions   []Morbid obesity (E66.01)  []Diabetes type 1(E10.65) or 2 (E11.65)   []Neuropathy (G60.9)     Pulmonary conditions   []Asthma (J45)  []Coughing   []COPD (J44.9)   Psychological Disorders  []Anxiety (F41.9)  []Depression (F32.9)   []Other:   []Other:          Barriers to/and or personal factors that will affect rehab potential:              []Age  []Sex              []Motivation/Lack of Motivation                        [x]Co-Morbidities              []Cognitive Function, education/learning barriers              []Environmental, home barriers              []profession/work barriers  []past PT/medical experience  []other:  Justification:     Falls Risk Assessment (30 days):   [x] Falls Risk assessed and no intervention required.   [] Falls Risk assessed and Patient requires intervention due to being higher risk   TUG score (>12s at risk):     [] Falls education provided, including           ASSESSMENT:   Functional Impairments:     [x]Noted lumbar/proximal hip hypomobility   []Noted lumbosacral and/or generalized hypermobility   [x]Decreased Lumbosacral/hip/LE functional ROM   [x]Decreased core/proximal hip strength and neuromuscular control    [x]Decreased LE functional strength    []Abnormal reflexes/sensation/myotomal/dermatomal deficits  []Reduced balance/proprioceptive control    []other:      Functional Activity Limitations (from functional questionnaire and intake)   []Reduced ability to tolerate prolonged functional positions   [x]Reduced ability or difficulty with changes of positions or transfers between positions   []Reduced ability to maintain good posture and demonstrate good body mechanics with sitting, bending, and lifting   []Reduced ability to sleep   [] Reduced ability or tolerance with driving and/or computer work   []Reduced ability to perform lifting, reaching, carrying tasks   []Reduced ability to squat   []Reduced ability to forward bend   [x]Reduced ability to ambulate prolonged functional periods/distances/surfaces   [x]Reduced ability to ascend/descend stairs   []other:       Participation Restrictions   [x]Reduced participation in self care activities   [x]Reduced participation in home management activities   []Reduced participation in work activities   []Reduced participation in social activities. []Reduced participation in sport/recreation activities. Classification:   [x]Signs/symptoms consistent with Lumbar instability/stabilization subgroup. []Signs/symptoms consistent with Lumbar mobilization/manipulation subgroup, myotomes and dermatomes intact. Meets manipulation criteria.     []Signs/symptoms consistent with Lumbar direction specific/centralization subgroup   []Signs/symptoms consistent with Lumbar traction subgroup     []Signs/symptoms consistent with lumbar facet dysfunction   [x]Signs/symptoms consistent with lumbar stenosis type dysfunction   []Signs/symptoms consistent with nerve root involvement including myotome & dermatome dysfunction   []Signs/symptoms consistent with post-surgical status including: decreased ROM, strength and function. []signs/symptoms consistent with pathology which may benefit from Dry needling     []other:      Prognosis/Rehab Potential:      []Excellent   [x]Good    []Fair   []Poor    Tolerance of evaluation/treatment:    []Excellent   [x]Good    []Fair   []Poor  Physical Therapy Evaluation Complexity Justification  [x] A history of present problem with:  [] no personal factors and/or comorbidities that impact the plan of care;  [x]1-2 personal factors and/or comorbidities that impact the plan of care  []3 personal factors and/or comorbidities that impact the plan of care  [x] An examination of body systems using standardized tests and measures addressing any of the following: body structures and functions (impairments), activity limitations, and/or participation restrictions;:  [] a total of 1-2 or more elements   [x] a total of 3 or more elements   [] a total of 4 or more elements   [x] A clinical presentation with:  [] stable and/or uncomplicated characteristics   [] evolving clinical presentation with changing characteristics  [] unstable and unpredictable characteristics;   [x] Clinical decision making of [] low, [] moderate, [] high complexity using standardized patient assessment instrument and/or measurable assessment of functional outcome.     [x] EVAL (LOW) 74538 (typically 20 minutes face-to-face)  [] EVAL (MOD) 70435 (typically 30 minutes face-to-face)  [] EVAL (HIGH) 55041 (typically 45 minutes face-to-face)  [] RE-EVAL         PLAN: Begin PT focusing on: proximal hip mobilizations, LB mobs, LB core activation, proximal hip activation, and HEP    Frequency/Duration:  1-2 days per week for 12 Weeks:  Interventions:  [x]  Therapeutic exercise including: strength training, ROM, for LE, Glutes and core   [x]  NMR activation and proprioception for glutes , LE and Core   [x]  Manual therapy as indicated for Hip complex, LE and spine to include: Dry Needling/IASTM, STM, PROM, Gr I-IV mobilizations, manipulation. [x]  Modalities as needed that may include: thermal agents, E-stim, Biofeedback, US, iontophoresis as indicated  [x]  Patient education on joint protection, postural re-education, activity modification, progression of HEP. HEP instruction: (see scanned forms)  Access Code: [email or web address]  URL: [email or web address]  Date: 08/31/2022  Prepared by: Rica Mcburney    Exercises  Cat-Camel - 2 x daily - 7 x weekly - 1-2 sets - 10 reps - 10 hold  Cat-Camel to Child's Pose - 2 x daily - 7 x weekly - 1-2 sets - 10 reps - 10 hold  Child's Pose with Thread the Needle - 1 x daily - 7 x weekly - 3 sets - 10 reps - 10 hold  Sidelying Open Book Thoracic Lumbar Rotation and Extension - 1 x daily - 7 x weekly - 3 sets - 10 reps - 10 hold    GOALS:  Patient stated goal: return to walking 45-1 hour without pain  [] Progressing: [] Met: [] Not Met: [] Adjusted    Therapist goals for Patient:   Short Term Goals: To be achieved in: 2 weeks  1. Independent in HEP and progression per patient tolerance, in order to prevent re-injury. [] Progressing: [] Met: [] Not Met: [] Adjusted  2. Patient will have a decrease in pain to facilitate improvement in movement, function, and ADLs as indicated by Functional Deficits. [] Progressing: [] Met: [] Not Met: [] Adjusted      Long Term Goals: To be achieved in: 12 weeks  1. Disability index score of 62% or more per FOTO to assist with reaching prior level of function. [] Progressing: [] Met: [] Not Met: [] Adjusted  2. Patient will demonstrate increased AROM to WNL, good LS mobility, good hip ROM to allow for proper joint functioning as indicated by patients Functional Deficits. [] Progressing: [] Met: [] Not Met: [] Adjusted  3.  Patient will

## 2022-08-31 NOTE — FLOWSHEET NOTE
[facility], [address] [place]  Phone: [phone]  Fax [phone]    Physical Therapy Treatment Note/ Progress Report:           Date:  2022    Patient Name:  Benny Mckee    :  1955  MRN: [id number]  Restrictions/Precautions:    Medical/Treatment Diagnosis Information:  Diagnosis: M51.36 (ICD-10-CM) - DDD (degenerative disc disease), lumbar  M54.16 (ICD-10-CM) - Right lumbar radiculopathy  M48.062 (ICD-10-CM) - Lumbar stenosis with neurogenic claudication  Treatment Diagnosis: M51.36 (ICD-10-CM) - DDD (degenerative disc disease), lumbar  M54.16 (ICD-10-CM) - Right lumbar radiculopathy  M48.062 (ICD-10-CM) - Lumbar stenosis with neurogenic claudication. low back pain W90.36  Insurance/Certification information:  PT Insurance Information: Medicare  Physician Information:   Jesse Callahan PA-C  Has the plan of care been signed (Y/N):        []  Yes  [x]  No     Date of Patient follow up with Physician: 22      Is this a Progress Report:     []  Yes  [x]  No        If Yes:  Date Range for reporting period:  Beginning22  Ending:     Progress report will be due (10 Rx or 30 days whichever is less): 40       Recertification will be due (POC Duration  / 90 days whichever is less): 12 weeks from 22        Visit # Insurance Allowable Auth Required   In-person [address] []  Yes []  No    Telehealth   []  Yes []  No    Total        MEDICARE CAP EXCEPTION DOCUMENTATION      I certify that this patient meets one of the below criteria necessary for becoming an exception to the Medicare cap on therapy services:    [x]  The patient has a condition identified by an ICD-10 code that has a direct and significant impact on the need for therapy.  (Significantly impacts the rate of recovery.)                   []  The patient has a complexity identified by an ICD-10 code that has a direct and significant impact on the need for therapy. (Significantly impacts the rate of recovery and is associated with a primary condition.)         []  The patient has associated variables that influence the amount of treatment to include:  Social support, self-efficacy/motivation, prognosis, time since onset/acuity. []  The patient has generalized musculoskeletal conditions or a condition affecting multiple sites that will have a direct impact on the rate of recovery. [x]  The patient had a prior episode of outpatient therapy during this calendar year for a different condition. []  The patient has a mental or cognitive disorder in addition to the condition being treated that will have a direct and significant impact on the rate of recovery. Functional Scale: FOTO: 51/100    Date assessed:  8/31/22      Therapy Diagnosis/Practice Pattern:4F. Impaired joint mobility, motor function, muscle performance,  and ROM secondary to spinal disorders. Number of Comorbidities:  []0     [x]1-2    []3+    Latex Allergy:  [x]NO      []YES  Preferred Language for Healthcare:   [x]English       []other:      Pain level:  3/10    SUBJECTIVE:  See eval    OBJECTIVE: See eval  Observation:   Test measurements:  Alignment good.  - supine to sit, - slump/SLR    RESTRICTIONS/PRECAUTIONS:  lumbar DDD/stenosis    Exercises/Interventions:     Therapeutic Ex (81034) Sets/sec Reps Notes/CUES         Cat/camel /child's pose H10 10 reps + to HEP   Thread the needle H10 10 reps + to HEP   Open book H10 5 reps each + to HEP                                                   Manual Intervention (97225)      Lumbar-sacral mobs 5'                                   NMR re-education (52471)   CUES NEEDED         Patient education /HEP insruction                                                Therapeutic Activity (57350)                                            HEP instruction: (see scanned forms)  Access Code: [email or web address]  URL: [email or web address]  Date: 08/31/2022  Prepared by: Luis Sea     Exercises  Cat-Camel - 2 x daily - 7 x weekly - 1-2 sets - 10 reps - 10 hold  Cat-Camel to Child's Pose - 2 x daily - 7 x weekly - 1-2 sets - 10 reps - 10 hold  Child's Pose with Thread the Needle - 1 x daily - 7 x weekly - 3 sets - 10 reps - 10 hold  Sidelying Open Book Thoracic Lumbar Rotation and Extension - 1 x daily - 7 x weekly - 3 sets - 10 reps - 10 hold  Therapeutic Exercise and NMR EXR  [x] (94878) Provided verbal/tactile cueing for activities related to strengthening, flexibility, endurance, ROM  for improvements in proximal hip and core control with self care, mobility, lifting and ambulation.  [] (12679) Provided verbal/tactile cueing for activities related to improving balance, coordination, kinesthetic sense, posture, motor skill, proprioception  to assist with core control in self care, mobility, lifting, and ambulation.      Therapeutic Activities:    [] (15625 or 06652) Provided verbal/tactile cueing for activities related to improving balance, coordination, kinesthetic sense, posture, motor skill, proprioception and motor activation to allow for proper function  with self care and ADLs  [] (35318) Provided training and instruction to the patient for proper core and proximal hip recruitment and positioning with ambulation re-education     Home Exercise Program:    [x] (06032) Reviewed/Progressed HEP activities related to strengthening, flexibility, endurance, ROM of core, proximal hip and LE for functional self-care, mobility, lifting and ambulation   [] (62183) Reviewed/Progressed HEP activities related to improving balance, coordination, kinesthetic sense, posture, motor skill, proprioception of core, proximal hip and LE for self care, mobility, lifting, and ambulation      Manual Treatments:  PROM / STM / Oscillations-Mobs:  G-I, II, III, IV (PA's, Inf., Post.)  [x] (65496) Provided manual therapy to mobilize proximal hip and LS spine soft tissue/joints for the purpose of modulating pain, promoting relaxation,  increasing ROM, reducing/eliminating soft tissue swelling/inflammation/restriction, improving soft tissue extensibility and allowing for proper ROM for normal function with self care, mobility, lifting and ambulation. Modalities:   deferred    Charges  Timed Code Treatment Minutes: 30'   Total Treatment Minutes: 48'     Medicare total (add KX at $2000)  kx           [x] EVAL (LOW) 36831   [] EVAL (MOD) 21053   [] EVAL (HIGH) 98405   [] RE-EVAL     [x] GK(27856) x  1   [] IONTO  [x] NMR (64479) x 1    [] VASO  [] Manual (62301) x      [] Other:  [] TA x      [] Mech Traction (30614)  [] ES(attended) (64961)      [] ES (un) (94722):       GOALS:  Patient stated goal: return to walking 45-1 hour without pain  [] Progressing: [] Met: [] Not Met: [] Adjusted     Therapist goals for Patient:   Short Term Goals: To be achieved in: 2 weeks  1. Independent in HEP and progression per patient tolerance, in order to prevent re-injury. [] Progressing: [] Met: [] Not Met: [] Adjusted  2. Patient will have a decrease in pain to facilitate improvement in movement, function, and ADLs as indicated by Functional Deficits. [] Progressing: [] Met: [] Not Met: [] Adjusted        Long Term Goals: To be achieved in: 12 weeks  1. Disability index score of 62% or more per FOTO to assist with reaching prior level of function. [] Progressing: [] Met: [] Not Met: [] Adjusted  2. Patient will demonstrate increased AROM to WNL, good LS mobility, good hip ROM to allow for proper joint functioning as indicated by patients Functional Deficits. [] Progressing: [] Met: [] Not Met: [] Adjusted  3. Patient will demonstrate an increase in Strength to good proximal hip and core activation to allow for proper functional mobility as indicated by patients Functional Deficits. [] Progressing: [] Met: [] Not Met: [] Adjusted  4.  Patient will return to Hold pending MD visit [] Discharge      Electronically signed by:  Lorenzo Varela, [address] [facility] OMT-C    Note: If patient does not return for scheduled/ recommended follow up visits, this note will serve as a discharge from care along with most recent update on progress.

## 2022-09-02 ENCOUNTER — HOSPITAL ENCOUNTER (OUTPATIENT)
Dept: PHYSICAL THERAPY | Age: 67
Setting detail: THERAPIES SERIES
Discharge: HOME | End: 2022-09-02
Payer: MEDICARE

## 2022-09-02 ENCOUNTER — OFFICE VISIT (OUTPATIENT)
Dept: FAMILY MEDICINE CLINIC | Age: 67
End: 2022-09-02
Payer: MEDICARE

## 2022-09-02 VITALS
HEIGHT: 64 IN | SYSTOLIC BLOOD PRESSURE: 120 MMHG | OXYGEN SATURATION: 98 % | WEIGHT: 156.8 LBS | HEART RATE: 71 BPM | DIASTOLIC BLOOD PRESSURE: 74 MMHG | BODY MASS INDEX: 26.77 KG/M2

## 2022-09-02 DIAGNOSIS — N18.30 STAGE 3 CHRONIC KIDNEY DISEASE, UNSPECIFIED WHETHER STAGE 3A OR 3B CKD (HCC): ICD-10-CM

## 2022-09-02 DIAGNOSIS — E21.3 HYPERPARATHYROIDISM (HCC): ICD-10-CM

## 2022-09-02 DIAGNOSIS — I25.10 CORONARY ARTERY DISEASE INVOLVING NATIVE HEART WITHOUT ANGINA PECTORIS, UNSPECIFIED VESSEL OR LESION TYPE: ICD-10-CM

## 2022-09-02 DIAGNOSIS — E78.00 PURE HYPERCHOLESTEROLEMIA: ICD-10-CM

## 2022-09-02 DIAGNOSIS — I10 ESSENTIAL HYPERTENSION: Primary | ICD-10-CM

## 2022-09-02 LAB
ALBUMIN SERPL-MCNC: 4.4 G/DL (ref 3.4–5)
ALP BLD-CCNC: 76 U/L (ref 40–129)
ALT SERPL-CCNC: 19 U/L (ref 10–40)
AST SERPL-CCNC: 23 U/L (ref 15–37)
BILIRUB SERPL-MCNC: 0.5 MG/DL (ref 0–1)
BILIRUBIN DIRECT: <0.2 MG/DL (ref 0–0.3)
BILIRUBIN, INDIRECT: ABNORMAL MG/DL (ref 0–1)
CHOLESTEROL, TOTAL: 168 MG/DL (ref 0–199)
HDLC SERPL-MCNC: 63 MG/DL (ref 40–60)
LDL CHOLESTEROL CALCULATED: 85 MG/DL
TOTAL PROTEIN: 6.3 G/DL (ref 6.4–8.2)
TRIGL SERPL-MCNC: 101 MG/DL (ref 0–150)
VLDLC SERPL CALC-MCNC: 20 MG/DL

## 2022-09-02 PROCEDURE — 36415 COLL VENOUS BLD VENIPUNCTURE: CPT | Performed by: FAMILY MEDICINE

## 2022-09-02 PROCEDURE — 1090F PRES/ABSN URINE INCON ASSESS: CPT | Performed by: FAMILY MEDICINE

## 2022-09-02 PROCEDURE — G8427 DOCREV CUR MEDS BY ELIG CLIN: HCPCS | Performed by: FAMILY MEDICINE

## 2022-09-02 PROCEDURE — 1036F TOBACCO NON-USER: CPT | Performed by: FAMILY MEDICINE

## 2022-09-02 PROCEDURE — 97110 THERAPEUTIC EXERCISES: CPT | Performed by: PHYSICAL THERAPIST

## 2022-09-02 PROCEDURE — 99214 OFFICE O/P EST MOD 30 MIN: CPT | Performed by: FAMILY MEDICINE

## 2022-09-02 PROCEDURE — 97140 MANUAL THERAPY 1/> REGIONS: CPT | Performed by: PHYSICAL THERAPIST

## 2022-09-02 PROCEDURE — G8417 CALC BMI ABV UP PARAM F/U: HCPCS | Performed by: FAMILY MEDICINE

## 2022-09-02 PROCEDURE — G8399 PT W/DXA RESULTS DOCUMENT: HCPCS | Performed by: FAMILY MEDICINE

## 2022-09-02 PROCEDURE — 97112 NEUROMUSCULAR REEDUCATION: CPT | Performed by: PHYSICAL THERAPIST

## 2022-09-02 PROCEDURE — 1123F ACP DISCUSS/DSCN MKR DOCD: CPT | Performed by: FAMILY MEDICINE

## 2022-09-02 PROCEDURE — 3017F COLORECTAL CA SCREEN DOC REV: CPT | Performed by: FAMILY MEDICINE

## 2022-09-02 NOTE — FLOWSHEET NOTE
[facility], [address]  Phone: [phone]  Fax [phone]    Physical Therapy Treatment Note/ Progress Report:           Date:  2022    Patient Name:  Simin Kat    :  1955  MRN: [id number]  Restrictions/Precautions:    Medical/Treatment Diagnosis Information:  Diagnosis: M25.561 (ICD-10-CM) - Acute pain of right knee, S80.01XA (ICD-10-CM) - Contusion of right knee, initial encounter, M22.41 (ICD-10-CM) - Chondromalacia patellae of right knee  Treatment Diagnosis: M25.561 (ICD-10-CM) - Acute pain of right knee  Insurance/Certification information:  PT Insurance Information: [employer]  Physician Information:   Dr Leah Quiros  Has the plan of care been signed (Y/N):        [x]  Yes  []  No     Date of Patient follow up with Physician: 02 with Leigh Balbuena and Dr. Leah Quiros. Is this a Progress Report:     []  Yes  [x]  No        If Yes:  Date Range for reporting period:  Beginnin22  Ending:     Progress report will be due (10 Rx or 30 days whichever is less):       Recertification will be due (POC Duration  / 90 days whichever is less): 8 weeks 10/3/22     Visit # Insurance Allowable Auth Required   In-person [address] []  Yes []  No    Telehealth   []  Yes []  No    Total          Functional Scale: FOTO 21/100    Date assessed:  22    Functional Scale: FOTO  47/100    Date assessed:22  Therapy Diagnosis/Practice Pattern:E      Number of Comorbidities:  []0     []1-2    [x]3+    Latex Allergy:  [x]NO      []YES  Preferred Language for Healthcare:   [x]English       []other:      Pain level:  eval 4-10/10 Current 1-2/10 upon arrival in knee. SUBJECTIVE: Reports feeling more secure in general with the taping on knee. Feels better than the brace. Minimal to no pain noted R knee. Low level awareness noted upon arrival this visit.   OBJECTIVE: Lumbar flnstructed that patient may start walking on level surfaces at home outside short distances. Observation:   Test measurements:  Girth measurements:OBJECTIVE  Test used 8/1/22 8/29/22   Pain Summary 0-10 4-10 0-5/10   Functional questionnaire FOTO 21 47   Functional Testing Ankle DF R/L  +8    Knee flex 133 133   ROM Knee ext 0 0    Ankle ever R/L 4- p, 5 4+,5   Strength Knee ext R/L 4 P, 5 4+, 5    Hip abd R/L 4-, 4, 4    RESTRICTIONS/PRECAUTIONS: lumbar DDD/stenosis    Exercises/Interventions: + TB with Clams. Progressed exercises this visit.     Therapeutic Ex (18226) Sets/sec/Reps Notes/CUES   Ankle/foot AROM PF w/ toes/DF, inv/ev HEP  HEP   Supine nerve glide knee flex/ext w/ DF/PF x10 HEP   LAQ w/ add ball squeeze 2# HEP   Mini SLR + to HEP   Supine LTR Supine bridge w/ add SL hip abd w/ TA  SL clam with heel elevated 2 x 10 Floating clams H5 2x10 rep   Supine fig 4 S  Supine ITB w/ strap  Supine quad S X30\"  X30\"  X30\" HEP  HEP  HEP   Bike for ROM 6'    Incline  H30\" x 3    LP B 80 #/[employer] 60# R LE [id number]    Standing HR 2x15 reps    Wall 1/4 squat w/ TA with SB with ADD SB bridges  Standing Hip abd - [facility]  45# 2 x 15 Side-stepping at wall OVL Pt ed: eval findings, POC, HEP, posture, heat vs ice, time spent communicating back and forth with Wilber Harmon ATC working with Dr Manasa Middleton re: meds, back vs peroneal nerve symptoms    Manual Intervention (55936)     STM to peroneals/ant tib, pat mobs Manual GS/ HS stretch  X 3 min     IASTM HG to distal ITB 10'                   NMR re-education (39492)  CUES NEEDED        Hodges taping for medial glides of patella X         Tandem stance on Airex    SLS R LE 3 x 15\"          Gliders post/PL /lateral  h R/L- small range   LSD    CC retro walking 15# with SLS    CC side stepping 15# with SLS    Therapeutic Activity (96044)                                     Therapeutic Exercise and NMR EXR  [x] (33159) Provided verbal/tactile cueing for activities related to strengthening, flexibility, endurance, ROM for improvements in LE, proximal hip, and core control with self care, mobility, lifting, ambulation.  [] (39334) Provided verbal/tactile cueing for activities related to improving balance, coordination, kinesthetic sense, posture, motor skill, proprioception  to assist with LE, proximal hip, and core control in self care, mobility, lifting, ambulation and eccentric single leg control. NMR and Therapeutic Activities:    [x] (66136 or 92129) Provided verbal/tactile cueing for activities related to improving balance, coordination, kinesthetic sense, posture, motor skill, proprioception and motor activation to allow for proper function of core, proximal hip and LE with self care and ADLs  [] (05983) Gait Re-education- Provided training and instruction to the patient for proper LE, core and proximal hip recruitment and positioning and eccentric body weight control with ambulation re-education including up and down stairs     Home Exercise Program:    [x] (77218) Reviewed/Progressed HEP activities related to strengthening, flexibility, endurance, ROM of core, proximal hip and LE for functional self-care, mobility, lifting and ambulation/stair navigation   [] (88415)Reviewed/Progressed HEP activities related to improving balance, coordination, kinesthetic sense, posture, motor skill, proprioception of core, proximal hip and LE for self care, mobility, lifting, and ambulation/stair navigation      Manual Treatments:  PROM / STM / Oscillations-Mobs:  G-I, II, III, IV (PA's, Inf., Post.)  [x] (90331) Provided manual therapy to mobilize LE, proximal hip and/or LS spine soft tissue/joints for the purpose of modulating pain, promoting relaxation,  increasing ROM, reducing/eliminating soft tissue swelling/inflammation/restriction, improving soft tissue extensibility and allowing for proper ROM for normal function with self care, mobility, lifting and ambulation.      Modalities:   [] GAME READY (VASO)- for significant edema, swelling, pain control. Charges  Timed Code Treatment Minutes: 48'   Total Treatment Minutes: 48'     Medicare total (add KX at $2000)  Prior episode of care this year     [] EVAL (LOW) 10773   [] EVAL (MOD) 44665  [] EVAL (HIGH) 10545   [] RE-EVAL     [x] HH(96618) x1    [] IONTO  [x] NMR (53710) x 1  [] VASO  [x] Manual (65597) x  1   [] Other:  [] TA x      [] Mech Traction (85646)  [] ES(attended) (29858)      [] ES (un) (26946):       GOALS:  Patient stated goal: decrease pain with daily activity  [] Progressing: [x] Met: [] Not Met: [] Adjusted    Therapist goals for Patient:   Short Term Goals: To be achieved in: 2 weeks  1. Independent in HEP and progression per patient tolerance, in order to prevent re-injury. [] Progressing: [x] Met: [] Not Met: [] Adjusted  2. Patient will have a decrease in pain to facilitate improvement in movement, function, and ADLs as indicated by Functional Deficits. [] Progressing: [x] Met: [] Not Met: [] Adjusted    Long Term Goals: To be achieved in: 8 weeks  1. Disability index score of 52% or more per FOTO to assist with reaching prior level of function. [x] Progressing: [] Met: [] Not Met: [] Adjusted  2. Patient will demonstrate increased AROM to 10 deg DF and lumbar flex/ext to [facility] to allow for proper joint functioning with dressing and ADL's. [x] Progressing: [] Met: [] Not Met: [] Adjusted  3. Patient will demonstrate an increase in Strength to 5/5 R knee and ankle to allow for proper functional mobility with squatting and stairs. [x] Progressing: [] Met: [] Not Met: [] Adjusted  4. Patient will return to walking at least 39' to go grocery shopping without increased symptoms or restriction. [x] Progressing: [] Met: [] Not Met: [] Adjusted  5. Pt will be able to resume Pilates post rehab program for ongoing conditioning and healthy lifestyle.     [x] Progressing: [] Met: [] Not Met: [] Adjusted     Progression Towards Functional goals:  [x] Patient is progressing as expected towards functional goals listed. [] Progression is slowed due to complexities listed. [] Progression has been slowed due to co-morbidities. [] Plan just implemented, too soon to assess goals progression  [] Other:         Overall Progression Towards Functional goals/ Treatment Progress Update:  [x] Patient is progressing as expected towards functional goals listed. [] Progression is slowed due to complexities/Impairments listed. [] Progression has been slowed due to co-morbidities. [] Plan just implemented, too soon to assess goals progression <30days   [] Goals require adjustment due to lack of progress  [] Patient is not progressing as expected and requires additional follow up with physician  [] Other    Prognosis for POC: [x] Good [] Fair  [] Poor      Patient requires continued skilled intervention: [x] Yes  [] No    Treatment/Activity Tolerance:  [x] Patient able to complete treatment  [] Patient limited by fatigue  [] Patient limited by pain    [] Patient limited by other medical complications  [] Other:     ASSESSMENT:  Patient has made progress with decreased pain, increased functional mobility and increased strength since initial visit. Recommend Cont PT to address above deficits. Pt requires skilled intervention to restore ROM, strength, functional endurance and balance in order to perform ADLs without significant symptoms or limitations. Return to Play: (if applicable)   []  Stage 1: Intro to Strength   []  Stage 2: Return to Run and Strength   []  Stage 3: Return to Jump and Strength   []  Stage 4: Dynamic Strength and Agility   []  Stage 5: Sport Specific Training     []  Ready to Return to Play, Meets All Above Stages   []  Not Ready for Return to Sports   Comments:                               PLAN:  Progress CKC as tolerated with emphasis on hip abductors.    [x] Continue per plan of care [] Alter current plan (see comments above)  [] Plan of care initiated [] Hold pending MD visit [] Discharge      Electronically signed by:  Stephan Starr, [address]    Note: If patient does not return for scheduled/ recommended follow up visits, this note will serve as a discharge from care along with most recent update on progress. Access Code: [email or web address]  URL: [email or web address]  Date: 08/12/2022  Prepared by: Stephan Starr    Exercises  Supine Ankle Pumps - 1-2 x daily - 7 x weekly - 1-3 sets - 10 reps  Supine Ankle Inversion Eversion AROM - 1-2 x daily - 7 x weekly - 1-3 sets - 10 reps  Supine 90/90 Sciatic Nerve Glide with Knee Flexion/Extension - 1 x daily - 7 x weekly - 3 sets - 10 reps  Seated Long Arc Quad with Hip Adduction - 1 x daily - 7 x weekly - 1-3 sets - 10 reps  Supine ITB Stretch with Strap - 1 x daily - 7 x weekly - 3 sets - 20-30 seconds hold  Supine Figure 4 Piriformis Stretch (Mirrored) - 1 x daily - 7 x weekly - 3 sets - 30 seconds hold  Supine Quadriceps Stretch with Strap on Table - 1 x daily - 7 x weekly - 3 sets - 30 seconds hold  Supine Lower Trunk Rotation - 1 x daily - 7 x weekly - 1-2 sets - 10 reps  Supine Bridge with Mini Swiss Ball Between Knees - 1 x daily - 7 x weekly - 2-3 sets - 10 reps  Sidelying Hip Abduction - 1 x daily - 3 x weekly - 2-3 sets - 10 reps  Clamshell - 1 x daily - 3 x weekly - 2-3 sets - 10 reps  Wall Quarter Squat - 1 x daily - 3 x weekly - 2 sets - 10 reps  Small Range Straight Leg Raise - 1 x daily - 7 x weekly - 2 sets - 10 reps - 5 hold  Standing Hip Abduction with Resistance at Ankles and Counter Support - 1 x daily - 7 x weekly - 2-3 sets - 10 reps - 5 hold  Side Stepping with Counter Support - 1 x daily - 7 x weekly - 3 sets - 10 reps - 2 hold  Heel Raises with Counter Support - 1 x daily - 7 x weekly - 2-3 sets - 10 reps - 2 hold  Single Leg Stance with Support - 1 x daily - 7 x weekly - 1 sets - 5 reps - 15 hold      MEDICARE CAP EXCEPTION DOCUMENTATION      I certify that this patient meets one of the below criteria necessary for becoming an exception to the Medicare cap on therapy services:    []  The patient has a condition identified by an ICD-10 code that has a direct and significant impact on the need for therapy. (Significantly impacts the rate of recovery.)                   []  The patient has a complexity identified by an ICD-10 code that has a direct and significant impact on the need for therapy. (Significantly impacts the rate of recovery and is associated with a primary condition.)         []  The patient has associated variables that influence the amount of treatment to include:  Social support, self-efficacy/motivation, prognosis, time since onset/acuity. [x]  The patient has generalized musculoskeletal conditions or a condition affecting multiple sites that will have a direct impact on the rate of recovery. [x]  The patient had a prior episode of outpatient therapy during this calendar year for a different condition. []  The patient has a mental or cognitive disorder in addition to the condition being treated that will have a direct and significant impact on the rate of recovery.

## 2022-09-02 NOTE — PROGRESS NOTES
Marisel Syed presents for   Chief Complaint   Patient presents with    Hypertension     Pt states that she is here for a 6 month f/u, is fasting for bw     Hyperlipidemia        ASSESSMENT:   Diagnosis Orders   1. Essential hypertension, well controlled off medication. Patient will continue to monitor       2. Stage 3 chronic kidney disease, unspecified whether stage 3a or 3b CKD ([facility] [address] due to see nephrology in December of last note from June was reviewed. Most recent GFR is 38       3. Pure hypercholesterolemia, labs are pending continue Lipitor 40 mg Lipid Panel    Hepatic Function Panel      4. Coronary artery disease involving native heart without angina pectoris, unspecified vessel or lesion type, continue baby aspirin continue yearly follow-up with cardiology       5. Hyperparathyroidism [facility]), plan per nephrology            Plan:  1)  Medication: continue current medication regimen unchanged, medications are working and tolerated, continue as listed  2)  Recheck in 6 months, sooner should new symptoms or problems arise. 3) LLR          SUBJECTIVE:  Marisel Syed is a 78 y.o. female who presents for evaluation of hypertension, CAD, hyperparathyroidism, CKD and hyperlipidemia. She indicates that she is feeling well and denies any symptoms referable to her elevated blood pressure. Specifically denies chest pain, palpitations, dyspnea, orthopnea, PND or peripheral edema or neuro sx. No anorexia,  or leg cramps noted. Current medication regimen is as listed below. She denies any side effects of medication, and has been taking it regularly. Lab Results   Component Value Date    LDLCALC 67 03/01/2022    LDLDIRECT 125 (H) 07/30/2018       Since patient's last appointment, she has maintained her weight loss. Unfortunately though she has injured her right knee and is currently in physical therapy. But she has not been able to exercise due to her knee pain.   After seeing her cardiologist in May 2022, she has been off of her Bystolic. So currently she is not on blood pressure medicine. She monitors her blood pressure on a regular basis she brought those numbers for review. All those numbers look good. Blood pressure is good here in the office. She will continue to keep an eye on this. She is fasting for cholesterol blood work we will see how Lipitor 40 mg is working for her. She sees nephrology every 6 months. They monitor her CKD, her hyperparathyroidism and her history of kidney stones. Her most recent lab work showed a creatinine of 1.4 with a GFR 38. Her last PTH was 38.9. When patient saw orthopedics they did an MRI of her spine. And mentioned that hepatomegaly was suspected. We reviewed her CAT scan of her abdomen and pelvis from July 2021. Her liver was perfectly normal then, in March 2021 she had an ultrasound of her liver which was normal.  Patient sees cardiology for CAD which was diagnosed by an abnormal coronary calcium score. She is maintained on a baby aspirin and Lipitor 40 mg. We discussed getting a flu shot this fall and the new COVID booster    Current Outpatient Medications   Medication Sig Dispense Refill    gabapentin (NEURONTIN) 300 MG capsule Take one tablet once daily for 2 days. Then take one tablet twice daily for 2 days.  Then take one tablet three times daily for rest 90 capsule 1    diclofenac sodium (VOLTAREN) 1 % GEL Apply 4 g topically 4 times daily as needed for Pain 100 g 3    atorvastatin (LIPITOR) 40 MG tablet TAKE ONE TABLET BY MOUTH DAILY 90 tablet 3    denosumab (PROLIA) 60 MG/ML SOSY SC injection Inject 60 mg into the skin once      pantoprazole (PROTONIX) 40 MG tablet       Potassium Citrate ER 15 MEQ (1620 MG) TBCR 2 times daily       calcitRIOL (ROCALTROL) 0.25 MCG capsule Take 0.25 mcg by mouth every other day      aspirin 81 MG tablet Take 81 mg by mouth daily      Melatonin 5 MG CHEW Take by mouth nightly       No current facility-administered medications for this visit. Allergies   Allergen Reactions    Codeine Rash, Nausea And Vomiting and Shortness Of Breath    Lisinopril      cough    Norvasc [Amlodipine Besylate]      swelling    Triamterene-Hctz      Renal insufficiency       Social History     Tobacco Use    Smoking status: Never    Smokeless tobacco: Never   Substance Use Topics    Alcohol use: No       OBJECTIVE:   /74   Pulse 71   Ht 5' 4\" (1.626 m)   Wt 156 lb 12.8 oz (71.1 kg)   LMP 07/11/2005   SpO2 98%   BMI 26.91 kg/m²   NAD  Neck no bruit or JVD  S1 and S2 normal, no murmurs, clicks, gallops or rubs. Regular rate and rhythm. Chest is clear; no wheezes or rales. No edema or JVD. Neuro alert, no CN or motor deficits  Psych nl mood, thought and judgement                EMR Dragon/transcription disclaimer:  Much of this encounter note is electronic transcription/translation of spoken language to printed texts. The electronic translation of spoken language may be erroneous, or at times, nonsensical words or phrases may be inadvertently transcribed.   Although I have reviewed the note for such errors, some may still exist.

## 2022-09-07 ENCOUNTER — OFFICE VISIT (OUTPATIENT)
Dept: ORTHOPEDIC SURGERY | Age: 67
End: 2022-09-07
Payer: MEDICARE

## 2022-09-07 ENCOUNTER — APPOINTMENT (OUTPATIENT)
Dept: PHYSICAL THERAPY | Age: 67
End: 2022-09-07
Payer: MEDICARE

## 2022-09-07 ENCOUNTER — HOSPITAL ENCOUNTER (OUTPATIENT)
Dept: PHYSICAL THERAPY | Age: 67
Setting detail: THERAPIES SERIES
Discharge: HOME | End: 2022-09-07
Payer: MEDICARE

## 2022-09-07 VITALS — HEIGHT: 64 IN | BODY MASS INDEX: 26.63 KG/M2 | WEIGHT: 156 LBS

## 2022-09-07 DIAGNOSIS — M51.36 DDD (DEGENERATIVE DISC DISEASE), LUMBAR: ICD-10-CM

## 2022-09-07 DIAGNOSIS — M65.9 SYNOVITIS OF LEFT KNEE: ICD-10-CM

## 2022-09-07 DIAGNOSIS — S83.271A COMPLEX TEAR OF LATERAL MENISCUS OF RIGHT KNEE AS CURRENT INJURY, INITIAL ENCOUNTER: Primary | ICD-10-CM

## 2022-09-07 DIAGNOSIS — M48.062 LUMBAR STENOSIS WITH NEUROGENIC CLAUDICATION: ICD-10-CM

## 2022-09-07 DIAGNOSIS — M17.12 PRIMARY OSTEOARTHRITIS OF LEFT KNEE: ICD-10-CM

## 2022-09-07 PROCEDURE — 97112 NEUROMUSCULAR REEDUCATION: CPT | Performed by: PHYSICAL THERAPIST

## 2022-09-07 PROCEDURE — 1036F TOBACCO NON-USER: CPT | Performed by: FAMILY MEDICINE

## 2022-09-07 PROCEDURE — 1090F PRES/ABSN URINE INCON ASSESS: CPT | Performed by: FAMILY MEDICINE

## 2022-09-07 PROCEDURE — G8417 CALC BMI ABV UP PARAM F/U: HCPCS | Performed by: FAMILY MEDICINE

## 2022-09-07 PROCEDURE — G8399 PT W/DXA RESULTS DOCUMENT: HCPCS | Performed by: FAMILY MEDICINE

## 2022-09-07 PROCEDURE — 3017F COLORECTAL CA SCREEN DOC REV: CPT | Performed by: FAMILY MEDICINE

## 2022-09-07 PROCEDURE — 97140 MANUAL THERAPY 1/> REGIONS: CPT | Performed by: PHYSICAL THERAPIST

## 2022-09-07 PROCEDURE — 97110 THERAPEUTIC EXERCISES: CPT | Performed by: PHYSICAL THERAPIST

## 2022-09-07 PROCEDURE — 99213 OFFICE O/P EST LOW 20 MIN: CPT | Performed by: FAMILY MEDICINE

## 2022-09-07 PROCEDURE — G8427 DOCREV CUR MEDS BY ELIG CLIN: HCPCS | Performed by: FAMILY MEDICINE

## 2022-09-07 PROCEDURE — 1123F ACP DISCUSS/DSCN MKR DOCD: CPT | Performed by: FAMILY MEDICINE

## 2022-09-07 NOTE — FLOWSHEET NOTE
[facility], [address]  Phone: [phone]  Fax [phone]    Physical Therapy Treatment Note/ Progress Report:           Date:  2022    Patient Name:  Judie Montalvo    :  1955  MRN: [id number]  Restrictions/Precautions:    Medical/Treatment Diagnosis Information:  Diagnosis: M51.36 (ICD-10-CM) - DDD (degenerative disc disease), lumbar  M54.16 (ICD-10-CM) - Right lumbar radiculopathy  M48.062 (ICD-10-CM) - Lumbar stenosis with neurogenic claudication  Treatment Diagnosis: M51.36 (ICD-10-CM) - DDD (degenerative disc disease), lumbar  M54.16 (ICD-10-CM) - Right lumbar radiculopathy  M48.062 (ICD-10-CM) - Lumbar stenosis with neurogenic claudication. low back pain N33.69  Insurance/Certification information:  PT Insurance Information: Medicare  Physician Information:   Carlos Rodrigues PA-C  Has the plan of care been signed (Y/N):        []  Yes  [x]  No     Date of Patient follow up with Physician: 22      Is this a Progress Report:     []  Yes  [x]  No        If Yes:  Date Range for reporting period:  Beginnin22  Ending:     Progress report will be due (10 Rx or 30 days whichever is less):        Recertification will be due (POC Duration  / 90 days whichever is less): 12 weeks from 22        Visit # Insurance Allowable Auth Required   In-person 2 [facility] []  Yes []  No    Telehealth   []  Yes []  No    Total        MEDICARE CAP EXCEPTION DOCUMENTATION      I certify that this patient meets one of the below criteria necessary for becoming an exception to the Medicare cap on therapy services:    [x]  The patient has a condition identified by an ICD-10 code that has a direct and significant impact on the need for therapy.  (Significantly impacts the rate of recovery.)                   []  The patient has a complexity identified by an ICD-10 code that has a direct and significant impact on the need for therapy. (Significantly impacts the rate of recovery and is associated with a primary condition.)         []  The patient has associated variables that influence the amount of treatment to include:  Social support, self-efficacy/motivation, prognosis, time since onset/acuity. []  The patient has generalized musculoskeletal conditions or a condition affecting multiple sites that will have a direct impact on the rate of recovery. [x]  The patient had a prior episode of outpatient therapy during this calendar year for a different condition. []  The patient has a mental or cognitive disorder in addition to the condition being treated that will have a direct and significant impact on the rate of recovery. Functional Scale: FOTO: 51/100    Date assessed:  8/31/22      Therapy Diagnosis/Practice Pattern:4F. Impaired joint mobility, motor function, muscle performance,  and ROM secondary to spinal disorders. Number of Comorbidities:  []0     [x]1-2    []3+    Latex Allergy:  [x]NO      []YES  Preferred Language for Healthcare:   [x]English       []other:      Pain level:  3/10    SUBJECTIVE:  Reports seeing MD today to go over MRI report for knee. Sees Dr. Charley Aguila tomorrow for surgery consult. Apprehensive about doing back exercises on hands and knees. Reports no issues with back exercises. OBJECTIVE: See eval. Modified lumbar flexion stretches this visit. Observation:   Test measurements:  Alignment good.  - supine to sit, - slump/SLR    RESTRICTIONS/PRECAUTIONS:  lumbar DDD/stenosis    Exercises/Interventions:     Therapeutic Ex (09302) Sets/sec Reps Notes/CUES         Cat/camel /child's pose + to HEP   Thread the needle + to HEP   Open book 1' 1-2 reps each + to HEP         DKTC  H10 5          Piriformis stretches H30 5                            Manual Intervention (49443)      Lumbar-sacral mobs 10'  Piriformis stretches                                 NMR re-education (48018)   CUES NEEDED         Patient education /HEP insruction            TrA with stabilizer h10 10 reps 40-42/44 lb   TrA with adduction with stabilizer H10 10 reps 40- 45lb                           Therapeutic Activity (26166)                                            HEP instruction: (see scanned forms)  Access Code: [email or web address]  URL: [email or web address]  Date: 08/31/2022  Prepared by: Rica Mcburney     Exercises  Cat-Camel - 2 x daily - 7 x weekly - 1-2 sets - 10 reps - 10 hold  Cat-Camel to Child's Pose - 2 x daily - 7 x weekly - 1-2 sets - 10 reps - 10 hold  Child's Pose with Thread the Needle - 1 x daily - 7 x weekly - 3 sets - 10 reps - 10 hold  Sidelying Open Book Thoracic Lumbar Rotation and Extension - 1 x daily - 7 x weekly - 3 sets - 10 reps - 10 hold  Therapeutic Exercise and NMR EXR  [x] (77872) Provided verbal/tactile cueing for activities related to strengthening, flexibility, endurance, ROM  for improvements in proximal hip and core control with self care, mobility, lifting and ambulation.  [] (82918) Provided verbal/tactile cueing for activities related to improving balance, coordination, kinesthetic sense, posture, motor skill, proprioception  to assist with core control in self care, mobility, lifting, and ambulation.      Therapeutic Activities:    [] (86431 or 02220) Provided verbal/tactile cueing for activities related to improving balance, coordination, kinesthetic sense, posture, motor skill, proprioception and motor activation to allow for proper function  with self care and ADLs  [] (03913) Provided training and instruction to the patient for proper core and proximal hip recruitment and positioning with ambulation re-education     Home Exercise Program:    [x] (62564) Reviewed/Progressed HEP activities related to strengthening, flexibility, endurance, ROM of core, proximal hip and LE for functional self-care, mobility, lifting and ambulation   [] (38196) Reviewed/Progressed HEP activities related to improving balance, coordination, kinesthetic sense, posture, motor skill, proprioception of core, proximal hip and LE for self care, mobility, lifting, and ambulation      Manual Treatments:  PROM / STM / Oscillations-Mobs:  G-I, II, III, IV (PA's, Inf., Post.)  [x] (65665) Provided manual therapy to mobilize proximal hip and LS spine soft tissue/joints for the purpose of modulating pain, promoting relaxation,  increasing ROM, reducing/eliminating soft tissue swelling/inflammation/restriction, improving soft tissue extensibility and allowing for proper ROM for normal function with self care, mobility, lifting and ambulation. Modalities:   deferred    Charges  Timed Code Treatment Minutes: 39'   Total Treatment Minutes: 39'     Medicare total (add KX at $2000)  kx           [] EVAL (LOW) 92275   [] EVAL (MOD) 50799   [] EVAL (HIGH) 46976   [] RE-EVAL     [x] HC(12719) x  1   [] IONTO  [x] NMR (16811) x 1    [] VASO  [x] Manual (51663) x  1    [] Other:  [] TA x      [] Mech Traction (60737)  [] ES(attended) (41598)      [] ES (un) (95545):       GOALS:  Patient stated goal: return to walking 45-1 hour without pain  [] Progressing: [] Met: [] Not Met: [] Adjusted     Therapist goals for Patient:   Short Term Goals: To be achieved in: 2 weeks  1. Independent in HEP and progression per patient tolerance, in order to prevent re-injury. [] Progressing: [] Met: [] Not Met: [] Adjusted  2. Patient will have a decrease in pain to facilitate improvement in movement, function, and ADLs as indicated by Functional Deficits. [] Progressing: [] Met: [] Not Met: [] Adjusted        Long Term Goals: To be achieved in: 12 weeks  1. Disability index score of 62% or more per FOTO to assist with reaching prior level of function. [] Progressing: [] Met: [] Not Met: [] Adjusted  2.  Patient will demonstrate increased AROM to WNL, good LS mobility, good hip ROM to allow for proper joint functioning as indicated by patients Functional Deficits. [] Progressing: [] Met: [] Not Met: [] Adjusted  3. Patient will demonstrate an increase in Strength to good proximal hip and core activation to allow for proper functional mobility as indicated by patients Functional Deficits. [] Progressing: [] Met: [] Not Met: [] Adjusted  4. Patient will return to walking 45 min functional activities without increased symptoms or restriction. [] Progressing: [] Met: [] Not Met: [] Adjusted  5. Patient to be able to perform stairs without pain (patient specific functional goal)    [] Progressing: [] Met: [] Not Met: [] Adjusted       Progression Towards Functional goals:  [] Patient is progressing as expected towards functional goals listed. [] Progression is slowed due to complexities listed. [] Progression has been slowed due to co-morbidities. [x] Plan just implemented, too soon to assess goals progression  [] Other:     Overall Progression Towards Functional goals/ Treatment Progress Update:  [] Patient is progressing as expected towards functional goals listed. [] Progression is slowed due to complexities/Impairments listed. [] Progression has been slowed due to co-morbidities. [x] Plan just implemented, too soon to assess goals progression <30days   [] Goals require adjustment due to lack of progress  [] Patient is not progressing as expected and requires additional follow up with physician  [] Other    Prognosis for POC: [x] Good [] Fair  [] Poor      Patient requires continued skilled intervention: [x] Yes  [] No    Treatment/Activity Tolerance:  [x] Patient able to complete treatment  [] Patient limited by fatigue  [] Patient limited by pain    [] Patient limited by other medical complications  [] Other:     ASSESSMENT: Patient with significant verbal cues to perform TrA correctly.  Weakness noted with exercise and easily fatigued in general. Pt requires skilled intervention to restore ROM, strength, functional endurance and balance in order to perform ADLs without significant symptoms or limitations. Return to Play: (if applicable)   []  Stage 1: Intro to Strength   []  Stage 2: Return to Run and Strength   []  Stage 3: Return to Jump and Strength   []  Stage 4: Dynamic Strength and Agility   []  Stage 5: Sport Specific Training     []  Ready to Return to Play, Meets All Above Stages   []  Not Ready for Return to Sports   Comments:                               PLAN: See eval. Continue with LE/lumbar/core stabilization. [x] Continue per plan of care [] Alter current plan (see comments above)  [] Plan of care initiated [] Hold pending MD visit [] Discharge      Electronically signed by:  Adalberto Hurley, [address] [place] [address]    Note: If patient does not return for scheduled/ recommended follow up visits, this note will serve as a discharge from care along with most recent update on progress.

## 2022-09-07 NOTE — PROGRESS NOTES
FU acute onset right-sided mechanical back pain with right leg pain with associated numbness and tingling and known history of previously documented severe spinal stenosis previously established with Castro Ortiz and Dr. Valentin Orourke. Recently seen 7/25/2022 for right knee contusion and aggravation chondromalacia patella with ongoing knee pain. Review of right knee MRI        Subjective:      Patient ID: Nicolas Manrique is a 78 y.o. female who is here in the [address] [place], [place] [address]y [address]- for an initial evaluation of right anterior knee pain. She states approximately 1 month ago she walked into a piece of furniture and struck her knee. Since that time she has had discomfort in the anterior knee. She reports pain with stairs and chairs. She denies mechanical symptoms such as locking and catching. Otis Guzman is a very pleasant retired white female who does have a history of mechanical low back pain is a patient of Dr. Denisa Parikh who is being seen today in kind consultation from Maciel Carnes for evaluation of an injury to her right knee. She states that in early June 2022 she was helping her daughter move and her house was somewhat cluttered and believes she struck the anterior portion of her right knee against the edge of a metal table. There is no pop or crack although she did have immediate pain her pain symptoms do progress or worsen after several days to the point where is difficulty performing her exercise program going up and down stairs and doing her Pilates. Most of her pain is anterior in nature and she has had some very mild swelling.   Initially she treated herself with Tylenol but avoided anti-inflammatories given her history of kidney dysfunction but was eventually seen by Maciel Carnes on 7/11/2022 where x-rays did not reveal high-grade degenerative changes and was placed on a Medrol pack which has helped her symptoms substantially at about 50 to 60% but is still unable to walk and perform her exercises. She is have some occasional subtle buckling and denies active locking or catching. No previous history of knee injury. She is being seen today for orthopedic and sports consultation with imaging. Pain Scale 7/10 VAS. Location of pain anterior lateral knee just inferior to the patella. Pain is worse with stairs, chairs. Pain improves with ice, elevation. Previous treatments have included Tylenol. Cannot take NSAIDs due to CKD. Was initially evaluated primarily for her knee on 7/25/2022 and is now almost 2 months out from her knee contusion with aggravation chondromalacia patella. She was seen in the office and did not exhibit any evidence of fracturing although she did have some evidence of low-grade osteoarthritis and chondromalacia we did start her in therapy and performed a injection to her knee and started on Voltaren gel and knee bracing and was improving with regard to her actual knee symptoms. Her more pressing complaint is over this past weekend since roughly 7/30/2022 she began to notice very severe throbbing and aching to her right leg which was suspicious for radiculopathy mostly in the L5 distribution and has noted she had been previously established with Dr. Nate Rocha and Pancho Trejo with her last lumbar MRI being 7/15/2019 showing severe spinal stenosis centrally at L4-5 secondary to disc protrusion and facet arthropathy with ligamentous thickening. There is left lateral recess stenosis primarily at L3-4 with severe left foraminal narrowing at L5-S1 spondylitic in nature. She states that her worsening symptoms and inability to walk over the weekend felt much like nerve pain but she has never had this on the right-hand side and was having a difficult time sleeping. She did mention this to her therapist at her evaluation on 8/1/2022 and they did come over and speak with us we started her on a Medrol Dosepak and a gabapentin taper.   This is resulted in about distances and has had a couple of catching episodes. She has been utilizing a knee brace as well as using her Voltaren gel. Her back does seem to be improving and she did have evaluation with Trish Tran and is hoping to be able to cut back on her gabapentin as well. Her major concern is being active and taking care of her new grandchild. She does have some stability with the brace but is afraid about falling carrying the baby down stairs. He has not had active locking and does have both anterior and also anterior lateral pain. Review of Systems:  I have reviewed the clinically relevant past medical history, medications, allergies, family history, social history, and 13 point Review of Systems from the patient's recent history form & documented any details relevant to today's presenting complaints in the history above. The patient's self-reported past medical history, medications, allergies, family history, social history, and Review of Systems form from today's date have been scanned into the chart under the \"Media\" tab. As outlined in the HPI. Negative for fever or chills. Negative for poly-joint pain, swelling and stiffness. She denies numbness or tingling into the affected extremity.      Past Medical History:   Diagnosis Date    Actinic keratosis     Breast disorder     CAD (coronary artery disease)     Chronic kidney disease     Hyperlipidemia     Hypertension     Infertility, female     S/P colonoscopy 2009    Tendinitis of foot     UTI (urinary tract infection)        Family History   Problem Relation Age of Onset    Heart Disease Father         MI  at  age 48    High Blood Pressure Sister     Diabetes Sister     Kidney Disease Sister         transplant     Breast Cancer Sister     High Blood Pressure Mother     High Cholesterol Mother     Arthritis Mother         gout    Heart Disease Mother     Cancer Mother     Heart Disease Brother     High Cholesterol Brother     High Blood Pressure Brother        Past Surgical History:   Procedure Laterality Date    ABDOMEN SURGERY      stone removed from bile duct    ANGIOPLASTY      renal    BREAST BIOPSY      BREAST SURGERY      duct removal    BUNIONECTOMY Left 12/15/2020    MODIFIED YAZMIN BUNIONECTOMY, MODIFIED AKIN OSTEOTOMY, MODIFIED WEIL SECOND METATARSAL, PROXIMAL INTERPHALANGEAL JOINT ARTHRODESIS DIGITS TWO THREE FOUR LEFT FOOT performed by Fanta Boland DPM at [address] [place] [address]  12/1992    COLONOSCOPY  8/1/2009    COLONOSCOPY N/A 10/23/2019    COLONOSCOPY POLYPECTOMY SNARE/COLD BIOPSY performed by Nyla Renee MD at [address] N/A 7/29/2019    MIDLINE LUMBAR FIVE SACRAL ONE EPIDURAL STEROID INJECTION SITE CONFIRMED BY FLUOROSCOPY performed by Justa De La Cruz MD at [place] [id number] Left 01/11/2016    Cystoscopy, with left stent placement    TONSILLECTOMY AND ADENOIDECTOMY      UPPER GASTROINTESTINAL ENDOSCOPY N/A 4/7/2021    EGD BIOPSY performed by Nyla Renee MD at [address] [place] [address] History     Occupational History    Not on file   Tobacco Use    Smoking status: Never    Smokeless tobacco: Never   Vaping Use    Vaping Use: Never used   Substance and Sexual Activity    Alcohol use: No    Drug use: No    Sexual activity: Not Currently       Current Outpatient Medications   Medication Sig Dispense Refill    gabapentin (NEURONTIN) 300 MG capsule Take one tablet once daily for 2 days. Then take one tablet twice daily for 2 days.  Then take one tablet three times daily for rest 90 capsule 1    diclofenac sodium (VOLTAREN) 1 % GEL Apply 4 g topically 4 times daily as needed for Pain 100 g 3    atorvastatin (LIPITOR) 40 MG tablet TAKE ONE TABLET BY MOUTH DAILY 90 tablet 3    denosumab (PROLIA) 60 MG/ML SOSY SC injection Inject 60 mg into the skin once      pantoprazole (PROTONIX) 40 MG tablet       Potassium Citrate ER 15 MEQ (1620 MG) TBCR 2 times daily       calcitRIOL (ROCALTROL) 0.25 MCG capsule Take 0.25 mcg by mouth every other day      aspirin 81 MG tablet Take 81 mg by mouth daily      Melatonin 5 MG CHEW Take by mouth nightly       No current facility-administered medications for this visit. Allergies   Allergen Reactions    Codeine Rash, Nausea And Vomiting and Shortness Of Breath    Lisinopril      cough    Norvasc [Amlodipine Besylate]      swelling    Triamterene-Hctz      Renal insufficiency         Objective:     She is alert, oriented x 3, pleasant, well nourished, developed and in no acute distress. Ht 5' 4\" (1.626 m)   Wt 156 lb (70.8 kg)   LMP 07/11/2005   BMI 26.78 kg/m²      Examination of the right knee: The alignment of the knee is neutral.   There is not erythema. There is no soft tissue swelling. There is a mild effusion. ROM-  Extension 0           -   Flexion  130   There mild pain associated with ROM testing. Medial joint line non tender to palpation. Lateral joint line non tender to palpation. Retro patellar crepitus is present. There is no pain with resisted knee extension. There is no crepitus along the joint line with ROM testing. Varus Stress testing does not produce pain and does not show laxity. Valgus Stress testing does not produce pain and does not show laxity. Lachman's test is negative. Anterior Drawer test is negative. Posterior Drawer test is negative  Ligia's Test to her right knee does reveal pain laterally without high-grade click. Less pain with medial Ligia's. Patellar Compression testing does continue to reproduce pain. When she rates it about a 4-5 out of 10. Extensor Mechanism is  intact. Lower extremities:  She has 5/5 motor strength of bilateral lower extremities. She has a negative straight leg raise, bilaterally. Deep tendon reflexes at knees and achilles are 2+.    Sensation is intact to light touch L3 to S1 bilaterally. She has no clonus. Examination of the lower extremities shows intact perfusion to both lower extremities. She has no cyanosis and digigts are warm to touch, capillary refill is less than 2 seconds. She has no edema noted. She has intact skin without lacerations or abrasions, no significant erythema, rashes or skin lesions. Lumbar spine evaluation obtained today in the office does reveal less prominent tenderness over lumbar paraspinals and lower lumbar facets right greater than left. She does have central gluteal tenderness worse on the right than left. She can only forward flex to about 30 to 40 degrees and does have painful extension with a 50% reduction in lateral bending rotation. She does appear to have a trace positive straight leg raise on the right negative on the left today. There is not appear to be focal lower extremity motor deficits. X Rays: were performed in the office on 7/11/2022  AP, PA Standing, Lateral and Sunrise views of right knee:  No acute fractures or dislocations noted. There is minimal patellar subluxation. MRI scan Lumbar obtained at [facility] 7/15/2019 as listed above per Camila Alfaro  Narrative   EXAMINATION:   [facility] 7/15/2019 10:43 am       TECHNIQUE:   Multiplanar multisequence MRI of the lumbar spine was performed without the   administration of intravenous contrast.       COMPARISON:   None       HISTORY:   ORDERING SYSTEM PROVIDED HISTORY: DDD (degenerative disc disease), lumbar   TECHNOLOGIST PROVIDED HISTORY:   [facility] [phone]   Reason for exam:->Back pain   Reason for Exam: DDD (degenerative disc disease), lumbar   Acuity: Acute   Type of Exam: Initial       FINDINGS:   BONES/ALIGNMENT: There is grade 1 retrolisthesis of L5 relative to S1   measuring 3 mm. Alignment is otherwise normal.  There is no acute fracture.    There is disc desiccation and mild disc space narrowing at L5-S1. Intervertebral disc heights are otherwise normal.  There is no spondylolysis   evident. SPINAL CORD: The conus medullaris is normal in size and signal intensities   and terminates normally. SOFT TISSUES: There is no paraspinal mass identified. There is severe left   renal atrophy. L1-L2: There is no disc bulge or protrusion present. There is no significant   spinal canal stenosis or neural foraminal narrowing present. There is   bilateral facet arthropathy. L2-L3: There is a disc bulge and facet arthropathy. There is no significant   spinal canal stenosis or neural foraminal narrowing. L3-L4: There is a disc bulge which lateralizes to the left. There is   effacement of the left lateral recess and mild left neural foraminal   narrowing. There is no significant right neural foraminal narrowing. Mild   spinal canal stenosis is present. Facet arthropathy and thickening of the   ligamentum flavum is present. L4-L5: There is a disc bulge and superimposed focal 4 mm central disc   protrusion. There is facet arthropathy and thickening of the ligamentum   flavum. There is severe spinal canal stenosis. No significant neural   foraminal narrowing is present. L5-S1: There is a disc bulge and osteophyte formation. There is facet   arthropathy and thickening of the ligamentum flavum. There is mild spinal   canal stenosis. Severe left neural foraminal narrowing is present. No   significant right neural foraminal narrowing is present. Impression   1. Severe spinal canal stenosis at L4-L5 secondary to a disc bulge, central   disc protrusion, facet arthropathy and thickening of the ligamentum flavum. 2. Mild spinal canal stenosis, mild left neural foraminal narrowing and   effacement of the left lateral recess at L3-L4 secondary to a disc bulge.    3. Mild spinal canal stenosis and severe left neural foraminal narrowing at   L5-S1 secondary to a disc bulge, facet arthropathy, thickening of the   ligamentum flavum and osteophyte formation. MRI right knee obtained at [facility] 2022 as listed above  Narrative   Site: [facility] #: [id number] #: [id number] Procedure: MR Right Knee w/o Contrast ; Reason for Exam: Chondromalacia patellae of right knee - Primary; Right knee pain, unspecified chronicity; Contusion of right knee, initial encounter   This document is confidential medical information. Unauthorized disclosure or use of this information is prohibited by law. If you are not the intended recipient of this document, please advise us by calling immediately [phone]. [facility]   BAYRON PaizKaren Mae [address]           Patient Name: Valencia Oro   Case ID: [id number]   Patient : 1955   Referring Physician: Tom Verde MD   Exam Date: 2022   Exam Description: MR Right Knee w/o Contrast            HISTORY:  71-year-old female lateral knee pain going into her lower leg after hitting her knee    on a bookcase in 2022. TECHNICAL FACTORS:  Long- and short-axis fat- and water-weighted images were performed. COMPARISON:  MRI right knee 2011. FINDINGS:  MENISCI:   Medial Meniscus: Thin small and likely incidental 1.5 cm posterior horn cleavage tear. Lateral Meniscus: Complex, macerated anterior root/horn tear with horizontal cleavage extension    into the body and mild meniscal extrusion with a flap in the meniscofemoral recess. LIGAMENTS:   Anterior Cruciate Ligament: Thinned ACL compatible with chronic, prior, partial tearing. Posterior Cruciate Ligament: Intact. Medial Collateral Ligament: Grade 1 sprain proximally. Lateral Collateral Ligament: Intact. Posterolateral Corner Structures: Intact. Posteromedial Corner Structures: Intact. EXTENSOR MECHANISM:   Patellar Tendon: Intact.        Distal Quadriceps Tendon: Intact with inflammation in the quadriceps tendon fat pad. Medial Patellofemoral Ligament: Intact. Medial and Lateral Patellar Retinacula: Intact. Hoffa's Fat Pad: Mild induration. ARTICULATIONS:   Patellofemoral Compartment: Slightly laterally translated patella. Mild chondral thinning. Medial Compartment: Mild chondral thinning. Lateral Compartment: Mild chondral thinning. Central Compartment: Mild synovitis. Tibiofibular Articulation:  Normal.       GENERAL:   Bones: Unremarkable. Effusion: Large 3+ effusion with synovial proliferation. Baker's Cyst: None. Loose Bodies: None. Soft Tissues/Other: Mild anteromedial peripatellar soft tissue swelling consistent with a soft    tissue contusion. CONCLUSION:   1. Complex, anterior root/horn lateral meniscus tear, with horizontal cleavage extension into    the body and a small displaced flap in the meniscofemoral recess. This finding is new compared    to the prior study. 2. Grade 1 MCL sprain. 3. Large joint effusion. Thank you for the opportunity to provide your interpretation. Myrna Jain MD       A: FRANK/FRANCES/ISIDORO/tv 09/01/2022 1:58 PM   T: TV 08/31/2022 12:43 PM                 Additional Tests reviewed: none  Additional Outside Records reviewed: none    Diagnosis:       ICD-10-CM    1. Complex tear of lateral meniscus of right knee as current injury, initial encounter  [address] [facility] Lisa Aranda MD, Orthopedics and Sports Medicine (Shoulder; Hip; Knee), [place]      2. Primary osteoarthritis of left knee  M17.12 Ana Laura Bhatti MD, Orthopedics and Sports Medicine (Shoulder; Hip; Knee)[facility]      3. Synovitis of left knee  M65.9 [facility] Lisa Aranda MD, Orthopedics and Sports Medicine (Shoulder; Hip; Knee), [place]      4. Lumbar stenosis with neurogenic claudication  M48.062       5.  DDD (degenerative disc disease), lumbar M51.36            Assessment and Plan:       Assessment:  #1.  13-week status post posttraumatic persistent right knee aggravation chondromalacia patella with ongoing knee pain with MRI documented mild tricompartmental osteoarthritis with complex tear viscus with small meniscal flap and episodic catching and limited ability to walk distances. #2.  3 Weeks status post acute onset primarily right-sided mechanical low back pain with suspected right L5 radiculopathy and previously documented severe canal stenosis treated previously with epidurals by Dr. Lourdes Valles and followed by Christina Uriostegui had an extensive discussion with Ms. Marina Mclean regarding the natural history, etiology, and long term consequences of her condition. I have presented reasonable alternatives to the patient's proposed care, treatment, and services. Risks and benefits of the treatment options also reviewed in detail. I have outlined a treatment plan with them. She has had full opportunity to ask her questions. I have answered them all to her satisfaction. I feel that Ms. Marina Mclean understands our discussion today. Plan: Treatment options were discussed with Valorie Meyers today. We did review her plain films, recent right knee MRI and exam findings. She does not appear to have a great deal of degenerative changes involving her knee on plain films and MRI does confirm only mild tricompartmental osteoarthritis but does have a complex tear to the lateral meniscus with a small displaced fragment. She does continue to have pain and has had only minimal improvement with treatment over the last couple of months. We have previously discussed viscosupplementation but does seem to be localized her pain to the lateral aspect of the knee and would like for her to sit down with Dr. Lee Adams to discuss potential arthroscopic intervention as this may very well give her best chance for a reasonable recovery.   We can also consider viscosupplementation down the road if this is ineffective. She will continue with her Voltaren gel, use of her knee bracing and her physical therapy exercises for both her knee and back. She did have an evaluation with Tevin Del Real who recommended continuing with therapy. She is little bit foggy with the gabapentin we will try cutting this down to 300 mg twice daily and icing and activity modification was discussed. We will see her back as needed and she will contact us in the interim with questions or concerns. Charley Dean MD    Disclaimer: This note was generated with use of a verbal recognition program (DRAGON) and an attempt was made to check for errors. It is possible that there are still dictated errors within this office note. If so, please bring any significant errors to my attention for an addendum. All efforts were made to ensure that this office note is accurate.

## 2022-09-08 ENCOUNTER — OFFICE VISIT (OUTPATIENT)
Dept: ORTHOPEDIC SURGERY | Age: 67
End: 2022-09-08
Payer: MEDICARE

## 2022-09-08 VITALS — BODY MASS INDEX: 26.63 KG/M2 | WEIGHT: 156 LBS | HEIGHT: 64 IN

## 2022-09-08 DIAGNOSIS — S80.01XA CONTUSION OF RIGHT KNEE, INITIAL ENCOUNTER: ICD-10-CM

## 2022-09-08 DIAGNOSIS — M22.41 CHONDROMALACIA OF PATELLOFEMORAL JOINT, RIGHT: Primary | ICD-10-CM

## 2022-09-08 PROCEDURE — G8427 DOCREV CUR MEDS BY ELIG CLIN: HCPCS | Performed by: ORTHOPAEDIC SURGERY

## 2022-09-08 PROCEDURE — 1090F PRES/ABSN URINE INCON ASSESS: CPT | Performed by: ORTHOPAEDIC SURGERY

## 2022-09-08 PROCEDURE — G8399 PT W/DXA RESULTS DOCUMENT: HCPCS | Performed by: ORTHOPAEDIC SURGERY

## 2022-09-08 PROCEDURE — 1036F TOBACCO NON-USER: CPT | Performed by: ORTHOPAEDIC SURGERY

## 2022-09-08 PROCEDURE — 99204 OFFICE O/P NEW MOD 45 MIN: CPT | Performed by: ORTHOPAEDIC SURGERY

## 2022-09-08 PROCEDURE — 3017F COLORECTAL CA SCREEN DOC REV: CPT | Performed by: ORTHOPAEDIC SURGERY

## 2022-09-08 PROCEDURE — 1123F ACP DISCUSS/DSCN MKR DOCD: CPT | Performed by: ORTHOPAEDIC SURGERY

## 2022-09-08 PROCEDURE — G8417 CALC BMI ABV UP PARAM F/U: HCPCS | Performed by: ORTHOPAEDIC SURGERY

## 2022-09-08 NOTE — PROGRESS NOTES
ORTHOPAEDIC SURGERY H&P / CONSULTATION NOTE    Chief complaint:   Chief Complaint   Patient presents with    Knee Pain     Ck R KNEE        History of present illness: The patient is a 78 y.o. female with subjective symptoms of right knee pain. The chief complaint is located at anterior lateral aspect right knee. Duration of symptoms has been for 2 months. The severity of symptoms is rated at 3/10 pain on intake form. Please see previous notes by Dr. Demario Shoemaker for evaluation and treatment from previous right knee self-reported injury with referral for consultation services to include surgery. Patient self-reports that she hit her knee against a bookshelf that was knee height level on 7/1/2022. She states dull throbbing achiness. Denies mechanical twisting knee pain. She had had continued pain in the knee and ultimately received a corticosteroid injection right knee intra-articular space on 7/25/2022. She is done formal physical therapy for 2-3 visits. She states occasional numbness around the lateral/anterolateral aspect of the leg but denies low back pain. She denies gross instability in the knee. She states dull throbbing aching pain, pain with up and down stairs. The patient has tried the below listed items prior to today's consultation for above listed chief complaint.     -   Over-the-counter anti-inflammatories/prescription medication anti-inflammatory.      +   Physical therapy / guided home exercise program weeks     +   Previous corticosteroid injections    Past medical history:    Past Medical History:   Diagnosis Date    Actinic keratosis     Breast disorder     CAD (coronary artery disease)     Chronic kidney disease     Hyperlipidemia     Hypertension     Infertility, female     S/P colonoscopy 08/2009    Tendinitis of foot     UTI (urinary tract infection)         Past surgical history:    Past Surgical History:   Procedure Laterality Date    ABDOMEN SURGERY      stone removed from bile duct ANGIOPLASTY      renal    BREAST BIOPSY      BREAST SURGERY      duct removal    BUNIONECTOMY Left 12/15/2020    MODIFIED YAZMIN BUNIONECTOMY, MODIFIED AKIN OSTEOTOMY, MODIFIED WEIL SECOND METATARSAL, PROXIMAL INTERPHALANGEAL JOINT ARTHRODESIS DIGITS TWO THREE FOUR LEFT FOOT performed by Gloria Quinn DPM at [address] [place] [address]  12/1992    COLONOSCOPY  8/1/2009    COLONOSCOPY N/A 10/23/2019    COLONOSCOPY POLYPECTOMY SNARE/COLD BIOPSY performed by Lurdes Aldridge MD at [facility] N/A 7/29/2019    MIDLINE LUMBAR FIVE SACRAL ONE EPIDURAL STEROID INJECTION SITE CONFIRMED BY FLUOROSCOPY performed by Arabella Jmail MD at [place] [address] Left 01/11/2016    Cystoscopy, with left stent placement    TONSILLECTOMY AND ADENOIDECTOMY      UPPER GASTROINTESTINAL ENDOSCOPY N/A 4/7/2021    EGD BIOPSY performed by Lurdes Aldridge MD at [address] [place] [address] Dr: Allergies   Allergen Reactions    Codeine Rash, Nausea And Vomiting and Shortness Of Breath    Lisinopril      cough    Norvasc [Amlodipine Besylate]      swelling    Triamterene-Hctz      Renal insufficiency         Medications:   Current Outpatient Medications:     gabapentin (NEURONTIN) 300 MG capsule, Take one tablet once daily for 2 days. Then take one tablet twice daily for 2 days.  Then take one tablet three times daily for rest, Disp: 90 capsule, Rfl: 1    diclofenac sodium (VOLTAREN) 1 % GEL, Apply 4 g topically 4 times daily as needed for Pain, Disp: 100 g, Rfl: 3    atorvastatin (LIPITOR) 40 MG tablet, TAKE ONE TABLET BY MOUTH DAILY, Disp: 90 tablet, Rfl: 3    denosumab (PROLIA) 60 MG/ML SOSY SC injection, Inject 60 mg into the skin once, Disp: , Rfl:     pantoprazole (PROTONIX) 40 MG tablet, , Disp: , Rfl:     Potassium Citrate ER 15 MEQ (1620 MG) TBCR, 2 times daily , Disp: , Rfl:     calcitRIOL (ROCALTROL) 0.25 MCG capsule, Take 0.25 mcg by mouth every other day, Disp: , Rfl:     aspirin 81 MG tablet, Take 81 mg by mouth daily, Disp: , Rfl:     Melatonin 5 MG CHEW, Take by mouth nightly, Disp: , Rfl:      Social history: Denies IV drug use. Social History     Socioeconomic History    Marital status:      Spouse name: Not on file    Number of children: Not on file    Years of education: Not on file    Highest education level: Not on file   Occupational History    Not on file   Tobacco Use    Smoking status: Never    Smokeless tobacco: Never   Vaping Use    Vaping Use: Never used   Substance and Sexual Activity    Alcohol use: No    Drug use: No    Sexual activity: Not Currently   Other Topics Concern    Not on file   Social History Narrative    Not on file     Social Determinants of Health     Financial Resource Strain: Not on file   Food Insecurity: Not on file   Transportation Needs: Not on file   Physical Activity: Sufficiently Active    Days of Exercise per Week: 6 days    Minutes of Exercise per Session: 40 min   Stress: Not on file   Social Connections: Not on file   Intimate Partner Violence: Not on file   Housing Stability: Not on file     Tobacco use. Social History     Tobacco Use   Smoking Status Never   Smokeless Tobacco Never     Employment: Noncontributory    Workers compensation claim: Noncontributory    Review of systems: Patient denies any fevers chills chest pain shortness of breath nausea vomiting significant weight loss any change in voiding or bowel movements. Patient denies any significant numbness or tingling at baseline as it relates to this presenting symptom/chief complaint. The patient denies any significant problems with skin or any significant allergies. Physical examination:  Body mass index is 26.78 kg/m².   AAOx3, NCAT  EOMI  MMM  RR  Unlabored breathing, no wheezing  Skin intact BUE and BLE, warm and moist  Bilateral lower extremity examination specific to subjective symptoms  Exam Right Lower Extremity  Trace to 1+ effusion, 2/124/0 active ROM (E/F/Lag), same P assive ROM (E/F/Lag), negative anterior Drawer, 1A Lachman,   negative posterior Drawer,   Stable varus/valgus at 0 and 30?,    none TTP Joint Line, negative Ligia,   negative Thessaly, positive Cali's with patellar grind, positive tenderness palpation lateral patellar facet greater than medial patellar facet. Skin intact throughout  5/5 IP Q H TA G EHL  SILT DP SP LP MP S S  +2 DP pulse      Diagnostic imaging:  MY READ:  4 view right knee 7/11/2022: Negative fracture. Positive medial patellofemoral joint space narrowing mild. MRI right knee 9/2/2022:  CONCLUSION:   1. Complex, anterior root/horn lateral meniscus tear, with horizontal cleavage extension into    the body and a small displaced flap in the meniscofemoral recess. This finding is new compared    to the prior study. 2. Grade 1 MCL sprain. 3. Large joint effusion. MY READ: ACL PCL LCL MCL intact. Positive horizontal cleavage tear posterior medial horn medial meniscus, positive horizontal cleavage tear posterior horn lateral meniscus with extension towards the mid body small flipped segment in the posterior lateral recess. Complex appearing maceration of the anterior horn lateral meniscus. Positive chondromalacia patella greater than medial compartment greater than lateral compartment    Pertinent lab work: None     Diagnosis Orders   1. Chondromalacia of patellofemoral joint, right        2.  Contusion of right knee, initial encounter            Assessment and plan: 78 y.o. female with current subjective symptoms and physical exam findings with diagnostic imaging correlating to right knee patellar chondromalacia in the setting of asymptomatic medial/lateral meniscal tears is appreciated on diagnostic imaging.  -Time of 23 minutes was spent coordinating and discussing the clinical findings, reviewing diagnostic imaging as indicated, coordinating care with prior notes review and current clinical encounter documentation as it pertains to the patient's presenting subjective symptoms and diagnoses. -I reviewed with the patient the imaging findings as well as clinical exam and  how it correlates to subjective symptoms.  -Additional time was taken today to review previous notes by Dr. Washington Orozco of treatment plans rendered as well as previous diagnostic imaging. I reviewed these images with her directly today. I had a pleasant discussion with her and had reviewed with her that currently her subjective symptoms do not appear to have mechanical twisting knee pain. Her pain upon discussion today very similar to previous documented findings on previous clinical notes, she has anterior lateral related knee pain. This appears to be likely an acute on chronic exacerbation of patellar chondromalacia which is appreciated not only on advanced imaging but also subjectively with going up and down stairs and prolonged sit to stand. This also goes hand-in-hand with having a direct contusion to the anterior aspect of the knee. I do not see any significant bone edema on MRI but she does have associated chondromalacia appreciated. -I did review with her again that her examination does not show significant mechanical twisting knee pain nor did she subjectively states such. While a diagnostic arthroscopy with partial meniscectomy and chondroplasty could be performed, without subjective symptoms of mechanical twisting knee pain, likelihood for long-term symptomatic improvement from patellar chondromalacia with chondroplasty given without gross fissuring or flaps appreciated displaced would not be of significant benefit. -I did review with her the risk and benefits associated with nonoperative versus operative measures to include the above listed surgeries and currently she wishes to not pursue surgery.   This is not unreasonable.  -As a pertains to the meniscal pathology that is seen on MRI as complex tearing of both medial and lateral posterior horns to include far posterior lateral fragment from meniscal injury, should she develop mechanical twisting knee pain as to how she was reviewed today, she will contact my office for consideration of right knee arthroscopic partial meniscectomy and chondroplasty.  -She may continue low impact activity at this time to include elliptical stationary bike swimming and walking. She does feel better today compared to where she was at the time of injury and even prior to the corticosteroid injection so that is been successful thus far. -She is unable to toric oral anti-inflammatories so topical Voltaren gel may be continued as needed OTC per bottle as needed pain as well as OTC Tylenol per bottle as needed pain  -With regard to chondromalacia related dull throbbing aching pain symptoms, could consider viscosupplementation injection therapy. This is in accordance with Dr. Nataliya Danielle note upon review and patient may follow-up with either him or myself for continuation of care to include conservative versus surgical.  -She is going to formal physical therapy. She may continue this but ultimately transition to a home exercise program as she sees fit. A physician directed physical therapy program was also provided to her today for general use at home  -All questions answered to the patient's satisfaction and the patient expressed understanding and agreement with the above listed treatment plan  -Follow up in as needed  -Thank you for the clinical consultation and allowing me to participate in the patient's care. Electronically signed by Osvaldo Colon MD on 9/8/22 at 2:33 PM JENN Colon MD       Orthopaedic Surgery-Sports Medicine        Disclaimer: This note was dictated with voice recognition software. Though review and correction are routinely performed, please contact the office/medical records for any errors requiring correction.

## 2022-09-09 ENCOUNTER — HOSPITAL ENCOUNTER (OUTPATIENT)
Dept: PHYSICAL THERAPY | Age: 67
Setting detail: THERAPIES SERIES
Discharge: HOME | End: 2022-09-09
Payer: MEDICARE

## 2022-09-09 ENCOUNTER — APPOINTMENT (OUTPATIENT)
Dept: PHYSICAL THERAPY | Age: 67
End: 2022-09-09
Payer: MEDICARE

## 2022-09-09 PROCEDURE — 97112 NEUROMUSCULAR REEDUCATION: CPT | Performed by: PHYSICAL THERAPIST

## 2022-09-09 PROCEDURE — 97110 THERAPEUTIC EXERCISES: CPT | Performed by: PHYSICAL THERAPIST

## 2022-09-09 PROCEDURE — 97140 MANUAL THERAPY 1/> REGIONS: CPT | Performed by: PHYSICAL THERAPIST

## 2022-09-09 NOTE — FLOWSHEET NOTE
[facility], [address]  Phone: [phone]  Fax [phone]    Physical Therapy Treatment Note/ Progress Report:           Date:  2022    Patient Name:  Ankit Orozco    :  1955  MRN: [id number]  Restrictions/Precautions:    Medical/Treatment Diagnosis Information:  Diagnosis: M25.561 (ICD-10-CM) - Acute pain of right knee, S80.01XA (ICD-10-CM) - Contusion of right knee, initial encounter, M22.41 (ICD-10-CM) - Chondromalacia patellae of right knee  Treatment Diagnosis: M25.561 (ICD-10-CM) - Acute pain of right knee  Insurance/Certification information:  PT Insurance Information: [employer] mutual  Physician Information:   Dr Christ Spivey  Has the plan of care been signed (Y/N):        [x]  Yes  []  No     Date of Patient follow up with Physician: 02 with Lj Kenyon and Dr. Christ Spivey. Is this a Progress Report:     []  Yes  [x]  No        If Yes:  Date Range for reporting period:  Beginnin22  Ending:     Progress report will be due (10 Rx or 30 days whichever is less):       Recertification will be due (POC Duration  / 90 days whichever is less): 8 weeks 10/3/22     Visit # Insurance Allowable Auth Required   In-person 8 [facility] []  Yes []  No    Telehealth   []  Yes []  No    Total          Functional Scale: FOTO 21/100    Date assessed:  22    Functional Scale: FOTO  47/100    Date assessed:22  Therapy Diagnosis/Practice Pattern:E      Number of Comorbidities:  []0     []1-2    [x]3+    Latex Allergy:  [x]NO      []YES  Preferred Language for Healthcare:   [x]English       []other:      Pain level:  eval 4-10/10 Current 1-2/10 upon arrival in knee. SUBJECTIVE: Reports seeing MD for knee. Patient going to try therapy /HEP x 1 month. To consider surgery if no improvement is noted with knee. Reducing to 1x/week for therapy.    OBJECTIVE: Lumbar flnstructed that patient may start walking on level surfaces at home outside short distances. Observation:   Test measurements:  Girth measurements:OBJECTIVE  Test used 8/1/22 8/29/22   Pain Summary 0-10 4-10 0-5/10   Functional questionnaire FOTO 21 47   Functional Testing Ankle DF R/L  +8    Knee flex 133 133   ROM Knee ext 0 0    Ankle ever R/L 4- p, 5 4+,5   Strength Knee ext R/L 4 P, 5 4+, 5    Hip abd R/L 4-, 4, 4    RESTRICTIONS/PRECAUTIONS: lumbar DDD/stenosis    Exercises/Interventions: + TB with Clams. Progressed exercises this visit.     Therapeutic Ex (61324) Sets/sec/Reps Notes/CUES   Ankle/foot AROM PF w/ toes/DF, inv/ev HEP  HEP   Supine nerve glide knee flex/ext w/ DF/PF x10 HEP   LAQ w/ add ball squeeze 2# HEP   Mini SLR + to HEP   Supine LTR Supine bridge w/ add SL hip abd w/ TA  SL clam with heel elevated 2 x 10 Floating clams H5 2x10 rep   Supine fig 4 S  Supine ITB w/ strap  Supine quad S X30\"  X30\"  X30\" HEP  HEP  HEP   Bike for ROM 6'    Incline  H30\" x 3    LP B 80 #/[employer] 60# R LE [id number]    Standing HR    Wall 1/4 squat w/ TA with SB with ADD SB bridges  Standing Hip abd - [facility] [place]  45# 2 x 15 Side-stepping at wall OVL Pt ed: eval findings, POC, HEP, posture, heat vs ice, time spent communicating back and forth with Afia Rodriguez ATC working with Dr Brandon Fraire re: meds, back vs peroneal nerve symptoms    Manual Intervention (01107)     STM to peroneals/ant tib, pat mobs Manual GS/ HS stretch  X 3 min     IASTM HG to distal ITB 10'                   NMR re-education (91517)  CUES NEEDED        Hodges taping for medial glides of patella X         Tandem stance on Airex    SLS R LE 3 x 15\"          Gliders post/PL /lateral  h R/L- small range   LSD    CC retro walking 15# with SLS    CC side stepping 15# with SLS    Therapeutic Activity (99401)                                     Therapeutic Exercise and NMR EXR  [x] (11388) Provided verbal/tactile cueing for activities related to strengthening, flexibility, endurance, ROM for improvements in LE, proximal hip, and core control with self care, mobility, lifting, ambulation.  [] (87633) Provided verbal/tactile cueing for activities related to improving balance, coordination, kinesthetic sense, posture, motor skill, proprioception  to assist with LE, proximal hip, and core control in self care, mobility, lifting, ambulation and eccentric single leg control. NMR and Therapeutic Activities:    [x] (30271 or 66451) Provided verbal/tactile cueing for activities related to improving balance, coordination, kinesthetic sense, posture, motor skill, proprioception and motor activation to allow for proper function of core, proximal hip and LE with self care and ADLs  [] (70122) Gait Re-education- Provided training and instruction to the patient for proper LE, core and proximal hip recruitment and positioning and eccentric body weight control with ambulation re-education including up and down stairs     Home Exercise Program:    [x] (32707) Reviewed/Progressed HEP activities related to strengthening, flexibility, endurance, ROM of core, proximal hip and LE for functional self-care, mobility, lifting and ambulation/stair navigation   [] (18803)Reviewed/Progressed HEP activities related to improving balance, coordination, kinesthetic sense, posture, motor skill, proprioception of core, proximal hip and LE for self care, mobility, lifting, and ambulation/stair navigation      Manual Treatments:  PROM / STM / Oscillations-Mobs:  G-I, II, III, IV (PA's, Inf., Post.)  [x] (48742) Provided manual therapy to mobilize LE, proximal hip and/or LS spine soft tissue/joints for the purpose of modulating pain, promoting relaxation,  increasing ROM, reducing/eliminating soft tissue swelling/inflammation/restriction, improving soft tissue extensibility and allowing for proper ROM for normal function with self care, mobility, lifting and ambulation. Modalities:  CP x15 min to R knee after treatment.    [] GAME READY (VASO)- for significant edema, swelling, pain control. Charges  Timed Code Treatment Minutes: 39'   Total Treatment Minutes: 54'     Medicare total (add KX at $2000)  Prior episode of care this year     [] EVAL (LOW) 74152   [] EVAL (MOD) 06241  [] EVAL (HIGH) 67295   [] RE-EVAL     [x] LG(03715) x1    [] IONTO  [x] NMR (34171) x 1  [] VASO  [x] Manual (80255) x  1   [] Other:  [] TA x      [] Mech Traction (63375)  [] ES(attended) (57812)      [] ES (un) (14475):       GOALS:  Patient stated goal: decrease pain with daily activity  [] Progressing: [x] Met: [] Not Met: [] Adjusted    Therapist goals for Patient:   Short Term Goals: To be achieved in: 2 weeks  1. Independent in HEP and progression per patient tolerance, in order to prevent re-injury. [] Progressing: [x] Met: [] Not Met: [] Adjusted  2. Patient will have a decrease in pain to facilitate improvement in movement, function, and ADLs as indicated by Functional Deficits. [] Progressing: [x] Met: [] Not Met: [] Adjusted    Long Term Goals: To be achieved in: 8 weeks  1. Disability index score of 52% or more per FOTO to assist with reaching prior level of function. [x] Progressing: [] Met: [] Not Met: [] Adjusted  2. Patient will demonstrate increased AROM to 10 deg DF and lumbar flex/ext to [facility] to allow for proper joint functioning with dressing and ADL's. [x] Progressing: [] Met: [] Not Met: [] Adjusted  3. Patient will demonstrate an increase in Strength to 5/5 R knee and ankle to allow for proper functional mobility with squatting and stairs. [x] Progressing: [] Met: [] Not Met: [] Adjusted  4. Patient will return to walking at least 39' to go grocery shopping without increased symptoms or restriction. [x] Progressing: [] Met: [] Not Met: [] Adjusted  5. Pt will be able to resume Pilates post rehab program for ongoing conditioning and healthy lifestyle.     [x] Progressing: [] Met: [] Not Met: [] Adjusted     Progression Towards Functional goals:  [x] Patient is progressing as expected towards functional goals listed. [] Progression is slowed due to complexities listed. [] Progression has been slowed due to co-morbidities. [] Plan just implemented, too soon to assess goals progression  [] Other:         Overall Progression Towards Functional goals/ Treatment Progress Update:  [x] Patient is progressing as expected towards functional goals listed. [] Progression is slowed due to complexities/Impairments listed. [] Progression has been slowed due to co-morbidities. [] Plan just implemented, too soon to assess goals progression <30days   [] Goals require adjustment due to lack of progress  [] Patient is not progressing as expected and requires additional follow up with physician  [] Other    Prognosis for POC: [x] Good [] Fair  [] Poor      Patient requires continued skilled intervention: [x] Yes  [] No    Treatment/Activity Tolerance:  [x] Patient able to complete treatment  [] Patient limited by fatigue  [] Patient limited by pain    [] Patient limited by other medical complications  [] Other:     ASSESSMENT:  Patient has made progress with decreased pain, increased functional mobility and increased strength since initial visit. Recommend Cont PT to address above deficits. Pt requires skilled intervention to restore ROM, strength, functional endurance and balance in order to perform ADLs without significant symptoms or limitations. Return to Play: (if applicable)   []  Stage 1: Intro to Strength   []  Stage 2: Return to Run and Strength   []  Stage 3: Return to Jump and Strength   []  Stage 4: Dynamic Strength and Agility   []  Stage 5: Sport Specific Training     []  Ready to Return to Play, Meets All Above Stages   []  Not Ready for Return to Sports   Comments:                               PLAN:  Progress CKC as tolerated with emphasis on hip abductors.    [x] Continue per plan of care [] Alter current plan (see comments above)  [] Plan of care initiated [] Hold pending MD visit [] Discharge      Electronically signed by:  Flo Collins, [address]    Note: If patient does not return for scheduled/ recommended follow up visits, this note will serve as a discharge from care along with most recent update on progress. Access Code: [email or web address]  URL: [email or web address]  Date: 08/12/2022  Prepared by: Flo Collins    Exercises  Supine Ankle Pumps - 1-2 x daily - 7 x weekly - 1-3 sets - 10 reps  Supine Ankle Inversion Eversion AROM - 1-2 x daily - 7 x weekly - 1-3 sets - 10 reps  Supine 90/90 Sciatic Nerve Glide with Knee Flexion/Extension - 1 x daily - 7 x weekly - 3 sets - 10 reps  Seated Long Arc Quad with Hip Adduction - 1 x daily - 7 x weekly - 1-3 sets - 10 reps  Supine ITB Stretch with Strap - 1 x daily - 7 x weekly - 3 sets - 20-30 seconds hold  Supine Figure 4 Piriformis Stretch (Mirrored) - 1 x daily - 7 x weekly - 3 sets - 30 seconds hold  Supine Quadriceps Stretch with Strap on Table - 1 x daily - 7 x weekly - 3 sets - 30 seconds hold  Supine Lower Trunk Rotation - 1 x daily - 7 x weekly - 1-2 sets - 10 reps  Supine Bridge with Mini Swiss Ball Between Knees - 1 x daily - 7 x weekly - 2-3 sets - 10 reps  Sidelying Hip Abduction - 1 x daily - 3 x weekly - 2-3 sets - 10 reps  Clamshell - 1 x daily - 3 x weekly - 2-3 sets - 10 reps  Wall Quarter Squat - 1 x daily - 3 x weekly - 2 sets - 10 reps  Small Range Straight Leg Raise - 1 x daily - 7 x weekly - 2 sets - 10 reps - 5 hold  Standing Hip Abduction with Resistance at Ankles and Counter Support - 1 x daily - 7 x weekly - 2-3 sets - 10 reps - 5 hold  Side Stepping with Counter Support - 1 x daily - 7 x weekly - 3 sets - 10 reps - 2 hold  Heel Raises with Counter Support - 1 x daily - 7 x weekly - 2-3 sets - 10 reps - 2 hold  Single Leg Stance with Support - 1 x daily - 7 x weekly - 1 sets - 5 reps - 15 hold      MEDICARE CAP EXCEPTION DOCUMENTATION      I certify that this patient meets one of the below criteria necessary for becoming an exception to the Medicare cap on therapy services:    []  The patient has a condition identified by an ICD-10 code that has a direct and significant impact on the need for therapy. (Significantly impacts the rate of recovery.)                   []  The patient has a complexity identified by an ICD-10 code that has a direct and significant impact on the need for therapy. (Significantly impacts the rate of recovery and is associated with a primary condition.)         []  The patient has associated variables that influence the amount of treatment to include:  Social support, self-efficacy/motivation, prognosis, time since onset/acuity. [x]  The patient has generalized musculoskeletal conditions or a condition affecting multiple sites that will have a direct impact on the rate of recovery. [x]  The patient had a prior episode of outpatient therapy during this calendar year for a different condition. []  The patient has a mental or cognitive disorder in addition to the condition being treated that will have a direct and significant impact on the rate of recovery.

## 2022-09-12 ENCOUNTER — HOSPITAL ENCOUNTER (OUTPATIENT)
Dept: PHYSICAL THERAPY | Age: 67
Setting detail: THERAPIES SERIES
Discharge: HOME | End: 2022-09-12
Payer: MEDICARE

## 2022-09-19 ENCOUNTER — HOSPITAL ENCOUNTER (OUTPATIENT)
Dept: PHYSICAL THERAPY | Age: 67
Setting detail: THERAPIES SERIES
Discharge: HOME | End: 2022-09-19
Payer: MEDICARE

## 2022-09-19 PROCEDURE — 97110 THERAPEUTIC EXERCISES: CPT | Performed by: PHYSICAL THERAPIST

## 2022-09-19 PROCEDURE — 97140 MANUAL THERAPY 1/> REGIONS: CPT | Performed by: PHYSICAL THERAPIST

## 2022-09-19 PROCEDURE — 97112 NEUROMUSCULAR REEDUCATION: CPT | Performed by: PHYSICAL THERAPIST

## 2022-09-19 NOTE — FLOWSHEET NOTE
[facility], [address]  Phone: [phone]  Fax [phone]    Physical Therapy Treatment Note/ Progress Report:           Date:  2022    Patient Name:  Sherif Jarrell    :  1955  MRN: [id number]  Restrictions/Precautions:    Medical/Treatment Diagnosis Information:  Diagnosis: M51.36 (ICD-10-CM) - DDD (degenerative disc disease), lumbar  M54.16 (ICD-10-CM) - Right lumbar radiculopathy  M48.062 (ICD-10-CM) - Lumbar stenosis with neurogenic claudication  Treatment Diagnosis: M51.36 (ICD-10-CM) - DDD (degenerative disc disease), lumbar  M54.16 (ICD-10-CM) - Right lumbar radiculopathy  M48.062 (ICD-10-CM) - Lumbar stenosis with neurogenic claudication. low back pain F07.83  Insurance/Certification information:  PT Insurance Information: Medicare  Physician Information:   Marjorie Schmidt PA-C  Has the plan of care been signed (Y/N):        []  Yes  [x]  No     Date of Patient follow up with Physician: 22      Is this a Progress Report:     []  Yes  [x]  No        If Yes:  Date Range for reporting period:  Beginnin22  Ending:     Progress report will be due (10 Rx or 30 days whichever is less):        Recertification will be due (POC Duration  / 90 days whichever is less): 12 weeks from 22        Visit # Insurance Allowable Auth Required   In-person 3 [facility] []  Yes []  No    Telehealth   []  Yes []  No    Total        MEDICARE CAP EXCEPTION DOCUMENTATION      I certify that this patient meets one of the below criteria necessary for becoming an exception to the Medicare cap on therapy services:    [x]  The patient has a condition identified by an ICD-10 code that has a direct and significant impact on the need for therapy.  (Significantly impacts the rate of recovery.)                   []  The patient has a complexity identified by an ICD-10 code that has a direct and significant impact on the need for therapy. (Significantly impacts the rate of recovery and is associated with a primary condition.)         []  The patient has associated variables that influence the amount of treatment to include:  Social support, self-efficacy/motivation, prognosis, time since onset/acuity. []  The patient has generalized musculoskeletal conditions or a condition affecting multiple sites that will have a direct impact on the rate of recovery. [x]  The patient had a prior episode of outpatient therapy during this calendar year for a different condition. []  The patient has a mental or cognitive disorder in addition to the condition being treated that will have a direct and significant impact on the rate of recovery. Functional Scale: FOTO: 51/100    Date assessed:  8/31/22      Therapy Diagnosis/Practice Pattern:4F. Impaired joint mobility, motor function, muscle performance,  and ROM secondary to spinal disorders. Number of Comorbidities:  []0     [x]1-2    []3+    Latex Allergy:  [x]NO      []YES  Preferred Language for Healthcare:   [x]English       []other:      Pain level:  1-2/10    SUBJECTIVE:  Reports no issues in general since last visit for either back or knee. Will go to [facility] this week to work out on the machines. Still has pilate's sessions left from package. OBJECTIVE: See eval. Patient to schedule pilate's visits in upcoming weeks when ready. Observation:   Test measurements:  Alignment good.  - supine to sit, - slump/SLR    RESTRICTIONS/PRECAUTIONS:  lumbar DDD/stenosis    Exercises/Interventions:     Therapeutic Ex (17274) Sets/sec Reps Notes/CUES         Cat/camel /child's pose + to HEP   Thread the needle + to HEP   Open book 1' 1-2 reps each + to HEP   Bike  5'     DKTC  H10 5          Piriformis stretches H30 5          SB bridges H5 2x10 reps    SB bird/Dog H5 X10 reps V.C for proper tech         Manual Intervention (85365)      Lumbar-sacral mobs/prone quad stretches 10'                                   NMR re-education (16558)   CUES NEEDED         Patient education /HEP insruction            TrA with stabilizer h10 10 reps 40-42/44 lb   TrA with adduction with stabilizer H10 10 reps 40- 45lb   TrA up/up down/down h5 10 reps                      Therapeutic Activity (43803)                                            HEP instruction: (see scanned forms)  Access Code: [email or web address]  URL: [email or web address]/  Date: 08/31/2022  Prepared by: Tate Console     Exercises  Cat-Camel - 2 x daily - 7 x weekly - 1-2 sets - 10 reps - 10 hold  Cat-Camel to Child's Pose - 2 x daily - 7 x weekly - 1-2 sets - 10 reps - 10 hold  Child's Pose with Thread the Needle - 1 x daily - 7 x weekly - 3 sets - 10 reps - 10 hold  Sidelying Open Book Thoracic Lumbar Rotation and Extension - 1 x daily - 7 x weekly - 3 sets - 10 reps - 10 hold  Therapeutic Exercise and NMR EXR  [x] (77772) Provided verbal/tactile cueing for activities related to strengthening, flexibility, endurance, ROM  for improvements in proximal hip and core control with self care, mobility, lifting and ambulation.  [] (87877) Provided verbal/tactile cueing for activities related to improving balance, coordination, kinesthetic sense, posture, motor skill, proprioception  to assist with core control in self care, mobility, lifting, and ambulation.      Therapeutic Activities:    [] (46761 or 25771) Provided verbal/tactile cueing for activities related to improving balance, coordination, kinesthetic sense, posture, motor skill, proprioception and motor activation to allow for proper function  with self care and ADLs  [] (60472) Provided training and instruction to the patient for proper core and proximal hip recruitment and positioning with ambulation re-education     Home Exercise Program:    [x] (75735) Reviewed/Progressed HEP activities related to strengthening, flexibility, endurance, ROM of core, proximal hip and LE for functional self-care, mobility, lifting and ambulation   [] (57498) Reviewed/Progressed HEP activities related to improving balance, coordination, kinesthetic sense, posture, motor skill, proprioception of core, proximal hip and LE for self care, mobility, lifting, and ambulation      Manual Treatments:  PROM / STM / Oscillations-Mobs:  G-I, II, III, IV (PA's, Inf., Post.)  [x] (08197) Provided manual therapy to mobilize proximal hip and LS spine soft tissue/joints for the purpose of modulating pain, promoting relaxation,  increasing ROM, reducing/eliminating soft tissue swelling/inflammation/restriction, improving soft tissue extensibility and allowing for proper ROM for normal function with self care, mobility, lifting and ambulation. Modalities:   deferred    Charges  Timed Code Treatment Minutes: 48'   Total Treatment Minutes: 48'     Medicare total (add KX at $2000)  kx           [] EVAL (LOW) 53267   [] EVAL (MOD) 96051   [] EVAL (HIGH) 18169   [] RE-EVAL     [x] MK(70512) x  1   [] IONTO  [x] NMR (25244) x 1    [] VASO  [x] Manual (66072) x  1    [] Other:  [] TA x      [] Mech Traction (97788)  [] ES(attended) (90183)      [] ES (un) (99411):       GOALS:  Patient stated goal: return to walking 45-1 hour without pain  [] Progressing: [] Met: [] Not Met: [] Adjusted     Therapist goals for Patient:   Short Term Goals: To be achieved in: 2 weeks  1. Independent in HEP and progression per patient tolerance, in order to prevent re-injury. [] Progressing: [] Met: [] Not Met: [] Adjusted  2. Patient will have a decrease in pain to facilitate improvement in movement, function, and ADLs as indicated by Functional Deficits. [] Progressing: [] Met: [] Not Met: [] Adjusted        Long Term Goals: To be achieved in: 12 weeks  1. Disability index score of 62% or more per FOTO to assist with reaching prior level of function. [] Progressing: [] Met: [] Not Met: [] Adjusted  2.  Patient will demonstrate increased AROM to WNL, good LS mobility, good hip ROM to allow for proper joint functioning as indicated by patients Functional Deficits. [] Progressing: [] Met: [] Not Met: [] Adjusted  3. Patient will demonstrate an increase in Strength to good proximal hip and core activation to allow for proper functional mobility as indicated by patients Functional Deficits. [] Progressing: [] Met: [] Not Met: [] Adjusted  4. Patient will return to walking 45 min functional activities without increased symptoms or restriction. [] Progressing: [] Met: [] Not Met: [] Adjusted  5. Patient to be able to perform stairs without pain (patient specific functional goal)    [] Progressing: [] Met: [] Not Met: [] Adjusted       Progression Towards Functional goals:  [] Patient is progressing as expected towards functional goals listed. [] Progression is slowed due to complexities listed. [] Progression has been slowed due to co-morbidities. [x] Plan just implemented, too soon to assess goals progression  [] Other:     Overall Progression Towards Functional goals/ Treatment Progress Update:  [] Patient is progressing as expected towards functional goals listed. [] Progression is slowed due to complexities/Impairments listed. [] Progression has been slowed due to co-morbidities. [x] Plan just implemented, too soon to assess goals progression <30days   [] Goals require adjustment due to lack of progress  [] Patient is not progressing as expected and requires additional follow up with physician  [] Other    Prognosis for POC: [x] Good [] Fair  [] Poor      Patient requires continued skilled intervention: [x] Yes  [] No    Treatment/Activity Tolerance:  [x] Patient able to complete treatment  [] Patient limited by fatigue  [] Patient limited by pain    [] Patient limited by other medical complications  [] Other:     ASSESSMENT: Patient with significant verbal cues to perform TrA correctly.  Weakness noted with exercise and easily fatigued in general. Pt requires skilled intervention to restore ROM, strength, functional endurance and balance in order to perform ADLs without significant symptoms or limitations. Return to Play: (if applicable)   []  Stage 1: Intro to Strength   []  Stage 2: Return to Run and Strength   []  Stage 3: Return to Jump and Strength   []  Stage 4: Dynamic Strength and Agility   []  Stage 5: Sport Specific Training     []  Ready to Return to Play, Meets All Above Stages   []  Not Ready for Return to Sports   Comments:                               PLAN: F/U in 2 weeks for re-assessment of both knee and lumbar spine. Anticipate D/C N.V.  [x] Continue per plan of care [] Alter current plan (see comments above)  [] Plan of care initiated [] Hold pending MD visit [] Discharge      Electronically signed by:  Agnieszka See, [address] [place] [address]    Note: If patient does not return for scheduled/ recommended follow up visits, this note will serve as a discharge from care along with most recent update on progress.

## 2022-09-22 ENCOUNTER — OFFICE VISIT (OUTPATIENT)
Dept: OBGYN CLINIC | Age: 67
End: 2022-09-22
Payer: MEDICARE

## 2022-09-22 VITALS
BODY MASS INDEX: 27.02 KG/M2 | WEIGHT: 157.4 LBS | SYSTOLIC BLOOD PRESSURE: 138 MMHG | HEART RATE: 90 BPM | TEMPERATURE: 98.2 F | DIASTOLIC BLOOD PRESSURE: 82 MMHG

## 2022-09-22 DIAGNOSIS — Z01.419 ENCOUNTER FOR ANNUAL ROUTINE GYNECOLOGICAL EXAMINATION: Primary | ICD-10-CM

## 2022-09-22 DIAGNOSIS — M81.8 OTHER OSTEOPOROSIS WITHOUT CURRENT PATHOLOGICAL FRACTURE: ICD-10-CM

## 2022-09-22 PROCEDURE — G8427 DOCREV CUR MEDS BY ELIG CLIN: HCPCS | Performed by: OBSTETRICS & GYNECOLOGY

## 2022-09-22 PROCEDURE — G0101 CA SCREEN;PELVIC/BREAST EXAM: HCPCS | Performed by: OBSTETRICS & GYNECOLOGY

## 2022-09-22 PROCEDURE — G8417 CALC BMI ABV UP PARAM F/U: HCPCS | Performed by: OBSTETRICS & GYNECOLOGY

## 2022-09-22 ASSESSMENT — ENCOUNTER SYMPTOMS
NAUSEA: 0
CONSTIPATION: 0
ABDOMINAL PAIN: 0
VOMITING: 0
SHORTNESS OF BREATH: 0
DIARRHEA: 0

## 2022-09-22 NOTE — PROGRESS NOTES
Annual Exam      CC:   Chief Complaint   Patient presents with    Annual Exam       HPI:  78 y.o. Gamaliel Barrera presents for her gynecologic annual exam. Doing well today, no complaints. No gyn issues today. Did hit her right breast after passing out last month, did notice a lump there after it happened that she feels is improving but still there. Otherwise no issues, first grandson was born in April. Patient seen and examined. Screening:  Last pap smear: 2021 NILM/HPV neg  Mammogram: 2022 birads 1  Colonoscopy: 10/2019  DEXA scan: 2020    Review of Systems:   Review of Systems   Constitutional:  Negative for chills and fever. Respiratory:  Negative for shortness of breath. Cardiovascular:  Negative for chest pain. Gastrointestinal:  Negative for abdominal pain, constipation, diarrhea, nausea and vomiting. Genitourinary:  Negative for difficulty urinating, dysuria, menstrual problem, vaginal bleeding and vaginal discharge.      Primary Care Physician: Rhett Moran MD    Obstetric History  OB History    Para Term  AB Living   1 1 0 1   1   SAB IAB Ectopic Molar Multiple Live Births             1      # Outcome Date GA Lbr Salvador/2nd Weight Sex Delivery Anes PTL Lv   1  85 36w0d  6 lb 12 oz (3.062 kg) F Vag-Forceps EPI Y RON       Gynecologic History  Menstrual History:  LMP: s/p menopause   Menstrual pattern: s/p menopause, no PMB  Sexual History:  Contraception: n/a  Currently is not sexually active  Denies history of STIs  No sexual problems  Pap History:  Last pap smear: 2021 NILM/HPV neg  History of abnormal pap smears: denies    Medical History:  Past Medical History:   Diagnosis Date    Actinic keratosis     Breast disorder     CAD (coronary artery disease)     Chronic kidney disease     Hyperlipidemia     Hypertension     Infertility, female     S/P colonoscopy 2009    Tendinitis of foot     UTI (urinary tract infection)        Surgical History:  Past Surgical History:   Procedure Laterality Date    ABDOMEN SURGERY      stone removed from bile duct    ANGIOPLASTY      renal    BREAST BIOPSY      BREAST SURGERY      duct removal    BUNIONECTOMY Left 12/15/2020    MODIFIED YAZMIN BUNIONECTOMY, MODIFIED AKIN OSTEOTOMY, MODIFIED WEIL SECOND METATARSAL, PROXIMAL INTERPHALANGEAL JOINT ARTHRODESIS DIGITS TWO THREE FOUR LEFT FOOT performed by Adis Vegas DPM at [address] [place] [address]  12/1992    COLONOSCOPY  8/1/2009    COLONOSCOPY N/A 10/23/2019    COLONOSCOPY POLYPECTOMY SNARE/COLD BIOPSY performed by Naina Mcfarland MD at [address] [place] [address] 7/29/2019    MIDLINE LUMBAR FIVE SACRAL ONE EPIDURAL STEROID INJECTION SITE CONFIRMED BY FLUOROSCOPY performed by Niurka Sesay MD at [place] [address] Left 01/11/2016    Cystoscopy, with left stent placement    TONSILLECTOMY AND ADENOIDECTOMY      UPPER GASTROINTESTINAL ENDOSCOPY N/A 4/7/2021    EGD BIOPSY performed by Naina Mcfarland MD at [facility] ENDOSCOPY       Medications:  Current Outpatient Medications   Medication Sig Dispense Refill    atorvastatin (LIPITOR) 40 MG tablet TAKE ONE TABLET BY MOUTH DAILY 90 tablet 3    denosumab (PROLIA) 60 MG/ML SOSY SC injection Inject 60 mg into the skin once      pantoprazole (PROTONIX) 40 MG tablet       Potassium Citrate ER 15 MEQ (1620 MG) TBCR 2 times daily       calcitRIOL (ROCALTROL) 0.25 MCG capsule Take 0.25 mcg by mouth every other day      aspirin 81 MG tablet Take 81 mg by mouth daily      Melatonin 5 MG CHEW Take by mouth nightly      gabapentin (NEURONTIN) 300 MG capsule Take one tablet once daily for 2 days. Then take one tablet twice daily for 2 days.  Then take one tablet three times daily for rest (Patient not taking: Reported on 9/22/2022) 90 capsule 1    diclofenac sodium (VOLTAREN) 1 % GEL Apply 4 g topically 4 times daily as needed for Pain (Patient not taking: Reported on 2022) 100 g 3     No current facility-administered medications for this visit. Allergies: Allergies   Allergen Reactions    Codeine Rash, Nausea And Vomiting and Shortness Of Breath    Lisinopril      cough    Norvasc [Amlodipine Besylate]      swelling    Triamterene-Hctz      Renal insufficiency       Social History:  Social History     Socioeconomic History    Marital status:    Tobacco Use    Smoking status: Never    Smokeless tobacco: Never   Vaping Use    Vaping Use: Never used   Substance and Sexual Activity    Alcohol use: No    Drug use: No    Sexual activity: Not Currently     Social Determinants of Health     Physical Activity: Sufficiently Active    Days of Exercise per Week: 6 days    Minutes of Exercise per Session: 40 min       Family History:  Family History   Problem Relation Age of Onset    Heart Disease Father         MI  at  age 48    High Blood Pressure Sister     Diabetes Sister     Kidney Disease Sister         transplant 2019    Breast Cancer Sister     High Blood Pressure Mother     High Cholesterol Mother     Arthritis Mother         gout    Heart Disease Mother     Cancer Mother     Heart Disease Brother     High Cholesterol Brother     High Blood Pressure Brother      See above, otherwise denies personal/family history of cervical, uterine, ovarian, vulvar, breast, or coloncancers. Objective:  /82 (Site: Left Upper Arm, Position: Sitting, Cuff Size: Medium Adult)   Pulse 90   Temp 98.2 °F (36.8 °C) (Infrared)   Wt 157 lb 6.4 oz (71.4 kg)   LMP 2005   BMI 27.02 kg/m²     Exam:   Physical Exam  Exam conducted with a chaperone present. Constitutional:       Appearance: She is well-developed. HENT:      Head: Normocephalic and atraumatic. Cardiovascular:      Rate and Rhythm: Normal rate and regular rhythm. Pulmonary:      Effort: Pulmonary effort is normal. No respiratory distress.    Chest:   Breasts:     Right: Normal. No mass, nipple discharge or skin change. Left: Normal. No mass, nipple discharge or skin change. Abdominal:      General: There is no distension. Palpations: Abdomen is soft. Tenderness: There is no abdominal tenderness. There is no guarding or rebound. Genitourinary:     Comments: Pelvic exam: URETHRA: normal meatus, VULVA: normal appearing vulva with no masses, tenderness or lesions, VAGINA: normal appearing vagina with normal color and discharge, no lesions, CERVIX: normal appearing cervix without discharge or lesions, UTERUS: uterus is normal size, shape, consistency and nontender, ADNEXA: normal adnexa in size, nontender and no masses  Musculoskeletal:      Cervical back: Normal range of motion. Neurological:      Mental Status: She is alert and oriented to person, place, and time. Assessment/Plan:  78 y.o.  presenting for her annual exam:    1. Encounter for annual routine gynecological examination  Discussed age appropriate screening recommendations, pap smear no longer indicated (last normal 2021) and mammogram/colonoscopy up-tod-ate. Discussed breast self awareness, STI screening deferred. 2. Other osteoporosis without current pathological fracture  Patient with osteopenia on last DEXA scan in , repeat ordered today.   - DEXA BONE DENSITY AXIAL SKELETON; Future    Dispo: PRN or 2 years for annual exam  Tahir Lopez MD

## 2022-10-03 ENCOUNTER — HOSPITAL ENCOUNTER (OUTPATIENT)
Dept: PHYSICAL THERAPY | Age: 67
Setting detail: THERAPIES SERIES
Discharge: HOME | End: 2022-10-03
Payer: MEDICARE

## 2022-10-03 PROCEDURE — 97110 THERAPEUTIC EXERCISES: CPT | Performed by: PHYSICAL THERAPIST

## 2022-10-03 PROCEDURE — 97112 NEUROMUSCULAR REEDUCATION: CPT | Performed by: PHYSICAL THERAPIST

## 2022-10-03 NOTE — FLOWSHEET NOTE
[facility], [address] [place] [address]  Phone: [phone]  Fax [phone]    Physical Therapy Treatment Note/ Progress Report:           Date:  10/3/2022    Patient Name:  Lisa Killian    :  1955  MRN: [id number]  Restrictions/Precautions:    Medical/Treatment Diagnosis Information:  Diagnosis: M25.561 (ICD-10-CM) - Acute pain of right knee, S80.01XA (ICD-10-CM) - Contusion of right knee, initial encounter, M22.41 (ICD-10-CM) - Chondromalacia patellae of right knee  Treatment Diagnosis: M25.561 (ICD-10-CM) - Acute pain of right knee  Insurance/Certification information:  PT Insurance Information: /Med mutual  Physician Information:   Dr Du Mcgrath  Has the plan of care been signed (Y/N):        [x]  Yes  []  No     Date of Patient follow up with Physician: 02 with Padmaja Smalls and Dr. Du Mcgrath. Is this a Progress Report:     [x]  Yes  []  No        If Yes:  Date Range for reporting period:  Beginnin22  Ending: 10/3/22    Progress report will be due (10 Rx or 30 days whichever is less):       Recertification will be due (POC Duration  / 90 days whichever is less): 8 weeks 10/3/22     Visit # Insurance Allowable Auth Required   In-person [address] Tomi Orozco []  Yes []  No    Telehealth   []  Yes []  No    Total          Functional Scale: FOTO 21/100    Date assessed:  22    Functional Scale: FOTO  47/100    Date assessed:22  FOTO: 69/100 10/3/22  Therapy Diagnosis/Practice Pattern:E      Number of Comorbidities:  []0     []1-2    [x]3+    Latex Allergy:  [x]NO      []YES  Preferred Language for Healthcare:   [x]English       []other:      Pain level:   Current 0-1/10 upon arrival in knee. SUBJECTIVE: Reports seeing MD for knee. Patient going to try therapy /HEP x 1 month. To consider surgery if no improvement is noted with knee. Reducing to 1x/week for therapy.    OBJECTIVE: Re-assessed strength /FOTO for knee this visit. Observation:   Test measurements:  Girth measurements:OBJECTIVE  Test used 8/1/22 8/29/22 10/3/22   Pain Summary 0-10 4-10 0-5/10 0-1   Functional questionnaire FOTO 21 47 69/100   Functional Testing Ankle DF R/L  +8 +10    Knee flex 133 133 145   ROM Knee ext 0 0 0    Ankle ever R/L 4- p, 5 4+,5 5/5   Strength Knee ext R/L 4 P, 5 4+, 5 5/5    Hip abd R/L 4-, 4, 4 4/4    RESTRICTIONS/PRECAUTIONS: lumbar DDD/stenosis    Exercises/Interventions: + TB with Clams. Progressed exercises this visit.     Therapeutic Ex (19933) Sets/sec/Reps Notes/CUES   Ankle/foot AROM PF w/ toes/DF, inv/ev HEP  HEP   Supine nerve glide knee flex/ext w/ DF/PF x10 HEP   LAQ w/ add ball squeeze 2# HEP   Mini SLR + to HEP   Supine LTR Supine bridge w/ add SL hip abd w/ TA  SL clam with heel elevated 2 x 10 Floating clams H5 2x10 rep   Supine fig 4 S  Supine ITB w/ strap  Supine quad S X30\"  X30\"  X30\" HEP  HEP  HEP   Bike for ROM 6'    Incline  H30\" x 3    LP B 80 #/[employer] 60# R LE [id number]    Standing HR    Wall 1/4 squat w/ TA with SB with ADD SB bridges  Standing Hip abd - [facility] [place]  45# 2 x 15 Side-stepping at wall OVL Pt ed: eval findings, POC, HEP, posture, heat vs ice, time spent communicating back and forth with Chandrika Patience, ATC working with Dr Arnulfo Christopher re: meds, back vs peroneal nerve symptoms    Manual Intervention (45902)     STM to peroneals/ant tib, pat mobs Manual GS/ HS stretch  X 3 min     IASTM HG to distal ITB 10'                   NMR re-education (24185)  CUES NEEDED        Tracie taping for medial glides of patella X         Tandem stance on Airex    SLS R LE 3 x 15\"          Gliders post/PL /lateral  h R/L- small range   LSD    CC retro walking 15# with SLS    CC side stepping 15# with SLS    Therapeutic Activity (12952)                                     Therapeutic Exercise and NMR EXR  [x] (87456) Provided verbal/tactile cueing for activities related to strengthening, flexibility, endurance, ROM for improvements in LE, proximal hip, and core control with self care, mobility, lifting, ambulation.  [] (48623) Provided verbal/tactile cueing for activities related to improving balance, coordination, kinesthetic sense, posture, motor skill, proprioception  to assist with LE, proximal hip, and core control in self care, mobility, lifting, ambulation and eccentric single leg control. NMR and Therapeutic Activities:    [x] (37330 or 52528) Provided verbal/tactile cueing for activities related to improving balance, coordination, kinesthetic sense, posture, motor skill, proprioception and motor activation to allow for proper function of core, proximal hip and LE with self care and ADLs  [] (09018) Gait Re-education- Provided training and instruction to the patient for proper LE, core and proximal hip recruitment and positioning and eccentric body weight control with ambulation re-education including up and down stairs     Home Exercise Program:    [x] (65674) Reviewed/Progressed HEP activities related to strengthening, flexibility, endurance, ROM of core, proximal hip and LE for functional self-care, mobility, lifting and ambulation/stair navigation   [] (20493)Reviewed/Progressed HEP activities related to improving balance, coordination, kinesthetic sense, posture, motor skill, proprioception of core, proximal hip and LE for self care, mobility, lifting, and ambulation/stair navigation      Manual Treatments:  PROM / STM / Oscillations-Mobs:  G-I, II, III, IV (PA's, Inf., Post.)  [x] (39740) Provided manual therapy to mobilize LE, proximal hip and/or LS spine soft tissue/joints for the purpose of modulating pain, promoting relaxation,  increasing ROM, reducing/eliminating soft tissue swelling/inflammation/restriction, improving soft tissue extensibility and allowing for proper ROM for normal function with self care, mobility, lifting and ambulation. Modalities:  CP x15 min to R knee after treatment.    [] GAME READY (VASO)- for significant edema, swelling, pain control. Charges  Timed Code Treatment Minutes: 27' for back and knee combined   Total Treatment Minutes: 36'      Medicare total (add KX at $2000)  Prior episode of care this year     [] EVAL (LOW) 32747   [] EVAL (MOD) 76151  [] EVAL (HIGH) 83981   [] RE-EVAL     [x] BQ(41914) x1    [] IONTO  [x] NMR (89524) x 1  [] VASO  [] Manual (06611) x    [] Other:  [] TA x      [] Mech Traction (73559)  [] ES(attended) (25010)      [] ES (un) (79586):       GOALS:  Patient stated goal: decrease pain with daily activity  [] Progressing: [x] Met: [] Not Met: [] Adjusted    Therapist goals for Patient:   Short Term Goals: To be achieved in: 2 weeks  1. Independent in HEP and progression per patient tolerance, in order to prevent re-injury. [] Progressing: [x] Met: [] Not Met: [] Adjusted  2. Patient will have a decrease in pain to facilitate improvement in movement, function, and ADLs as indicated by Functional Deficits. [] Progressing: [x] Met: [] Not Met: [] Adjusted    Long Term Goals: To be achieved in: 8 weeks  1. Disability index score of 52% or more per FOTO to assist with reaching prior level of function. [] Progressing: [x] Met: [] Not Met: [] Adjusted  2. Patient will demonstrate increased AROM to 10 deg DF and lumbar flex/ext to [facility] to allow for proper joint functioning with dressing and ADL's.   [] Progressing: [x] Met: [] Not Met: [] Adjusted  3. Patient will demonstrate an increase in Strength to 5/5 R knee and ankle to allow for proper functional mobility with squatting and stairs. [] Progressing: [x] Met: [] Not Met: [] Adjusted  4. Patient will return to walking at least 39' to go grocery shopping without increased symptoms or restriction. [] Progressing: [x] Met: [] Not Met: [] Adjusted  5. Pt will be able to resume Pilates post rehab program for ongoing conditioning and healthy lifestyle.     [] Progressing: [x] Met: [] Not Met: [] Adjusted ( To schedule in a few weeks)    Progression Towards Functional goals:  [x] Patient is progressing as expected towards functional goals listed. [] Progression is slowed due to complexities listed. [] Progression has been slowed due to co-morbidities. [] Plan just implemented, too soon to assess goals progression  [] Other:         Overall Progression Towards Functional goals/ Treatment Progress Update:  [x] Patient is progressing as expected towards functional goals listed. [] Progression is slowed due to complexities/Impairments listed. [] Progression has been slowed due to co-morbidities. [] Plan just implemented, too soon to assess goals progression <30days   [] Goals require adjustment due to lack of progress  [] Patient is not progressing as expected and requires additional follow up with physician  [] Other    Prognosis for POC: [x] Good [] Fair  [] Poor      Patient requires continued skilled intervention: [] Yes  [x] No    Treatment/Activity Tolerance:  [x] Patient able to complete treatment  [] Patient limited by fatigue  [] Patient limited by pain    [] Patient limited by other medical complications  [] Other:     ASSESSMENT:  Patient has achieved all goals. Reviewed ITB /LE stick massage for HEP. Patient to continue Gym workouts/ HEP/ and post-therapy pilates. Return to Play: (if applicable)   []  Stage 1: Intro to Strength   []  Stage 2: Return to Run and Strength   []  Stage 3: Return to Jump and Strength   []  Stage 4: Dynamic Strength and Agility   []  Stage 5: Sport Specific Training     []  Ready to Return to Play, Meets All Above Stages   []  Not Ready for Return to Sports   Comments:                               PLAN:  D/C with HEP and post-pilates.   [] Continue per plan of care [] Alter current plan (see comments above)  [] Plan of care initiated [] Hold pending MD visit [x] Discharge      Electronically signed by:  Munira Mullins, [address]    Note: If patient does not return for scheduled/ recommended follow up visits, this note will serve as a discharge from care along with most recent update on progress. Access Code: [email or web address]  URL: [email or web address]  Date: 08/12/2022  Prepared by: Rey Recinos    Exercises  Supine Ankle Pumps - 1-2 x daily - 7 x weekly - 1-3 sets - 10 reps  Supine Ankle Inversion Eversion AROM - 1-2 x daily - 7 x weekly - 1-3 sets - 10 reps  Supine 90/90 Sciatic Nerve Glide with Knee Flexion/Extension - 1 x daily - 7 x weekly - 3 sets - 10 reps  Seated Long Arc Quad with Hip Adduction - 1 x daily - 7 x weekly - 1-3 sets - 10 reps  Supine ITB Stretch with Strap - 1 x daily - 7 x weekly - 3 sets - 20-30 seconds hold  Supine Figure 4 Piriformis Stretch (Mirrored) - 1 x daily - 7 x weekly - 3 sets - 30 seconds hold  Supine Quadriceps Stretch with Strap on Table - 1 x daily - 7 x weekly - 3 sets - 30 seconds hold  Supine Lower Trunk Rotation - 1 x daily - 7 x weekly - 1-2 sets - 10 reps  Supine Bridge with Mini Swiss Ball Between Knees - 1 x daily - 7 x weekly - 2-3 sets - 10 reps  Sidelying Hip Abduction - 1 x daily - 3 x weekly - 2-3 sets - 10 reps  Clamshell - 1 x daily - 3 x weekly - 2-3 sets - 10 reps  Wall Quarter Squat - 1 x daily - 3 x weekly - 2 sets - 10 reps  Small Range Straight Leg Raise - 1 x daily - 7 x weekly - 2 sets - 10 reps - 5 hold  Standing Hip Abduction with Resistance at Ankles and Counter Support - 1 x daily - 7 x weekly - 2-3 sets - 10 reps - 5 hold  Side Stepping with Counter Support - 1 x daily - 7 x weekly - 3 sets - 10 reps - 2 hold  Heel Raises with Counter Support - 1 x daily - 7 x weekly - 2-3 sets - 10 reps - 2 hold  Single Leg Stance with Support - 1 x daily - 7 x weekly - 1 sets - 5 reps - 15 hold      MEDICARE CAP EXCEPTION DOCUMENTATION      I certify that this patient meets one of the below criteria necessary for becoming an exception to the Medicare cap on therapy services:    []  The patient has a condition identified by an ICD-10 code that has a direct and significant impact on the need for therapy. (Significantly impacts the rate of recovery.)                   []  The patient has a complexity identified by an ICD-10 code that has a direct and significant impact on the need for therapy. (Significantly impacts the rate of recovery and is associated with a primary condition.)         []  The patient has associated variables that influence the amount of treatment to include:  Social support, self-efficacy/motivation, prognosis, time since onset/acuity. [x]  The patient has generalized musculoskeletal conditions or a condition affecting multiple sites that will have a direct impact on the rate of recovery. [x]  The patient had a prior episode of outpatient therapy during this calendar year for a different condition. []  The patient has a mental or cognitive disorder in addition to the condition being treated that will have a direct and significant impact on the rate of recovery.

## 2022-10-03 NOTE — FLOWSHEET NOTE
[facility], [address]  Phone: [phone]  Fax [phone]    Physical Therapy Treatment Note/ Progress Report:           Date:  10/3/2022    Patient Name:  Ernesto Cross    :  1955  MRN: [id number]  Restrictions/Precautions:    Medical/Treatment Diagnosis Information:  Diagnosis: M51.36 (ICD-10-CM) - DDD (degenerative disc disease), lumbar  M54.16 (ICD-10-CM) - Right lumbar radiculopathy  M48.062 (ICD-10-CM) - Lumbar stenosis with neurogenic claudication  Treatment Diagnosis: M51.36 (ICD-10-CM) - DDD (degenerative disc disease), lumbar  M54.16 (ICD-10-CM) - Right lumbar radiculopathy  M48.062 (ICD-10-CM) - Lumbar stenosis with neurogenic claudication. low back pain F09.62  Insurance/Certification information:  PT Insurance Information: Medicare  Physician Information:   Francisco Trujillo PA-C  Has the plan of care been signed (Y/N):        []  Yes  [x]  No     Date of Patient follow up with Physician: 22      Is this a Progress Report:     [x]  Yes  []  No        If Yes:  Date Range for reporting period:  Beginnin22  Ending: 10/3/22    Progress report will be due (10 Rx or 30 days whichever is less):        Recertification will be due (POC Duration  / 90 days whichever is less): 12 weeks from 22        Visit # Insurance Allowable Auth Required   In-person 4 MC []  Yes []  No    Telehealth   []  Yes []  No    Total        MEDICARE CAP EXCEPTION DOCUMENTATION      I certify that this patient meets one of the below criteria necessary for becoming an exception to the Medicare cap on therapy services:    [x]  The patient has a condition identified by an ICD-10 code that has a direct and significant impact on the need for therapy.  (Significantly impacts the rate of recovery.)                   []  The patient has a complexity identified by an ICD-10 code that has a direct and significant impact on the need for therapy. (Significantly impacts the rate of recovery and is associated with a primary condition.)         []  The patient has associated variables that influence the amount of treatment to include:  Social support, self-efficacy/motivation, prognosis, time since onset/acuity. []  The patient has generalized musculoskeletal conditions or a condition affecting multiple sites that will have a direct impact on the rate of recovery. [x]  The patient had a prior episode of outpatient therapy during this calendar year for a different condition. []  The patient has a mental or cognitive disorder in addition to the condition being treated that will have a direct and significant impact on the rate of recovery. Functional Scale: FOTO: 51/100, 83/100   Date assessed:  8/31/22, 10/3/22      Therapy Diagnosis/Practice Pattern:4F. Impaired joint mobility, motor function, muscle performance,  and ROM secondary to spinal disorders. Number of Comorbidities:  []0     [x]1-2    []3+    Latex Allergy:  [x]NO      []YES  Preferred Language for Healthcare:   [x]English       []other:      Pain level:  1-2/10    SUBJECTIVE:  Reports no issues in general since last visit for either back or knee. Will go to [facility] this week to work out on the machines. Still has misti's sessions left from package. OBJECTIVE: See eval. Patient to schedule piljorge's visits in upcoming weeks when ready. Re-assessment performed this visit for back and knee. Reviewed using the stick to massage ITB and lateral calf. Observation:   Test measurements:  Alignment good.  - supine to sit, - slump/SLR  OBJECTIVE  Test used 8/31/22 10/3/22   Pain Summary  3/10 0-1/10   Functional questionnaire FOTO 51/100 83/100   Functional Testing       Lumbar Flex  48 60   ROM Lumbar ext 15 18    Hip IR 4- 4   Strength Hip flex 4- 4    Hip abd 4 4       RESTRICTIONS/PRECAUTIONS:  lumbar DDD/stenosis    Exercises/Interventions:     Therapeutic Ex (57828) Sets/sec Reps Notes/CUES         Cat/camel /child's pose + to HEP   Thread the needle + to HEP   Open book 1' 1-2 reps each + to HEP   Bike  5'     DKTC  H10 5          Piriformis stretches H30 5          SB bridges H5 2x10 reps    SB bird/Dog H5 X10 reps V.C for proper tech         Manual Intervention (70911)      Lumbar-sacral mobs/prone quad stretches 10'                                   NMR re-education (64183)   CUES NEEDED         Patient education /HEP insruction            TrA with stabilizer h10 10 reps 40-42/44 lb   TrA with adduction with stabilizer H10 10 reps 40- 45lb   TrA up/up down/down h5 10 reps                      Therapeutic Activity (45672)                                            HEP instruction: (see scanned forms)  Access Code: [email or web address]  URL: [email or web address]  Date: 08/31/2022  Prepared by: Adina Keenant     Exercises  Cat-Camel - 2 x daily - 7 x weekly - 1-2 sets - 10 reps - 10 hold  Cat-Camel to Child's Pose - 2 x daily - 7 x weekly - 1-2 sets - 10 reps - 10 hold  Child's Pose with Thread the Needle - 1 x daily - 7 x weekly - 3 sets - 10 reps - 10 hold  Sidelying Open Book Thoracic Lumbar Rotation and Extension - 1 x daily - 7 x weekly - 3 sets - 10 reps - 10 hold  Therapeutic Exercise and NMR EXR  [x] (37993) Provided verbal/tactile cueing for activities related to strengthening, flexibility, endurance, ROM  for improvements in proximal hip and core control with self care, mobility, lifting and ambulation.  [] (50349) Provided verbal/tactile cueing for activities related to improving balance, coordination, kinesthetic sense, posture, motor skill, proprioception  to assist with core control in self care, mobility, lifting, and ambulation.      Therapeutic Activities:    [] (32677 or 24839) Provided verbal/tactile cueing for activities related to improving balance, coordination, kinesthetic sense, posture, motor skill, proprioception and motor activation to allow for proper function  with self care and ADLs  [] (80097) Provided training and instruction to the patient for proper core and proximal hip recruitment and positioning with ambulation re-education     Home Exercise Program:    [x] (93887) Reviewed/Progressed HEP activities related to strengthening, flexibility, endurance, ROM of core, proximal hip and LE for functional self-care, mobility, lifting and ambulation   [] (13138) Reviewed/Progressed HEP activities related to improving balance, coordination, kinesthetic sense, posture, motor skill, proprioception of core, proximal hip and LE for self care, mobility, lifting, and ambulation      Manual Treatments:  PROM / STM / Oscillations-Mobs:  G-I, II, III, IV (PA's, Inf., Post.)  [x] (33689) Provided manual therapy to mobilize proximal hip and LS spine soft tissue/joints for the purpose of modulating pain, promoting relaxation,  increasing ROM, reducing/eliminating soft tissue swelling/inflammation/restriction, improving soft tissue extensibility and allowing for proper ROM for normal function with self care, mobility, lifting and ambulation. Modalities:   deferred    Charges  Timed Code Treatment Minutes: 30'   Total Treatment Minutes: 36'     Medicare total (add KX at $2000)  kx           [] EVAL (LOW) 92398   [] EVAL (MOD) 66273   [] EVAL (HIGH) 82211   [] RE-EVAL     [x] KN(37395) x  1   [] IONTO  [x] NMR (21568) x 1    [] VASO  [] Manual (72682) x  1    [] Other:  [] TA x      [] Mech Traction (88984)  [] ES(attended) (62529)      [] ES (un) (88487):       GOALS:  Patient stated goal: return to walking 45-1 hour without pain  [] Progressing: [x] Met: [] Not Met: [] Adjusted     Therapist goals for Patient:   Short Term Goals: To be achieved in: 2 weeks  1. Independent in HEP and progression per patient tolerance, in order to prevent re-injury. [] Progressing: [x] Met: [] Not Met: [] Adjusted  2.  Patient will have a decrease in pain to facilitate improvement in movement, function, and ADLs as indicated by Functional Deficits. [] Progressing: [x] Met: [] Not Met: [] Adjusted        Long Term Goals: To be achieved in: 12 weeks  1. Disability index score of 62% or more per FOTO to assist with reaching prior level of function. [] Progressing: [x] Met: [] Not Met: [] Adjusted  2. Patient will demonstrate increased AROM to WNL, good LS mobility, good hip ROM to allow for proper joint functioning as indicated by patients Functional Deficits. [] Progressing: [x] Met: [] Not Met: [] Adjusted  3. Patient will demonstrate an increase in Strength to good proximal hip and core activation to allow for proper functional mobility as indicated by patients Functional Deficits. [] Progressing: [x] Met: [] Not Met: [] Adjusted  4. Patient will return to walking 45 min functional activities without increased symptoms or restriction. [] Progressing: [x] Met: [] Not Met: [] Adjusted  5. Patient to be able to perform stairs without pain (patient specific functional goal)    [] Progressing: [x] Met: [] Not Met: [] Adjusted       Progression Towards Functional goals:  [x] Patient is progressing as expected towards functional goals listed. [] Progression is slowed due to complexities listed. [] Progression has been slowed due to co-morbidities. [] Plan just implemented, too soon to assess goals progression  [] Other:     Overall Progression Towards Functional goals/ Treatment Progress Update:  [x] Patient is progressing as expected towards functional goals listed. [] Progression is slowed due to complexities/Impairments listed. [] Progression has been slowed due to co-morbidities.   [] Plan just implemented, too soon to assess goals progression <30days   [] Goals require adjustment due to lack of progress  [] Patient is not progressing as expected and requires additional follow up with physician  [] Other    Prognosis for POC: [x] Good [] Fair  [] Poor      Patient requires continued skilled intervention: [] Yes  [x] No    Treatment/Activity Tolerance:  [x] Patient able to complete treatment  [] Patient limited by fatigue  [] Patient limited by pain    [] Patient limited by other medical complications  [] Other:     ASSESSMENT: Patient has achieved all goals and has returned to walking without pain. Has returned to going to the gym for workouts without difficulty. To resume post-op pilates visits after D/C. Return to Play: (if applicable)   []  Stage 1: Intro to Strength   []  Stage 2: Return to Run and Strength   []  Stage 3: Return to Jump and Strength   []  Stage 4: Dynamic Strength and Agility   []  Stage 5: Sport Specific Training     []  Ready to Return to Play, Meets All Above Stages   []  Not Ready for Return to Sports   Comments:                               PLAN: D/C with HEP and post-pilates program.  [] Continue per plan of care [] Alter current plan (see comments above)  [] Plan of care initiated [] Hold pending MD visit [x] Discharge      Electronically signed by:  Dennis Chisholm, [address] [place], [place] [address]T-    Note: If patient does not return for scheduled/ recommended follow up visits, this note will serve as a discharge from care along with most recent update on progress.

## 2022-10-12 ENCOUNTER — HOSPITAL ENCOUNTER (OUTPATIENT)
Dept: WOMENS IMAGING | Age: 67
Discharge: HOME | End: 2022-10-12
Payer: MEDICARE

## 2022-10-12 DIAGNOSIS — M81.8 OTHER OSTEOPOROSIS WITHOUT CURRENT PATHOLOGICAL FRACTURE: ICD-10-CM

## 2022-10-12 PROCEDURE — 77080 DXA BONE DENSITY AXIAL: CPT

## 2022-10-17 ENCOUNTER — HOSPITAL ENCOUNTER (EMERGENCY)
Age: 67
Discharge: HOME | End: 2022-10-17
Attending: EMERGENCY MEDICINE
Payer: MEDICARE

## 2022-10-17 ENCOUNTER — HOSPITAL ENCOUNTER (OUTPATIENT)
Dept: PHYSICAL THERAPY | Age: 67
Discharge: HOME | End: 2022-10-17

## 2022-10-17 ENCOUNTER — APPOINTMENT (OUTPATIENT)
Dept: CT IMAGING | Age: 67
End: 2022-10-17
Payer: MEDICARE

## 2022-10-17 VITALS
RESPIRATION RATE: 16 BRPM | SYSTOLIC BLOOD PRESSURE: 152 MMHG | OXYGEN SATURATION: 99 % | DIASTOLIC BLOOD PRESSURE: 94 MMHG | HEIGHT: 64 IN | WEIGHT: 160 LBS | HEART RATE: 69 BPM | TEMPERATURE: 97.8 F | BODY MASS INDEX: 27.31 KG/M2

## 2022-10-17 DIAGNOSIS — S09.90XA INJURY OF HEAD, INITIAL ENCOUNTER: Primary | ICD-10-CM

## 2022-10-17 PROCEDURE — 72125 CT NECK SPINE W/O DYE: CPT

## 2022-10-17 PROCEDURE — 70486 CT MAXILLOFACIAL W/O DYE: CPT

## 2022-10-17 PROCEDURE — 70450 CT HEAD/BRAIN W/O DYE: CPT

## 2022-10-17 PROCEDURE — 6370000000 HC RX 637 (ALT 250 FOR IP): Performed by: EMERGENCY MEDICINE

## 2022-10-17 PROCEDURE — 99284 EMERGENCY DEPT VISIT MOD MDM: CPT

## 2022-10-17 RX ORDER — HYDROCODONE BITARTRATE AND ACETAMINOPHEN 5; 325 MG/1; MG/1
1 TABLET ORAL ONCE
Status: COMPLETED | OUTPATIENT
Start: 2022-10-17 | End: 2022-10-17

## 2022-10-17 RX ORDER — ONDANSETRON 4 MG/1
8 TABLET, ORALLY DISINTEGRATING ORAL ONCE
Status: COMPLETED | OUTPATIENT
Start: 2022-10-17 | End: 2022-10-17

## 2022-10-17 RX ORDER — ONDANSETRON 4 MG/1
4 TABLET, FILM COATED ORAL 3 TIMES DAILY PRN
Qty: 15 TABLET | Refills: 0 | Status: SHIPPED | OUTPATIENT
Start: 2022-10-17

## 2022-10-17 RX ADMIN — HYDROCODONE BITARTRATE AND ACETAMINOPHEN 1 TABLET: 5; 325 TABLET ORAL at 18:09

## 2022-10-17 RX ADMIN — ONDANSETRON 8 MG: 4 TABLET, ORALLY DISINTEGRATING ORAL at 19:32

## 2022-10-17 ASSESSMENT — PAIN SCALES - GENERAL
PAINLEVEL_OUTOF10: 1
PAINLEVEL_OUTOF10: 2
PAINLEVEL_OUTOF10: 7
PAINLEVEL_OUTOF10: 5

## 2022-10-17 ASSESSMENT — PAIN - FUNCTIONAL ASSESSMENT
PAIN_FUNCTIONAL_ASSESSMENT: 0-10
PAIN_FUNCTIONAL_ASSESSMENT: 0-10

## 2022-10-17 ASSESSMENT — PAIN DESCRIPTION - LOCATION: LOCATION: EYE

## 2022-10-17 ASSESSMENT — LIFESTYLE VARIABLES
HOW MANY STANDARD DRINKS CONTAINING ALCOHOL DO YOU HAVE ON A TYPICAL DAY: PATIENT DOES NOT DRINK
HOW OFTEN DO YOU HAVE A DRINK CONTAINING ALCOHOL: NEVER

## 2022-10-17 ASSESSMENT — PAIN DESCRIPTION - ORIENTATION: ORIENTATION: RIGHT

## 2022-10-17 NOTE — FLOWSHEET NOTE
[facility],  [address] [place]  Phone: [phone]  Fax [phone]      Date:  10/17/2022    Patient Name:  Jhonatan Bautista    :  1955  MRN: [id number]  Restrictions/Precautions:    Medical/Treatment Diagnosis Information:    Diagnosis: M54.50 (ICD-10-CM) - Lumbar spine painM51.36 (ICD-10-CM) - DDD (degenerative disc disease), [id number] (ICD-10-CM) - Lumbar spondylosis  Treatment Diagnosis: M51.36 DDD       Physician Information:   Aydin Botello    Patient is Post-Op [] Yes   [x] No     DOS:      Visit number: $210 3/28/22, 22, 6/20/22       6/13/22 6/20/22 6/27/22 7/7/22 7/11/22 7/18/22 10/17/22   Subjective Doing well - just kind of tired from doing so much this weekend. Noticed back was more sore/tight with inactivity due to heat this past week. Has been having increased LBP - may be caused by R lateral knee pain. Was feeling better, but today has not been a good day. Feels very stiff and having difficulty moving Going to [address] [place] [address] after Pilates for R knee pain    Back pain was really bad yesterday. May be due to prolonged standing Dx synovitis. Steroid and F/U with [facility]. Pain has decreased and as a result back pain has decreased. PT has gone well. Knee pain is completely gone. 7/7 1/7 2/7 3/7 4/7 5/7 6/7             Objective Per PT  Diagnosis: M54.50 (ICD-10-CM) - Lumbar spine painM51.36 (ICD-10-CM) - DDD (degenerative disc disease), [id number] (ICD-10-CM) - Lumbar spondylosis  Treatment Diagnosis: M51.36 DDD    Hamstring B 80 Adding home exercise plan at least 1x/day to keep moving to decrease back pain R knee pain no BJORN. 2-3/10 pain began during the middle of the week last week    Back pain is located more to L side vs central.     Noticed knee pain first than back pain. Neuropathy in R toes (2-4). Hamstring B 80             Goals 1. Strengthen TA  2. Increase flexibility   3.  Continue gaining strides with physical workout                   Reformer Exercises Walking 2R 2x10  Squats 2R1Y II and ER 2x10 2x10  2x10  Raises 2x10  U squat 2R 15x 2x10  2x10 no wide ER  2x10  2x10  2x10  2x10  15x 2x10  2x10  2x10  15x 2x10  2x10  2x10  15x 2x10  2x10  2x10  15x   Pelvic Stabilization   Bridges 3R 2x10 ADD  Do abs first next time 2x10 ADD  Better with abs first - continue 2x10  30 sec rest in between sets 2x10 2x10 2x10 2x10     Standing ABD 1R1Y 2x10 2x10 2x10 2x10 2x10                       Trunk Stabilization 1R1Y 10x 12x 2x10x 15x 15x 15x 15x    TA series - Y's biking  30 sec rest in between sets                           Hip Disassociation 2R1Y 2x10 2x10 15x 2x10 2x10 2x10 2x10    Arcs, circles          Flossing 2R 15x D/C                  Scapular Stabilization Seated on box          Rows 2R 15x 15x  15x 15x 15x 15x     ext                  Thoracic Mobility  Thoracic AAROM 1R 10x [place] with ball 2 blue 10x                                     General ROM Hamstring 1 min 1 min 1 min 1 min 1 min 1 min 1 min    Hip flexor 1 min 1 min 1 min 1 min 1 min 1 min 1 min    1 min Post capsule 1 min 1 min 1 min 1 min 1 min 1 min             Other  HSC 1R 10x                                      Summary/Comments Tolerated well.  Going to see how she feels this week to decide if she wants to continue Continue POC                          Electronically signed by:  Lois Cadet, MS, LAT, ATC

## 2022-10-17 NOTE — DISCHARGE INSTRUCTIONS
CAT scan of the head and face does not show any traumatic injuries. You may take Tylenol, ibuprofen for pain and discomfort. Please follow-up with your primary care doctor. Return for worsening symptoms.

## 2022-10-17 NOTE — ED PROVIDER NOTES
CHIEF COMPLAINT  Head Injury (Patient was walking and tripped on a stray cat. Hit head on concrete, hematoma over right eye, no other injuries noted. No anticoagulation. Dizzy but no nausea or vomiting at this time. )      HISTORY OF PRESENT ILLNESS  Marina Mujica is a 78 y.o. female with a history of hyperlipidemia, hypertension presenting for evaluation of head injury, face injury. Patient states that she was walking and a stray cat had tripped her. She states that she had hit her head on concrete, developed swelling over the right eye. Denies any vision changes. She is on a blood thinner. She denies any chest pain or shortness of breath preceding the fall. She states that she recently has recovered from a meniscus injury of the right knee, has been ambulatory since the time of the fall. She states she also braced herself with the palm of the left hand but denies any significant pain. States that her last tetanus vaccination was in last 6 years. Denies any neck pain, arm weakness or numbness. No other complaints, modifying factors or associated symptoms. I have reviewed the following from the nursing documentation.    Past Medical History:   Diagnosis Date    Actinic keratosis     Breast disorder     CAD (coronary artery disease)     Chronic kidney disease     Hyperlipidemia     Hypertension     Infertility, female     S/P colonoscopy 08/2009    Tendinitis of foot     UTI (urinary tract infection)      Past Surgical History:   Procedure Laterality Date    ABDOMEN SURGERY      stone removed from bile duct    ANGIOPLASTY      renal    BREAST BIOPSY      BREAST SURGERY      duct removal    BUNIONECTOMY Left 12/15/2020    MODIFIED YAZMIN BUNIONECTOMY, MODIFIED AKIN OSTEOTOMY, MODIFIED WEIL SECOND METATARSAL, PROXIMAL INTERPHALANGEAL JOINT ARTHRODESIS DIGITS TWO THREE FOUR LEFT FOOT performed by Lamar Billy DPM at [address] [place] [address]  12/1992    COLONOSCOPY  8/1/2009    COLONOSCOPY N/A 10/23/2019    COLONOSCOPY POLYPECTOMY SNARE/COLD BIOPSY performed by Shanique Voss MD at [address] [facility] [address] N/A 2019    MIDLINE LUMBAR FIVE SACRAL ONE EPIDURAL STEROID INJECTION SITE CONFIRMED BY FLUOROSCOPY performed by Mila Alcantar MD at [place] [address] Left 2016    Cystoscopy, with left stent placement    TONSILLECTOMY AND ADENOIDECTOMY      UPPER GASTROINTESTINAL ENDOSCOPY N/A 2021    EGD BIOPSY performed by Shanique Voss MD at [address] [place] [address]     Family History   Problem Relation Age of Onset    Heart Disease Father         MI  at  age 48    High Blood Pressure Sister     Diabetes Sister     Kidney Disease Sister         transplant 2019    Breast Cancer Sister     High Blood Pressure Mother     High Cholesterol Mother     Arthritis Mother         gout    Heart Disease Mother     Cancer Mother     Heart Disease Brother     High Cholesterol Brother     High Blood Pressure Brother      Social History     Socioeconomic History    Marital status:       Spouse name: Not on file    Number of children: Not on file    Years of education: Not on file    Highest education level: Not on file   Occupational History    Not on file   Tobacco Use    Smoking status: Never    Smokeless tobacco: Never   Vaping Use    Vaping Use: Never used   Substance and Sexual Activity    Alcohol use: No    Drug use: No    Sexual activity: Not Currently   Other Topics Concern    Not on file   Social History Narrative    Not on file     Social Determinants of Health     Financial Resource Strain: Not on file   Food Insecurity: Not on file   Transportation Needs: Not on file   Physical Activity: Sufficiently Active    Days of Exercise per Week: 6 days    Minutes of Exercise per Session: 40 min   Stress: Not on file   Social Connections: Not on file   Intimate Partner Violence: Not on file   Housing Stability: Not on file No current facility-administered medications for this encounter. Current Outpatient Medications   Medication Sig Dispense Refill    ondansetron (ZOFRAN) 4 MG tablet Take 1 tablet by mouth 3 times daily as needed for Nausea or Vomiting 15 tablet 0    gabapentin (NEURONTIN) 300 MG capsule Take one tablet once daily for 2 days. Then take one tablet twice daily for 2 days. Then take one tablet three times daily for rest (Patient not taking: Reported on 9/22/2022) 90 capsule 1    diclofenac sodium (VOLTAREN) 1 % GEL Apply 4 g topically 4 times daily as needed for Pain (Patient not taking: Reported on 9/22/2022) 100 g 3    atorvastatin (LIPITOR) 40 MG tablet TAKE ONE TABLET BY MOUTH DAILY 90 tablet 3    denosumab (PROLIA) 60 MG/ML SOSY SC injection Inject 60 mg into the skin once      pantoprazole (PROTONIX) 40 MG tablet       Potassium Citrate ER 15 MEQ (1620 MG) TBCR 2 times daily       calcitRIOL (ROCALTROL) 0.25 MCG capsule Take 0.25 mcg by mouth every other day      aspirin 81 MG tablet Take 81 mg by mouth daily      Melatonin 5 MG CHEW Take by mouth nightly       Allergies   Allergen Reactions    Codeine Rash, Nausea And Vomiting and Shortness Of Breath    Lisinopril      cough    Norvasc [Amlodipine Besylate]      swelling    Triamterene-Hctz      Renal insufficiency       REVIEW OF SYSTEMS  Positive and pertinent negatives as per HPI. All other systems were reviewed and are negative. PHYSICAL EXAM  BP (!) 152/94   Pulse 69   Temp 97.8 °F (36.6 °C) (Oral)   Resp 16   Ht 5' 4\" (1.626 m)   Wt 160 lb (72.6 kg)   LMP 07/11/2005   SpO2 99%   BMI 27.46 kg/m²   GENERAL APPEARANCE: Awake and alert. Cooperative. HEAD: Normocephalic. Atraumatic. There is no midline C-spine tenderness  EYES: PERRL. EOM's grossly intact. There is significant right-sided periorbital swelling and bruising, no subconjunctival hematoma intact extraocular motions  ENT: Mucous membranes are moist.   HEART: RRR.  No harsh murmurs. Intact radial pulses 2+ bilaterally. LUNGS: Respirations unlabored without accessory muscle use. Speaking comfortably in full sentences. ABDOMEN: Soft. Non-distended. Non-tender. No guarding or rebound. EXTREMITIES: No peripheral edema. No acute deformities. SKIN: Warm and dry. No acute rashes. Abrasion to the right knee,  NEUROLOGICAL: Alert and oriented X 3. No focal deficits    LABS  I have reviewed all labs for this visit. No results found for this visit on 10/17/22. RADIOLOGY  X-RAYS:  I have reviewed radiologic plain film image(s). ALL OTHER NON-PLAIN FILM IMAGES SUCH AS CT, ULTRASOUND AND MRI HAVE BEEN READ BY THE RADIOLOGIST. CT HEAD WO CONTRAST   Final Result   No acute intracranial abnormality. CT CERVICAL SPINE WO CONTRAST   Final Result   1. No acute fracture of the cervical spine. 2. Probable degenerative grade 1 anterolisthesis C4 on C5 and C5 on C6, facet   joints maintaining normal alignment. 3. Mild acquired cervical spinal canal stenosis C5-6. CT FACIAL BONES WO CONTRAST   Final Result   Large right frontal scalp and periorbital hematoma. No evidence of   underlying ocular injury or fracture. No results found. During the patient's ED course, the patient was given:  Medications   HYDROcodone-acetaminophen (NORCO) 5-325 MG per tablet 1 tablet (1 tablet Oral Given 10/17/22 1809)   ondansetron (ZOFRAN-ODT) disintegrating tablet 8 mg (8 mg Oral Given 10/17/22 1932)        ED COURSE/MDM  Patient seen and evaluated. Patient presenting after mechanical fall with right-sided facial injuries, head injury. Patient arrives with stable vital signs, she has significant right-sided periorbital swelling, bruising, no obvious ocular injury. CT head, face C-spine imaging was ordered. She has deferred imaging of the hand and right knee.   CT imaging with findings of periorbital swelling, right frontal scalp hematoma no underlying ocular injury, orbital rim fracture there is no evidence of retrobulbar hematoma. Patient advised on need for applying Neosporin or triple antibiotic ointment to the facial abrasions and continue symptomatic management with Tylenol, ibuprofen for pain and discomforts. She was given Zofran for nausea. The patient will be discharged from the emergency department. The patient was counseled on their diagnosis and any medications prescribed. They were advised on the need for PCP followup. They were counseled on the need to return to the emergency department if any of their symptoms were to worsen, change or have any other concerns. Discharged in stable condition. Patient was given scripts for the following medications. I counseled patient how to take these medications. Discharge Medication List as of 10/17/2022  7:43 PM        START taking these medications    Details   ondansetron (ZOFRAN) 4 MG tablet Take 1 tablet by mouth 3 times daily as needed for Nausea or Vomiting, Disp-15 tablet, R-0Normal             CLINICAL IMPRESSION  1. Injury of head, initial encounter        Blood pressure (!) 152/94, pulse 69, temperature 97.8 °F (36.6 °C), temperature source Oral, resp. rate 16, height 5' 4\" (1.626 m), weight 160 lb (72.6 kg), last menstrual period 07/11/2005, SpO2 99 %, not currently breastfeeding. Pratik Amaral was discharged to home in stable condition. This chart was generated in part by using Dragon Dictation system and may contain errors related to that system including errors in grammar, punctuation, and spelling, as well as words and phrases that may be inappropriate. If there are any questions or concerns please feel free to contact the dictating provider for clarification.        Danielle Esparza MD  10/17/22 1901

## 2022-10-31 ENCOUNTER — OFFICE VISIT (OUTPATIENT)
Dept: FAMILY MEDICINE CLINIC | Age: 67
End: 2022-10-31
Payer: MEDICARE

## 2022-10-31 VITALS
OXYGEN SATURATION: 98 % | WEIGHT: 162.4 LBS | HEIGHT: 64 IN | SYSTOLIC BLOOD PRESSURE: 120 MMHG | HEART RATE: 93 BPM | BODY MASS INDEX: 27.72 KG/M2 | DIASTOLIC BLOOD PRESSURE: 68 MMHG

## 2022-10-31 DIAGNOSIS — S09.90XA CLOSED HEAD INJURY, INITIAL ENCOUNTER: Primary | ICD-10-CM

## 2022-10-31 DIAGNOSIS — Z23 NEEDS FLU SHOT: ICD-10-CM

## 2022-10-31 PROCEDURE — G8427 DOCREV CUR MEDS BY ELIG CLIN: HCPCS | Performed by: FAMILY MEDICINE

## 2022-10-31 PROCEDURE — 99213 OFFICE O/P EST LOW 20 MIN: CPT | Performed by: FAMILY MEDICINE

## 2022-10-31 PROCEDURE — G0008 ADMIN INFLUENZA VIRUS VAC: HCPCS | Performed by: FAMILY MEDICINE

## 2022-10-31 PROCEDURE — 90694 VACC AIIV4 NO PRSRV 0.5ML IM: CPT | Performed by: FAMILY MEDICINE

## 2022-10-31 PROCEDURE — G8399 PT W/DXA RESULTS DOCUMENT: HCPCS | Performed by: FAMILY MEDICINE

## 2022-10-31 PROCEDURE — 1090F PRES/ABSN URINE INCON ASSESS: CPT | Performed by: FAMILY MEDICINE

## 2022-10-31 PROCEDURE — 3074F SYST BP LT 130 MM HG: CPT | Performed by: FAMILY MEDICINE

## 2022-10-31 PROCEDURE — 1036F TOBACCO NON-USER: CPT | Performed by: FAMILY MEDICINE

## 2022-10-31 PROCEDURE — G8417 CALC BMI ABV UP PARAM F/U: HCPCS | Performed by: FAMILY MEDICINE

## 2022-10-31 PROCEDURE — G8484 FLU IMMUNIZE NO ADMIN: HCPCS | Performed by: FAMILY MEDICINE

## 2022-10-31 PROCEDURE — 3017F COLORECTAL CA SCREEN DOC REV: CPT | Performed by: FAMILY MEDICINE

## 2022-10-31 PROCEDURE — 3078F DIAST BP <80 MM HG: CPT | Performed by: FAMILY MEDICINE

## 2022-10-31 PROCEDURE — 1123F ACP DISCUSS/DSCN MKR DOCD: CPT | Performed by: FAMILY MEDICINE

## 2022-10-31 NOTE — PROGRESS NOTES
Patient: Marina howell 78 y.o. femalewho presents today with the following Chief Complaint(s):  Chief Complaint   Patient presents with    Follow-Up from Hospital     Pt states that she tripped over a stray cat outside 2 weeks ago and fell, she did not break any facial bones but has a lot of bruising. Patient is here for emergency room follow-up. On 10/17/2022, she tripped over stray cat landing on her head. In the emergency room she had CT of her facial bones, head and C-spine. No fracture was found, she did have a scalp hematoma. Patient is 2 weeks out from this fall. She continues to have some residual resolving ecchymosis. She denies blurred vision or headache, no neck pain. No dizziness or off-balance symptoms. Patient did not require any suturing. There is no laceration. She is on a baby aspirin daily        Current Outpatient Medications   Medication Sig Dispense Refill    ondansetron (ZOFRAN) 4 MG tablet Take 1 tablet by mouth 3 times daily as needed for Nausea or Vomiting 15 tablet 0    atorvastatin (LIPITOR) 40 MG tablet TAKE ONE TABLET BY MOUTH DAILY 90 tablet 3    denosumab (PROLIA) 60 MG/ML SOSY SC injection Inject 60 mg into the skin once      pantoprazole (PROTONIX) 40 MG tablet       Potassium Citrate ER 15 MEQ (1620 MG) TBCR 2 times daily       calcitRIOL (ROCALTROL) 0.25 MCG capsule Take 0.25 mcg by mouth every other day      aspirin 81 MG tablet Take 81 mg by mouth daily      Melatonin 5 MG CHEW Take by mouth nightly       No current facility-administered medications for this visit. Patients past medical history, surgical history, family history, medications and allergies were all reviewed and updated as appropriate today. Review of Systems   Musculoskeletal:  Negative for myalgias, neck pain and neck stiffness. Neurological:  Negative for dizziness, weakness, light-headedness, numbness and headaches. Physical Exam  Vitals reviewed.    Constitutional: General: She is not in acute distress. HENT:      Head:      Comments: Infraorbital ecchymosis bilaterally, on the right side she has some tracking of the hematoma down to her neck. EOMI, conjunctive a clear. C-spine good range of motion. Neurological:      Mental Status: She is alert. Vitals:    10/31/22 1336   BP: 120/68   Pulse: 93   SpO2: 98%   Weight: 162 lb 6.4 oz (73.7 kg)   Height: 5' 4\" (1.626 m)       Assessment/Plan:   Casandra Antonio was seen today for follow-up from hospital.    Diagnoses and all orders for this visit:    Closed head injury, initial encounter, ecchymosis gradually resolving. Patient has no acute issues.   She is aware that this is going to take a little bit more time for the hematomas and ecchymosis to completely absorb    Needs flu shot  -     Influenza, FLUAD, (age 72 y+), IM, Preservative Free, 0.5 mL

## 2022-10-31 NOTE — PROGRESS NOTES
Vaccine Information Sheet, \"Influenza - Inactivated\"  given to [employer], or parent/legal guardian of  [employer] and verbalized understanding. Patient responses:    Have you ever had a reaction to a flu vaccine? No  Do you have any current illness? No  Have you ever had [place] Syndrome? No  Do you have a serious allergy to any of the follow: Neomycin, Polymyxin, Thimerosal, eggs or egg products? No    Flu vaccine given per order. Please see immunization tab. Risks and benefits explained. Current VIS given.

## 2022-11-03 ENCOUNTER — HOSPITAL ENCOUNTER (OUTPATIENT)
Dept: PHYSICAL THERAPY | Age: 67
Discharge: HOME | End: 2022-11-03

## 2022-11-03 NOTE — FLOWSHEET NOTE
[facility], [address]  Phone: [phone]  Fax [phone]      Date:  11/3/2022    Patient Name:  Bruce Wills    :  1955  MRN: [id number]  Restrictions/Precautions:    Medical/Treatment Diagnosis Information:    Diagnosis: M54.50 (ICD-10-CM) - Lumbar spine painM51.36 (ICD-10-CM) - DDD (degenerative disc disease), [id number] (ICD-10-CM) - Lumbar spondylosis  Treatment Diagnosis: M51.36 DDD       Physician Information:   Jayashree Majano    Patient is Post-Op [] Yes   [x] No     DOS:      Visit number: $210 3/28/22, 22, 6/20/22       11/3/22         Subjective Doing well                    7/7 1/7 2/7 3/7 4/7 5/7 6/7             Objective Per PT  Diagnosis: M54.50 (ICD-10-CM) - Lumbar spine painM51.36 (ICD-10-CM) - DDD (degenerative disc disease), [id number] (ICD-10-CM) - Lumbar spondylosis  Treatment Diagnosis: M51.36 DDD    Hamstring B 80 Adding home exercise plan at least 1x/day to keep moving to decrease back pain R knee pain no BJORN. 2-3/10 pain began during the middle of the week last week    Back pain is located more to L side vs central.     Noticed knee pain first than back pain. Neuropathy in R toes (2-4). Hamstring B 80             Goals 1. Strengthen TA  2. Increase flexibility   3.  Continue gaining strides with physical workout                   Reformer Exercises Walking 2R 2x10  Squats 2R1Y II and ER 2x10 2x10  2x10  Raises 2x10  U squat 2R 15x 2x10  2x10 no wide ER  2x10  2x10  2x10  2x10  15x 2x10  2x10  2x10  15x 2x10  2x10  2x10  15x 2x10  2x10  2x10  15x   Pelvic Stabilization   Bridges 3R 2x10 ADD  Do abs first next time 2x10 ADD  Better with abs first - continue 2x10  30 sec rest in between sets 2x10 2x10 2x10 2x10     Standing ABD 1R1Y 2x10 2x10 2x10 2x10 2x10                       Trunk Stabilization 1R1Y 10x 12x 2x10x 15x 15x 15x 15x    TA series - Y's biking  30 sec rest in between sets                           Hip Disassociation 2R1Y 2x10 2x10 15x 2x10 2x10 2x10 2x10    Arcs, circles          Flossing 2R 15x D/C                  Scapular Stabilization Seated on box          Rows 2R 15x 15x  15x 15x 15x 15x     ext                  Thoracic Mobility  Thoracic AAROM 1R 10x [place] with ball 2 blue 10x                                     General ROM Hamstring 1 min 1 min 1 min 1 min 1 min 1 min 1 min    Hip flexor 1 min 1 min 1 min 1 min 1 min 1 min 1 min    1 min Post capsule 1 min 1 min 1 min 1 min 1 min 1 min             Other  HSC 1R 10x                                      Summary/Comments Tolerated well.  Going to see how she feels this week to decide if she wants to continue Continue POC                          Electronically signed by:  Lamar Concepcion, MS, LAT, ATC

## 2022-11-07 ENCOUNTER — HOSPITAL ENCOUNTER (OUTPATIENT)
Dept: PHYSICAL THERAPY | Age: 67
Discharge: HOME | End: 2022-11-07

## 2022-11-07 PROCEDURE — 9990000029 HC GAP PACKAGE: Performed by: SPECIALIST/TECHNOLOGIST

## 2022-11-07 NOTE — FLOWSHEET NOTE
[facility], [address] [place]  Phone: [phone]  Fax [phone]      Date:  2022    Patient Name:  Shane Vaughn    :  1955  MRN: [id number]  Restrictions/Precautions:    Medical/Treatment Diagnosis Information:    Diagnosis: M54.50 (ICD-10-CM) - Lumbar spine painM51.36 (ICD-10-CM) - DDD (degenerative disc disease), [id number] (ICD-10-CM) - Lumbar spondylosis  Treatment Diagnosis: M51.36 DDD       Physician Information:   Maryana Medeiros    Patient is Post-Op [] Yes   [x] No     DOS:      Visit number: $210 3/28/22, 22, 6/20/22       11/3/22 11/7/22        Subjective Doing well Doing well                   7/7 1/7 2/7 3/7 4/7 5/7 6/7             Objective Per PT  Diagnosis: M54.50 (ICD-10-CM) - Lumbar spine painM51.36 (ICD-10-CM) - DDD (degenerative disc disease), [id number] (ICD-10-CM) - Lumbar spondylosis  Treatment Diagnosis: M51.36 DDD    Hamstring B 80 Adding home exercise plan at least 1x/day to keep moving to decrease back pain R knee pain no BJORN. 2-3/10 pain began during the middle of the week last week    Back pain is located more to L side vs central.     Noticed knee pain first than back pain. Neuropathy in R toes (2-4). Hamstring B 80             Goals 1. Strengthen TA  2. Increase flexibility   3.  Continue gaining strides with physical workout                   Reformer Exercises Walking 2R 2x10  Squats 2R1Y II and ER 2x10 2x10  2x10  Raises 2x10  U squat 2R 15x 2x10  2x10 no wide ER  2x10  2x10  2x10  2x10  15x 2x10  2x10  2x10  15x 2x10  2x10  2x10  15x 2x10  2x10  2x10  15x   Pelvic Stabilization   Bridges 3R 2x10 ADD  Do abs first next time 2x10 ADD  Better with abs first - continue 2x10  30 sec rest in between sets 2x10 2x10 2x10 2x10     Standing ABD 1R1Y 2x10 2x10 2x10 2x10 2x10                       Trunk Stabilization 1R1Y 10x 12x 2x10x 15x 15x 15x 15x    TA series - Y's biking 30 sec rest in between sets                           Hip Disassociation 2R1Y 2x10 2x10 15x 2x10 2x10 2x10 2x10    Arcs, circles          Flossing 2R 15x D/C                  Scapular Stabilization Seated on box          Rows 2R 15x 15x  15x 15x 15x 15x     ext                  Thoracic Mobility  Thoracic AAROM 1R 10x [place] with ball 2 blue 10x                                     General ROM Hamstring 1 min 1 min 1 min 1 min 1 min 1 min 1 min    Hip flexor 1 min 1 min 1 min 1 min 1 min 1 min 1 min    1 min Post capsule 1 min 1 min 1 min 1 min 1 min 1 min             Other  HSC 1R 10x                                      Summary/Comments Tolerated well.  Going to see how she feels this week to decide if she wants to continue Continue POC                          Electronically signed by:  Daya Hyman, MS, LAT, ATC

## 2022-11-17 ENCOUNTER — HOSPITAL ENCOUNTER (OUTPATIENT)
Dept: PHYSICAL THERAPY | Age: 67
Discharge: HOME | End: 2022-11-17

## 2022-11-17 NOTE — FLOWSHEET NOTE
[facility], [address] [place]  Phone: [phone]  Fax [phone]      Date:  2022    Patient Name:  Maira Victoria    :  1955  MRN: [id number]  Restrictions/Precautions:    Medical/Treatment Diagnosis Information:    Diagnosis: M54.50 (ICD-10-CM) - Lumbar spine painM51.36 (ICD-10-CM) - DDD (degenerative disc disease), [id number] (ICD-10-CM) - Lumbar spondylosis  Treatment Diagnosis: M51.36 DDD       Physician Information:   Berenice Lloyd    Patient is Post-Op [] Yes   [x] No     DOS:      Visit number: $210 3/28/22, 22, 22, 11/7/22       11/3/22 11/7/22 11/17/22       Subjective Doing well Doing well Doing well                  7/7 1/7 2/7 3/7 4/7 5/7 6/7             Objective Per PT  Diagnosis: M54.50 (ICD-10-CM) - Lumbar spine painM51.36 (ICD-10-CM) - DDD (degenerative disc disease), [id number] (ICD-10-CM) - Lumbar spondylosis  Treatment Diagnosis: M51.36 DDD    Hamstring B 80 Adding home exercise plan at least 1x/day to keep moving to decrease back pain R knee pain no BJORN. 2-3/10 pain began during the middle of the week last week    Back pain is located more to L side vs central.     Noticed knee pain first than back pain. Neuropathy in R toes (2-4). Hamstring B 80             Goals 1. Strengthen TA  2. Increase flexibility   3.  Continue gaining strides with physical workout                   Reformer Exercises Walking 2R 2x10  Squats 2R1Y II and ER 2x10 2x10  2x10  Raises 2x10  U squat 2R 15x 2x10  2x10 no wide ER  2x10  2x10  2x10  2x10  15x 2x10  2x10  2x10  15x 2x10  2x10  2x10  15x 2x10  2x10  2x10  15x   Pelvic Stabilization   Bridges 3R 2x10 ADD  Do abs first next time 2x10 ADD  Better with abs first - continue 2x10  30 sec rest in between sets 2x10 2x10 2x10 2x10     Standing ABD 1R1Y 2x10 2x10 2x10 2x10 2x10                       Trunk Stabilization 1R1Y 10x 12x 2x10x 15x 15x 15x 15x    TA series - Y's biking  30 sec rest in between sets                           Hip Disassociation 2R1Y 2x10 2x10 15x 2x10 2x10 2x10 2x10    Arcs, circles          Flossing 2R 15x D/C                  Scapular Stabilization Seated on box          Rows 2R 15x 15x 15x 15x 15x 15x 15x     ext                  Thoracic Mobility  Thoracic AAROM 1R 10x [place] with ball 2 blue 10x                                     General ROM Hamstring 1 min 1 min 1 min 1 min 1 min 1 min 1 min    Hip flexor 1 min 1 min 1 min 1 min 1 min 1 min 1 min    1 min Post capsule 1 min 1 min 1 min 1 min 1 min 1 min             Other  HSC 1R 10x 10x                                     Summary/Comments Tolerated well.  Going to see how she feels this week to decide if she wants to continue Continue POC                          Electronically signed by:  Abhi Flores, MS, LAT, ATC

## 2022-11-28 ENCOUNTER — HOSPITAL ENCOUNTER (OUTPATIENT)
Dept: PHYSICAL THERAPY | Age: 67
Discharge: HOME | End: 2022-11-28
Payer: MEDICARE

## 2022-11-28 NOTE — FLOWSHEET NOTE
[facility],  [address] [place]  Phone: [phone]  Fax [phone]      Date:  2022    Patient Name:  Liliana Boo    :  1955  MRN: [id number]  Restrictions/Precautions:    Medical/Treatment Diagnosis Information:    Diagnosis: M54.50 (ICD-10-CM) - Lumbar spine painM51.36 (ICD-10-CM) - DDD (degenerative disc disease), [id number] (ICD-10-CM) - Lumbar spondylosis  Treatment Diagnosis: M51.36 DDD       Physician Information:   Marcie Aranajean marie    Patient is Post-Op [] Yes   [x] No     DOS:      Visit number: $210 3/28/22, 22, 22, 11/7/22       11/3/22 11/7/22 11/17/22 11/28/22      Subjective Doing well Doing well Doing well Doing ok - left hip flexor has been bothering her                 7/7 1/7 2/7 3/7 4/7 5/7 6/7             Objective Per PT  Diagnosis: M54.50 (ICD-10-CM) - Lumbar spine painM51.36 (ICD-10-CM) - DDD (degenerative disc disease), [id number] (ICD-10-CM) - Lumbar spondylosis  Treatment Diagnosis: M51.36 DDD    Hamstring B 80 Adding home exercise plan at least 1x/day to keep moving to decrease back pain R knee pain no BJORN. 2-3/10 pain began during the middle of the week last week    Back pain is located more to L side vs central.     Noticed knee pain first than back pain. Neuropathy in R toes (2-4). Adjust routine for no leg lifts    Hip flexor stretch HEP   Hamstring B 80             Goals 1. Strengthen TA  2. Increase flexibility   3.  Continue gaining strides with physical workout                   Reformer Exercises Walking 2R 2x10  Squats 2R1Y II and ER 2x10 2x10  2x10  Raises 2x10  U squat 2R 15x 2x10  2x10 no wide ER  2x10  2x10  2x10  2x10  15x 2x10  2x10  2x10  15x 2x10  2x10  2x10  15x 2x10  2x10  2x10  15x   Pelvic Stabilization   Bridges 3R 2x10 ADD  Do abs first next time 2x10 ADD  Better with abs first - continue 2x10  30 sec rest in between sets 2x10 2x10 2x10 2x10 Standing ABD 1R1Y 2x10 2x10 2x10 2x10 2x10                       Trunk Stabilization 1R1Y 10x 12x 2x10x 15x 15x 15x 15x    TA series - Y's biking  30 sec rest in between sets                           Hip Disassociation 2R1Y 2x10 2x10 15x 2x10 2x10 2x10 2x10    Arcs, circles          Flossing 2R 15x D/C                  Scapular Stabilization Seated on box          Rows 2R 15x 15x 15x 15x 15x 15x 15x     ext                  Thoracic Mobility  Thoracic AAROM 1R 10x [place] with ball 2 blue 10x                                     General ROM Hamstring 1 min 1 min 1 min 1 min 1 min 1 min 1 min    Hip flexor 1 min 1 min 1 min 1 min 1 min 1 min 1 min    1 min Post capsule 1 min 1 min 1 min 1 min 1 min 1 min             Other  HSC 1R 10x 10x                                     Summary/Comments Tolerated well.  Going to see how she feels this week to decide if she wants to continue Continue POC                          Electronically signed by:  Leonardo Mendes MS, LAT, ATC

## 2022-12-05 ENCOUNTER — HOSPITAL ENCOUNTER (OUTPATIENT)
Dept: PHYSICAL THERAPY | Age: 67
Discharge: HOME | End: 2022-12-05

## 2022-12-05 NOTE — FLOWSHEET NOTE
[facility], [address] [place]  Phone: [phone]  Fax [phone]      Date:  2022    Patient Name:  Samia Bills    :  1955  MRN: [id number]  Restrictions/Precautions:    Medical/Treatment Diagnosis Information:    Diagnosis: M54.50 (ICD-10-CM) - Lumbar spine painM51.36 (ICD-10-CM) - DDD (degenerative disc disease), [id number] (ICD-10-CM) - Lumbar spondylosis  Treatment Diagnosis: M51.36 DDD       Physician Information:   Mary Deal    Patient is Post-Op [] Yes   [x] No     DOS:      Visit number: $210 3/28/22, 22, 22, 11/7/22       11/3/22 11/7/22 11/17/22 11/28/22 12/5/22     Subjective Doing well Doing well Doing well Doing ok - left hip flexor has been bothering her Doing well - hip is a little bit better                7/7 1/7 2/7 3/7 4/7 5/7 6/7             Objective Per PT  Diagnosis: M54.50 (ICD-10-CM) - Lumbar spine painM51.36 (ICD-10-CM) - DDD (degenerative disc disease), [id number] (ICD-10-CM) - Lumbar spondylosis  Treatment Diagnosis: M51.36 DDD    Hamstring B 80 Adding home exercise plan at least 1x/day to keep moving to decrease back pain R knee pain no BJORN. 2-3/10 pain began during the middle of the week last week    Back pain is located more to L side vs central.     Noticed knee pain first than back pain. Neuropathy in R toes (2-4). Adjust routine for no leg lifts    Hip flexor stretch HEP   Hamstring B 80             Goals 1. Strengthen TA  2. Increase flexibility   3.  Continue gaining strides with physical workout                   Reformer Exercises Walking 2R 2x10  Squats 2R1Y II and ER 2x10 2x10  2x10  Raises 2x10  U squat 2R 15x 2x10  2x10 no wide ER  2x10  2x10  2x10  2x10  15x 2x10  2x10  2x10  15x 2x10  2x10  2x10  15x 2x10  2x10  2x10  15x   Pelvic Stabilization   Bridges 3R 2x10 ADD  Do abs first next time 2x10 ADD  Better with abs first - continue 2x10  30 sec rest in between sets 2x10 2x10 2x10 2x10     Standing ABD 1R1Y 2x10 2x10 2x10 2x10 2x10                       Trunk Stabilization 1R1Y 10x 12x 2x10x 15x 15x 15x 15x    TA series - Y's biking  30 sec rest in between sets                           Hip Disassociation 2R1Y 2x10 2x10 15x 2x10 2x10 2x10 2x10    Arcs, circles          Flossing 2R 15x D/C                  Scapular Stabilization Seated on box          Rows 2R 15x 15x 15x 15x 15x 15x 15x     ext                  Thoracic Mobility  Thoracic AAROM 1R 10x [place] with ball 2 blue 10x                                     General ROM Hamstring 1 min 1 min 1 min 1 min 1 min 1 min 1 min    Hip flexor 1 min 1 min 1 min 1 min 1 min 1 min 1 min    1 min Post capsule 1 min 1 min 1 min 1 min 1 min 1 min             Other  HSC 1R 10x 10x                                     Summary/Comments Tolerated well.  Going to see how she feels this week to decide if she wants to continue Continue POC                          Electronically signed by:  Noelle Lee, MS, LAT, ATC

## 2022-12-12 ENCOUNTER — HOSPITAL ENCOUNTER (OUTPATIENT)
Dept: PHYSICAL THERAPY | Age: 67
Discharge: HOME | End: 2022-12-12

## 2022-12-12 NOTE — FLOWSHEET NOTE
[facility],  [address] [place]  Phone: [phone]  Fax [phone]      Date:  2022    Patient Name:  Liliana Boo    :  1955  MRN: [id number]  Restrictions/Precautions:    Medical/Treatment Diagnosis Information:    Diagnosis: M54.50 (ICD-10-CM) - Lumbar spine painM51.36 (ICD-10-CM) - DDD (degenerative disc disease), [id number] (ICD-10-CM) - Lumbar spondylosis  Treatment Diagnosis: M51.36 DDD       Physician Information:   Marcie Sumitjean marie    Patient is Post-Op [] Yes   [x] No     DOS:      Visit number: $210 3/28/22, 22, 22, 11/7/22       11/3/22 11/7/22 11/17/22 11/28/22 12/5/22 12/12/22    Subjective Doing well Doing well Doing well Doing ok - left hip flexor has been bothering her Doing well - hip is a little bit better Doing well               7/7 1/7 2/7 3/7 4/7 5/7 6/7             Objective Per PT  Diagnosis: M54.50 (ICD-10-CM) - Lumbar spine painM51.36 (ICD-10-CM) - DDD (degenerative disc disease), [id number] (ICD-10-CM) - Lumbar spondylosis  Treatment Diagnosis: M51.36 DDD    Hamstring B 80 Adding home exercise plan at least 1x/day to keep moving to decrease back pain R knee pain no BJORN. 2-3/10 pain began during the middle of the week last week    Back pain is located more to L side vs central.     Noticed knee pain first than back pain. Neuropathy in R toes (2-4). Adjust routine for no leg lifts    Hip flexor stretch HEP   Hamstring B 80             Goals 1. Strengthen TA  2. Increase flexibility   3.  Continue gaining strides with physical workout                   Reformer Exercises Walking 2R 2x10  Squats 2R1Y II and ER 2x10 2x10  2x10  Raises 2x10  U squat 2R 15x 2x10  2x10 no wide ER  2x10  2x10  2x10  2x10  15x 2x10  2x10  2x10  15x 2x10  2x10  2x10  15x 2x10  2x10  2x10  15x   Pelvic Stabilization   Bridges 3R 2x10 ADD  Do abs first next time 2x10 ADD  Better with abs first - continue 2x10  30 sec rest in between sets 2x10 2x10 2x10 2x10     Standing ABD 1R1Y 2x10 2x10 2x10 2x10 2x10                       Trunk Stabilization 1R1Y 10x 12x 2x10x 15x 15x 15x 15x    TA series - Y's biking  30 sec rest in between sets                           Hip Disassociation 2R1Y 2x10 2x10 15x 2x10 2x10 2x10 2x10    Arcs, circles          Flossing 2R 15x D/C                  Scapular Stabilization Seated on box          Rows 2R 15x 15x 15x 15x 15x 15x 15x     ext                  Thoracic Mobility  Thoracic AAROM 1R 10x [place] with ball 2 blue 10x                                     General ROM Hamstring 1 min 1 min 1 min 1 min 1 min 1 min 1 min    Hip flexor 1 min 1 min 1 min 1 min 1 min 1 min 1 min    1 min Post capsule 1 min 1 min 1 min 1 min 1 min 1 min             Other  HSC 1R 10x 10x                                     Summary/Comments Tolerated well.  Going to see how she feels this week to decide if she wants to continue Continue POC                          Electronically signed by:  Jo Mcardle, MS, LAT, ATC

## 2022-12-19 ENCOUNTER — HOSPITAL ENCOUNTER (OUTPATIENT)
Dept: PHYSICAL THERAPY | Age: 67
Discharge: HOME | End: 2022-12-19

## 2022-12-19 NOTE — FLOWSHEET NOTE
[facility],  [address]  Phone: [phone]  Fax [phone]      Date:  2022    Patient Name:  Terence Norris    :  1955  MRN: [id number]  Restrictions/Precautions:    Medical/Treatment Diagnosis Information:    Diagnosis: M54.50 (ICD-10-CM) - Lumbar spine painM51.36 (ICD-10-CM) - DDD (degenerative disc disease), [id number] (ICD-10-CM) - Lumbar spondylosis  Treatment Diagnosis: M51.36 DDD       Physician Information:   Louie Mcneil    Patient is Post-Op [] Yes   [x] No     DOS:      Visit number: $210 3/28/22, 22, 22, 11/7/22       11/3/22 11/7/22 11/17/22 11/28/22 12/5/22 12/12/22 12/19/22   Subjective Doing well Doing well Doing well Doing ok - left hip flexor has been bothering her Doing well - hip is a little bit better Doing well Doing well              7/7 1/7 2/7 3/7 4/7 5/7 6/7             Objective Per PT  Diagnosis: M54.50 (ICD-10-CM) - Lumbar spine painM51.36 (ICD-10-CM) - DDD (degenerative disc disease), [id number] (ICD-10-CM) - Lumbar spondylosis  Treatment Diagnosis: M51.36 DDD    Hamstring B 80 Adding home exercise plan at least 1x/day to keep moving to decrease back pain R knee pain no BJORN. 2-3/10 pain began during the middle of the week last week    Back pain is located more to L side vs central.     Noticed knee pain first than back pain. Neuropathy in R toes (2-4). Adjust routine for no leg lifts    Hip flexor stretch HEP   Hamstring B 80             Goals 1. Strengthen TA  2. Increase flexibility   3.  Continue gaining strides with physical workout                   Reformer Exercises Walking 2R 2x10  Squats 2R1Y II and ER 2x10 2x10  2x10  Raises 2x10  U squat 2R 15x 2x10  2x10 no wide ER  2x10  2x10  2x10  2x10  15x 2x10  2x10  2x10  15x 2x10  2x10  2x10  15x 2x10  2x10  2x10  15x   Pelvic Stabilization   Bridges 3R 2x10 ADD  Do abs first next time 2x10 ADD  Better with abs first - continue 2x10  30 sec rest in between sets 2x10 2x10 2x10 2x10     Standing ABD 1R1Y 2x10 2x10 2x10 2x10 2x10                       Trunk Stabilization 1R1Y 10x 12x 2x10x 15x 15x 15x 15x    TA series - Y's biking  30 sec rest in between sets                           Hip Disassociation 2R1Y 2x10 2x10 15x 2x10 2x10 2x10 2x10    Arcs, circles          Flossing 2R 15x D/C                  Scapular Stabilization Seated on box          Rows 2R 15x 15x 15x 15x 15x 15x 15x     ext                  Thoracic Mobility  Thoracic AAROM 1R 10x [place] with ball 2 blue 10x                                     General ROM Hamstring 1 min 1 min 1 min 1 min 1 min 1 min 1 min    Hip flexor 1 min 1 min 1 min 1 min 1 min 1 min 1 min    1 min Post capsule 1 min 1 min 1 min 1 min 1 min 1 min             Other  HSC 1R 10x 10x                                     Summary/Comments Tolerated well.  Going to see how she feels this week to decide if she wants to continue Continue POC                          Electronically signed by:  Shawn Delgado, MS, LAT, ATC

## 2023-01-12 ENCOUNTER — HOSPITAL ENCOUNTER (OUTPATIENT)
Dept: PHYSICAL THERAPY | Age: 68
Discharge: HOME | End: 2023-01-12

## 2023-01-12 NOTE — FLOWSHEET NOTE
[facility], [address] [place]  Phone: [phone]  Fax [phone]      Date:  2023    Patient Name:  Ernesto Cross    :  1955  MRN: [id number]  Restrictions/Precautions:    Medical/Treatment Diagnosis Information:    Diagnosis: M54.50 (ICD-10-CM) - Lumbar spine painM51.36 (ICD-10-CM) - DDD (degenerative disc disease), [id number] (ICD-10-CM) - Lumbar spondylosis  Treatment Diagnosis: M51.36 DDD       Physician Information:   Soraya Shelton    Patient is Post-Op [] Yes   [x] No     DOS:      Visit number: $210 3/28/22, 22, 22, 22         Subjective Doing well                    7/7 1/7 2/7 3/7 4/7 5/7 6/7             Objective Per PT  Diagnosis: M54.50 (ICD-10-CM) - Lumbar spine painM51.36 (ICD-10-CM) - DDD (degenerative disc disease), [id number] (ICD-10-CM) - Lumbar spondylosis  Treatment Diagnosis: M51.36 DDD    Hamstring B 80 Adding home exercise plan at least 1x/day to keep moving to decrease back pain R knee pain no BJORN. 2-3/10 pain began during the middle of the week last week    Back pain is located more to L side vs central.     Noticed knee pain first than back pain. Neuropathy in R toes (2-4). Adjust routine for no leg lifts    Hip flexor stretch HEP   Hamstring B 80             Goals 1. Strengthen TA  2. Increase flexibility   3.  Continue gaining strides with physical workout                   Reformer Exercises Walking 2R 2x10  Squats 2R1Y II and ER 2x10 2x10  2x10  Raises 2x10  U squat 2R 15x 2x10  2x10 no wide ER  2x10  2x10  2x10  2x10  15x 2x10  2x10  2x10  15x 2x10  2x10  2x10  15x 2x10  2x10  2x10  15x   Pelvic Stabilization   Bridges 3R 2x10 ADD  Do abs first next time 2x10 ADD  Better with abs first - continue 2x10  30 sec rest in between sets 2x10 2x10 2x10 2x10     Standing ABD 1R1Y 2x10 2x10 2x10 2x10 2x10                       Trunk Stabilization 1R1Y 10x 12x 2x10x 15x 15x 15x 15x    TA series - Y's biking  30 sec rest in between sets                           Hip Disassociation 2R1Y 2x10 2x10 15x 2x10 2x10 2x10 2x10    Arcs, circles          Flossing 2R 15x D/C                  Scapular Stabilization Seated on box          Rows 2R 15x 15x 15x 15x 15x 15x 15x     ext                  Thoracic Mobility  Thoracic AAROM 1R 10x [place] with ball 2 blue 10x                                     General ROM Hamstring 1 min 1 min 1 min 1 min 1 min 1 min 1 min    Hip flexor 1 min 1 min 1 min 1 min 1 min 1 min 1 min    1 min Post capsule 1 min 1 min 1 min 1 min 1 min 1 min             Other  HSC 1R 10x 10x                                     Summary/Comments Tolerated well.  Going to see how she feels this week to decide if she wants to continue Continue POC                          Electronically signed by:  Jose Bond, MS, LAT, ATC

## 2023-01-19 ENCOUNTER — HOSPITAL ENCOUNTER (OUTPATIENT)
Dept: PHYSICAL THERAPY | Age: 68
Discharge: HOME | End: 2023-01-19

## 2023-01-19 PROCEDURE — 9990000029 HC GAP PACKAGE: Performed by: SPECIALIST/TECHNOLOGIST

## 2023-01-19 NOTE — FLOWSHEET NOTE
[facility], [address] [place]  Phone: [phone]  Fax [phone]      Date:  2023    Patient Name:  Josef Randolph    :  1955  MRN: [id number]  Restrictions/Precautions:    Medical/Treatment Diagnosis Information:    Diagnosis: M54.50 (ICD-10-CM) - Lumbar spine painM51.36 (ICD-10-CM) - DDD (degenerative disc disease), [id number] (ICD-10-CM) - Lumbar spondylosis  Treatment Diagnosis: M51.36 DDD       Physician Information:   Alice Peace    Patient is Post-Op [] Yes   [x] No     DOS:      Visit number: $210 3/28/22, 22, 22, 22        Subjective Doing well Doing well                   7/7 1/7 2/7 3/7 4/7 5/7 6/7             Objective Per PT  Diagnosis: M54.50 (ICD-10-CM) - Lumbar spine painM51.36 (ICD-10-CM) - DDD (degenerative disc disease), [id number] (ICD-10-CM) - Lumbar spondylosis  Treatment Diagnosis: M51.36 DDD    Hamstring B 80 Adding home exercise plan at least 1x/day to keep moving to decrease back pain R knee pain no BJORN. 2-3/10 pain began during the middle of the week last week    Back pain is located more to L side vs central.     Noticed knee pain first than back pain. Neuropathy in R toes (2-4). Adjust routine for no leg lifts    Hip flexor stretch HEP   Hamstring B 80             Goals 1. Strengthen TA  2. Increase flexibility   3.  Continue gaining strides with physical workout                   Reformer Exercises Walking 2R 2x10  Squats 2R1Y II and ER 2x10 2x10  2x10  Raises 2x10  U squat 2R 15x 2x10  2x10 no wide ER  2x10  2x10  2x10  2x10  15x 2x10  2x10  2x10  15x 2x10  2x10  2x10  15x 2x10  2x10  2x10  15x   Pelvic Stabilization   Bridges 3R 2x10 ADD  Do abs first next time 2x10 ADD  Better with abs first - continue 2x10  30 sec rest in between sets 2x10 2x10 2x10 2x10     Standing ABD 1R1Y 2x10 2x10 2x10 2x10 2x10                       Trunk Stabilization 1R1Y 10x 12x 2x10x 15x 15x 15x 15x    TA series - Y's biking  30 sec rest in between sets                           Hip Disassociation 2R1Y 2x10 2x10 15x 2x10 2x10 2x10 2x10    Arcs, circles          Flossing 2R 15x D/C                  Scapular Stabilization Seated on box          Rows 2R 15x 15x 15x 15x 15x 15x 15x     ext                  Thoracic Mobility  Thoracic AAROM 1R 10x [place] with ball 2 blue 10x                                     General ROM Hamstring 1 min 1 min 1 min 1 min 1 min 1 min 1 min    Hip flexor 1 min 1 min 1 min 1 min 1 min 1 min 1 min    1 min Post capsule 1 min 1 min 1 min 1 min 1 min 1 min             Other  HSC 1R 10x 10x                                     Summary/Comments Tolerated well. Going to see how she feels this week to decide if she wants to continue Continue POC                          Electronically signed by:  Suellen Horton, MS, LAT, ATC

## 2023-01-26 ENCOUNTER — HOSPITAL ENCOUNTER (OUTPATIENT)
Dept: PHYSICAL THERAPY | Age: 68
Discharge: HOME | End: 2023-01-26

## 2023-01-26 NOTE — FLOWSHEET NOTE
[facility], [address] [place]  Phone: [phone]  Fax [phone]      Date:  2023    Patient Name:  Sherif Jarrell    :  1955  MRN: [id number]  Restrictions/Precautions:    Medical/Treatment Diagnosis Information:    Diagnosis: M54.50 (ICD-10-CM) - Lumbar spine painM51.36 (ICD-10-CM) - DDD (degenerative disc disease), [id number] (ICD-10-CM) - Lumbar spondylosis  Treatment Diagnosis: M51.36 DDD       Physician Information:   Cleo Machado    Patient is Post-Op [] Yes   [x] No     DOS:      Visit number: $210 3/28/22, 22, 22, 22       Subjective Doing well Doing well Doing well                  7/7 1/7 2/7 3/7 4/7 5/7 6/7             Objective Per PT  Diagnosis: M54.50 (ICD-10-CM) - Lumbar spine painM51.36 (ICD-10-CM) - DDD (degenerative disc disease), [id number] (ICD-10-CM) - Lumbar spondylosis  Treatment Diagnosis: M51.36 DDD    Hamstring B 80 Adding home exercise plan at least 1x/day to keep moving to decrease back pain R knee pain no BJORN. 2-3/10 pain began during the middle of the week last week    Back pain is located more to L side vs central.     Noticed knee pain first than back pain. Neuropathy in R toes (2-4). Adjust routine for no leg lifts    Hip flexor stretch HEP   Hamstring B 80             Goals 1. Strengthen TA  2. Increase flexibility   3.  Continue gaining strides with physical workout                   Reformer Exercises Walking 2R 2x10  Squats 2R1Y II and ER 2x10 2x10  2x10  Raises 2x10  U squat 2R 15x 2x10  2x10 no wide ER  2x10   over/under contra leg 2x10  2x10  2x10  15x 2x10  2x10  2x10  15x 2x10  2x10  2x10  15x 2x10  2x10  2x10  15x   Pelvic Stabilization   Bridges 3R 2x10 ADD  Do abs first next time 2x10 ADD  Better with abs first - continue 2x10  Added ext  2x10 2x10 2x10 2x10     Standing ABD 1R1Y 2x10 2x10 2x10 2x10 2x10 2x10 Trunk Stabilization 1R1Y 10x 12x 2x10x 15x 15x 15x 15x    TA series - Y's biking  30 sec rest in between sets          Attempted flex series - unable to do                  Hip Disassociation 2R1Y 2x10 2x10 15x 2x10 2x10 2x10 2x10    Arcs, circles          Flossing 2R 15x D/C                  Scapular Stabilization Seated on box          Rows 2R 15x 15x 15x 15x 15x 15x 15x     ext                  Thoracic Mobility  Thoracic AAROM 1R 10x                                      General ROM Hamstring 1 min 1 min 1 min 1 min 1 min 1 min 1 min    Hip flexor 1 min 1 min 1 min 1 min 1 min 1 min 1 min    1 min Post capsule 1 min 1 min 1 min 1 min 1 min 1 min             Other  HSC 1R 10x 10x                                     Summary/Comments Tolerated well.  Going to see how she feels this week to decide if she wants to continue Continue POC                          Electronically signed by:  Eunice Candelaria, MS, LAT, ATC

## 2023-01-30 ENCOUNTER — HOSPITAL ENCOUNTER (OUTPATIENT)
Dept: PHYSICAL THERAPY | Age: 68
Discharge: HOME | End: 2023-01-30

## 2023-01-30 NOTE — FLOWSHEET NOTE
[facility], [address] [place]  Phone: [phone]  Fax [phone]      Date:  2023    Patient Name:  Carissa Montes    :  1955  MRN: [id number]  Restrictions/Precautions:    Medical/Treatment Diagnosis Information:    Diagnosis: M54.50 (ICD-10-CM) - Lumbar spine painM51.36 (ICD-10-CM) - DDD (degenerative disc disease), bykwosG07.816 (ICD-10-CM) - Lumbar spondylosis  Treatment Diagnosis: M51.36 DDD       Physician Information:   Dae Bryan    Patient is Post-Op [] Yes   [x] No     DOS:      Visit number: $210 3/28/22, 22, 22, 22      Subjective Doing well Doing well Doing well Doing well                 7/7 1/7 2/7 3/7 4/7 5/7 6/7             Objective Per PT  Diagnosis: M54.50 (ICD-10-CM) - Lumbar spine painM51.36 (ICD-10-CM) - DDD (degenerative disc disease), [id number] (ICD-10-CM) - Lumbar spondylosis  Treatment Diagnosis: M51.36 DDD    Hamstring B 80 Adding home exercise plan at least 1x/day to keep moving to decrease back pain R knee pain no BJORN. 2-3/10 pain began during the middle of the week last week    Back pain is located more to L side vs central.     Noticed knee pain first than back pain. Neuropathy in R toes (2-4). Adjust routine for no leg lifts    Hip flexor stretch HEP   Hamstring B 80             Goals 1. Strengthen TA  2. Increase flexibility   3.  Continue gaining strides with physical workout                   Reformer Exercises Walking 2R 2x10  Squats 2R1Y II and ER 2x10 2x10  2x10  Raises 2x10  U squat 2R 15x 2x10  2x10 no wide ER  2x10   over/under contra leg 2x10  2x10  2x10  15x 2x10  2x10  2x10  15x 2x10  2x10  2x10  15x 2x10  2x10  2x10  15x   Pelvic Stabilization   Bridges 3R 2x10 ADD  Do abs first next time 2x10 ADD  Better with abs first - continue 2x10  Added ext  2x10 2x10 2x10 2x10     Standing ABD 1R1Y 2x10 2x10 2x10 2x10 2x10 2x10                       Trunk Stabilization 1R1Y 10x 12x 2x10x 15x 15x 15x 15x    TA series - Y's biking  30 sec rest in between sets          Attempted flex series - unable to do                  Hip Disassociation 2R1Y 2x10 2x10 15x 2x10 2x10 2x10 2x10    Arcs, circles          Flossing 2R 15x D/C                  Scapular Stabilization Seated on box          Rows 2R 15x 15x 15x 15x 15x 15x 15x     ext                  Thoracic Mobility  Thoracic AAROM 1R 10x                                      General ROM Hamstring 1 min 1 min 1 min 1 min 1 min 1 min 1 min    Hip flexor 1 min 1 min 1 min 1 min 1 min 1 min 1 min    1 min Post capsule 1 min 1 min 1 min 1 min 1 min 1 min             Other  HSC 1R 10x 10x                                     Summary/Comments Tolerated well.  Going to see how she feels this week to decide if she wants to continue Continue POC                          Electronically signed by:  Ha De La Cruz, MS, LAT, ATC

## 2023-02-02 ENCOUNTER — HOSPITAL ENCOUNTER (OUTPATIENT)
Dept: PHYSICAL THERAPY | Age: 68
Discharge: HOME | End: 2023-02-02

## 2023-02-02 NOTE — FLOWSHEET NOTE
[facility], [address] [place]  Phone: [phone]  Fax [phone]      Date:  2023    Patient Name:  Liliana Boo    :  1955  MRN: [id number]  Restrictions/Precautions:    Medical/Treatment Diagnosis Information:    Diagnosis: M54.50 (ICD-10-CM) - Lumbar spine painM51.36 (ICD-10-CM) - DDD (degenerative disc disease), [id number] (ICD-10-CM) - Lumbar spondylosis  Treatment Diagnosis: M51.36 DDD       Physician Information:   Marcie Brown    Patient is Post-Op [] Yes   [x] No     DOS:      Visit number: $210 3/28/22, 22, 22, 22     Subjective Doing well Doing well Doing well Doing well Doing well                7/7 1/7 2/7 3/7 4/7 5/7 6/7             Objective Per PT  Diagnosis: M54.50 (ICD-10-CM) - Lumbar spine painM51.36 (ICD-10-CM) - DDD (degenerative disc disease), [id number] (ICD-10-CM) - Lumbar spondylosis  Treatment Diagnosis: M51.36 DDD    Hamstring B 80 Adding home exercise plan at least 1x/day to keep moving to decrease back pain R knee pain no BJORN. 2-3/10 pain began during the middle of the week last week    Back pain is located more to L side vs central.     Noticed knee pain first than back pain. Neuropathy in R toes (2-4). Adjust routine for no leg lifts    Hip flexor stretch HEP   Hamstring B 80             Goals 1. Strengthen TA  2. Increase flexibility   3.  Continue gaining strides with physical workout                   Reformer Exercises Walking 2R 2x10  Squats 2R1Y II and ER 2x10 2x10  2x10  Raises 2x10  U squat 2R 15x 2x10  2x10 no wide ER  2x10   over/under contra leg 2x10  2x10  2x10  15x [id number]  3R 2x10  2R1Y 2x10  2R1B 15x 2x10  2x10  2x10  15x 2x10  2x10  2x10  15x   Pelvic Stabilization   Bridges 3R 2x10 ADD  Do abs first next time 2x10 ADD  Better with abs first - continue 2x10  Added ext  2x10 2x10 2x10 2x10 Standing ABD 1R1Y 2x10 2x10 2x10 2x10 2x10 2x10                       Trunk Stabilization 1R1Y 10x 12x 2x10x 15x 15x 15x 15x    TA series - Y's biking  30 sec rest in between sets          Attempted flex series - unable to do                  Hip Disassociation 2R1Y 2x10 2x10 15x 2x10 2x10 2R 2x10 2x10    Arcs, circles          Flossing 2R 15x D/C                  Scapular Stabilization Seated on box          Rows 2R 15x 15x 15x 15x 15x 15x 15x     ext                  Thoracic Mobility  Thoracic AAROM 1R 10x                                      General ROM Hamstring 1 min 1 min 1 min 1 min 1 min 1 min 1 min    Hip flexor 1 min 1 min 1 min 1 min 1 min 1 min 1 min    1 min Post capsule 1 min 1 min 1 min 1 min 1 min 1 min             Other  HSC 1R 10x 10x  1R1B                                   Summary/Comments Tolerated well.  Going to see how she feels this week to decide if she wants to continue Continue POC                          Electronically signed by:  Ha De La Cruz, MS, LAT, ATC

## 2023-02-06 ENCOUNTER — HOSPITAL ENCOUNTER (OUTPATIENT)
Dept: PHYSICAL THERAPY | Age: 68
Discharge: HOME | End: 2023-02-06

## 2023-02-06 NOTE — FLOWSHEET NOTE
[facility], [address]  Phone: [phone]  Fax [phone]      Date:  2023    Patient Name:  Carolina Argueta    :  1955  MRN: [id number]  Restrictions/Precautions:    Medical/Treatment Diagnosis Information:    Diagnosis: M54.50 (ICD-10-CM) - Lumbar spine painM51.36 (ICD-10-CM) - DDD (degenerative disc disease), [id number] (ICD-10-CM) - Lumbar spondylosis  Treatment Diagnosis: M51.36 DDD       Physician Information:   Nitin Bro    Patient is Post-Op [] Yes   [x] No     DOS:      Visit number: $210 3/28/22, 22, 22, 22    Subjective Doing well Doing well Doing well Doing well Doing well Doing well               7/7 1/7 2/7 3/7 4/7 5/7 6/7             Objective Per PT  Diagnosis: M54.50 (ICD-10-CM) - Lumbar spine painM51.36 (ICD-10-CM) - DDD (degenerative disc disease), [id number] (ICD-10-CM) - Lumbar spondylosis  Treatment Diagnosis: M51.36 DDD    Hamstring B 80 Adding home exercise plan at least 1x/day to keep moving to decrease back pain R knee pain no BJORN. 2-3/10 pain began during the middle of the week last week    Back pain is located more to L side vs central.     Noticed knee pain first than back pain. Neuropathy in R toes (2-4). Adjust routine for no leg lifts    Hip flexor stretch HEP   Hamstring B 80             Goals 1. Strengthen TA  2. Increase flexibility   3.  Continue gaining strides with physical workout                   Reformer Exercises Walking 2R 2x10  Squats 2R1Y II and ER 2x10 2x10  2x10  Raises 2x10  U squat 2R 15x 2x10  2x10 no wide ER  2x10   over/under contra leg 2x10  2x10  2x10  15x [id number]  3R 2x10  2R1Y 2x10  2R1B 15x 2x10  2x10  2x10  15x 2x10  2x10  2x10  15x   Pelvic Stabilization   Bridges 3R 2x10 ADD  Do abs first next time 2x10 ADD  Better with abs first - continue 2x10  Added ext  2x10 2x10 2x10 2x10     Standing ABD 1R1Y 2x10 2x10 2x10 2x10 2x10 2x10                       Trunk Stabilization 1R1Y 10x 12x 2x10x 15x 15x 15x 15x    TA series - Y's biking  30 sec rest in between sets          Attempted flex series - unable to do                  Hip Disassociation 2R1Y 2x10 2x10 15x 2x10 2x10 2R 2x10 2x10    Arcs, circles          Flossing 2R 15x D/C                  Scapular Stabilization Seated on box          Rows 2R 15x 15x 15x 15x 15x 15x 15x     ext                  Thoracic Mobility  Thoracic AAROM 1R 10x                                      General ROM Hamstring 1 min 1 min 1 min 1 min 1 min 1 min 1 min    Hip flexor 1 min 1 min 1 min 1 min 1 min 1 min 1 min    1 min Post capsule 1 min 1 min 1 min 1 min 1 min 1 min             Other  HSC 1R 10x 10x  1R1B                                   Summary/Comments Tolerated well.  Going to see how she feels this week to decide if she wants to continue Continue POC                          Electronically signed by:  Domo Bolivar, MS, LAT, ATC

## 2023-02-09 ENCOUNTER — HOSPITAL ENCOUNTER (OUTPATIENT)
Dept: PHYSICAL THERAPY | Age: 68
Discharge: HOME | End: 2023-02-09

## 2023-02-09 NOTE — FLOWSHEET NOTE
[facility], [address]  Phone: [phone]  Fax [phone]      Date:  2023    Patient Name:  Milton Ornelas    :  1955  MRN: [id number]  Restrictions/Precautions:    Medical/Treatment Diagnosis Information:    Diagnosis: M54.50 (ICD-10-CM) - Lumbar spine painM51.36 (ICD-10-CM) - DDD (degenerative disc disease), [id number] (ICD-10-CM) - Lumbar spondylosis  Treatment Diagnosis: M51.36 DDD       Physician Information:   Alycia Soares    Patient is Post-Op [] Yes   [x] No     DOS:      Visit number: $210 3/28/22, 22, 22, 22   Subjective Doing well Doing well Doing well Doing well Doing well Doing well Doing well              7/7 1/7 2/7 3/7 4/7 5/7 6/7             Objective Per PT  Diagnosis: M54.50 (ICD-10-CM) - Lumbar spine painM51.36 (ICD-10-CM) - DDD (degenerative disc disease), [id number] (ICD-10-CM) - Lumbar spondylosis  Treatment Diagnosis: M51.36 DDD    Hamstring B 80 Adding home exercise plan at least 1x/day to keep moving to decrease back pain R knee pain no BJORN. 2-3/10 pain began during the middle of the week last week    Back pain is located more to L side vs central.     Noticed knee pain first than back pain. Neuropathy in R toes (2-4). Adjust routine for no leg lifts    Hip flexor stretch HEP   Hamstring B 80             Goals 1. Strengthen TA  2. Increase flexibility   3.  Continue gaining strides with physical workout                   Reformer Exercises Walking 2R 2x10  Squats 2R1Y II and ER 2x10 2x10  2x10  Raises 2x10  U squat 2R 15x 2x10  2x10 no wide ER  2x10   over/under contra leg 2x10  2x10  2x10  15x [id number]  3R 2x10  2R1Y 2x10  2R1B 15x 2x10  2x10  2x10  15x 2x10  2x10  2x10  15x   Pelvic Stabilization   Bridges 3R 2x10 ADD  Do abs first next time 2x10 ADD  Better with abs first - continue 2x10  Added ext 2x10 2x10 2x10 2x10     Standing ABD 1R1Y 2x10 2x10 2x10 2x10 2x10 2x10                       Trunk Stabilization 1R1Y 10x 12x 2x10x 15x 15x 15x 15x    TA series - Y's biking  30 sec rest in between sets          Attempted flex series - unable to do                  Hip Disassociation 2R1Y 2x10 2x10 15x 2x10 2x10 2R 2x10 2x10    Arcs, circles          Flossing 2R 15x D/C                  Scapular Stabilization Seated on box          Rows 2R 15x 15x 15x 15x 15x 15x 15x     ext                  Thoracic Mobility  Thoracic AAROM 1R 10x                                      General ROM Hamstring 1 min 1 min 1 min 1 min 1 min 1 min 1 min    Hip flexor 1 min 1 min 1 min 1 min 1 min 1 min 1 min    1 min Post capsule 1 min 1 min 1 min 1 min 1 min 1 min             Other  HSC 1R 10x 10x  1R1B                                   Summary/Comments Tolerated well.  Going to see how she feels this week to decide if she wants to continue Continue POC                          Electronically signed by:  Sahil Hutchinson, MS, LAT, ATC

## (undated) DEVICE — NEEDLE HYPO 18GA L1.5IN PNK POLYPR HUB S STL THN WALL FILL

## (undated) DEVICE — SMALL OSC. SAW BLADE, 5.5MM X 18.5MM X 0.38MM: Brand: MICROAIRE®

## (undated) DEVICE — ENDO CARRY-ON PROCEDURE KIT INCLUDES SUCTION TUBING, LUBRICANT, GAUZE, BIOHAZARD STICKER, TRANSPORT PAD AND INTERCEPT BEDSIDE KIT.: Brand: ENDO CARRY-ON PROCEDURE KIT

## (undated) DEVICE — TOWEL OR BLUEE 16X26IN ST 8 PACK ORB08 16X26ORTWL

## (undated) DEVICE — BANDAGE COMPR W3INXL5YD TAN BRTH SELF ADH WRP W/ HND TEAR

## (undated) DEVICE — CANNULATED COUNTERSINK: Brand: ASNIS

## (undated) DEVICE — STANDARD DRILL BIT , AO

## (undated) DEVICE — PADDING CAST W4INXL4YD NONSTERILE COT RAYON MICROPLEATED

## (undated) DEVICE — ALCOHOL RUBBING 16OZ 70% ISO

## (undated) DEVICE — CONMED SCOPE SAVER BITE BLOCK, 20X27 MM: Brand: SCOPE SAVER

## (undated) DEVICE — SUTURE VCRL SZ 3-0 L27IN ABSRB UD L26MM CT-2 1/2 CIR J232H

## (undated) DEVICE — COVER C ARM MINI

## (undated) DEVICE — CHLORAPREP 26ML ORANGE

## (undated) DEVICE — FORCEPS BX L240CM JAW DIA2.8MM L CAP W/ NDL MIC MESH TOOTH

## (undated) DEVICE — K-WIRE
Type: IMPLANTABLE DEVICE | Status: NON-FUNCTIONAL
Brand: ASNIS
Removed: 2020-12-15

## (undated) DEVICE — Device: Brand: DISPOSABLE ELECTROSURGICAL SNARE

## (undated) DEVICE — CANNULATED SCREWDRIVER

## (undated) DEVICE — COTTON UNDERCAST PADDING,CRIMPED FINISH: Brand: WEBRIL

## (undated) DEVICE — Device

## (undated) DEVICE — GAUZE,SPONGE,4"X4",16PLY,STRL,LF,10/TRAY: Brand: MEDLINE

## (undated) DEVICE — GLOVE NON LATEX  SIZE 8.0

## (undated) DEVICE — POSITIONING PIN

## (undated) DEVICE — CANNULATED DRILL: Brand: ASNIS

## (undated) DEVICE — UNIVERSAL BLOCK TRAY: Brand: MEDLINE INDUSTRIES, INC.

## (undated) DEVICE — STERILE LATEX POWDER-FREE SURGICAL GLOVESWITH NITRILE COATING: Brand: PROTEXIS

## (undated) DEVICE — STERILE POLYISOPRENE POWDER-FREE SURGICAL GLOVES: Brand: PROTEXIS

## (undated) DEVICE — SUTURE COAT VCRL SZ 5-0 L18IN ABSRB UD L19MM PS-2 1/2 CIR J495G

## (undated) DEVICE — NEEDLE HYPO 25GA L1.5IN BLU POLYPR HUB S STL REG BVL STR

## (undated) DEVICE — SMALL OSC. SAW BLADE, 9MM X 24.6MM X 0.38MM: Brand: MICROAIRE®

## (undated) DEVICE — SUTURE VCRL SZ 4-0 L27IN ABSRB UD L26MM SH 1/2 CIR J415H